# Patient Record
Sex: MALE | Race: WHITE | NOT HISPANIC OR LATINO | Employment: FULL TIME | URBAN - METROPOLITAN AREA
[De-identification: names, ages, dates, MRNs, and addresses within clinical notes are randomized per-mention and may not be internally consistent; named-entity substitution may affect disease eponyms.]

---

## 2017-06-03 ENCOUNTER — HOSPITAL ENCOUNTER (INPATIENT)
Facility: HOSPITAL | Age: 57
LOS: 1 days | Discharge: HOME | DRG: 552 | End: 2017-06-04
Attending: THORACIC SURGERY (CARDIOTHORACIC VASCULAR SURGERY) | Admitting: THORACIC SURGERY (CARDIOTHORACIC VASCULAR SURGERY)
Payer: COMMERCIAL

## 2017-06-03 ENCOUNTER — APPOINTMENT (EMERGENCY)
Dept: RADIOLOGY | Facility: HOSPITAL | Age: 57
DRG: 552 | End: 2017-06-03
Payer: COMMERCIAL

## 2017-06-03 LAB
ANION GAP SERPL CALCULATED.3IONS-SCNC: 5 MMOL/L (ref 4–13)
BASE EXCESS BLDA CALC-SCNC: 5 MMOL/L (ref -2–3)
BASOPHILS # BLD AUTO: 0.02 THOUSANDS/ΜL (ref 0–0.1)
BASOPHILS NFR BLD AUTO: 0 % (ref 0–1)
BUN SERPL-MCNC: 12 MG/DL (ref 5–25)
CA-I BLD-SCNC: 1.12 MMOL/L (ref 1.12–1.32)
CALCIUM SERPL-MCNC: 9 MG/DL (ref 8.3–10.1)
CHLORIDE SERPL-SCNC: 105 MMOL/L (ref 100–108)
CO2 SERPL-SCNC: 27 MMOL/L (ref 21–32)
CREAT SERPL-MCNC: 1.15 MG/DL (ref 0.6–1.3)
EOSINOPHIL # BLD AUTO: 0.25 THOUSAND/ΜL (ref 0–0.61)
EOSINOPHIL NFR BLD AUTO: 4 % (ref 0–6)
ERYTHROCYTE [DISTWIDTH] IN BLOOD BY AUTOMATED COUNT: 12.4 % (ref 11.6–15.1)
GFR SERPL CREATININE-BSD FRML MDRD: >60 ML/MIN/1.73SQ M
GLUCOSE SERPL-MCNC: 83 MG/DL (ref 65–140)
GLUCOSE SERPL-MCNC: 91 MG/DL (ref 65–140)
HCO3 BLDA-SCNC: 28.4 MMOL/L (ref 24–30)
HCT VFR BLD AUTO: 42.6 % (ref 36.5–49.3)
HCT VFR BLD CALC: 42 % (ref 36.5–49.3)
HGB BLD-MCNC: 15.6 G/DL (ref 12–17)
HGB BLDA-MCNC: 14.3 G/DL (ref 12–17)
HOLD SPECIMEN: NORMAL
HOLD SPECIMEN: YES
LYMPHOCYTES # BLD AUTO: 1.82 THOUSANDS/ΜL (ref 0.6–4.47)
LYMPHOCYTES NFR BLD AUTO: 31 % (ref 14–44)
MCH RBC QN AUTO: 31.5 PG (ref 26.8–34.3)
MCHC RBC AUTO-ENTMCNC: 36.6 G/DL (ref 31.4–37.4)
MCV RBC AUTO: 86 FL (ref 82–98)
MONOCYTES # BLD AUTO: 0.52 THOUSAND/ΜL (ref 0.17–1.22)
MONOCYTES NFR BLD AUTO: 9 % (ref 4–12)
NEUTROPHILS # BLD AUTO: 3.23 THOUSANDS/ΜL (ref 1.85–7.62)
NEUTS SEG NFR BLD AUTO: 56 % (ref 43–75)
NRBC BLD AUTO-RTO: 0 /100 WBCS
PCO2 BLD: 30 MMOL/L (ref 21–32)
PCO2 BLD: 36.2 MM HG (ref 42–50)
PH BLD: 7.5 [PH] (ref 7.3–7.4)
PLATELET # BLD AUTO: 171 THOUSANDS/UL (ref 149–390)
PMV BLD AUTO: 9.3 FL (ref 8.9–12.7)
PO2 BLD: 52 MM HG (ref 35–45)
POTASSIUM BLD-SCNC: 3.6 MMOL/L (ref 3.5–5.3)
POTASSIUM SERPL-SCNC: 4.6 MMOL/L (ref 3.5–5.3)
RBC # BLD AUTO: 4.95 MILLION/UL (ref 3.88–5.62)
SAO2 % BLD FROM PO2: 90 % (ref 95–98)
SODIUM BLD-SCNC: 141 MMOL/L (ref 136–145)
SODIUM SERPL-SCNC: 137 MMOL/L (ref 136–145)
SPECIMEN SOURCE: ABNORMAL
WBC # BLD AUTO: 5.86 THOUSAND/UL (ref 4.31–10.16)

## 2017-06-03 PROCEDURE — 80048 BASIC METABOLIC PNL TOTAL CA: CPT | Performed by: THORACIC SURGERY (CARDIOTHORACIC VASCULAR SURGERY)

## 2017-06-03 PROCEDURE — 84295 ASSAY OF SERUM SODIUM: CPT

## 2017-06-03 PROCEDURE — 84132 ASSAY OF SERUM POTASSIUM: CPT

## 2017-06-03 PROCEDURE — 85014 HEMATOCRIT: CPT

## 2017-06-03 PROCEDURE — 82330 ASSAY OF CALCIUM: CPT

## 2017-06-03 PROCEDURE — 85025 COMPLETE CBC W/AUTO DIFF WBC: CPT | Performed by: THORACIC SURGERY (CARDIOTHORACIC VASCULAR SURGERY)

## 2017-06-03 PROCEDURE — 73560 X-RAY EXAM OF KNEE 1 OR 2: CPT

## 2017-06-03 PROCEDURE — 71010 HB CHEST X-RAY 1 VIEW FRONTAL: CPT

## 2017-06-03 PROCEDURE — 70450 CT HEAD/BRAIN W/O DYE: CPT

## 2017-06-03 PROCEDURE — 36415 COLL VENOUS BLD VENIPUNCTURE: CPT

## 2017-06-03 PROCEDURE — 99285 EMERGENCY DEPT VISIT HI MDM: CPT

## 2017-06-03 PROCEDURE — 72125 CT NECK SPINE W/O DYE: CPT

## 2017-06-03 PROCEDURE — 82803 BLOOD GASES ANY COMBINATION: CPT

## 2017-06-03 PROCEDURE — 71260 CT THORAX DX C+: CPT

## 2017-06-03 PROCEDURE — 82947 ASSAY GLUCOSE BLOOD QUANT: CPT

## 2017-06-03 PROCEDURE — 73110 X-RAY EXAM OF WRIST: CPT

## 2017-06-03 PROCEDURE — 74177 CT ABD & PELVIS W/CONTRAST: CPT

## 2017-06-03 PROCEDURE — 96374 THER/PROPH/DIAG INJ IV PUSH: CPT

## 2017-06-03 RX ORDER — ONDANSETRON 2 MG/ML
4 INJECTION INTRAMUSCULAR; INTRAVENOUS EVERY 6 HOURS PRN
Status: DISCONTINUED | OUTPATIENT
Start: 2017-06-03 | End: 2017-06-04 | Stop reason: HOSPADM

## 2017-06-03 RX ORDER — ESOMEPRAZOLE MAGNESIUM 40 MG/1
40 CAPSULE, DELAYED RELEASE ORAL
COMMUNITY
End: 2017-12-20 | Stop reason: ALTCHOICE

## 2017-06-03 RX ORDER — FENTANYL CITRATE 50 UG/ML
INJECTION, SOLUTION INTRAMUSCULAR; INTRAVENOUS CODE/TRAUMA/SEDATION MEDICATION
Status: COMPLETED | OUTPATIENT
Start: 2017-06-03 | End: 2017-06-03

## 2017-06-03 RX ORDER — OXYCODONE HYDROCHLORIDE 5 MG/1
5 TABLET ORAL EVERY 4 HOURS PRN
Status: DISCONTINUED | OUTPATIENT
Start: 2017-06-03 | End: 2017-06-04 | Stop reason: HOSPADM

## 2017-06-03 RX ORDER — OXYCODONE HYDROCHLORIDE 10 MG/1
10 TABLET ORAL EVERY 4 HOURS PRN
Status: DISCONTINUED | OUTPATIENT
Start: 2017-06-03 | End: 2017-06-04 | Stop reason: HOSPADM

## 2017-06-03 RX ORDER — ESCITALOPRAM OXALATE 20 MG/1
20 TABLET ORAL DAILY
COMMUNITY
End: 2017-12-20 | Stop reason: ALTCHOICE

## 2017-06-03 RX ORDER — FENTANYL CITRATE 50 UG/ML
INJECTION, SOLUTION INTRAMUSCULAR; INTRAVENOUS
Status: COMPLETED
Start: 2017-06-03 | End: 2017-06-03

## 2017-06-03 RX ORDER — MORPHINE SULFATE 2 MG/ML
2 INJECTION, SOLUTION INTRAMUSCULAR; INTRAVENOUS EVERY 2 HOUR PRN
Status: DISCONTINUED | OUTPATIENT
Start: 2017-06-03 | End: 2017-06-04 | Stop reason: HOSPADM

## 2017-06-03 RX ORDER — ACETAMINOPHEN 325 MG/1
650 TABLET ORAL EVERY 6 HOURS SCHEDULED
Status: DISCONTINUED | OUTPATIENT
Start: 2017-06-03 | End: 2017-06-04 | Stop reason: HOSPADM

## 2017-06-03 RX ADMIN — ACETAMINOPHEN 650 MG: 325 TABLET, FILM COATED ORAL at 17:17

## 2017-06-03 RX ADMIN — OXYCODONE HYDROCHLORIDE 10 MG: 10 TABLET ORAL at 15:51

## 2017-06-03 RX ADMIN — FENTANYL CITRATE 50 MCG: 50 INJECTION INTRAMUSCULAR; INTRAVENOUS at 14:15

## 2017-06-03 RX ADMIN — FENTANYL CITRATE 50 MCG: 50 INJECTION, SOLUTION INTRAMUSCULAR; INTRAVENOUS at 14:15

## 2017-06-03 RX ADMIN — IOHEXOL 100 ML: 350 INJECTION, SOLUTION INTRAVENOUS at 14:09

## 2017-06-03 RX ADMIN — ACETAMINOPHEN 650 MG: 325 TABLET, FILM COATED ORAL at 23:35

## 2017-06-04 VITALS
BODY MASS INDEX: 23.29 KG/M2 | TEMPERATURE: 98.2 F | SYSTOLIC BLOOD PRESSURE: 127 MMHG | HEIGHT: 72 IN | RESPIRATION RATE: 16 BRPM | OXYGEN SATURATION: 95 % | WEIGHT: 171.96 LBS | HEART RATE: 68 BPM | DIASTOLIC BLOOD PRESSURE: 91 MMHG

## 2017-06-04 PROBLEM — S63.509A WRIST SPRAIN: Status: ACTIVE | Noted: 2017-06-04

## 2017-06-04 PROBLEM — S13.9XXA CERVICAL SPRAIN: Status: ACTIVE | Noted: 2017-06-04
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
## 2017-06-04 LAB
ANION GAP SERPL CALCULATED.3IONS-SCNC: 7 MMOL/L (ref 4–13)
BUN SERPL-MCNC: 12 MG/DL (ref 5–25)
CALCIUM SERPL-MCNC: 8.6 MG/DL (ref 8.3–10.1)
CHLORIDE SERPL-SCNC: 104 MMOL/L (ref 100–108)
CO2 SERPL-SCNC: 31 MMOL/L (ref 21–32)
CREAT SERPL-MCNC: 0.99 MG/DL (ref 0.6–1.3)
ERYTHROCYTE [DISTWIDTH] IN BLOOD BY AUTOMATED COUNT: 12.4 % (ref 11.6–15.1)
GFR SERPL CREATININE-BSD FRML MDRD: >60 ML/MIN/1.73SQ M
GLUCOSE SERPL-MCNC: 80 MG/DL (ref 65–140)
HCT VFR BLD AUTO: 41.5 % (ref 36.5–49.3)
HGB BLD-MCNC: 14.8 G/DL (ref 12–17)
MCH RBC QN AUTO: 31 PG (ref 26.8–34.3)
MCHC RBC AUTO-ENTMCNC: 35.7 G/DL (ref 31.4–37.4)
MCV RBC AUTO: 87 FL (ref 82–98)
PLATELET # BLD AUTO: 158 THOUSANDS/UL (ref 149–390)
PMV BLD AUTO: 9.6 FL (ref 8.9–12.7)
POTASSIUM SERPL-SCNC: 3.3 MMOL/L (ref 3.5–5.3)
RBC # BLD AUTO: 4.77 MILLION/UL (ref 3.88–5.62)
SODIUM SERPL-SCNC: 142 MMOL/L (ref 136–145)
WBC # BLD AUTO: 5 THOUSAND/UL (ref 4.31–10.16)

## 2017-06-04 PROCEDURE — G8988 SELF CARE GOAL STATUS: HCPCS

## 2017-06-04 PROCEDURE — G8978 MOBILITY CURRENT STATUS: HCPCS

## 2017-06-04 PROCEDURE — 80048 BASIC METABOLIC PNL TOTAL CA: CPT | Performed by: ORTHOPAEDIC SURGERY

## 2017-06-04 PROCEDURE — G8989 SELF CARE D/C STATUS: HCPCS

## 2017-06-04 PROCEDURE — G8987 SELF CARE CURRENT STATUS: HCPCS

## 2017-06-04 PROCEDURE — 97166 OT EVAL MOD COMPLEX 45 MIN: CPT

## 2017-06-04 PROCEDURE — 85027 COMPLETE CBC AUTOMATED: CPT | Performed by: ORTHOPAEDIC SURGERY

## 2017-06-04 PROCEDURE — G8980 MOBILITY D/C STATUS: HCPCS

## 2017-06-04 PROCEDURE — 97162 PT EVAL MOD COMPLEX 30 MIN: CPT

## 2017-06-04 PROCEDURE — G8979 MOBILITY GOAL STATUS: HCPCS

## 2017-06-04 RX ORDER — POTASSIUM CHLORIDE 20 MEQ/1
40 TABLET, EXTENDED RELEASE ORAL ONCE
Status: COMPLETED | OUTPATIENT
Start: 2017-06-04 | End: 2017-06-04

## 2017-06-04 RX ORDER — METHOCARBAMOL 500 MG/1
500 TABLET, FILM COATED ORAL EVERY 8 HOURS PRN
Qty: 15 TABLET | Refills: 0 | Status: SHIPPED | OUTPATIENT
Start: 2017-06-04 | End: 2017-12-20 | Stop reason: ALTCHOICE

## 2017-06-04 RX ADMIN — POTASSIUM CHLORIDE 40 MEQ: 1500 TABLET, EXTENDED RELEASE ORAL at 08:48

## 2017-06-04 RX ADMIN — ACETAMINOPHEN 650 MG: 325 TABLET, FILM COATED ORAL at 11:54

## 2017-06-04 RX ADMIN — OXYCODONE HYDROCHLORIDE 10 MG: 10 TABLET ORAL at 11:54

## 2017-06-04 RX ADMIN — ACETAMINOPHEN 650 MG: 325 TABLET, FILM COATED ORAL at 06:09

## 2017-06-04 RX ADMIN — OXYCODONE HYDROCHLORIDE 10 MG: 10 TABLET ORAL at 06:10

## 2017-06-05 ENCOUNTER — GENERIC CONVERSION - ENCOUNTER (OUTPATIENT)
Dept: OTHER | Facility: OTHER | Age: 57
End: 2017-06-05

## 2017-06-06 ENCOUNTER — HOSPITAL ENCOUNTER (OUTPATIENT)
Dept: RADIOLOGY | Facility: HOSPITAL | Age: 57
Discharge: HOME | End: 2017-06-06
Attending: SURGERY
Payer: COMMERCIAL

## 2017-06-06 ENCOUNTER — TRANSCRIBE ORDERS (OUTPATIENT)
Dept: RADIOLOGY | Facility: HOSPITAL | Age: 57
End: 2017-06-06

## 2017-06-06 DIAGNOSIS — M54.9 DORSALGIA: ICD-10-CM

## 2017-06-06 DIAGNOSIS — M25.532 LEFT WRIST PAIN: ICD-10-CM

## 2017-06-06 DIAGNOSIS — M54.9 DORSALGIA: Primary | ICD-10-CM

## 2017-06-06 PROCEDURE — 73110 X-RAY EXAM OF WRIST: CPT

## 2017-06-06 PROCEDURE — 72052 X-RAY EXAM NECK SPINE 6/>VWS: CPT

## 2017-06-08 ENCOUNTER — ALLSCRIPTS OFFICE VISIT (OUTPATIENT)
Dept: OTHER | Facility: OTHER | Age: 57
End: 2017-06-08

## 2017-06-08 ENCOUNTER — TRANSCRIBE ORDERS (OUTPATIENT)
Dept: ADMINISTRATIVE | Facility: HOSPITAL | Age: 57
End: 2017-06-08

## 2017-06-08 DIAGNOSIS — S62.112A: ICD-10-CM

## 2017-06-08 DIAGNOSIS — M77.8 OTHER ENTHESOPATHIES, NOT ELSEWHERE CLASSIFIED: ICD-10-CM

## 2017-06-08 DIAGNOSIS — M77.8 ENTHESOPATHY OF ELBOW, UNSPECIFIED: ICD-10-CM

## 2017-06-08 DIAGNOSIS — M25.532 PAIN IN LEFT WRIST: ICD-10-CM

## 2017-06-08 DIAGNOSIS — M25.532 LEFT WRIST PAIN: Primary | ICD-10-CM

## 2017-06-08 DIAGNOSIS — S60.011A CONTUSION OF RIGHT THUMB WITHOUT DAMAGE TO NAIL: ICD-10-CM

## 2017-06-09 ENCOUNTER — ALLSCRIPTS OFFICE VISIT (OUTPATIENT)
Dept: OTHER | Facility: OTHER | Age: 57
End: 2017-06-09

## 2017-06-12 ENCOUNTER — TRANSCRIBE ORDERS (OUTPATIENT)
Dept: LAB | Facility: CLINIC | Age: 57
End: 2017-06-12

## 2017-06-12 ENCOUNTER — APPOINTMENT (OUTPATIENT)
Dept: LAB | Facility: CLINIC | Age: 57
End: 2017-06-12
Payer: COMMERCIAL

## 2017-06-12 DIAGNOSIS — I10 UNSPECIFIED ESSENTIAL HYPERTENSION: ICD-10-CM

## 2017-06-12 DIAGNOSIS — E03.8 OTHER SPECIFIED ACQUIRED HYPOTHYROIDISM: ICD-10-CM

## 2017-06-12 DIAGNOSIS — E06.3 CHRONIC LYMPHOCYTIC THYROIDITIS: ICD-10-CM

## 2017-06-12 DIAGNOSIS — E03.8 OTHER SPECIFIED ACQUIRED HYPOTHYROIDISM: Primary | ICD-10-CM

## 2017-06-12 LAB
POTASSIUM SERPL-SCNC: 3.5 MMOL/L (ref 3.5–5.3)
TSH SERPL DL<=0.05 MIU/L-ACNC: 2.25 UIU/ML (ref 0.36–3.74)

## 2017-06-12 PROCEDURE — 84443 ASSAY THYROID STIM HORMONE: CPT

## 2017-06-12 PROCEDURE — 36415 COLL VENOUS BLD VENIPUNCTURE: CPT

## 2017-06-12 PROCEDURE — 84132 ASSAY OF SERUM POTASSIUM: CPT

## 2017-06-17 ENCOUNTER — HOSPITAL ENCOUNTER (OUTPATIENT)
Dept: MRI IMAGING | Facility: HOSPITAL | Age: 57
Discharge: HOME | End: 2017-06-17
Attending: ORTHOPAEDIC SURGERY
Payer: COMMERCIAL

## 2017-06-17 DIAGNOSIS — M25.532 PAIN IN LEFT WRIST: ICD-10-CM

## 2017-06-17 DIAGNOSIS — S62.112A: ICD-10-CM

## 2017-06-17 DIAGNOSIS — M25.532 LEFT WRIST PAIN: ICD-10-CM

## 2017-06-17 DIAGNOSIS — M77.8 OTHER ENTHESOPATHIES, NOT ELSEWHERE CLASSIFIED: ICD-10-CM

## 2017-06-17 DIAGNOSIS — M77.8 ENTHESOPATHY OF ELBOW, UNSPECIFIED: ICD-10-CM

## 2017-06-17 PROCEDURE — 73221 MRI JOINT UPR EXTREM W/O DYE: CPT

## 2017-07-05 ENCOUNTER — ALLSCRIPTS OFFICE VISIT (OUTPATIENT)
Dept: OTHER | Facility: OTHER | Age: 57
End: 2017-07-05

## 2017-07-05 ENCOUNTER — HOSPITAL ENCOUNTER (OUTPATIENT)
Dept: RADIOLOGY | Facility: HOSPITAL | Age: 57
Discharge: HOME | End: 2017-07-05
Attending: ORTHOPAEDIC SURGERY
Payer: COMMERCIAL

## 2017-07-05 DIAGNOSIS — S62.112A: ICD-10-CM

## 2017-07-05 PROCEDURE — 73110 X-RAY EXAM OF WRIST: CPT

## 2017-07-06 ENCOUNTER — ALLSCRIPTS OFFICE VISIT (OUTPATIENT)
Dept: OTHER | Facility: OTHER | Age: 57
End: 2017-07-06

## 2017-07-12 ENCOUNTER — GENERIC CONVERSION - ENCOUNTER (OUTPATIENT)
Dept: OTHER | Facility: OTHER | Age: 57
End: 2017-07-12

## 2017-07-12 ENCOUNTER — APPOINTMENT (OUTPATIENT)
Dept: OCCUPATIONAL THERAPY | Facility: CLINIC | Age: 57
End: 2017-07-12
Payer: COMMERCIAL

## 2017-07-12 DIAGNOSIS — S60.011A CONTUSION OF RIGHT THUMB WITHOUT DAMAGE TO NAIL: ICD-10-CM

## 2017-07-12 PROCEDURE — 97165 OT EVAL LOW COMPLEX 30 MIN: CPT

## 2017-07-18 ENCOUNTER — APPOINTMENT (OUTPATIENT)
Dept: OCCUPATIONAL THERAPY | Facility: CLINIC | Age: 57
End: 2017-07-18
Payer: COMMERCIAL

## 2017-07-18 PROCEDURE — 97110 THERAPEUTIC EXERCISES: CPT

## 2017-07-18 PROCEDURE — 97010 HOT OR COLD PACKS THERAPY: CPT

## 2017-07-18 PROCEDURE — 97140 MANUAL THERAPY 1/> REGIONS: CPT

## 2017-07-20 ENCOUNTER — APPOINTMENT (OUTPATIENT)
Dept: OCCUPATIONAL THERAPY | Facility: CLINIC | Age: 57
End: 2017-07-20
Payer: COMMERCIAL

## 2017-07-25 ENCOUNTER — APPOINTMENT (OUTPATIENT)
Dept: OCCUPATIONAL THERAPY | Facility: CLINIC | Age: 57
End: 2017-07-25
Payer: COMMERCIAL

## 2017-07-25 PROCEDURE — 97110 THERAPEUTIC EXERCISES: CPT

## 2017-07-25 PROCEDURE — 97140 MANUAL THERAPY 1/> REGIONS: CPT

## 2017-07-25 PROCEDURE — 97150 GROUP THERAPEUTIC PROCEDURES: CPT

## 2017-07-27 ENCOUNTER — ALLSCRIPTS OFFICE VISIT (OUTPATIENT)
Dept: OTHER | Facility: OTHER | Age: 57
End: 2017-07-27

## 2017-07-27 ENCOUNTER — APPOINTMENT (OUTPATIENT)
Dept: OCCUPATIONAL THERAPY | Facility: CLINIC | Age: 57
End: 2017-07-27
Payer: COMMERCIAL

## 2017-07-27 DIAGNOSIS — M25.532 PAIN IN LEFT WRIST: ICD-10-CM

## 2017-07-27 DIAGNOSIS — S62.112A: ICD-10-CM

## 2017-07-27 DIAGNOSIS — M77.8 OTHER ENTHESOPATHIES, NOT ELSEWHERE CLASSIFIED: ICD-10-CM

## 2017-07-27 PROCEDURE — 97110 THERAPEUTIC EXERCISES: CPT

## 2017-07-27 PROCEDURE — 97140 MANUAL THERAPY 1/> REGIONS: CPT

## 2017-07-31 ENCOUNTER — APPOINTMENT (OUTPATIENT)
Dept: OCCUPATIONAL THERAPY | Facility: CLINIC | Age: 57
End: 2017-07-31
Payer: COMMERCIAL

## 2017-07-31 PROCEDURE — 97110 THERAPEUTIC EXERCISES: CPT

## 2017-07-31 PROCEDURE — 97140 MANUAL THERAPY 1/> REGIONS: CPT

## 2017-08-02 ENCOUNTER — APPOINTMENT (OUTPATIENT)
Dept: OCCUPATIONAL THERAPY | Facility: CLINIC | Age: 57
End: 2017-08-02
Payer: COMMERCIAL

## 2017-08-02 PROCEDURE — 97110 THERAPEUTIC EXERCISES: CPT

## 2017-08-02 PROCEDURE — 97140 MANUAL THERAPY 1/> REGIONS: CPT

## 2017-08-07 ENCOUNTER — APPOINTMENT (OUTPATIENT)
Dept: OCCUPATIONAL THERAPY | Facility: CLINIC | Age: 57
End: 2017-08-07
Payer: COMMERCIAL

## 2017-08-07 PROCEDURE — 97140 MANUAL THERAPY 1/> REGIONS: CPT

## 2017-08-07 PROCEDURE — 97110 THERAPEUTIC EXERCISES: CPT

## 2017-08-09 ENCOUNTER — APPOINTMENT (OUTPATIENT)
Dept: OCCUPATIONAL THERAPY | Facility: CLINIC | Age: 57
End: 2017-08-09
Payer: COMMERCIAL

## 2017-08-09 PROCEDURE — 97110 THERAPEUTIC EXERCISES: CPT

## 2017-08-09 PROCEDURE — 97140 MANUAL THERAPY 1/> REGIONS: CPT

## 2017-08-14 ENCOUNTER — ALLSCRIPTS OFFICE VISIT (OUTPATIENT)
Dept: OTHER | Facility: OTHER | Age: 57
End: 2017-08-14

## 2017-08-14 ENCOUNTER — APPOINTMENT (OUTPATIENT)
Dept: OCCUPATIONAL THERAPY | Facility: CLINIC | Age: 57
End: 2017-08-14
Payer: COMMERCIAL

## 2017-08-14 ENCOUNTER — APPOINTMENT (OUTPATIENT)
Dept: RADIOLOGY | Facility: CLINIC | Age: 57
End: 2017-08-14
Payer: COMMERCIAL

## 2017-08-14 ENCOUNTER — TRANSCRIBE ORDERS (OUTPATIENT)
Dept: ADMINISTRATIVE | Facility: HOSPITAL | Age: 57
End: 2017-08-14

## 2017-08-14 DIAGNOSIS — M25.532 PAIN IN LEFT WRIST: ICD-10-CM

## 2017-08-14 DIAGNOSIS — S62.112A CLOSED DISPLACED FRACTURE OF TRIQUETRUM OF LEFT WRIST, INITIAL ENCOUNTER: Primary | ICD-10-CM

## 2017-08-14 PROCEDURE — 73110 X-RAY EXAM OF WRIST: CPT

## 2017-08-14 PROCEDURE — 97110 THERAPEUTIC EXERCISES: CPT

## 2017-08-14 PROCEDURE — 97010 HOT OR COLD PACKS THERAPY: CPT

## 2017-08-14 PROCEDURE — 97140 MANUAL THERAPY 1/> REGIONS: CPT

## 2017-08-17 ENCOUNTER — APPOINTMENT (OUTPATIENT)
Dept: OCCUPATIONAL THERAPY | Facility: CLINIC | Age: 57
End: 2017-08-17
Payer: COMMERCIAL

## 2017-08-17 PROCEDURE — 97010 HOT OR COLD PACKS THERAPY: CPT

## 2017-08-17 PROCEDURE — 97140 MANUAL THERAPY 1/> REGIONS: CPT

## 2017-08-17 PROCEDURE — 97110 THERAPEUTIC EXERCISES: CPT

## 2017-08-21 ENCOUNTER — APPOINTMENT (OUTPATIENT)
Dept: OCCUPATIONAL THERAPY | Facility: CLINIC | Age: 57
End: 2017-08-21
Payer: COMMERCIAL

## 2017-08-21 PROCEDURE — 97110 THERAPEUTIC EXERCISES: CPT

## 2017-08-21 PROCEDURE — 97140 MANUAL THERAPY 1/> REGIONS: CPT

## 2017-08-23 ENCOUNTER — APPOINTMENT (OUTPATIENT)
Dept: OCCUPATIONAL THERAPY | Facility: CLINIC | Age: 57
End: 2017-08-23
Payer: COMMERCIAL

## 2017-08-23 PROCEDURE — 97140 MANUAL THERAPY 1/> REGIONS: CPT

## 2017-08-23 PROCEDURE — 97110 THERAPEUTIC EXERCISES: CPT

## 2017-08-24 ENCOUNTER — HOSPITAL ENCOUNTER (OUTPATIENT)
Dept: MRI IMAGING | Facility: HOSPITAL | Age: 57
Discharge: HOME | End: 2017-08-24
Attending: ORTHOPAEDIC SURGERY
Payer: COMMERCIAL

## 2017-08-24 DIAGNOSIS — S62.112A CLOSED DISPLACED FRACTURE OF TRIQUETRUM OF LEFT WRIST, INITIAL ENCOUNTER: ICD-10-CM

## 2017-08-24 PROCEDURE — 73221 MRI JOINT UPR EXTREM W/O DYE: CPT

## 2017-08-25 ENCOUNTER — APPOINTMENT (OUTPATIENT)
Dept: OCCUPATIONAL THERAPY | Facility: CLINIC | Age: 57
End: 2017-08-25
Payer: COMMERCIAL

## 2017-08-25 PROCEDURE — 97110 THERAPEUTIC EXERCISES: CPT

## 2017-08-25 PROCEDURE — 97140 MANUAL THERAPY 1/> REGIONS: CPT

## 2017-08-28 ENCOUNTER — APPOINTMENT (OUTPATIENT)
Dept: OCCUPATIONAL THERAPY | Facility: CLINIC | Age: 57
End: 2017-08-28
Payer: COMMERCIAL

## 2017-08-28 PROCEDURE — 97110 THERAPEUTIC EXERCISES: CPT

## 2017-08-28 PROCEDURE — 97140 MANUAL THERAPY 1/> REGIONS: CPT

## 2017-08-30 ENCOUNTER — APPOINTMENT (OUTPATIENT)
Dept: OCCUPATIONAL THERAPY | Facility: CLINIC | Age: 57
End: 2017-08-30
Payer: COMMERCIAL

## 2017-08-30 PROCEDURE — 97110 THERAPEUTIC EXERCISES: CPT

## 2017-08-30 PROCEDURE — 97140 MANUAL THERAPY 1/> REGIONS: CPT

## 2017-09-01 ENCOUNTER — APPOINTMENT (OUTPATIENT)
Dept: OCCUPATIONAL THERAPY | Facility: CLINIC | Age: 57
End: 2017-09-01
Payer: COMMERCIAL

## 2017-09-05 ENCOUNTER — APPOINTMENT (OUTPATIENT)
Dept: OCCUPATIONAL THERAPY | Facility: CLINIC | Age: 57
End: 2017-09-05
Payer: COMMERCIAL

## 2017-09-05 PROCEDURE — 97140 MANUAL THERAPY 1/> REGIONS: CPT

## 2017-09-05 PROCEDURE — 97010 HOT OR COLD PACKS THERAPY: CPT

## 2017-09-05 PROCEDURE — 97110 THERAPEUTIC EXERCISES: CPT

## 2017-09-07 ENCOUNTER — APPOINTMENT (OUTPATIENT)
Dept: OCCUPATIONAL THERAPY | Facility: CLINIC | Age: 57
End: 2017-09-07
Payer: COMMERCIAL

## 2017-09-07 PROCEDURE — 97140 MANUAL THERAPY 1/> REGIONS: CPT

## 2017-09-07 PROCEDURE — 97110 THERAPEUTIC EXERCISES: CPT

## 2017-09-08 ENCOUNTER — APPOINTMENT (OUTPATIENT)
Dept: OCCUPATIONAL THERAPY | Facility: CLINIC | Age: 57
End: 2017-09-08
Payer: COMMERCIAL

## 2017-09-08 PROCEDURE — 97110 THERAPEUTIC EXERCISES: CPT

## 2017-09-08 PROCEDURE — 97140 MANUAL THERAPY 1/> REGIONS: CPT

## 2017-09-11 ENCOUNTER — APPOINTMENT (OUTPATIENT)
Dept: OCCUPATIONAL THERAPY | Facility: CLINIC | Age: 57
End: 2017-09-11
Payer: COMMERCIAL

## 2017-09-11 PROCEDURE — 97110 THERAPEUTIC EXERCISES: CPT

## 2017-09-13 ENCOUNTER — APPOINTMENT (OUTPATIENT)
Dept: OCCUPATIONAL THERAPY | Facility: CLINIC | Age: 57
End: 2017-09-13
Payer: COMMERCIAL

## 2017-09-15 ENCOUNTER — APPOINTMENT (OUTPATIENT)
Dept: OCCUPATIONAL THERAPY | Facility: CLINIC | Age: 57
End: 2017-09-15
Payer: COMMERCIAL

## 2017-09-18 ENCOUNTER — ALLSCRIPTS OFFICE VISIT (OUTPATIENT)
Dept: OTHER | Facility: OTHER | Age: 57
End: 2017-09-18

## 2017-09-21 ENCOUNTER — APPOINTMENT (OUTPATIENT)
Dept: OCCUPATIONAL THERAPY | Facility: CLINIC | Age: 57
End: 2017-09-21
Payer: COMMERCIAL

## 2017-09-21 PROCEDURE — 97140 MANUAL THERAPY 1/> REGIONS: CPT

## 2017-09-21 PROCEDURE — 97110 THERAPEUTIC EXERCISES: CPT

## 2017-09-26 ENCOUNTER — APPOINTMENT (OUTPATIENT)
Dept: OCCUPATIONAL THERAPY | Facility: CLINIC | Age: 57
End: 2017-09-26
Payer: COMMERCIAL

## 2017-09-26 PROCEDURE — 97110 THERAPEUTIC EXERCISES: CPT

## 2017-09-26 PROCEDURE — 97140 MANUAL THERAPY 1/> REGIONS: CPT

## 2017-09-28 ENCOUNTER — APPOINTMENT (OUTPATIENT)
Dept: OCCUPATIONAL THERAPY | Facility: CLINIC | Age: 57
End: 2017-09-28
Payer: COMMERCIAL

## 2017-09-28 PROCEDURE — 97140 MANUAL THERAPY 1/> REGIONS: CPT

## 2017-09-28 PROCEDURE — 97110 THERAPEUTIC EXERCISES: CPT

## 2017-10-03 ENCOUNTER — APPOINTMENT (OUTPATIENT)
Dept: OCCUPATIONAL THERAPY | Facility: CLINIC | Age: 57
End: 2017-10-03
Payer: COMMERCIAL

## 2017-10-03 PROCEDURE — 97110 THERAPEUTIC EXERCISES: CPT

## 2017-10-03 PROCEDURE — 97140 MANUAL THERAPY 1/> REGIONS: CPT

## 2017-10-05 ENCOUNTER — APPOINTMENT (OUTPATIENT)
Dept: OCCUPATIONAL THERAPY | Facility: CLINIC | Age: 57
End: 2017-10-05
Payer: COMMERCIAL

## 2017-10-05 PROCEDURE — 97140 MANUAL THERAPY 1/> REGIONS: CPT

## 2017-10-05 PROCEDURE — G8990 OTHER PT/OT CURRENT STATUS: HCPCS

## 2017-10-05 PROCEDURE — G8991 OTHER PT/OT GOAL STATUS: HCPCS

## 2017-12-20 ENCOUNTER — HOSPITAL ENCOUNTER (OUTPATIENT)
Facility: HOSPITAL | Age: 57
Setting detail: OBSERVATION
Discharge: HOME | End: 2017-12-22
Attending: EMERGENCY MEDICINE | Admitting: HOSPITALIST
Payer: COMMERCIAL

## 2017-12-20 ENCOUNTER — APPOINTMENT (EMERGENCY)
Dept: RADIOLOGY | Facility: HOSPITAL | Age: 57
End: 2017-12-20
Payer: COMMERCIAL

## 2017-12-20 DIAGNOSIS — R05.9 COUGH: ICD-10-CM

## 2017-12-20 DIAGNOSIS — R50.9 FEVER AND CHILLS: Primary | ICD-10-CM

## 2017-12-20 PROBLEM — E06.3 HASHIMOTO'S DISEASE: Status: ACTIVE | Noted: 2017-12-20

## 2017-12-20 PROBLEM — E86.0 DEHYDRATION: Status: ACTIVE | Noted: 2017-12-20

## 2017-12-20 PROBLEM — E87.6 HYPOKALEMIA: Status: ACTIVE | Noted: 2017-12-20

## 2017-12-20 LAB
ALBUMIN SERPL BCP-MCNC: 3.7 G/DL (ref 3.5–5)
ALP SERPL-CCNC: 103 U/L (ref 46–116)
ALT SERPL W P-5'-P-CCNC: 47 U/L (ref 12–78)
ANION GAP SERPL CALCULATED.3IONS-SCNC: 10 MMOL/L (ref 4–13)
AST SERPL W P-5'-P-CCNC: 28 U/L (ref 5–45)
BASOPHILS # BLD AUTO: 0.01 THOUSANDS/ΜL (ref 0–0.1)
BASOPHILS NFR BLD AUTO: 0 % (ref 0–1)
BILIRUB SERPL-MCNC: 0.9 MG/DL (ref 0.2–1)
BILIRUB UR QL STRIP: NEGATIVE
BUN SERPL-MCNC: 8 MG/DL (ref 5–25)
CALCIUM SERPL-MCNC: 8.6 MG/DL (ref 8.3–10.1)
CHLORIDE SERPL-SCNC: 101 MMOL/L (ref 100–108)
CK SERPL-CCNC: 70 U/L (ref 39–308)
CLARITY UR: CLEAR
CO2 SERPL-SCNC: 28 MMOL/L (ref 21–32)
COLOR UR: NORMAL
CREAT SERPL-MCNC: 1.16 MG/DL (ref 0.6–1.3)
CRP SERPL QL: 10.5 MG/L
EOSINOPHIL # BLD AUTO: 0.15 THOUSAND/ΜL (ref 0–0.61)
EOSINOPHIL NFR BLD AUTO: 3 % (ref 0–6)
ERYTHROCYTE [DISTWIDTH] IN BLOOD BY AUTOMATED COUNT: 12 % (ref 11.6–15.1)
FLUAV AG SPEC QL IA: NEGATIVE
FLUBV AG SPEC QL IA: NEGATIVE
GFR SERPL CREATININE-BSD FRML MDRD: 70 ML/MIN/1.73SQ M
GLUCOSE SERPL-MCNC: 116 MG/DL (ref 65–140)
GLUCOSE UR STRIP-MCNC: NEGATIVE MG/DL
HCT VFR BLD AUTO: 43.6 % (ref 36.5–49.3)
HGB BLD-MCNC: 15.2 G/DL (ref 12–17)
HGB UR QL STRIP.AUTO: NEGATIVE
KETONES UR STRIP-MCNC: NEGATIVE MG/DL
LACTATE SERPL-SCNC: 1.5 MMOL/L (ref 0.5–2)
LEUKOCYTE ESTERASE UR QL STRIP: NEGATIVE
LYMPHOCYTES # BLD AUTO: 0.71 THOUSANDS/ΜL (ref 0.6–4.47)
LYMPHOCYTES NFR BLD AUTO: 13 % (ref 14–44)
MCH RBC QN AUTO: 30.5 PG (ref 26.8–34.3)
MCHC RBC AUTO-ENTMCNC: 34.9 G/DL (ref 31.4–37.4)
MCV RBC AUTO: 87 FL (ref 82–98)
MONOCYTES # BLD AUTO: 0.58 THOUSAND/ΜL (ref 0.17–1.22)
MONOCYTES NFR BLD AUTO: 11 % (ref 4–12)
NEUTROPHILS # BLD AUTO: 4.02 THOUSANDS/ΜL (ref 1.85–7.62)
NEUTS SEG NFR BLD AUTO: 73 % (ref 43–75)
NITRITE UR QL STRIP: NEGATIVE
PH UR STRIP.AUTO: 7.5 [PH] (ref 4.5–8)
PLATELET # BLD AUTO: 176 THOUSANDS/UL (ref 149–390)
PMV BLD AUTO: 9.5 FL (ref 8.9–12.7)
POTASSIUM SERPL-SCNC: 3.2 MMOL/L (ref 3.5–5.3)
PROT SERPL-MCNC: 6.5 G/DL (ref 6.4–8.2)
PROT UR STRIP-MCNC: NEGATIVE MG/DL
RBC # BLD AUTO: 4.99 MILLION/UL (ref 3.88–5.62)
SODIUM SERPL-SCNC: 139 MMOL/L (ref 136–145)
SP GR UR STRIP.AUTO: 1.01 (ref 1–1.03)
TSH SERPL DL<=0.05 MIU/L-ACNC: 1.8 UIU/ML (ref 0.36–3.74)
UROBILINOGEN UR QL STRIP.AUTO: 0.2 E.U./DL
WBC # BLD AUTO: 5.47 THOUSAND/UL (ref 4.31–10.16)

## 2017-12-20 PROCEDURE — 86140 C-REACTIVE PROTEIN: CPT | Performed by: PHYSICIAN ASSISTANT

## 2017-12-20 PROCEDURE — 82550 ASSAY OF CK (CPK): CPT | Performed by: PHYSICIAN ASSISTANT

## 2017-12-20 PROCEDURE — 85025 COMPLETE CBC W/AUTO DIFF WBC: CPT | Performed by: PHYSICIAN ASSISTANT

## 2017-12-20 PROCEDURE — 96361 HYDRATE IV INFUSION ADD-ON: CPT

## 2017-12-20 PROCEDURE — 96375 TX/PRO/DX INJ NEW DRUG ADDON: CPT

## 2017-12-20 PROCEDURE — 87400 INFLUENZA A/B EACH AG IA: CPT | Performed by: PHYSICIAN ASSISTANT

## 2017-12-20 PROCEDURE — 84443 ASSAY THYROID STIM HORMONE: CPT | Performed by: PHYSICIAN ASSISTANT

## 2017-12-20 PROCEDURE — 36415 COLL VENOUS BLD VENIPUNCTURE: CPT | Performed by: PHYSICIAN ASSISTANT

## 2017-12-20 PROCEDURE — 81003 URINALYSIS AUTO W/O SCOPE: CPT | Performed by: PHYSICIAN ASSISTANT

## 2017-12-20 PROCEDURE — 71020 HB CHEST X-RAY 2VW FRONTAL&LATL: CPT

## 2017-12-20 PROCEDURE — 87798 DETECT AGENT NOS DNA AMP: CPT | Performed by: PHYSICIAN ASSISTANT

## 2017-12-20 PROCEDURE — 83605 ASSAY OF LACTIC ACID: CPT | Performed by: PHYSICIAN ASSISTANT

## 2017-12-20 PROCEDURE — 87040 BLOOD CULTURE FOR BACTERIA: CPT | Performed by: PHYSICIAN ASSISTANT

## 2017-12-20 PROCEDURE — 80053 COMPREHEN METABOLIC PANEL: CPT | Performed by: PHYSICIAN ASSISTANT

## 2017-12-20 PROCEDURE — 96374 THER/PROPH/DIAG INJ IV PUSH: CPT

## 2017-12-20 RX ORDER — VENLAFAXINE 75 MG/1
75 TABLET ORAL 2 TIMES DAILY
COMMUNITY
End: 2018-09-17 | Stop reason: ALTCHOICE

## 2017-12-20 RX ORDER — CLONAZEPAM 1 MG/1
0.5 TABLET ORAL DAILY
COMMUNITY
End: 2019-01-25 | Stop reason: SDUPTHER

## 2017-12-20 RX ORDER — KETOROLAC TROMETHAMINE 30 MG/ML
15 INJECTION, SOLUTION INTRAMUSCULAR; INTRAVENOUS ONCE
Status: COMPLETED | OUTPATIENT
Start: 2017-12-20 | End: 2017-12-20

## 2017-12-20 RX ORDER — POTASSIUM CHLORIDE 20 MEQ/1
40 TABLET, EXTENDED RELEASE ORAL ONCE
Status: COMPLETED | OUTPATIENT
Start: 2017-12-20 | End: 2017-12-20

## 2017-12-20 RX ORDER — POTASSIUM CHLORIDE 20MEQ/15ML
20 LIQUID (ML) ORAL ONCE
Status: COMPLETED | OUTPATIENT
Start: 2017-12-20 | End: 2017-12-20

## 2017-12-20 RX ORDER — BUPROPION HYDROCHLORIDE 300 MG/1
300 TABLET ORAL DAILY
Status: ON HOLD | COMMUNITY
End: 2019-07-27 | Stop reason: ALTCHOICE

## 2017-12-20 RX ORDER — ACETAMINOPHEN 325 MG/1
650 TABLET ORAL EVERY 6 HOURS PRN
Status: DISCONTINUED | OUTPATIENT
Start: 2017-12-20 | End: 2017-12-22 | Stop reason: HOSPADM

## 2017-12-20 RX ORDER — SODIUM CHLORIDE 9 MG/ML
125 INJECTION, SOLUTION INTRAVENOUS CONTINUOUS
Status: DISCONTINUED | OUTPATIENT
Start: 2017-12-21 | End: 2017-12-21 | Stop reason: SDUPTHER

## 2017-12-20 RX ORDER — BUPROPION HYDROCHLORIDE 150 MG/1
300 TABLET ORAL DAILY
Status: DISCONTINUED | OUTPATIENT
Start: 2017-12-21 | End: 2017-12-22 | Stop reason: HOSPADM

## 2017-12-20 RX ORDER — HEPARIN SODIUM 5000 [USP'U]/ML
5000 INJECTION, SOLUTION INTRAVENOUS; SUBCUTANEOUS EVERY 8 HOURS SCHEDULED
Status: DISCONTINUED | OUTPATIENT
Start: 2017-12-21 | End: 2017-12-22 | Stop reason: HOSPADM

## 2017-12-20 RX ORDER — SODIUM CHLORIDE 9 MG/ML
125 INJECTION, SOLUTION INTRAVENOUS CONTINUOUS
Status: DISCONTINUED | OUTPATIENT
Start: 2017-12-21 | End: 2017-12-22 | Stop reason: HOSPADM

## 2017-12-20 RX ORDER — VENLAFAXINE 37.5 MG/1
75 TABLET ORAL 2 TIMES DAILY
Status: DISCONTINUED | OUTPATIENT
Start: 2017-12-21 | End: 2017-12-22 | Stop reason: HOSPADM

## 2017-12-20 RX ORDER — ONDANSETRON 4 MG/1
4 TABLET, ORALLY DISINTEGRATING ORAL ONCE
Status: COMPLETED | OUTPATIENT
Start: 2017-12-20 | End: 2017-12-20

## 2017-12-20 RX ORDER — ACETAMINOPHEN 325 MG/1
650 TABLET ORAL ONCE
Status: COMPLETED | OUTPATIENT
Start: 2017-12-20 | End: 2017-12-20

## 2017-12-20 RX ORDER — LEVOTHYROXINE SODIUM 0.05 MG/1
50 TABLET ORAL
Status: DISCONTINUED | OUTPATIENT
Start: 2017-12-21 | End: 2017-12-22 | Stop reason: HOSPADM

## 2017-12-20 RX ORDER — LEVOTHYROXINE SODIUM 0.05 MG/1
50 TABLET ORAL DAILY
COMMUNITY
End: 2019-01-15 | Stop reason: ALTCHOICE

## 2017-12-20 RX ORDER — CLONAZEPAM 0.5 MG/1
0.5 TABLET ORAL DAILY
Status: DISCONTINUED | OUTPATIENT
Start: 2017-12-21 | End: 2017-12-22 | Stop reason: HOSPADM

## 2017-12-20 RX ADMIN — ONDANSETRON 4 MG: 4 TABLET, ORALLY DISINTEGRATING ORAL at 20:09

## 2017-12-20 RX ADMIN — POTASSIUM CHLORIDE 20 MEQ: 1500 TABLET, EXTENDED RELEASE ORAL at 22:24

## 2017-12-20 RX ADMIN — KETOROLAC TROMETHAMINE 15 MG: 30 INJECTION, SOLUTION INTRAMUSCULAR at 22:29

## 2017-12-20 RX ADMIN — POTASSIUM CHLORIDE 20 MEQ: 20 SOLUTION ORAL at 22:44

## 2017-12-20 RX ADMIN — SODIUM CHLORIDE 1000 ML: 0.9 INJECTION, SOLUTION INTRAVENOUS at 20:24

## 2017-12-20 RX ADMIN — CEFTRIAXONE 2000 MG: 2 INJECTION, SOLUTION INTRAVENOUS at 22:29

## 2017-12-20 RX ADMIN — ACETAMINOPHEN 650 MG: 325 TABLET, FILM COATED ORAL at 20:23

## 2017-12-21 ENCOUNTER — APPOINTMENT (OUTPATIENT)
Dept: CT IMAGING | Facility: HOSPITAL | Age: 57
End: 2017-12-21
Payer: COMMERCIAL

## 2017-12-21 PROBLEM — J10.1 INFLUENZA DUE TO INFLUENZA VIRUS, TYPE B: Status: ACTIVE | Noted: 2017-12-21

## 2017-12-21 PROBLEM — A41.9 SEPSIS (HCC): Status: ACTIVE | Noted: 2017-12-21

## 2017-12-21 PROBLEM — E86.0 DEHYDRATION: Status: RESOLVED | Noted: 2017-12-20 | Resolved: 2017-12-21

## 2017-12-21 PROBLEM — E87.6 HYPOKALEMIA: Status: RESOLVED | Noted: 2017-12-20 | Resolved: 2017-12-21

## 2017-12-21 LAB
ANION GAP SERPL CALCULATED.3IONS-SCNC: 9 MMOL/L (ref 4–13)
BUN SERPL-MCNC: 7 MG/DL (ref 5–25)
CALCIUM SERPL-MCNC: 8.1 MG/DL (ref 8.3–10.1)
CHLORIDE SERPL-SCNC: 107 MMOL/L (ref 100–108)
CK SERPL-CCNC: 70 U/L (ref 39–308)
CO2 SERPL-SCNC: 26 MMOL/L (ref 21–32)
CREAT SERPL-MCNC: 0.93 MG/DL (ref 0.6–1.3)
ERYTHROCYTE [DISTWIDTH] IN BLOOD BY AUTOMATED COUNT: 12.2 % (ref 11.6–15.1)
FLUAV AG SPEC QL: ABNORMAL
FLUBV AG SPEC QL: DETECTED
GFR SERPL CREATININE-BSD FRML MDRD: 91 ML/MIN/1.73SQ M
GLUCOSE SERPL-MCNC: 94 MG/DL (ref 65–140)
HCT VFR BLD AUTO: 40.2 % (ref 36.5–49.3)
HGB BLD-MCNC: 13.5 G/DL (ref 12–17)
MAGNESIUM SERPL-MCNC: 2 MG/DL (ref 1.6–2.6)
MCH RBC QN AUTO: 30.3 PG (ref 26.8–34.3)
MCHC RBC AUTO-ENTMCNC: 33.6 G/DL (ref 31.4–37.4)
MCV RBC AUTO: 90 FL (ref 82–98)
PLATELET # BLD AUTO: 147 THOUSANDS/UL (ref 149–390)
PMV BLD AUTO: 9.8 FL (ref 8.9–12.7)
POTASSIUM SERPL-SCNC: 3.7 MMOL/L (ref 3.5–5.3)
RBC # BLD AUTO: 4.46 MILLION/UL (ref 3.88–5.62)
RSV B RNA SPEC QL NAA+PROBE: ABNORMAL
SODIUM SERPL-SCNC: 142 MMOL/L (ref 136–145)
WBC # BLD AUTO: 4.11 THOUSAND/UL (ref 4.31–10.16)

## 2017-12-21 PROCEDURE — 85027 COMPLETE CBC AUTOMATED: CPT | Performed by: HOSPITALIST

## 2017-12-21 PROCEDURE — 80048 BASIC METABOLIC PNL TOTAL CA: CPT | Performed by: HOSPITALIST

## 2017-12-21 PROCEDURE — 83735 ASSAY OF MAGNESIUM: CPT | Performed by: HOSPITALIST

## 2017-12-21 PROCEDURE — 82550 ASSAY OF CK (CPK): CPT | Performed by: HOSPITALIST

## 2017-12-21 PROCEDURE — 99284 EMERGENCY DEPT VISIT MOD MDM: CPT

## 2017-12-21 PROCEDURE — 70486 CT MAXILLOFACIAL W/O DYE: CPT

## 2017-12-21 RX ORDER — KETOROLAC TROMETHAMINE 30 MG/ML
15 INJECTION, SOLUTION INTRAMUSCULAR; INTRAVENOUS EVERY 6 HOURS PRN
Status: DISCONTINUED | OUTPATIENT
Start: 2017-12-21 | End: 2017-12-22 | Stop reason: HOSPADM

## 2017-12-21 RX ORDER — OSELTAMIVIR PHOSPHATE 75 MG/1
75 CAPSULE ORAL EVERY 12 HOURS SCHEDULED
Status: DISCONTINUED | OUTPATIENT
Start: 2017-12-21 | End: 2017-12-22 | Stop reason: HOSPADM

## 2017-12-21 RX ADMIN — VENLAFAXINE 75 MG: 37.5 TABLET ORAL at 09:28

## 2017-12-21 RX ADMIN — VENLAFAXINE 75 MG: 37.5 TABLET ORAL at 17:46

## 2017-12-21 RX ADMIN — ACETAMINOPHEN 650 MG: 325 TABLET, FILM COATED ORAL at 17:44

## 2017-12-21 RX ADMIN — CLONAZEPAM 0.5 MG: 0.5 TABLET ORAL at 09:27

## 2017-12-21 RX ADMIN — KETOROLAC TROMETHAMINE 15 MG: 30 INJECTION, SOLUTION INTRAMUSCULAR at 20:54

## 2017-12-21 RX ADMIN — HEPARIN SODIUM 5000 UNITS: 5000 INJECTION, SOLUTION INTRAVENOUS; SUBCUTANEOUS at 13:30

## 2017-12-21 RX ADMIN — LEVOTHYROXINE SODIUM 50 MCG: 50 TABLET ORAL at 05:06

## 2017-12-21 RX ADMIN — ACETAMINOPHEN 650 MG: 325 TABLET, FILM COATED ORAL at 09:27

## 2017-12-21 RX ADMIN — BUPROPION HYDROCHLORIDE 300 MG: 150 TABLET, FILM COATED, EXTENDED RELEASE ORAL at 09:27

## 2017-12-21 RX ADMIN — HEPARIN SODIUM 5000 UNITS: 5000 INJECTION, SOLUTION INTRAVENOUS; SUBCUTANEOUS at 05:06

## 2017-12-21 RX ADMIN — HEPARIN SODIUM 5000 UNITS: 5000 INJECTION, SOLUTION INTRAVENOUS; SUBCUTANEOUS at 21:00

## 2017-12-21 RX ADMIN — OSELTAMIVIR PHOSPHATE 75 MG: 75 CAPSULE ORAL at 12:07

## 2017-12-21 RX ADMIN — SODIUM CHLORIDE 125 ML/HR: 0.9 INJECTION, SOLUTION INTRAVENOUS at 10:44

## 2017-12-21 RX ADMIN — SODIUM CHLORIDE 125 ML/HR: 0.9 INJECTION, SOLUTION INTRAVENOUS at 00:45

## 2017-12-21 RX ADMIN — SODIUM CHLORIDE 125 ML/HR: 0.9 INJECTION, SOLUTION INTRAVENOUS at 19:56

## 2017-12-21 RX ADMIN — OSELTAMIVIR PHOSPHATE 75 MG: 75 CAPSULE ORAL at 20:55

## 2017-12-21 RX ADMIN — KETOROLAC TROMETHAMINE 15 MG: 30 INJECTION, SOLUTION INTRAMUSCULAR at 10:40

## 2017-12-21 NOTE — PROGRESS NOTES
Progress Note - Sandra Churchill 1960, 62 y o  male MRN: [id number]    Unit/Bed#: -01 Encounter: [id number]    Primary Care Provider: Dorinda Barnard MD   Date and time admitted to hospital: 12/20/2017  7:26 PM        * Influenza due to influenza virus, type B   Assessment & Plan    · Fever has resolved since time of admission  Temp currently 98 5 down from 100 5  · Tylenol and Toradol prn for headache, sinus pain, and myalgias  · CT scan of sinuses ordered by admitting physician for ongoing sinus pain x 3 weeks  Hypokalemia-resolved as of 12/21/2017   Assessment & Plan    · Resolved with oral supplementation  Dehydration-resolved as of 12/21/2017   Assessment & Plan    · Resolved with IV fluids  VTE Pharmacologic Prophylaxis:   Pharmacologic: Heparin  Mechanical VTE Prophylaxis in Place: No    Patient Centered Rounds: I have performed bedside rounds with nursing staff today  Discussions with Specialists or Other Care Team Provider: None    Education and Discussions with Family / Patient: Patient made aware he has influenza and no longer requires antibiotics  All questions were addressed and answered  Time Spent for Care: 20 minutes  More than 50% of total time spent on counseling and coordination of care as described above  Current Length of Stay: 0 day(s)    Current Patient Status: Observation   Certification Statement: The patient will continue to require additional inpatient hospital stay due to influenza    Discharge Plan: Patient will require an additional night per ID recommendations  Code Status: Level 1 - Full Code      Subjective:   Patient continues with headache, sinus pain, and body aches which have worsened since last night  He did not sleep well due to pain  He has not received any pain mediation since given Toradol in the ED, which alleviated his pain significantly  Continues to sporadically cough up green mucus   He denies nausea and states his appetite has increased since yesterday  Objective:     Vitals:   Temp (24hrs), Av 8 °F (37 1 °C), Min:98 1 °F (36 7 °C), Max:100 5 °F (38 1 °C)    HR:  [] 86  Resp:  [18-20] 18  BP: (128-145)/(73-94) 137/83  SpO2:  [95 %-98 %] 97 %  Body mass index is 21 32 kg/m²  Input and Output Summary (last 24 hours):     No intake or output data in the 24 hours ending 17 1102    Physical Exam:       Physical Exam   Constitutional: He appears well-developed and well-nourished  No distress  Patient is awake, alert, and interactive sitting upright in bed  HENT:   No pain on palpation of sinuses  Minimally erythematous nasal mucosa and TM bilaterally  Cardiovascular: Normal rate and normal heart sounds  Pulmonary/Chest: Effort normal and breath sounds normal  No respiratory distress  He has no wheezes  He has no rales  Abdominal: Soft  Bowel sounds are normal    Lymphadenopathy:     He has cervical adenopathy  Skin: He is not diaphoretic  Additional Data:     Labs:      Results from last 7 days  Lab Units 17   WBC Thousand/uL 4 11* 5 47   HEMOGLOBIN g/dL 13 5 15 2   HEMATOCRIT % 40 2 43 6   PLATELETS Thousands/uL 147* 176   NEUTROS PCT %  --  73   LYMPHS PCT %  --  13*   MONOS PCT %  --  11   EOS PCT %  --  3       Results from last 7 days  Lab Units 17   SODIUM mmol/L 142 139   POTASSIUM mmol/L 3 7 3 2*   CHLORIDE mmol/L 107 101   CO2 mmol/L 26 28   BUN mg/dL 7 8   CREATININE mg/dL 0 93 1 16   CALCIUM mg/dL 8 1* 8 6   TOTAL PROTEIN g/dL  --  6 5   BILIRUBIN TOTAL mg/dL  --  0 90   ALK PHOS U/L  --  103   ALT U/L  --  47   AST U/L  --  28   GLUCOSE RANDOM mg/dL 94 116           * I Have Reviewed All Lab Data Listed Above  * Additional Pertinent Lab Tests Reviewed: All Labs For Current Hospital Admission Reviewed    Imaging:    Imaging Reports Reviewed Today Include: None    Imaging Personally Reviewed by Myself Includes: None     Recent Cultures (last 7 days):       Results from last 7 days  Lab Units 12/20/17 2030   INFLUENZA A PCR  None Detected   INFLUENZA B PCR  Detected*   RSV PCR  None Detected       Last 24 Hours Medication List:     buPROPion 300 mg Oral Daily   clonazePAM 0 5 mg Oral Daily   heparin (porcine) 5,000 Units Subcutaneous Q8H [address]   levothyroxine 50 mcg Oral Early Morning   oseltamivir 75 mg Oral Q12H MARYBETH   venlafaxine 75 mg Oral BID        Today, Patient Was Seen By: Wandy Beaulieu PA-C    ** Please Note: Dictation voice to text software may have been used in the creation of this document   **

## 2017-12-21 NOTE — CONSULTS
Consultation - Infectious Disease   Daniela Quach III 62 y o  male MRN: [id number]  Unit/Bed#: -01 Encounter: [id number]      IMPRESSION & RECOMMENDATIONS:   Impression/Recommendations:  1  Sepsis, POA  Fever, tachycardia  Secondary to #2  Fevers improving  Patient not tachycardic  O2 sat stable on room air  Hemodynamically stable and nontoxic appearing     -plan as below  -monitor temperatures and white blood cell count  -monitor hemodynamics  -supportive care per primary team  -follow-up blood cultures    2  Influenza B  PCR now positive for influenza B, which is consistent with patient's presenting symptoms  Chest x-ray not indicative of any signs of pneumonia  Stable O2 sats on room air  Patient is certainly at risk for a secondary bacterial infection in the setting of influenza, but I do not see any signs or symptoms to suggest this at this point  He has already been on 2 different courses of antibiotics as an outpatient with no improvement of his symptoms  Would monitor off anymore antibiotics  Although patient's symptoms have been ongoing for more than 48 hours and it is difficult to tell when he actually contracted influenza, I would still treat with oseltamivir as he was ill enough to require hospitalization     -start oseltamivir 75 mg p o  twice a day for 5 days  -monitor respiratory symptoms  -monitor fevers  -no indication for CT sinus at this point  -supportive care per primary team    3  Elevated CRP  Likely in the setting of acute illness due to influenza  4   Hypothyroidism  Appears to be stable  -Synthroid per primary team    5  Headache  Improving with fever control  Likely all related to influenza  No meningeal signs on exam to suggest meningitis  Patient refused LP when offered in the ER   -monitor symptoms  -supportive care per primary team        Antibiotics:  Ceftriaxone D2    Thank you for this consultation  We will follow along with you      HISTORY OF PRESENT ILLNESS:  Reason for Consult:  Fevers    HPI: Ariadna Gaines is a 62 y o  male with history of anxiety, depression, hypothyroidism admitted on  with complaints of feeling sick for the past 2-3 weeks  He has had nasal congestion, headaches, intermittent fevers  Also has had a productive cough  Complains of diffuse myalgias  No shortness of breath  Yesterday, patient had visible rigors prompting him to come to the ER  Patient was initially diagnosed with acute sinusitis and given a 10 day course of an antibiotic, which he states did not improve his symptoms  He was subsequently on a different antibiotic up until about 5 days ago, and also received no relief from that  On admission, patient had a fever of 100 5  He was started on empiric ceftriaxone for possible community-acquired pneumonia  Flu PCR is now positive for influenza B  today, he feels like his fevers and rigors or better  He continues to have nasal congestion and intermittent in productive cough  No nausea, vomiting, diarrhea, abdominal pain, chest pain, urinary symptoms  He tolerated his breakfast this morning  REVIEW OF SYSTEMS:  A complete system-based review of systems is otherwise negative  PAST MEDICAL HISTORY:  Past Medical History:   Diagnosis Date    Anxiety     Depression     Hashimoto's disease     Hashimoto's disease      History reviewed  No pertinent surgical history  FAMILY HISTORY:  Non-contributory    SOCIAL HISTORY:  History   Alcohol Use No     History   Drug Use No     History   Smoking Status    Never Smoker   Smokeless Tobacco    Not on file       ALLERGIES:  Allergies   Allergen Reactions    Compazine [Prochlorperazine] GI Intolerance    Sulfa Antibiotics        MEDICATIONS:  All current active medications have been reviewed      PHYSICAL EXAM:  Vitals:  HR:  [] 86  Resp:  [18-20] 18  BP: (128-145)/(73-94) 137/83  SpO2:  [95 %-98 %] 97 %  Temp (24hrs), Av 8 °F (37 1 °C), Min:98 1 °F (36 7 °C), Max:100 5 °F (38 1 °C)  Current: Temperature: 98 5 °F (36 9 °C)     Physical Exam:  General:  No acute distress  Eyes:  Conjunctive clear with no hemorrhages or effusions  Oropharynx:  Mild or pharyngeal erythema, no exudate  Neck:  Supple, no lymphadenopathy  Lungs:  Clear to auscultation bilaterally, no accessory muscle use  Cardiac:  Regular rate and rhythm, no murmurs  Abdomen:  Soft, non-tender, non-distended  Extremities:  No peripheral cyanosis, clubbing, or edema  Skin:  No rashes, no ulcers  Neurological:  Moves all four extremities spontaneously, sensation grossly intact      LABS, IMAGING, & OTHER STUDIES:  Lab Results:  I have personally reviewed pertinent labs  Results from last 7 days  Lab Units 12/21/17 0530 12/20/17 2020   SODIUM mmol/L 142 139   POTASSIUM mmol/L 3 7 3 2*   CHLORIDE mmol/L 107 101   CO2 mmol/L 26 28   ANION GAP mmol/L 9 10   BUN mg/dL 7 8   CREATININE mg/dL 0 93 1 16   EGFR ml/min/1 73sq m 91 70   GLUCOSE RANDOM mg/dL 94 116   CALCIUM mg/dL 8 1* 8 6   AST U/L  --  28   ALT U/L  --  47   ALK PHOS U/L  --  103   TOTAL PROTEIN g/dL  --  6 5   ALBUMIN g/dL  --  3 7   BILIRUBIN TOTAL mg/dL  --  0 90       Results from last 7 days  Lab Units 12/21/17 0530 12/20/17 2020   WBC Thousand/uL 4 11* 5 47   HEMOGLOBIN g/dL 13 5 15 2   PLATELETS Thousands/uL 147* 176       Results from last 7 days  Lab Units 12/20/17 2030   INFLUENZA A PCR  None Detected   INFLUENZA B PCR  Detected*   RSV PCR  None Detected       Imaging Studies:   I have personally reviewed pertinent imaging study reports and images in PACS  No active pulmonary disease    EKG, Pathology, and Other Studies:   I have personally reviewed pertinent reports

## 2017-12-21 NOTE — ED ATTENDING ATTESTATION
Amarjit Farooq MD, saw and evaluated the patient  I have discussed the patient with the resident/non-physician practitioner and agree with the resident's/non-physician practitioner's findings, Plan of Care, and MDM as documented in the resident's/non-physician practitioner's note, except where noted  All available labs and Radiology studies were reviewed  At this point I agree with the current assessment done in the Emergency Department  I have conducted an independent evaluation of this patient a history and physical is as follows:    51-year-old male presents with worsening symptoms over the last few days  He has been sick overall for few weeks  He initially had sinus symptoms and was started on antibiotics without any improvement  He had a 2nd course of antibiotics again with no improvement  Today he reports cough, fever, headache, ear pain, generalized muscle aches and malaise  He has got a history of Hashimoto's thyroiditis, anxiety, GERD  He has not had much relief from OTC meds at home  No recent travel or sick contacts  He is febrile on arrival here and appears uncomfortable  He is actively having chills  Heart lungs sounds unremarkable  Bilateral middle ear effusions oropharynx erythematous  Differential diagnosis included viral illness, pneumonia, influenza  Denies any urinary symptoms  No neck stiffness no focal neurologic deficits  His evaluation was relatively unremarkable with a normal chest x-ray and labs other than CRP  History of possible sarcoidosis  Patient remained uncomfortable after meds  Unclear source of infection  Given multiple antibiotic courses as an outpatient will plan obs admission, IV abx until blood cultures clear  Disscused LP with the patient to r/o meningitis which he refused       Critical Care Time  CritCare Time    Procedures

## 2017-12-21 NOTE — ASSESSMENT & PLAN NOTE
· Fever has resolved since time of admission  Temp is 98 5  · Headache and sinus pain persists and has been ongoing for 3 weeks  Tylenol and Toradol were ordered prn for pain  CT scan of sinuses has been ordered    ·

## 2017-12-21 NOTE — ED PROVIDER NOTES
History  Chief Complaint   Patient presents with    Cough     Pt reports cough "for a while" finished 10 days of antibiotics for a sinus infection  Reports today increased chills, cough, fever (102 at home)  Pt reports "not feeling well at all"      Mr Juliana Ladd is a 58yo M who presents to the ED for cough, fever, and overall malaise  He has a history of hasimoto's, anxiety, and GERD  Patient stated he started feeling ill 2 5-3 weeks ago with a sore throat and earaches  He was seen by PCP, who diagnosed a sinus infection and was started on 10 day course of antibiotics  After completion, he still had symptoms and was again prescribed another round of antibiotics, which were finished 5 days ago  He states the symptoms have worsened since then and is now experiencing headache, productive cough, joint aches, nausea, and fever of 102  Patient also states that starting today, he has noticed increased burning over his skin and his eyes, along with shooting pain in his arms  He works as a  at [employer], but denies any known, recent exposures  Prior to Admission Medications   Prescriptions Last Dose Informant Patient Reported? Taking? buPROPion (WELLBUTRIN XL) 300 mg 24 hr tablet 12/20/2017 at Unknown time  Yes Yes   Sig: Take 300 mg by mouth daily   clonazePAM (KlonoPIN) 1 mg tablet 12/20/2017 at Unknown time  Yes Yes   Sig: Take 0 5 mg by mouth daily   levothyroxine 50 mcg tablet 12/20/2017 at Unknown time  Yes Yes   Sig: Take 50 mcg by mouth daily   venlafaxine (EFFEXOR) 75 mg tablet   Yes Yes   Sig: Take 75 mg by mouth 2 (two) times a day      Facility-Administered Medications: None       Past Medical History:   Diagnosis Date    Anxiety     Depression     Hashimoto's disease     Hashimoto's disease        History reviewed  No pertinent surgical history  History reviewed  No pertinent family history  I have reviewed and agree with the history as documented      Social History Substance Use Topics    Smoking status: Never Smoker    Smokeless tobacco: Not on file    Alcohol use No        Review of Systems   Constitutional: Positive for chills, fatigue and fever  HENT: Positive for congestion, ear pain, sinus pain and sore throat  Eyes: Negative for discharge and redness  Eyes burning   Respiratory: Positive for cough  Negative for chest tightness, shortness of breath and wheezing  Cardiovascular: Negative for chest pain  Gastrointestinal: Positive for nausea  Negative for abdominal pain, diarrhea and vomiting  Genitourinary: Negative for dysuria, frequency and urgency  Musculoskeletal: Positive for arthralgias and myalgias  Skin: Negative  Neurological: Positive for weakness, light-headedness and headaches  Psychiatric/Behavioral: Negative  Physical Exam  ED Triage Vitals [12/20/17 1831]   Temperature Pulse Respirations Blood Pressure SpO2   100 5 °F (38 1 °C) (!) 114 20 142/85 95 %      Temp Source Heart Rate Source Patient Position - Orthostatic VS BP Location FiO2 (%)   Oral Monitor Sitting Left arm --      Pain Score       7           Orthostatic Vital Signs  Vitals:    12/20/17 1831 12/20/17 2031 12/20/17 2240   BP: 142/85 145/94 128/73   Pulse: (!) 114 101 93   Patient Position - Orthostatic VS: Sitting Sitting Sitting       Physical Exam   Constitutional: He is oriented to person, place, and time  He appears well-developed and well-nourished  He appears distressed  HENT:   Head: Normocephalic and atraumatic  Right Ear: Hearing normal  There is tenderness  Tympanic membrane is erythematous  A middle ear effusion is present  Left Ear: Hearing normal  Tympanic membrane is erythematous  A middle ear effusion is present  Eyes: Conjunctivae and EOM are normal  Pupils are equal, round, and reactive to light  Neck: Normal range of motion  Neck supple  Cardiovascular: Regular rhythm, S1 normal, S2 normal and normal heart sounds  Tachycardia present  Exam reveals no gallop  No murmur heard  Pulmonary/Chest: Effort normal and breath sounds normal  No respiratory distress  He has no wheezes  He has no rales  Abdominal: Soft  He exhibits no distension  There is no tenderness  There is no rebound and no guarding  Musculoskeletal: Normal range of motion  Neurological: He is alert and oriented to person, place, and time  Skin: Skin is warm  He is not diaphoretic  Psychiatric: He has a normal mood and affect   His behavior is normal  Judgment and thought content normal        ED Medications  Medications   sodium chloride 0 9 % bolus 1,000 mL (0 mL Intravenous Stopped 12/20/17 2120)   acetaminophen (TYLENOL) tablet 650 mg (650 mg Oral Given 12/20/17 2023)   ondansetron (ZOFRAN-ODT) dispersible tablet 4 mg (4 mg Oral Given 12/20/17 2009)   potassium chloride (K-DUR,KLOR-CON) CR tablet 40 mEq (20 mEq Oral Given 12/20/17 2224)   cefTRIAXone (ROCEPHIN) IVPB (premix) 2,000 mg (0 mg Intravenous Stopped 12/20/17 2305)   ketorolac (TORADOL) injection 15 mg (15 mg Intravenous Given 12/20/17 2229)   potassium chloride 10 % oral solution 20 mEq (20 mEq Oral Given 12/20/17 2244)       Diagnostic Studies  Results Reviewed     Procedure Component Value Units Date/Time    UA w Reflex to Microscopic w Reflex to Culture [[id number]]  (Normal) Collected:  12/20/17 2144    Lab Status:  Final result Specimen:  Urine from Urine, Clean Catch Updated:  12/20/17 2200     Color, UA Light Yellow     Clarity, UA Clear     Specific Gravity, UA 1 015     pH, UA 7 5     Leukocytes, UA Negative     Nitrite, UA Negative     Protein, UA Negative mg/dl      Glucose, UA Negative mg/dl      Ketones, UA Negative mg/dl      Urobilinogen, UA 0 2 E U /dl      Bilirubin, UA Negative     Blood, UA Negative    Rapid Influenza Screen with Reflex PCR (indicated for patients <2 mo of age) [[id number]]  (Normal) Collected:  12/20/17 2030    Lab Status:  Final result Specimen: Nasopharyngeal Updated:  12/20/17 2111     Rapid Influenza A Ag Negative     Rapid Influenza B Ag Negative    Influenza A/B and RSV by PCR (Indicated for patients > 2 mo of age) [[id number]] Collected:  12/20/17 2030    Lab Status: In process Specimen:  Nasopharyngeal Updated:  12/20/17 2111    Blood culture #1 [[id number]] Collected:  12/20/17 2104    Lab Status: In process Specimen:  Blood from Hand, Right Updated:  12/20/17 2108    TSH [[id number]]  (Normal) Collected:  12/20/17 2020    Lab Status:  Final result Specimen:  Blood from Arm, Left Updated:  12/20/17 2102     TSH 3RD GENERATON 1 797 uIU/mL     Narrative:         Patients undergoing fluorescein dye angiography may retain small amounts of fluorescein in the body for 48-72 hours post procedure  Samples containing fluorescein can produce falsely depressed TSH values  If the patient had this procedure,a specimen should be resubmitted post fluorescein clearance  CK (with reflex to MB) [[id number]]  (Normal) Collected:  12/20/17 2020    Lab Status:  Final result Specimen:  Blood from Arm, Left Updated:  12/20/17 2102     Total CK 70 U/L     C-reactive protein [[id number]]  (Abnormal) Collected:  12/20/17 2020    Lab Status:  Final result Specimen:  Blood from Arm, Left Updated:  12/20/17 2102     CRP 10 5 (H) mg/L     Lactic acid, plasma [[id number]]  (Normal) Collected:  12/20/17 2020    Lab Status:  Final result Specimen:  Blood from Arm, Left Updated:  12/20/17 2053     LACTIC ACID 1 5 mmol/L     Narrative:         Result may be elevated if tourniquet was used during collection      Comprehensive metabolic panel [[id number]]  (Abnormal) Collected:  12/20/17 2020    Lab Status:  Final result Specimen:  Blood from Arm, Left Updated:  12/20/17 2050     Sodium 139 mmol/L      Potassium 3 2 (L) mmol/L      Chloride 101 mmol/L      CO2 28 mmol/L      Anion Gap 10 mmol/L      BUN 8 mg/dL      Creatinine 1 16 mg/dL      Glucose 116 mg/dL      Calcium 8 6 mg/dL      AST 28 U/L      ALT 47 U/L      Alkaline Phosphatase 103 U/L      Total Protein 6 5 g/dL      Albumin 3 7 g/dL      Total Bilirubin 0 90 mg/dL      eGFR 70 ml/min/1 73sq m     Narrative:         National Kidney Disease Education Program recommendations are as follows:  GFR calculation is accurate only with a steady state creatinine  Chronic Kidney disease less than 60 ml/min/1 73 sq  meters  Kidney failure less than 15 ml/min/1 73 sq  meters  CBC and differential [[id number]]  (Abnormal) Collected:  12/20/17 2020    Lab Status:  Final result Specimen:  Blood from Arm, Left Updated:  12/20/17 2037     WBC 5 47 Thousand/uL      RBC 4 99 Million/uL      Hemoglobin 15 2 g/dL      Hematocrit 43 6 %      MCV 87 fL      MCH 30 5 pg      MCHC 34 9 g/dL      RDW 12 0 %      MPV 9 5 fL      Platelets 449 Thousands/uL      Neutrophils Relative 73 %      Lymphocytes Relative 13 (L) %      Monocytes Relative 11 %      Eosinophils Relative 3 %      Basophils Relative 0 %      Neutrophils Absolute 4 02 Thousands/µL      Lymphocytes Absolute 0 71 Thousands/µL      Monocytes Absolute 0 58 Thousand/µL      Eosinophils Absolute 0 15 Thousand/µL      Basophils Absolute 0 01 Thousands/µL     Blood culture #2 [[id number]] Collected:  12/20/17 2020    Lab Status:   In process Specimen:  Blood from Arm, Left Updated:  12/20/17 2031                 XR chest 2 views   ED Interpretation by Gio Bautista MD (12/20 2131)   Normal                 Procedures  Procedures       Phone Contacts  ED Phone Contact    ED Course  ED Course          MDM  Number of Diagnoses or Management Options  Cough: new and requires workup  Fever and chills: new and requires workup     Amount and/or Complexity of Data Reviewed  Clinical lab tests: ordered  Tests in the radiology section of CPT®: ordered  Tests in the medicine section of CPT®: ordered  Review and summarize past medical records: yes  Independent visualization of images, tracings, or specimens: yes    Risk of Complications, Morbidity, and/or Mortality  Presenting problems: moderate  Diagnostic procedures: moderate  Management options: moderate  General comments: Patient was ill appearing in ED  Labs demonstrated no leukocytosis  UA, rapid influenza, lactic acid, Ck, and TSH all normal  CRP was elevated at 10  Patient declined a LP and decision to admit to obs unit overnight was made, based on minimal improvement with fluids and tylenol  Headache was still present and toradol was given  Blood cultures were drawn and rocephin was ordered pending results  Admitted to med/surg under Dr Karina Villatoro  [facility] Emergency Department Time    Disposition  Final diagnoses:   Fever and chills   Cough     Time reflects when diagnosis was documented in both MDM as applicable and the Disposition within this note     Time User Action Codes Description Comment    12/20/2017 10:34 PM Ni Inman Add [R50 9] Fever and chills     12/20/2017 10:34 PM Cassy Inman Add [R05] Cough       ED Disposition     ED Disposition Condition Comment    Admit  Case was discussed with Dr Karina Villatoro and the patient's admission status was agreed to be Admission Status: observation status to the service of Dr Karina Villatoro   Follow-up Information    None       Patient's Medications   Discharge Prescriptions    No medications on file     No discharge procedures on file      ED Provider  Electronically Signed by           Adina Healy PA-C  12/20/17 9949
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
## 2017-12-21 NOTE — H&P
History and Physical - [facility] Internal Medicine    Patient Information: Vianney Calles III 62 y o  male MRN: [id number]  Unit/Bed#: -01 Encounter: [id number]  Admitting Physician: Gail Sauer MD  PCP: Milvia Goldstein MD  Date of Admission:  12/21/17    Assessment/Plan:    Hospital Problem List:     Principal Problem:    Fever  Active Problems:    Dehydration    Hashimoto's disease    Hypokalemia    Present on Admission:   Fever   Dehydration   Hashimoto's disease   Hypokalemia      Plan for the Primary Problem(s):  · Fever  · Recurrence of her past 3 weeks, failed 2 courses of antibiotics for treatment of sinusitis and bronchitis  Started on Rocephin in the emergency department will continue  Will obtain CT of the sinuses to further evaluate  Will ask Infectious Disease to weigh in  Monitor temps and WBC count    Plan for Additional Problems:   ·    Dehydration:  Will start on normal saline solution at 100 cc/hour, monitor volume status  · Hypokalemia:  Replete orally  · History of Hashimoto's disease:  Resume outpatient levothyroxine, TSH level normal  · History of anxiety and depression:  Continue Wellbutrin and Klonopin as taken as an outpatient    VTE Prophylaxis: Heparin  / sequential compression device   Code Status: full  POLST: There is no POLST form on file for this patient (pre-hospital)    Anticipated Length of Stay:  Patient will be admitted on an Observation basis with an anticipated length of stay of  less than 2 midnights  Justification for Hospital Stay:  Fever workup    Total Time for Visit, including Counseling / Coordination of Care: 45 minutes  Greater than 50% of this total time spent on direct patient counseling and coordination of care      Chief Complaint:   Fever, myalgia, headache, cough    History of Present Illness:    Vianney Calles III is a 62 y o  male with past medical history of anxiety, depression, Hashimoto's disease presents to the emergency department with complaint of 3 week history of intermittent fevers  Patient states that he was treated for acute sinusitis with 10 day course of antibiotic with initially partial response  He was then switched over to a different antibiotic and completed a 10 day course without improvement of symptoms  He reports recurrence of fevers with temps as high as 102, chills, continued diffuse headache and cough productive of green sputum  He denies shortness of breath, chest pain, diarrhea or urinary complaints  He was offered lumbar puncture in the emergency department but refused  Review of Systems:  No chest pain, lightheadedness or palpitations  Positive fever, chills, generalized fatigue  Positive myalgias  Positive headache    All systems are reviewed  Positive as per history of presenting illness  Patient answered no to all other questions  Past Medical and Surgical History:     Past Medical History:   Diagnosis Date    Anxiety     Depression     Hashimoto's disease     Hashimoto's disease        History reviewed  No pertinent surgical history  Meds/Allergies:    Prior to Admission medications    Medication Sig Start Date End Date Taking? Authorizing Provider   buPROPion (WELLBUTRIN XL) 300 mg 24 hr tablet Take 300 mg by mouth daily   Yes Historical Provider, MD   clonazePAM (KlonoPIN) 1 mg tablet Take 0 5 mg by mouth daily   Yes Historical Provider, MD   levothyroxine 50 mcg tablet Take 50 mcg by mouth daily   Yes Historical Provider, MD   venlafaxine (EFFEXOR) 75 mg tablet Take 75 mg by mouth 2 (two) times a day   Yes Historical Provider, MD     I have reviewed home medications using allscripts  Allergies:    Allergies   Allergen Reactions    Compazine [Prochlorperazine] GI Intolerance    Sulfa Antibiotics        Social History:     Marital Status: /Civil Union   Patient Pre-hospital Living Situation:  Wife  Patient Pre-hospital Level of Mobility:  Independent  Patient Pre-hospital Diet Restrictions:  None  Substance Use History:   History   Alcohol Use No     History   Smoking Status    Never Smoker   Smokeless Tobacco    Not on file     History   Drug Use No       Family History:    non-contributory      Physical Exam:  Vitals:   Blood Pressure: 137/81 (12/21/17 0023)  Pulse: 83 (12/21/17 0023)  Temperature: 98 5 °F (36 9 °C) (12/21/17 0023)  Temp Source: Oral (12/21/17 0023)  Respirations: 18 (12/21/17 0023)  Height: 6' (182 9 cm) (12/21/17 0023)  Weight - Scale: 71 3 kg (157 lb 3 oz) (12/21/17 0023)  SpO2: 96 % (12/21/17 0023)    Vital signs are reviewed as above  No distress, awake and alert, wife at bedside  Sclera anicteric  Dry mucous membranes  Regular rate rhythm  Clear bilaterally  Soft, positive bowel sounds, nontender  No peripheral edema or deformity  No rash  No joint swelling  Move all extremities, grossly nonfocal, oriented x3        Additional Data:     Lab Results: I have personally reviewed pertinent reports  Results from last 7 days  Lab Units 12/20/17 2020   WBC Thousand/uL 5 47   HEMOGLOBIN g/dL 15 2   HEMATOCRIT % 43 6   PLATELETS Thousands/uL 176   NEUTROS PCT % 73   LYMPHS PCT % 13*   MONOS PCT % 11   EOS PCT % 3       Results from last 7 days  Lab Units 12/20/17 2020   SODIUM mmol/L 139   POTASSIUM mmol/L 3 2*   CHLORIDE mmol/L 101   CO2 mmol/L 28   BUN mg/dL 8   CREATININE mg/dL 1 16   CALCIUM mg/dL 8 6   TOTAL PROTEIN g/dL 6 5   BILIRUBIN TOTAL mg/dL 0 90   ALK PHOS U/L 103   ALT U/L 47   AST U/L 28   GLUCOSE RANDOM mg/dL 116           · Imaging: CXR: NAD    Allscripts Records Reviewed: Yes     ** Please Note: Dragon 360 Dictation voice to text software may have been used in the creation of this document   **

## 2017-12-21 NOTE — PLAN OF CARE
Nutrition/Hydration-ADULT     Nutrient/Hydration intake appropriate for improving, restoring or maintaining nutritional needs Progressing        Potential for Falls     Patient will remain free of falls Progressing        RESPIRATORY - ADULT     Achieves optimal ventilation and oxygenation Progressing

## 2017-12-21 NOTE — ASSESSMENT & PLAN NOTE
· Fever has resolved since time of admission  Temp currently 98 5 down from 100 5  · Tylenol and Toradol prn for headache, sinus pain, and myalgias  · CT scan of sinuses ordered by admitting physician for ongoing sinus pain x 3 weeks

## 2017-12-22 VITALS
RESPIRATION RATE: 16 BRPM | OXYGEN SATURATION: 100 % | HEART RATE: 81 BPM | WEIGHT: 157.19 LBS | HEIGHT: 72 IN | TEMPERATURE: 98.5 F | BODY MASS INDEX: 21.29 KG/M2 | DIASTOLIC BLOOD PRESSURE: 88 MMHG | SYSTOLIC BLOOD PRESSURE: 162 MMHG

## 2017-12-22 PROBLEM — A41.9 SEPSIS (HCC): Status: RESOLVED | Noted: 2017-12-21 | Resolved: 2017-12-22

## 2017-12-22 PROBLEM — R51.9 HEADACHE: Status: ACTIVE | Noted: 2017-12-22

## 2017-12-22 PROBLEM — J32.9 SINUSITIS: Status: ACTIVE | Noted: 2017-12-22

## 2017-12-22 RX ORDER — ACETAMINOPHEN 325 MG/1
650 TABLET ORAL EVERY 6 HOURS PRN
Qty: 30 TABLET | Refills: 0
Start: 2017-12-22 | End: 2018-09-17 | Stop reason: ALTCHOICE

## 2017-12-22 RX ORDER — POTASSIUM CHLORIDE 20 MEQ/1
20 TABLET, EXTENDED RELEASE ORAL ONCE
Status: DISCONTINUED | OUTPATIENT
Start: 2017-12-22 | End: 2017-12-22 | Stop reason: HOSPADM

## 2017-12-22 RX ORDER — OSELTAMIVIR PHOSPHATE 75 MG/1
75 CAPSULE ORAL EVERY 12 HOURS SCHEDULED
Qty: 7 CAPSULE | Refills: 0 | Status: SHIPPED | OUTPATIENT
Start: 2017-12-26 | End: 2018-01-05

## 2017-12-22 RX ADMIN — BUPROPION HYDROCHLORIDE 300 MG: 150 TABLET, FILM COATED, EXTENDED RELEASE ORAL at 09:53

## 2017-12-22 RX ADMIN — LEVOTHYROXINE SODIUM 50 MCG: 50 TABLET ORAL at 05:55

## 2017-12-22 RX ADMIN — SODIUM CHLORIDE 125 ML/HR: 0.9 INJECTION, SOLUTION INTRAVENOUS at 02:25

## 2017-12-22 RX ADMIN — KETOROLAC TROMETHAMINE 15 MG: 30 INJECTION, SOLUTION INTRAMUSCULAR at 03:16

## 2017-12-22 RX ADMIN — ACETAMINOPHEN 650 MG: 325 TABLET, FILM COATED ORAL at 02:25

## 2017-12-22 RX ADMIN — CLONAZEPAM 0.5 MG: 0.5 TABLET ORAL at 09:53

## 2017-12-22 RX ADMIN — OSELTAMIVIR PHOSPHATE 75 MG: 75 CAPSULE ORAL at 09:53

## 2017-12-22 NOTE — ASSESSMENT & PLAN NOTE
As evidence by fever, tachycardia likely due to influenza B  Fever has improved  No longer tachycardic  Maintaining oxygen saturations on room air  Continue Tamiflu and supportive care

## 2017-12-22 NOTE — ASSESSMENT & PLAN NOTE
Improved  Likely related to influenza and questionable acute on chronic sinusitis  Per ID, no signs of meningitis on exam   Patient refused LP when offered in the ED  Presently, no indication for LP

## 2017-12-22 NOTE — PROGRESS NOTES
Progress Note - Víctor Al 1960, 62 y o  male MRN: [id number]    Unit/Bed#: -01 Encounter: [id number]    Primary Care Provider: Mc Trinh MD   Date and time admitted to hospital: 12/20/2017  7:26 PM        Sinusitis   Assessment & Plan    ID following, appreciate input  Per ID, he has had worsening sinus congestion for the past 2 weeks which were not relieved with 2 different courses of oral antibiotics  Suspect a component of chronic sinusitis on acute sinusitis due to influenza virus  Recommend patient follows up with ENT as an outpatient after improvement in symptoms related to influenza  Information will be given to him on discharge  Sepsis ([address] )   Assessment & Plan    As evidence by fever, tachycardia likely due to influenza B  Fever has improved  No longer tachycardic  Maintaining oxygen saturations on room air  Continue Tamiflu and supportive care  * Influenza due to influenza virus, type B   Assessment & Plan    Influenza A&B and RSV by PCR positive for Influenza B  Chest x-ray without signs of pneumonia  Oxygen level stable on room air  Continue Tamiflu 75 mg p o  twice daily for total of 5 days  Patient anxious for discharge and will monitor his respiratory symptoms and fever at home  He is to continue supportive care with hydration, rest, NSAIDs as needed  Headache   Assessment & Plan    Improved  Likely related to influenza and questionable acute on chronic sinusitis  Per ID, no signs of meningitis on exam   Patient refused LP when offered in the ED  Presently, no indication for LP  Hashimoto's disease   Assessment & Plan    Stable  Continue Synthroid               Consultations During Hospital Stay:  · Infectious Disease    Procedures Performed:     · CT sinuses:  Diffuse paranasal sinus mucosal thickening  Bilateral air-fluid levels within the maxillary sinuses suggesting acute component of sinus disease    Deviation of the nasal septum  Significant Findings / Test Results:     · Influenza B positive by PCR  · Blood cultures reveal no growth at 24 hours    Incidental Findings:   · None    Test Results Pending at Discharge (will require follow up): · None     Outpatient Tests Requested:  · None    Complications:  None    Reason for Admission:  Fevers    Hospital Course:     Leonardo Aguillon III is a 62 y o  male patient with a PMH including anxiety, depression, hypothyroidism who originally presented to the hospital on 12/20/2017 due to complaints of nasal congestion, headaches, intermittent fevers, and productive cough for the past 3 weeks  Patient was treated with 2 different antibiotics for 10 day courses of therapy for suspected sinusitis, however, he did not receive relief from treatment  He was initially started on empiric ceftriaxone for possible community-acquired pneumonia  Influenza a and B and RSV by PCR returned positive for influenza B virus  Infectious Disease was consulted and discontinued ceftriaxone and initiated Tamiflu 75 mg twice daily for a 5 day course  He was treated with symptomatic care, IV fluids and Toradol/Tylenol as needed for pain  CT of the sinuses was obtained for suspected sinusitis which revealed diffuse paranasal sinus mucosal thickening and bilateral air-fluid levels within the maxillary sinuses suggesting acute component of sinus disease  I and D Infectious Disease suspects a component of acute sinusitis related to the influenza virus as well as a possible chronic sinusitis  Patient is recommended to follow up with ENT as an outpatient after improvement from influenza virus  Please see above list of diagnoses and related plan for additional information  Condition at Discharge: stable     Discharge Day Visit / Exam:     Subjective:  Overall, patient feels much improved  He is out of bed in chair with ease  Tolerating a diet    Denies shortness of breath, chest pain, abdominal pain, nausea, vomiting, diarrhea  Continues with intermittent headache which is relieved with Tylenol and Toradol  Anxious for discharge home  Vitals: Blood Pressure: 162/88 (12/22/17 0707)  Pulse: 81 (12/22/17 0707)  Temperature: 98 5 °F (36 9 °C) (12/22/17 0707)  Temp Source: Oral (12/22/17 0707)  Respirations: 16 (12/22/17 0707)  Height: 6' (182 9 cm) (12/21/17 0023)  Weight - Scale: 71 3 kg (157 lb 3 oz) (12/21/17 0023)  SpO2: 100 % (12/22/17 0707)  Exam:   Physical Exam   Constitutional: He is oriented to person, place, and time  He appears well-developed  No distress  HENT:   Head: Normocephalic  Neck: Normal range of motion  Cardiovascular: Normal rate and regular rhythm  Pulmonary/Chest: Effort normal  No respiratory distress  He has decreased breath sounds in the right lower field and the left lower field  He has no wheezes  He has no rhonchi  He has no rales  Abdominal: Soft  Bowel sounds are normal  He exhibits no distension  There is no tenderness  Musculoskeletal: Normal range of motion  He exhibits no edema or tenderness  Neurological: He is alert and oriented to person, place, and time  Skin: Skin is warm and dry  No rash noted  He is not diaphoretic  Psychiatric: He has a normal mood and affect  Judgment normal    Nursing note and vitals reviewed  Discussion with Family:  Wife at bedside    Discharge instructions/Information to patient and family:   See after visit summary for information provided to patient and family  Provisions for Follow-Up Care:  See after visit summary for information related to follow-up care and any pertinent home health orders  Disposition:     Home    For Discharges to [facility] SNF:   · Not Applicable to this Patient - Not Applicable to this Patient    Planned Readmission:  None     Discharge Statement:  I spent 35 minutes discharging the patient  This time was spent on the day of discharge   I had direct contact with the patient on the day of discharge  Greater than 50% of the total time was spent examining patient, answering all patient questions, arranging and discussing plan of care with patient as well as directly providing post-discharge instructions  Additional time then spent on discharge activities  Coordinated with ID  Discharge Medications:  See after visit summary for reconciled discharge medications provided to patient and family        ** Please Note: This note has been constructed using a voice recognition system **

## 2017-12-22 NOTE — PROGRESS NOTES
Progress Note - Infectious Disease   Kerry Big III 62 y o  male MRN: [id number]  Unit/Bed#: -01 Encounter: [id number]      Impression/Recommendations:  1  Sepsis, POA  Fever, tachycardia  Secondary to #2  Fevers have improved  Patient not tachycardic  O2 sat stable on room air  Hemodynamically stable and nontoxic appearing      -plan as below  -monitor temperatures and white blood cell count  -monitor hemodynamics  -supportive care per primary team  -follow-up blood cultures-no growth to date     2  Influenza B  PCR now positive for influenza B, which is consistent with patient's presenting symptoms  Chest x-ray not indicative of any signs of pneumonia  Stable O2 sats on room air  Patient is certainly at risk for a secondary bacterial infection in the setting of influenza, but I do not see any signs or symptoms to suggest this at this point  He has already been on 2 different courses of antibiotics as an outpatient with no improvement of his symptoms  Would continue to monitor off anymore antibiotics  Although patient's symptoms have been ongoing for more than 48 hours and it is difficult to tell when he actually contracted influenza, I would still treat with oseltamivir as he was ill enough to require hospitalization      -continue with oseltamivir 75 mg p o  twice a day for total of 5 days  -monitor respiratory symptoms  -monitor fevers  -supportive care per primary team     3   Elevated CRP  Likely in the setting of acute illness due to influenza      4  Hypothyroidism  Appears to be stable  -Synthroid per primary team     5   Headache  Improved  Likely all related to influenza  No meningeal signs on exam to suggest meningitis  Patient refused LP when offered in the ER  No indication for LP   -monitor symptoms  -supportive care per primary team     6  Sinusitis  Based on history, I suspect that patient has a component of chronic sinusitis    He has had worsening sinus congestion for the past 3 weeks, which were not relieved with to different courses of oral antibiotics  There is likely a component of acute sinusitis as well and I suspect this is related to viral infection  After improvement in symptoms related to influenza, I would suggest outpatient follow-up with ENT  7   Abdominal pain  I suspect this is superficial muscular pain secondary to heparin injections  No other associated GI symptoms  -monitor pain        Antibiotics:  None     Spoke with patient and his wife at bedside in detail regarding plan of care  Subjective:  Fevers have improved  No chills  No nausea, vomiting, diarrhea  Tolerating oral diet  He complains of some mild abdominal tenderness at the sites of injection  Objective:  Vitals:  HR:  [70-81] 81  Resp:  [16-18] 16  BP: (143-162)/(84-88) 162/88  SpO2:  [97 %-100 %] 100 %  Temp (24hrs), Av 2 °F (36 8 °C), Min:97 9 °F (36 6 °C), Max:98 5 °F (36 9 °C)  Current: Temperature: 98 5 °F (36 9 °C)    Physical Exam:   General:  No acute distress  Psychiatric:  Awake and alert  Pulmonary:  Normal respiratory excursion without accessory muscle use  Abdomen:  Soft, mild tenderness at sites of id the coagulation ejection  Extremities:  No edema  Skin:  No rashes  Mild oropharyngeal erythema, no exudate  Mild tenderness at maxillary sinuses    Lab Results:  I have personally reviewed pertinent labs      Results from last 7 days  Lab Units 17   SODIUM mmol/L 142 139   POTASSIUM mmol/L 3 7 3 2*   CHLORIDE mmol/L 107 101   CO2 mmol/L 26 28   ANION GAP mmol/L 9 10   BUN mg/dL 7 8   CREATININE mg/dL 0 93 1 16   EGFR ml/min/1 73sq m 91 70   GLUCOSE RANDOM mg/dL 94 116   CALCIUM mg/dL 8 1* 8 6   AST U/L  --  28   ALT U/L  --  47   ALK PHOS U/L  --  103   TOTAL PROTEIN g/dL  --  6 5   ALBUMIN g/dL  --  3 7   BILIRUBIN TOTAL mg/dL  --  0 90       Results from last 7 days  Lab Units 17   WBC Thousand/uL 4 11* 5 47 HEMOGLOBIN g/dL 13 5 15 2   PLATELETS Thousands/uL 147* 176       Results from last 7 days  Lab Units 12/20/17  2104 12/20/17 2030 12/20/17 2020   BLOOD CULTURE  No Growth at 24 hrs   --  No Growth at 24 hrs  INFLUENZA A PCR   --  None Detected  --    INFLUENZA B PCR   --  Detected*  --    RSV PCR   --  None Detected  --        Imaging Studies:   I have personally reviewed pertinent imaging study reports and images in PACS  CT sinus shows diffuse paranasal sinus mucosal thickening with bilateral air fluid levels suggesting acute component of sinus disease  Deviation of nasal septum  EKG, Pathology, and Other Studies:   I have personally reviewed pertinent reports

## 2017-12-22 NOTE — DISCHARGE INSTRUCTIONS
Influenza B:   Continue Tamiflu twice daily for a total of 5 days  Remain hydrated  Obtain plenty of rest    Monitor for fevers/chills  Take Aleve, Advil, Motrin, or Ibuprofen for fever or pain  Sinusitis: Suspect a component of chronic sinusitis with acute sinusitis due to influenza virus   After improvement in symptoms related to influenza, suggest outpatient follow-up with ENT (information was provided)      Influenza   WHAT YOU NEED TO KNOW:   Influenza (the flu) is an infection caused by the influenza virus  The flu is easily spread when an infected person coughs, sneezes, or has close contact with others  You may be able to spread the flu to others for 1 week or longer after signs or symptoms appear  DISCHARGE INSTRUCTIONS:   Call 911 for any of the following:   · You have trouble breathing, and your lips look purple or blue  · You have a seizure  Seek care immediately if:   · You are dizzy, or you are urinating less or not at all  · You have a headache with a stiff neck, and you feel tired or confused  · You have new pain or pressure in your chest     · Your symptoms, such as shortness of breath, vomiting, or diarrhea, get worse  · Your symptoms, such as fever and coughing, seem to get better, but then get worse  Contact your healthcare provider if:   · You have new muscle pain or weakness  · You have questions or concerns about your condition or care  Medicines: You may need any of the following:  · Acetaminophen  decreases pain and fever  It is available without a doctor's order  Ask how much to take and how often to take it  Follow directions  Acetaminophen can cause liver damage if not taken correctly  · NSAIDs , such as ibuprofen, help decrease swelling, pain, and fever  This medicine is available with or without a doctor's order  NSAIDs can cause stomach bleeding or kidney problems in certain people   If you take blood thinner medicine, always ask your healthcare provider if NSAIDs are safe for you  Always read the medicine label and follow directions  · Antivirals  help fight a viral infection  · Take your medicine as directed  Contact your healthcare provider if you think your medicine is not helping or if you have side effects  Tell him or her if you are allergic to any medicine  Keep a list of the medicines, vitamins, and herbs you take  Include the amounts, and when and why you take them  Bring the list or the pill bottles to follow-up visits  Carry your medicine list with you in case of an emergency  Rest  as much as you can to help you recover  Drink liquids as directed  to help prevent dehydration  Ask how much liquid to drink each day and which liquids are best for you  Prevent the spread of influenza:   · Wash your hands often  Use soap and water  Wash your hands after you use the bathroom, change a child's diapers, or sneeze  Wash your hands before you prepare or eat food  Use gel hand cleanser when soap and water are not available  Do not touch your eyes, nose, or mouth unless you have washed your hands first            · Cover your mouth when you sneeze or cough  Cough into a tissue or the bend of your arm  · Clean shared items with a germ-killing   Clean table surfaces, doorknobs, and light switches  Do not share towels, silverware, and dishes with people who are sick  Wash bed sheets, towels, silverware, and dishes with soap and water  · Wear a mask  over your mouth and nose if you are sick or are near anyone who is sick  · Stay away from others  if you are sick  · Influenza vaccine  helps prevent influenza (flu)  Everyone older than 6 months should get a yearly influenza vaccine  Get the vaccine as soon as it is available, usually in September or October each year  Follow up with your healthcare provider as directed:  Write down your questions so you remember to ask them during your visits     © 2017 [employer] Information is for End User's use only and may not be sold, redistributed or otherwise used for commercial purposes  All illustrations and images included in CareNotes® are the copyrighted property of [employer]  or [employer]  The above information is an  only  It is not intended as medical advice for individual conditions or treatments  Talk to your doctor, nurse or pharmacist before following any medical regimen to see if it is safe and effective for you  Oseltamivir (By mouth)   Oseltamivir (iw-cij-KNX-i-vir)  Treats and helps prevent influenza  Brand Name(s): Tamiflu   There may be other brand names for this medicine  When This Medicine Should Not Be Used: This medicine is not right for everyone  Do not use it if you had an allergic reaction to oseltamivir  How to Use This Medicine:   Capsule, Liquid  · Your doctor will tell you how much medicine to use  Do not use more than directed  Do not take this medicine for any illness other than the flu  · Start taking this medicine as soon as possible after flu symptoms begin or after you are exposed to the flu (within the first 2 days)  · Shake the oral liquid before each use  Measure the liquid medicine with the oral dispenser that came with the medicine  Ask your pharmacist for an oral measuring spoon or syringe if you do not have one  · You may open the capsule and mix the contents with a sweet liquid (such as chocolate syrup, corn syrup, or sugar dissolved in water)  Ask your doctor or pharmacist if you have any questions  · Read and follow the patient instructions that come with this medicine  Talk to your doctor or pharmacist if you have any questions  · Missed dose: If you miss a dose and your next dose is due within 2 hours, skip the missed dose and take your medicine at the normal time  Do not use extra medicine to make up for a missed dose   If you miss a dose and your next dose is due in more than 2 hours, take the missed dose as soon as you can  Then take your next dose at the normal time, and go back to your regular schedule  · Capsules: Store at room temperature, away from heat, moisture, and direct light  · Oral liquid: Store in the refrigerator and use within 17 days  Do not freeze  You may also store the medicine at room temperature, but use it within 10 days  Throw away any medicine that has not been used within this time  Drugs and Foods to Avoid:   Ask your doctor or pharmacist before using any other medicine, including over-the-counter medicines, vitamins, and herbal products  · Do not use this medicine if you have received the live influenza vaccine (nasal mist) in the past 2 weeks or if you will receive the vaccine within 48 hours, unless your doctor says it is okay  Warnings While Using This Medicine:   · Tell your doctor if you are pregnant or breastfeeding, or if you have kidney disease, liver disease, heart disease, lung disease, or a weakened immune system  · This medicine may cause the following problems:   ¨ Serious skin reactions  ¨ Unusual thoughts or behaviors  · Call your doctor if your symptoms do not improve or if they get worse  · The liquid form of this medicine contains sorbitol  Tell your doctor if you have hereditary fructose intolerance  · This medicine is not a substitute for a yearly flu shot  It also will not prevent a bacterial infection  · Keep all medicine out of the reach of children  Never share your medicine with anyone    Possible Side Effects While Using This Medicine:   Call your doctor right away if you notice any of these side effects:  · Allergic reaction: Itching or hives, swelling in your face or hands, swelling or tingling in your mouth or throat, chest tightness, trouble breathing  · Blistering, peeling, red skin rash  · Confusion, agitation, seeing or hearing things that are not there, change in mood or behavior, seizures  · Fever, chills, cough, sore throat, body aches  If you notice these less serious side effects, talk with your doctor:   · Nausea, vomiting, diarrhea, stomach pain, or upset stomach  If you notice other side effects that you think are caused by this medicine, tell your doctor  Call your doctor for medical advice about side effects  You may report side effects to FDA at 7-289-FDA-8819  © 2017 [address] [employer] Information is for End User's use only and may not be sold, redistributed or otherwise used for commercial purposes  The above information is an  only  It is not intended as medical advice for individual conditions or treatments  Talk to your doctor, nurse or pharmacist before following any medical regimen to see if it is safe and effective for you  Sinusitis   WHAT YOU NEED TO KNOW:   What is sinusitis? Sinusitis is inflammation or infection of your sinuses  It is most often caused by a virus  Acute sinusitis may last up to 12 weeks  Chronic sinusitis lasts longer than 12 weeks  Recurrent sinusitis means you have 4 or more times in 1 year  What increases my risk for sinusitis? · Medical conditions, such as an upper respiratory infection, allergies, asthma, or cystic fibrosis    · Dental infections or procedures, such as gum infections, tooth decay, or a root canal    · Smoking    · Abnormal sinus structure, such as nasal growths, swollen tonsils, or a deviated septum    · A weak immune system, from diseases such as diabetes or HIV  What are the signs and symptoms of sinusitis? · Fever    · Pain, pressure, redness, or swelling around the forehead, cheeks, or eyes    · Thick yellow or green discharge from your nose    · Tenderness when you touch your face over your sinuses    · Dry cough that happens mostly at night or when you lie down    · Headache and face pain that is worse when you lean forward    · Tooth pain, or pain when you chew  How is sinusitis diagnosed?   Your healthcare provider will examine you and ask about your symptoms  He or she will check inside your nose using a nasal speculum  This is a small tool used to open your nostrils  A sample of the mucus from your nose may show what germ is causing your infection  How is sinusitis treated? Your symptoms may go away on their own  Your healthcare provider may recommend watchful waiting for up to 10 days before starting antibiotics  You may  need any of the following:  · Acetaminophen  decreases pain and fever  It is available without a doctor's order  Ask how much to take and how often to take it  Follow directions  Read the labels of all other medicines you are using to see if they also contain acetaminophen, or ask your doctor or pharmacist  Acetaminophen can cause liver damage if not taken correctly  Do not use more than 4 grams (4,000 milligrams) total of acetaminophen in one day  · NSAIDs , such as ibuprofen, help decrease swelling, pain, and fever  This medicine is available with or without a doctor's order  NSAIDs can cause stomach bleeding or kidney problems in certain people  If you take blood thinner medicine, always ask your healthcare provider if NSAIDs are safe for you  Always read the medicine label and follow directions  · Nasal steroid sprays  may help decrease inflammation in your nose and sinuses  · Decongestants  help reduce swelling and drain mucus in the nose and sinuses  They may help you breathe easier  · Antihistamines  help dry mucus in the nose and relieve sneezing  · Antibiotics  help treat or prevent a bacterial infection  How can I manage my symptoms? · Rinse your sinuses  Use a sinus rinse device to rinse your nasal passages with a saline (salt water) solution or distilled water  Do not use tap water  This will help thin the mucus in your nose and rinse away pollen and dirt  It will also help reduce swelling so you can breathe normally  Ask your healthcare provider how often to do this       · Breathe in steam   Heat a bowl of water until you see steam  Lean over the bowl and make a tent over your head with a large towel  Breathe deeply for about 20 minutes  Be careful not to get too close to the steam or burn yourself  Do this 3 times a day  You can also breathe deeply when you take a hot shower  · Sleep with your head elevated  Place an extra pillow under your head before you go to sleep to help your sinuses drain  · Drink liquids as directed  Ask your healthcare provider how much liquid to drink each day and which liquids are best for you  Liquids will thin the mucus in your nose and help it drain  Avoid drinks that contain alcohol or caffeine  · Do not smoke, and avoid secondhand smoke  Nicotine and other chemicals in cigarettes and cigars can make your symptoms worse  Ask your healthcare provider for information if you currently smoke and need help to quit  E-cigarettes or smokeless tobacco still contain nicotine  Talk to your healthcare provider before you use these products  How can I help prevent the spread of germs that cause sinusitis? Wash your hands often with soap and water  Wash your hands after you use the bathroom, change a child's diaper, or sneeze  Wash your hands before you prepare or eat food  When should I seek immediate care? · Your eye and eyelid are red, swollen, and painful  · You cannot open your eye  · You have vision changes, such as double vision  · Your eyeball bulges out or you cannot move your eye  · You are more sleepy than normal, or you notice changes in your ability to think, move, or talk  · You have a stiff neck, a fever, or a bad headache  · You have swelling of your forehead or scalp  When should I contact my healthcare provider? · Your symptoms do not improve after 3 days  · Your symptoms do not go away after 10 days  · You have nausea and are vomiting  · Your nose is bleeding      · You have questions or concerns about your condition or care  CARE AGREEMENT:   You have the right to help plan your care  Learn about your health condition and how it may be treated  Discuss treatment options with your caregivers to decide what care you want to receive  You always have the right to refuse treatment  The above information is an  only  It is not intended as medical advice for individual conditions or treatments  Talk to your doctor, nurse or pharmacist before following any medical regimen to see if it is safe and effective for you  © 2017 [employer]  Information is for End User's use only and may not be sold, redistributed or otherwise used for commercial purposes  All illustrations and images included in CareNotes® are the copyrighted property of [employer]  or [employer]

## 2017-12-22 NOTE — ASSESSMENT & PLAN NOTE
Influenza A&B and RSV by PCR positive for Influenza B  Chest x-ray without signs of pneumonia  Oxygen level stable on room air  Continue Tamiflu 75 mg p o  twice daily for total of 5 days  Patient anxious for discharge and will monitor his respiratory symptoms and fever at home  He is to continue supportive care with hydration, rest, NSAIDs as needed
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
## 2017-12-22 NOTE — ASSESSMENT & PLAN NOTE
ID following, appreciate input  Per ID, he has had worsening sinus congestion for the past 2 weeks which were not relieved with 2 different courses of oral antibiotics  Suspect a component of chronic sinusitis on acute sinusitis due to influenza virus  Recommend patient follows up with ENT as an outpatient after improvement in symptoms related to influenza  Information will be given to him on discharge

## 2017-12-25 LAB
BACTERIA BLD CULT: NORMAL
BACTERIA BLD CULT: NORMAL

## 2018-01-13 VITALS
BODY MASS INDEX: 22.95 KG/M2 | HEART RATE: 76 BPM | HEIGHT: 72 IN | DIASTOLIC BLOOD PRESSURE: 78 MMHG | SYSTOLIC BLOOD PRESSURE: 116 MMHG | TEMPERATURE: 97.3 F | RESPIRATION RATE: 12 BRPM | WEIGHT: 169.44 LBS

## 2018-01-13 VITALS
HEART RATE: 67 BPM | DIASTOLIC BLOOD PRESSURE: 84 MMHG | HEIGHT: 72 IN | SYSTOLIC BLOOD PRESSURE: 156 MMHG | BODY MASS INDEX: 23.03 KG/M2 | WEIGHT: 170 LBS

## 2018-01-13 VITALS
WEIGHT: 167.5 LBS | DIASTOLIC BLOOD PRESSURE: 89 MMHG | HEIGHT: 72 IN | SYSTOLIC BLOOD PRESSURE: 144 MMHG | HEART RATE: 99 BPM | BODY MASS INDEX: 22.69 KG/M2

## 2018-01-13 VITALS
HEART RATE: 75 BPM | WEIGHT: 168.5 LBS | DIASTOLIC BLOOD PRESSURE: 98 MMHG | HEIGHT: 72 IN | BODY MASS INDEX: 22.82 KG/M2 | SYSTOLIC BLOOD PRESSURE: 145 MMHG

## 2018-01-13 VITALS
DIASTOLIC BLOOD PRESSURE: 88 MMHG | BODY MASS INDEX: 22.62 KG/M2 | SYSTOLIC BLOOD PRESSURE: 132 MMHG | HEIGHT: 72 IN | HEART RATE: 87 BPM | WEIGHT: 167 LBS

## 2018-01-13 NOTE — MISCELLANEOUS
Message  Faxed order for left wrist xray and c-spine xray to [facility] outpatient radiology registration @ [phone]  Active Problems    1  Back pain (724 5) (M54 9)   2  Left wrist pain (719 43) (M25 532)    Current Meds   1  ALPRAZolam 0 5 MG Oral Tablet; Therapy: (Recorded:2014) to Recorded   2  BuPROPion HCl ER (XL) 150 MG Oral Tablet Extended Release 24 Hour; Therapy: (Recorded:2014) to Recorded   3  ClonazePAM 0 5 MG Oral Tablet; Therapy: (Recorded:2014) to Recorded   4  Escitalopram Oxalate 20 MG Oral Tablet; Therapy: (Recorded:2014) to Recorded   5  LamoTRIgine 150 MG Oral Tablet; Therapy: (Recorded:2014) to Recorded   6  Micardis 80 MG Oral Tablet (Telmisartan); Therapy: (Recorded:2014) to Recorded   7  Pantoprazole Sodium 40 MG Oral Tablet Delayed Release; Therapy: (Recorded:2014) to Recorded   8  QUEtiapine Fumarate 25 MG Oral Tablet; Therapy: (Recorded:2014) to Recorded    Plan  Back pain    · XR SPINE CERVICAL COMPLETE 6+ VW FLEX/EXT/OBL; Status:Active - Retrospective  By Protocol Authorization; Requested EAM:[id number];   Left wrist pain    · * XR WRIST 3+ VIEW LEFT; Status:Active - Retrospective By Protocol Authorization;   Requested [id number];     Signatures   Electronically signed by : Agatha Johnston, ; 2017 10:07AM EST                       (Author)

## 2018-01-14 VITALS
DIASTOLIC BLOOD PRESSURE: 89 MMHG | BODY MASS INDEX: 22.95 KG/M2 | SYSTOLIC BLOOD PRESSURE: 142 MMHG | HEIGHT: 72 IN | HEART RATE: 77 BPM | WEIGHT: 169.44 LBS

## 2018-09-17 ENCOUNTER — APPOINTMENT (OUTPATIENT)
Dept: LAB | Facility: CLINIC | Age: 58
End: 2018-09-17
Payer: COMMERCIAL

## 2018-09-17 ENCOUNTER — OFFICE VISIT (OUTPATIENT)
Dept: FAMILY MEDICINE CLINIC | Facility: CLINIC | Age: 58
End: 2018-09-17
Payer: COMMERCIAL

## 2018-09-17 VITALS
WEIGHT: 171.3 LBS | DIASTOLIC BLOOD PRESSURE: 84 MMHG | SYSTOLIC BLOOD PRESSURE: 142 MMHG | RESPIRATION RATE: 16 BRPM | TEMPERATURE: 97.5 F | HEART RATE: 82 BPM | HEIGHT: 72 IN | BODY MASS INDEX: 23.2 KG/M2

## 2018-09-17 DIAGNOSIS — R53.83 OTHER FATIGUE: ICD-10-CM

## 2018-09-17 DIAGNOSIS — N40.0 BENIGN PROSTATIC HYPERPLASIA WITHOUT LOWER URINARY TRACT SYMPTOMS: ICD-10-CM

## 2018-09-17 DIAGNOSIS — E03.9 HYPOTHYROIDISM, UNSPECIFIED TYPE: ICD-10-CM

## 2018-09-17 DIAGNOSIS — E78.00 HYPERCHOLESTEROLEMIA: ICD-10-CM

## 2018-09-17 DIAGNOSIS — F32.A DEPRESSION, UNSPECIFIED DEPRESSION TYPE: Primary | ICD-10-CM

## 2018-09-17 LAB
ALBUMIN SERPL BCP-MCNC: 4.1 G/DL (ref 3.5–5)
ALP SERPL-CCNC: 95 U/L (ref 46–116)
ALT SERPL W P-5'-P-CCNC: 38 U/L (ref 12–78)
ANION GAP SERPL CALCULATED.3IONS-SCNC: 5 MMOL/L (ref 4–13)
AST SERPL W P-5'-P-CCNC: 26 U/L (ref 5–45)
BASOPHILS # BLD AUTO: 0.05 THOUSANDS/ΜL (ref 0–0.1)
BASOPHILS NFR BLD AUTO: 1 % (ref 0–1)
BILIRUB SERPL-MCNC: 1 MG/DL (ref 0.2–1)
BUN SERPL-MCNC: 9 MG/DL (ref 5–25)
CALCIUM SERPL-MCNC: 9 MG/DL (ref 8.3–10.1)
CHLORIDE SERPL-SCNC: 104 MMOL/L (ref 100–108)
CHOLEST SERPL-MCNC: 215 MG/DL (ref 50–200)
CO2 SERPL-SCNC: 33 MMOL/L (ref 21–32)
CREAT SERPL-MCNC: 1.15 MG/DL (ref 0.6–1.3)
EOSINOPHIL # BLD AUTO: 0.24 THOUSAND/ΜL (ref 0–0.61)
EOSINOPHIL NFR BLD AUTO: 4 % (ref 0–6)
ERYTHROCYTE [DISTWIDTH] IN BLOOD BY AUTOMATED COUNT: 12.1 % (ref 11.6–15.1)
GFR SERPL CREATININE-BSD FRML MDRD: 70 ML/MIN/1.73SQ M
GLUCOSE P FAST SERPL-MCNC: 90 MG/DL (ref 65–99)
HCT VFR BLD AUTO: 48 % (ref 36.5–49.3)
HDLC SERPL-MCNC: 59 MG/DL (ref 40–60)
HGB BLD-MCNC: 16.5 G/DL (ref 12–17)
IMM GRANULOCYTES # BLD AUTO: 0.02 THOUSAND/UL (ref 0–0.2)
IMM GRANULOCYTES NFR BLD AUTO: 0 % (ref 0–2)
LDLC SERPL CALC-MCNC: 142 MG/DL (ref 0–100)
LYMPHOCYTES # BLD AUTO: 1.8 THOUSANDS/ΜL (ref 0.6–4.47)
LYMPHOCYTES NFR BLD AUTO: 30 % (ref 14–44)
MCH RBC QN AUTO: 30.3 PG (ref 26.8–34.3)
MCHC RBC AUTO-ENTMCNC: 34.4 G/DL (ref 31.4–37.4)
MCV RBC AUTO: 88 FL (ref 82–98)
MONOCYTES # BLD AUTO: 0.6 THOUSAND/ΜL (ref 0.17–1.22)
MONOCYTES NFR BLD AUTO: 10 % (ref 4–12)
NEUTROPHILS # BLD AUTO: 3.37 THOUSANDS/ΜL (ref 1.85–7.62)
NEUTS SEG NFR BLD AUTO: 55 % (ref 43–75)
NRBC BLD AUTO-RTO: 0 /100 WBCS
PLATELET # BLD AUTO: 220 THOUSANDS/UL (ref 149–390)
PMV BLD AUTO: 8.7 FL (ref 8.9–12.7)
POTASSIUM SERPL-SCNC: 4.2 MMOL/L (ref 3.5–5.3)
PROT SERPL-MCNC: 7.1 G/DL (ref 6.4–8.2)
PSA SERPL-MCNC: 1.4 NG/ML (ref 0–4)
RBC # BLD AUTO: 5.44 MILLION/UL (ref 3.88–5.62)
SODIUM SERPL-SCNC: 142 MMOL/L (ref 136–145)
TRIGL SERPL-MCNC: 71 MG/DL
TSH SERPL DL<=0.05 MIU/L-ACNC: 4.58 UIU/ML (ref 0.36–3.74)
WBC # BLD AUTO: 6.08 THOUSAND/UL (ref 4.31–10.16)

## 2018-09-17 PROCEDURE — 99204 OFFICE O/P NEW MOD 45 MIN: CPT | Performed by: FAMILY MEDICINE

## 2018-09-17 PROCEDURE — 36415 COLL VENOUS BLD VENIPUNCTURE: CPT

## 2018-09-17 PROCEDURE — 3008F BODY MASS INDEX DOCD: CPT | Performed by: FAMILY MEDICINE

## 2018-09-17 PROCEDURE — 80053 COMPREHEN METABOLIC PANEL: CPT

## 2018-09-17 PROCEDURE — 85025 COMPLETE CBC W/AUTO DIFF WBC: CPT

## 2018-09-17 PROCEDURE — 84443 ASSAY THYROID STIM HORMONE: CPT

## 2018-09-17 PROCEDURE — 80061 LIPID PANEL: CPT

## 2018-09-17 PROCEDURE — 84153 ASSAY OF PSA TOTAL: CPT

## 2018-09-17 PROCEDURE — 1036F TOBACCO NON-USER: CPT | Performed by: FAMILY MEDICINE

## 2018-09-17 RX ORDER — DULOXETIN HYDROCHLORIDE 60 MG/1
60 CAPSULE, DELAYED RELEASE ORAL DAILY
Qty: 30 CAPSULE | Refills: 3 | Status: SHIPPED | OUTPATIENT
Start: 2018-09-17 | End: 2019-01-14 | Stop reason: SDUPTHER

## 2018-09-18 NOTE — PROGRESS NOTES
Patient ID: Reina Uribe is a 62 y o  male  HPI: 62 y o male presents to get established into the practice  He has hypercholeesrla and bph  He complains of fatigue and feels depressed without sucdality  SUBJECTIVE    Family History   Problem Relation Age of Onset    Cancer Mother     Stroke Mother     Colon cancer Father     Colon polyps Father     Valvular heart disease Father     Cancer Sister     Suicidality Brother      Social History     Social History    Marital status: /Civil Union     Spouse name: N/A    Number of children: N/A    Years of education: N/A     Occupational History    Not on file       Social History Main Topics    Smoking status: Never Smoker    Smokeless tobacco: Not on file    Alcohol use No      Comment: social    Drug use: No    Sexual activity: Not on file     Other Topics Concern    Not on file     Social History Narrative    No narrative on file     Past Medical History:   Diagnosis Date    Anxiety     Chronic urticaria     Depression     Hashimoto's disease     Hashimoto's disease     Hashimoto's thyroiditis      Past Surgical History:   Procedure Laterality Date    NASAL SEPTUM SURGERY      TONSILLECTOMY  1979     Allergies   Allergen Reactions    Compazine [Prochlorperazine] GI Intolerance    Sulfa Antibiotics        Current Outpatient Prescriptions:     buPROPion (WELLBUTRIN XL) 300 mg 24 hr tablet, Take 300 mg by mouth daily, Disp: , Rfl:     clonazePAM (KlonoPIN) 1 mg tablet, Take 0 5 mg by mouth daily, Disp: , Rfl:     levothyroxine 50 mcg tablet, Take 50 mcg by mouth daily, Disp: , Rfl:     DULoxetine (CYMBALTA) 60 mg delayed release capsule, Take 1 capsule (60 mg total) by mouth daily 1 in am, Disp: 30 capsule, Rfl: 3    Review of Systems  Constitutional:     Denies fever, chills , ,weakness ,weight loss, weight gain   + fatigue  ENT: Denies earache ,loss of hearing ,nosebleed, nasal discharge,nasal congestion ,sore throat ,hoarseness  Pulmonary: Denies shortness of breath ,cough  ,dyspnea on exertion, orthopnea  ,PND   Cardiovascular:  Denies bradycardia , tachycardia  ,palpations, lower extremity edema leg, claudication  Breast:  Denies new or changing breast lumps ,nipple discharge ,nipple changes  Abdomen:  Denies abdominal pain , anorexia , indigestion, nausea, vomiting, constipation, diarrhea  Musculoskeletal: Denies myalgias, arthralgias, joint swelling, joint stiffness , limb pain, limb swelling  Gu: denies dysuria, polyuria  Skin: Denies skin rash, skin lesion, skin wound, itching, dry skin  Neuro: Denies headache, numbness, tingling, confusion, loss of consciousness, dizziness, vertigo  Psychiatric: +Denies feelings of depression, suicidal ideation, denies any anxiety, sleep disturbances    OBJECTIVE  /84   Pulse 82   Temp 97 5 °F (36 4 °C)   Resp 16   Ht 6' (1 829 m)   Wt 77 7 kg (171 lb 4 8 oz)   BMI 23 23 kg/m²   Constitutional:   NAD, well appearing and well nourished      ENT:   Conjunctiva and lids: no injection, edema, or discharge     Pupils and iris: SULMA bilaterally    External inspection of ears and nose: normal without deformities or discharge  Otoscopic exam: Canals patent without erythema  Nasal mucosa, septum and turbinates: Normal or edema or discharge         Oropharynx:  Moist mucosa, normal tongue and tonsils without lesions  No erythema        Pulmonary:Respiratory effort normal rate and rhythm, no increased work of breathing   Auscultation of lungs:  Clear bilaterally with no adventitious breath sounds       Cardiovascular: regular rate and rhythm, S1 and S2, no murmur, no edema and/or varicosities of LE      Abdomen: Soft and non-distended     Positive bowel sounds      No heptomegaly or splenomegaly    Gu: no suprapubic tenderness or CVA tenderness, no urethral discharge  Lymphatic:  No anterior or posterior cervical lymphadenopathy         Musculoskeletal:  Gait and station: Normal gait      Digits and nails normal without clubbing or cyanosis       Inspection/palpation of joints, bones, and muscles:  No joint tenderness, swelling, full active and passive range of motion       Skin: Normal skin turgor and no rashes      Neuro:    Normal reflexes     Psych:   alert and oriented to person, place and time     normal mood and affect ; no suicidality      Assessment/Plan:Diagnoses and all orders for this visit:    Depression, unspecified depression type  -     DULoxetine (CYMBALTA) 60 mg delayed release capsule; Take 1 capsule (60 mg total) by mouth daily 1 in am    Hypothyroidism, unspecified type  -     TSH, 3rd generation; Future    Hypercholesterolemia  -     Lipid Panel with Direct LDL reflex; Future    Benign prostatic hyperplasia without lower urinary tract symptoms  -     PSA Total, Diagnostic; Future    Other fatigue  -     Comprehensive metabolic panel; Future  -     CBC and differential; Future    Other orders  -     Discontinue: loratadine-pseudoephedrine (CLARITIN-D 12-HOUR) 5-120 mg per tablet; Take 1 tablet by mouth 2 (two) times a day        Reviewed with patient plan to treat with the above      Patient instructed to call in $ weeks if not feeling better or if symptoms worsen

## 2018-10-01 ENCOUNTER — OFFICE VISIT (OUTPATIENT)
Dept: OBGYN CLINIC | Facility: CLINIC | Age: 58
End: 2018-10-01
Payer: COMMERCIAL

## 2018-10-01 VITALS
WEIGHT: 175.4 LBS | DIASTOLIC BLOOD PRESSURE: 96 MMHG | SYSTOLIC BLOOD PRESSURE: 164 MMHG | BODY MASS INDEX: 23.76 KG/M2 | HEIGHT: 72 IN | HEART RATE: 71 BPM

## 2018-10-01 DIAGNOSIS — M19.031 CMC DJD(CARPOMETACARPAL DEGENERATIVE JOINT DISEASE), LOCALIZED PRIMARY, RIGHT: Primary | ICD-10-CM

## 2018-10-01 PROCEDURE — 20600 DRAIN/INJ JOINT/BURSA W/O US: CPT | Performed by: ORTHOPAEDIC SURGERY

## 2018-10-01 PROCEDURE — 99213 OFFICE O/P EST LOW 20 MIN: CPT | Performed by: ORTHOPAEDIC SURGERY

## 2018-10-01 RX ORDER — BUPIVACAINE HYDROCHLORIDE 2.5 MG/ML
1 INJECTION, SOLUTION EPIDURAL; INFILTRATION; INTRACAUDAL
Status: COMPLETED | OUTPATIENT
Start: 2018-10-01 | End: 2018-10-01

## 2018-10-01 RX ORDER — BETAMETHASONE SODIUM PHOSPHATE AND BETAMETHASONE ACETATE 3; 3 MG/ML; MG/ML
3 INJECTION, SUSPENSION INTRA-ARTICULAR; INTRALESIONAL; INTRAMUSCULAR; SOFT TISSUE
Status: COMPLETED | OUTPATIENT
Start: 2018-10-01 | End: 2018-10-01

## 2018-10-01 RX ADMIN — BETAMETHASONE SODIUM PHOSPHATE AND BETAMETHASONE ACETATE 3 MG: 3; 3 INJECTION, SUSPENSION INTRA-ARTICULAR; INTRALESIONAL; INTRAMUSCULAR; SOFT TISSUE at 16:12

## 2018-10-01 RX ADMIN — BUPIVACAINE HYDROCHLORIDE 1 ML: 2.5 INJECTION, SOLUTION EPIDURAL; INFILTRATION; INTRACAUDAL at 16:12

## 2018-10-01 NOTE — PROGRESS NOTES
ASSESSMENT/PLAN:    Assessment:   Right thumb CMC DJD    Plan:   Steroid injection provided today  NSAIDs prn  Can use comfort cool brace prn    Follow Up:  3 months    General Discussions:  CMC Arthritis: The anatomy and physiology of carpometacarpal joint arthritis was discussed with the patient today in the office  Deterioration of the articular cartilage eventually leads to hypermobility at the thumb [facility] joint, resulting in joint subluxation, osteophyte formation, cystic changes within the trapezium and base of the first metacarpal, as well as subchondral sclerosis  Eventually, pain, limited mobility, and compensatory hyperextension at the metacarpophalangeal joint may develop  While normal activity and usage of the thumb joint may provide a painful experience to the patient, this typically does not result in damage to the thumb or hand  Treatment options include resting thumb spica splints to decreased joint edema, pain, and inflammation  Therapy exercises to strengthen the thenar musculature may relieve pain, but do not alter the overall continued development of osteoarthritis  Oral medications, topical medications, corticosteroid injections may decrease pain and increase overall function  Eventually, approximately 5% of patients may require surgical intervention  _____________________________________________________  CHIEF COMPLAINT:  Chief Complaint   Patient presents with    Right Wrist - Pain         SUBJECTIVE:  Leonidas Still III is a 62y o  year old male who presents for follow up regarding right wrist pain  States it is radial wrist pain, towards the base of the thumb  Worse with gripping activities  Previous steroid injection provided a couple of months relief  Uses NSAIDs prn      PAST MEDICAL HISTORY:  Past Medical History:   Diagnosis Date    Anxiety     Chronic urticaria     Depression     Hashimoto's disease     Hashimoto's disease     Hashimoto's thyroiditis        PAST SURGICAL HISTORY:  Past Surgical History:   Procedure Laterality Date    NASAL SEPTUM SURGERY      TONSILLECTOMY  1979       FAMILY HISTORY:  Family History   Problem Relation Age of Onset    Cancer Mother     Stroke Mother     Colon cancer Father     Colon polyps Father     Valvular heart disease Father     Cancer Sister     Suicidality Brother        SOCIAL HISTORY:  Social History   Substance Use Topics    Smoking status: Never Smoker    Smokeless tobacco: Not on file    Alcohol use No      Comment: social       MEDICATIONS:    Current Outpatient Prescriptions:     buPROPion (WELLBUTRIN XL) 300 mg 24 hr tablet, Take 300 mg by mouth daily, Disp: , Rfl:     clonazePAM (KlonoPIN) 1 mg tablet, Take 0 5 mg by mouth daily, Disp: , Rfl:     DULoxetine (CYMBALTA) 60 mg delayed release capsule, Take 1 capsule (60 mg total) by mouth daily 1 in am, Disp: 30 capsule, Rfl: 3    levothyroxine 50 mcg tablet, Take 50 mcg by mouth daily, Disp: , Rfl:     ALLERGIES:  Allergies   Allergen Reactions    Compazine [Prochlorperazine] GI Intolerance    Sulfa Antibiotics        REVIEW OF SYSTEMS:  Pertinent items are noted in HPI  A comprehensive review of systems was negative      LABS:  HgA1c: No results found for: HGBA1C  BMP:   Lab Results   Component Value Date    GLUCOSE 91 06/03/2017    CALCIUM 9 0 09/17/2018     09/17/2018    K 4 2 09/17/2018    CO2 33 (H) 09/17/2018     09/17/2018    BUN 9 09/17/2018    CREATININE 1 15 09/17/2018           _____________________________________________________  PHYSICAL EXAMINATION:  General: well developed and well nourished, alert, oriented times 3 and appears comfortable  Psychiatric: Normal  HEENT: Trachea Midline, No torticollis  Cardiovascular: No discernable arrhythmia  Pulmonary: No wheezing or stridor  Skin: No masses, erthema, lacerations, fluctation, ulcerations  Neurovascular: Sensation Intact to the Median, Ulnar, Radial Nerve, Motor Intact to the Median, Ulnar, Radial Nerve and Pulses Intact    MUSCULOSKELETAL EXAMINATION:  RIGHT SIDE:  CMC: No Instability, Positive grind, Positive tendnerness CMC and Positive Shoulder Sign  No ulnar wrist ttp  No ttp over radial styloid   Negative Finkelstein's    _____________________________________________________  STUDIES REVIEWED:  No Studies to review      PROCEDURES PERFORMED:  Small joint arthrocentesis  Date/Time: 10/1/2018 4:12 PM  Consent given by: patient  Supporting Documentation  Indications: pain   Procedure Details  Location: thumb - R thumb CMC  Needle size: 25 G  Ultrasound guidance: no  Approach: volar  Medications administered: 3 mg betamethasone acetate-betamethasone sodium phosphate 6 (3-3) mg/mL; 1 mL bupivacaine (PF) 0 25 %    Patient tolerance: patient tolerated the procedure well with no immediate complications  Dressing:  Sterile dressing applied          Scribe Attestation    I,:   Екатерина Mayer PA-C am acting as a scribe while in the presence of the attending physician :        I,:   Chris Higginbotham MD personally performed the services described in this documentation    as scribed in my presence :

## 2018-11-12 DIAGNOSIS — E03.9 HYPOTHYROIDISM, UNSPECIFIED TYPE: Primary | ICD-10-CM

## 2018-11-12 RX ORDER — LEVOTHYROXINE SODIUM 0.07 MG/1
75 TABLET ORAL DAILY
Qty: 30 TABLET | Refills: 3 | Status: SHIPPED | OUTPATIENT
Start: 2018-11-12 | End: 2019-01-25 | Stop reason: SDUPTHER

## 2019-01-14 DIAGNOSIS — F32.A DEPRESSION, UNSPECIFIED DEPRESSION TYPE: ICD-10-CM

## 2019-01-14 RX ORDER — DULOXETIN HYDROCHLORIDE 60 MG/1
CAPSULE, DELAYED RELEASE ORAL
Qty: 30 CAPSULE | Refills: 3 | Status: SHIPPED | OUTPATIENT
Start: 2019-01-14 | End: 2019-05-20 | Stop reason: SDUPTHER

## 2019-01-15 ENCOUNTER — TRANSCRIBE ORDERS (OUTPATIENT)
Dept: RADIOLOGY | Facility: CLINIC | Age: 59
End: 2019-01-15

## 2019-01-15 ENCOUNTER — APPOINTMENT (OUTPATIENT)
Dept: RADIOLOGY | Facility: CLINIC | Age: 59
End: 2019-01-15
Payer: COMMERCIAL

## 2019-01-15 ENCOUNTER — OFFICE VISIT (OUTPATIENT)
Dept: FAMILY MEDICINE CLINIC | Facility: CLINIC | Age: 59
End: 2019-01-15
Payer: COMMERCIAL

## 2019-01-15 VITALS
WEIGHT: 173 LBS | HEIGHT: 72 IN | HEART RATE: 70 BPM | TEMPERATURE: 98.5 F | SYSTOLIC BLOOD PRESSURE: 124 MMHG | BODY MASS INDEX: 23.43 KG/M2 | DIASTOLIC BLOOD PRESSURE: 90 MMHG

## 2019-01-15 DIAGNOSIS — R10.32 LEFT LOWER QUADRANT PAIN: ICD-10-CM

## 2019-01-15 DIAGNOSIS — J01.40 ACUTE NON-RECURRENT PANSINUSITIS: Primary | ICD-10-CM

## 2019-01-15 PROBLEM — M18.11 PRIMARY OSTEOARTHRITIS OF FIRST CARPOMETACARPAL JOINT OF RIGHT HAND: Status: ACTIVE | Noted: 2017-09-18

## 2019-01-15 PROBLEM — R91.8 PULMONARY NODULES: Status: ACTIVE | Noted: 2017-07-12

## 2019-01-15 PROBLEM — J30.1 NON-SEASONAL ALLERGIC RHINITIS DUE TO POLLEN: Status: ACTIVE | Noted: 2017-07-12

## 2019-01-15 PROCEDURE — 99213 OFFICE O/P EST LOW 20 MIN: CPT | Performed by: NURSE PRACTITIONER

## 2019-01-15 PROCEDURE — 74018 RADEX ABDOMEN 1 VIEW: CPT

## 2019-01-15 RX ORDER — AMPICILLIN TRIHYDRATE 250 MG
CAPSULE ORAL
COMMUNITY

## 2019-01-15 RX ORDER — AMOXICILLIN AND CLAVULANATE POTASSIUM 875; 125 MG/1; MG/1
1 TABLET, FILM COATED ORAL EVERY 12 HOURS SCHEDULED
Qty: 20 TABLET | Refills: 0 | Status: SHIPPED | OUTPATIENT
Start: 2019-01-15 | End: 2019-01-25

## 2019-01-15 NOTE — PROGRESS NOTES
Assessment/Plan:     Diagnoses and all orders for this visit:    Acute non-recurrent pansinusitis  -     amoxicillin-clavulanate (AUGMENTIN) 875-125 mg per tablet; Take 1 tablet by mouth every 12 (twelve) hours for 10 days    Left lower quadrant pain  -     XR abdomen 1 view kub; Future    Other orders  -     Coenzyme Q10 (COQ10) 200 MG CAPS; Take by mouth    #1 Acute pansinusitis  Discussed with patient and his plan to treat with a 10 day course of Augmentin  #2 Left lower quadrant pain  Discussed with patient and his wife plan to obtain x-ray of his abdominal to look for potential constipation or obstruction  Discussed with patient and his wife that he may require a GI referral for the pains/discomfort  Patient instructed to call if no improvement in 72 hours or symptoms worsen    Subjective:      Patient ID: Gaylen Gosselin III is a 62 y o  male  58 y o male presenting with nasal congestion, sinus pressure, headache, and neck pain for the past three days  He also reports having abdominal pains for a few months  When the cold symptoms started he thought he might have the flu but he called this office and was told that it did not sound like the flu  The upper respiratory symptoms have started improving but he woke up with a headache and sinus pressure this morning  The abdominal pains have been on going and he reports that he as been taking a stool softener for at least one month  He report that it feels like he needs to have a bowel movement but he cannot completely empty his colon  He denies nausea, vomiting or diarrhea occurring with the rectal pressure symptoms          Family History   Problem Relation Age of Onset   Kirsty Mare Cancer Mother     Stroke Mother     Colon cancer Father     Colon polyps Father     Valvular heart disease Father     Cancer Sister     Suicidality Brother      Social History     Social History    Marital status: /Civil Union     Spouse name: N/A    Number of children: N/A    Years of education: N/A     Occupational History    Not on file  Social History Main Topics    Smoking status: Never Smoker    Smokeless tobacco: Never Used    Alcohol use Yes      Comment: social    Drug use: No    Sexual activity: Not on file     Other Topics Concern    Not on file     Social History Narrative    No narrative on file     Past Medical History:   Diagnosis Date    Anxiety     Chronic urticaria     Depression     Hashimoto's disease     Hashimoto's disease     Hashimoto's thyroiditis      Past Surgical History:   Procedure Laterality Date    NASAL SEPTUM SURGERY      TONSILLECTOMY  1979     Allergies   Allergen Reactions    Compazine [Prochlorperazine] GI Intolerance    Sulfa Antibiotics        Current Outpatient Prescriptions:     buPROPion (WELLBUTRIN XL) 300 mg 24 hr tablet, Take 300 mg by mouth daily, Disp: , Rfl:     clonazePAM (KlonoPIN) 1 mg tablet, Take 0 5 mg by mouth daily, Disp: , Rfl:     Coenzyme Q10 (COQ10) 200 MG CAPS, Take by mouth, Disp: , Rfl:     DULoxetine (CYMBALTA) 60 mg delayed release capsule, TAKE ONE CAPSULE BY MOUTH EVERY MORNING, Disp: 30 capsule, Rfl: 3    amoxicillin-clavulanate (AUGMENTIN) 875-125 mg per tablet, Take 1 tablet by mouth every 12 (twelve) hours for 10 days, Disp: 20 tablet, Rfl: 0    levothyroxine 75 mcg tablet, Take 1 tablet (75 mcg total) by mouth daily for 30 days, Disp: 30 tablet, Rfl: 3      Review of Systems   Constitutional: Negative  HENT: Positive for congestion and sinus pressure  Eyes: Negative  Gastrointestinal: Positive for abdominal pain  Endocrine: Negative  Genitourinary: Negative  Musculoskeletal: Positive for neck pain  Skin: Negative  Allergic/Immunologic: Negative  Neurological: Positive for headaches  Hematological: Negative  Psychiatric/Behavioral: Negative          Objective:    /90   Pulse 70   Temp 98 5 °F (36 9 °C)   Ht 6' (1 829 m)   Wt 78 5 kg (173 lb)   BMI 23 46 kg/m² (Reviewed)     Physical Exam   Constitutional: He is oriented to person, place, and time  Vital signs are normal  He appears well-developed and well-nourished  HENT:   Head: Normocephalic and atraumatic  Right Ear: Tympanic membrane, external ear and ear canal normal    Left Ear: Tympanic membrane, external ear and ear canal normal    Nose: Mucosal edema and rhinorrhea present  Right sinus exhibits maxillary sinus tenderness and frontal sinus tenderness  Left sinus exhibits maxillary sinus tenderness and frontal sinus tenderness  Mouth/Throat: Uvula is midline and mucous membranes are normal  Oropharyngeal exudate and posterior oropharyngeal erythema present  Eyes: Pupils are equal, round, and reactive to light  Conjunctivae, EOM and lids are normal    Neck: Trachea normal and normal range of motion  Neck supple  Muscular tenderness present  Cardiovascular: Normal rate, regular rhythm and normal heart sounds  Pulmonary/Chest: Effort normal and breath sounds normal    Abdominal: Soft  Normal appearance and bowel sounds are normal  There is no hepatosplenomegaly  There is tenderness in the left lower quadrant  Neurological: He is alert and oriented to person, place, and time  Skin: Skin is warm and dry  Psychiatric: He has a normal mood and affect   His behavior is normal

## 2019-01-21 DIAGNOSIS — N20.0 RENAL STONES: Primary | ICD-10-CM

## 2019-01-23 ENCOUNTER — OFFICE VISIT (OUTPATIENT)
Dept: UROLOGY | Facility: CLINIC | Age: 59
End: 2019-01-23
Payer: COMMERCIAL

## 2019-01-23 VITALS
HEART RATE: 94 BPM | DIASTOLIC BLOOD PRESSURE: 84 MMHG | WEIGHT: 175.4 LBS | SYSTOLIC BLOOD PRESSURE: 118 MMHG | HEIGHT: 72 IN | BODY MASS INDEX: 23.76 KG/M2

## 2019-01-23 DIAGNOSIS — R39.15 URGENCY OF URINATION: Primary | ICD-10-CM

## 2019-01-23 DIAGNOSIS — N20.0 RENAL STONES: ICD-10-CM

## 2019-01-23 PROCEDURE — 99204 OFFICE O/P NEW MOD 45 MIN: CPT | Performed by: UROLOGY

## 2019-01-23 NOTE — ASSESSMENT & PLAN NOTE
Patient has noticed some increasing urinary urgency over time, he has no decrease in his force of stream and no difficulties emptying his bladder  He seldom has nocturia  AUA symptom score is 11 with a bother score of 5 mostly due to storage symptoms  We reviewed his daily intake of Gatorade, tea, and water, and discussed behavioral and fluid changes as well as avoidance of bladder irritants to decrease his symptoms  He states that he would not want to start a medication at this time for his symptoms, and he refuses a digital rectal examination today  PSA is 1 4, this would be in the low range risk for prostate cancer  Plan:  Behavioral and fluid changes for storage symptoms, if these do not improve then we will consider therapies with anticholinergic medication versus beta 3 agonist medication  No indication for this at this time

## 2019-01-23 NOTE — ASSESSMENT & PLAN NOTE
The patient is referred in consultation by Dr Moore for kidney stones  A copy of today's consultation note is being sent to the referring provider in the name of continuity of care  The patient has a longstanding history (at least 1 5 years) of nonobstructing right-sided kidney stones, he has no flank pain characteristic of stone obstruction or nonobstructing stones  He does have some lower abdominal and back pain which is positional but this appears to be musculoskeletal     Plan:  Reviewed films with him, discussed with him indications for operating to remove kidney stones in terms of recurrent urinary tract infections, intractable symptoms or pain, or evidence of renal dysfunction  The patient does not meet criteria for operative intervention at this time    Should he developed the above complaints, we will be happy to reassess him in the future with a consideration for removal of his stable, nonobstructing kidney stones
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
## 2019-01-23 NOTE — PATIENT INSTRUCTIONS
Kidney Stones   WHAT YOU NEED TO KNOW:   What is a kidney stone? Kidney stones form in the urinary system when the water and waste in your urine are out of balance  When this happens, certain types of waste crystals separate from the urine  The crystals build up and form kidney stones  Kidney stones can be made of uric acid, calcium, phosphate, or oxalate crystals  You may have 1 or more kidney stones  What increases my risk for kidney stones? · You do not drink enough liquids (especially water) each day  · You have urinary tract infections often  · You follow a certain type of diet  For example, people who eat a diet high in meat or salt may be at higher risk for kidney stones  People who eat foods high in oxalate may also be at higher risk  Foods that are high in oxalate include nuts, chocolate, coffee, and green leafy vegetables  · You take certain medicines such as diuretics, steroids, and antacids  · A family member has had kidney stones  · You were born with a kidney or bowel disorder, or you have other medical problems such as gout  What are the signs and symptoms of kidney stones? · Pain in the middle of your back that moves across to your side or that may spread to your groin    · Nausea and vomiting    · Urge to urinate often, burning feeling when you urinate, or pink or red urine    · Tenderness in your lower back, side, or stomach  How are kidney stones diagnosed? Your healthcare provider will ask about your health, diet, and lifestyle  He may refer you to a urologist  Leticia Keane may need tests to find out what type of kidney stones you have  Tests can show the size of your kidney stones and where they are in your urinary system  You may have one or more of the following:  · Urine tests  may show if you have blood in your urine  They may also show high amounts of the substances that form kidney stones, such as uric acid  · Blood tests  show how well your kidneys are working   They may also be used to check the levels of calcium or uric acid in your blood  · A noncontrast helical CT scan  is a type of x-ray that uses a computer to take pictures of your kidneys  Healthcare providers use the pictures to check for kidney stones or other problems  · X-rays  of your kidneys, bladder, and ureters may be done  You may be given a dye before the pictures are taken to help healthcare providers see the pictures better  You may need to have more than one x-ray  Tell the healthcare provider if you have ever had an allergic reaction to contrast dye  · An abdominal ultrasound  uses sound waves to show pictures of your abdomen on a monitor  How are kidney stones treated? · NSAIDs , such as ibuprofen, help decrease swelling, pain, and fever  This medicine is available with or without a doctor's order  NSAIDs can cause stomach bleeding or kidney problems in certain people  If you take blood thinner medicine, always ask your healthcare provider if NSAIDs are safe for you  Always read the medicine label and follow directions  · Prescription medicine  may be given  Ask how to take this medicine safely  · Medicines  to balance your electrolytes may be needed  · A procedure or surgery  to remove the kidney stones may be needed if they do not pass on their own  Your treatment will depend on the size and location of your kidney stones  How can I manage my symptoms? · Drink plenty of liquids  Your healthcare provider may tell you to drink at least 8 to 12 (eight-ounce) cups of liquids each day  This helps flush out the kidney stones when you urinate  Water is the best liquid to drink  · Strain your urine every time you go to the bathroom  Urinate through a strainer or a piece of thin cloth to catch the stones  Take the stones to your healthcare provider so they can be sent to the lab for tests  This will help your healthcare providers plan the best treatment for you             · Eat a variety of healthy foods  Healthy foods include fruits, vegetables, whole-grain breads, low-fat dairy products, beans, and fish  You may need to limit how much sodium (salt) or protein you eat  Ask for information about the best foods for you  · Exercise regularly  Your stones may pass more easily if you stay active  Ask about the best activities for you  When should I seek immediate care? · You have vomiting that is not relieved by medicine  When should I contact my healthcare provider? · You have a fever  · You have trouble passing urine  · You see blood in your urine  · You have severe pain  · You have any questions or concerns about your condition or care  CARE AGREEMENT:   You have the right to help plan your care  Learn about your health condition and how it may be treated  Discuss treatment options with your caregivers to decide what care you want to receive  You always have the right to refuse treatment  The above information is an  only  It is not intended as medical advice for individual conditions or treatments  Talk to your doctor, nurse or pharmacist before following any medical regimen to see if it is safe and effective for you  © 2017 [employer] Information is for End User's use only and may not be sold, redistributed or otherwise used for commercial purposes  All illustrations and images included in CareNotes® are the copyrighted property of [employer]  or [employer]
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
## 2019-01-24 ENCOUNTER — TRANSCRIBE ORDERS (OUTPATIENT)
Dept: LAB | Facility: CLINIC | Age: 59
End: 2019-01-24

## 2019-01-24 ENCOUNTER — OFFICE VISIT (OUTPATIENT)
Dept: FAMILY MEDICINE CLINIC | Facility: CLINIC | Age: 59
End: 2019-01-24
Payer: COMMERCIAL

## 2019-01-24 ENCOUNTER — APPOINTMENT (OUTPATIENT)
Dept: LAB | Facility: CLINIC | Age: 59
End: 2019-01-24
Payer: COMMERCIAL

## 2019-01-24 VITALS
TEMPERATURE: 97.4 F | DIASTOLIC BLOOD PRESSURE: 92 MMHG | SYSTOLIC BLOOD PRESSURE: 150 MMHG | HEART RATE: 76 BPM | HEIGHT: 72 IN | WEIGHT: 177.2 LBS | BODY MASS INDEX: 24 KG/M2

## 2019-01-24 DIAGNOSIS — L73.9 FOLLICULITIS: Primary | ICD-10-CM

## 2019-01-24 DIAGNOSIS — K59.00 CONSTIPATION, UNSPECIFIED CONSTIPATION TYPE: ICD-10-CM

## 2019-01-24 DIAGNOSIS — E03.9 HYPOTHYROIDISM, UNSPECIFIED TYPE: ICD-10-CM

## 2019-01-24 DIAGNOSIS — H69.83 EUSTACHIAN TUBE DYSFUNCTION, BILATERAL: ICD-10-CM

## 2019-01-24 DIAGNOSIS — R61 HYPERHIDROSIS: ICD-10-CM

## 2019-01-24 LAB — TSH SERPL DL<=0.05 MIU/L-ACNC: 1.86 UIU/ML (ref 0.36–3.74)

## 2019-01-24 PROCEDURE — 36415 COLL VENOUS BLD VENIPUNCTURE: CPT

## 2019-01-24 PROCEDURE — 3008F BODY MASS INDEX DOCD: CPT | Performed by: FAMILY MEDICINE

## 2019-01-24 PROCEDURE — 99215 OFFICE O/P EST HI 40 MIN: CPT | Performed by: FAMILY MEDICINE

## 2019-01-24 PROCEDURE — 84443 ASSAY THYROID STIM HORMONE: CPT

## 2019-01-24 RX ORDER — PREDNISONE 10 MG/1
TABLET ORAL
Qty: 26 TABLET | Refills: 0 | Status: ON HOLD | OUTPATIENT
Start: 2019-01-24 | End: 2019-07-27 | Stop reason: ALTCHOICE

## 2019-01-25 DIAGNOSIS — G47.00 INSOMNIA, UNSPECIFIED TYPE: Primary | ICD-10-CM

## 2019-01-25 DIAGNOSIS — E03.9 HYPOTHYROIDISM, UNSPECIFIED TYPE: ICD-10-CM

## 2019-01-25 RX ORDER — CLONAZEPAM 1 MG/1
0.5 TABLET ORAL DAILY
Qty: 30 TABLET | Refills: 3 | Status: SHIPPED | OUTPATIENT
Start: 2019-01-25 | End: 2019-08-10 | Stop reason: SDUPTHER

## 2019-01-25 RX ORDER — LEVOTHYROXINE SODIUM 0.05 MG/1
75 TABLET ORAL DAILY
Qty: 30 TABLET | Refills: 3 | Status: SHIPPED | OUTPATIENT
Start: 2019-01-25 | End: 2019-06-09 | Stop reason: SDUPTHER

## 2019-01-25 NOTE — PROGRESS NOTES
Patient ID: Antonia Najera is a 62 y o  male  HPI: 62 y o male presenting with continued macular rash on forearms following treatment for folliculitis with Augmentin  Pt had been using a nylon puff to bathe with   He also complains of hyperhidrosis, constipation over the past 2 weeks  He also complains of clogged ears, pressure and difficulty hearing for past 2-3 weeks without nasal congestion , fever or ear pain  SUBJECTIVE    Family History   Problem Relation Age of Onset    Cancer Mother     Stroke Mother     Colon cancer Father     Colon polyps Father     Valvular heart disease Father     Cancer Sister     Suicidality Brother      Social History     Social History    Marital status: /Civil Union     Spouse name: N/A    Number of children: N/A    Years of education: N/A     Occupational History    Not on file       Social History Main Topics    Smoking status: Never Smoker    Smokeless tobacco: Never Used    Alcohol use Yes      Comment: social    Drug use: No    Sexual activity: Not on file     Other Topics Concern    Not on file     Social History Narrative    No narrative on file     Past Medical History:   Diagnosis Date    Anxiety     Chronic urticaria     Depression     Hashimoto's disease     Hashimoto's disease     Hashimoto's thyroiditis      Past Surgical History:   Procedure Laterality Date    NASAL SEPTUM SURGERY      TONSILLECTOMY  1979     Allergies   Allergen Reactions    Compazine [Prochlorperazine] GI Intolerance    Sulfa Antibiotics        Current Outpatient Prescriptions:     amoxicillin-clavulanate (AUGMENTIN) 875-125 mg per tablet, Take 1 tablet by mouth every 12 (twelve) hours for 10 days, Disp: 20 tablet, Rfl: 0    clonazePAM (KlonoPIN) 1 mg tablet, Take 0 5 mg by mouth daily, Disp: , Rfl:     Coenzyme Q10 (COQ10) 200 MG CAPS, Take by mouth, Disp: , Rfl:     DULoxetine (CYMBALTA) 60 mg delayed release capsule, TAKE ONE CAPSULE BY MOUTH EVERY MORNING, Disp: 30 capsule, Rfl: 3    buPROPion (WELLBUTRIN XL) 300 mg 24 hr tablet, Take 300 mg by mouth daily, Disp: , Rfl:     levothyroxine 75 mcg tablet, Take 1 tablet (75 mcg total) by mouth daily for 30 days, Disp: 30 tablet, Rfl: 3    predniSONE 10 mg tablet, 3 tabs po bid x2 days, then 2 tabs po bid x2 days, then 1 tab bid x2 days, then 1 daily until done , Disp: 26 tablet, Rfl: 0    Review of Systems  Constitutional:     Denies fever, chills ,fatigue ,weakness ,weight loss, weight gain + hyperhidrosis     ENT: Denies earache ,nosebleed, nasal discharge,nasal congestion ,sore throat ,hoarseness; + muffled sound and loss of hearing bilaterally with ear pressure and crackling  Pulmonary: Denies shortness of breath ,cough  ,dyspnea on exertion, orthopnea  ,PND   Cardiovascular:  Denies bradycardia , tachycardia  ,palpations, lower extremity edema leg, claudication  Breast:  Denies new or changing breast lumps ,nipple discharge ,nipple changes  Abdomen:  Denies abdominal pain , anorexia , indigestion, nausea, vomiting,+ constipation  Musculoskeletal: Denies myalgias, arthralgias, joint swelling, joint stiffness , limb pain, limb swelling  Gu: denies dysuria, polyuria  Skin: Denies skin lesion, skin wound, itching, dry skin + residual erythematous flat red spots on forearms bilaterally  Neuro: Denies headache, numbness, tingling, confusion, loss of consciousness, dizziness, vertigo  Psychiatric: Denies feelings of depression, suicidal ideation, anxiety, sleep disturbances    OBJECTIVE  /92   Pulse 76   Temp (!) 97 4 °F (36 3 °C)   Ht 6' (1 829 m)   Wt 80 4 kg (177 lb 3 2 oz)   BMI 24 03 kg/m²   Constitutional:   NAD, well appearing and well nourished, excessively sweating  ENT:   Conjunctiva and lids: no injection, edema, or discharge     Pupils and iris: SULMA bilaterally    External inspection of ears and nose: normal without deformities or discharge        Otoscopic exam: Canals patent ; tm dull with effusions bilaterally, but no erythema  Nasal mucosa, septum and turbinates: Normal or edema or discharge         Oropharynx:  Moist mucosa, normal tongue and tonsils without lesions  No erythema        Pulmonary:Respiratory effort normal rate and rhythm, no increased work of breathing  Auscultation of lungs:  Clear bilaterally with no adventitious breath sounds       Cardiovascular: regular rate and rhythm, S1 and S2, no murmur, no edema and/or varicosities of LE      Abdomen: Soft and non-distended , no tenderness on palpation    Positive bowel sounds      No heptomegaly or splenomegaly      Gu: no suprapubic tenderness or CVA tenderness, no urethral discharge  Lymphatic:  No anterior or posterior cervical lymphadenopathy         Musculoskeletal:  Gait and station: Normal gait      Digits and nails normal without clubbing or cyanosis       Inspection/palpation of joints, bones, and muscles:  No joint tenderness, swelling, full active and passive range of motion       Skin: Normal skin turgor and erythematous macular rash on forearms bilaterally      Neuro:     Normal reflexes      Psych:   alert and oriented to person, place and time     normal mood and affect       Assessment/Plan:Diagnoses and all orders for this visit:    Folliculitis  Comments:  Pt to use a washcloth to bathe with and discontinue the nylon puff  Advised to use Dove, Tone or Basis soaps  Orders:  -     predniSONE 10 mg tablet; 3 tabs po bid x2 days, then 2 tabs po bid x2 days, then 1 tab bid x2 days, then 1 daily until done  Hypothyroidism, unspecified type  Comments: Will await TSH and titrate levothyroxine accordingly  This may be source of constipation and hyperhidrosis  Orders:  -     TSH, 3rd generation; Future    Constipation, unspecified constipation type  Comments:  Pt to  magnesium citrate; drink 1/2 bottle &if no bowel movement, drink other 1/2    Starting tomorrow, pt to use miralax daily &increase fluids    Hyperhidrosis  Comments:  Await TSH results  Eustachian tube dysfunction, bilateral  Comments:  Pt to take loratidine 10 mg daily in conjunction with prednisone taper  Reviewed with patient plan to treat with above plan       Patient instructed to call in 72 hours if not feeling better or if symptoms worsen

## 2019-01-30 ENCOUNTER — OFFICE VISIT (OUTPATIENT)
Dept: OBGYN CLINIC | Facility: HOSPITAL | Age: 59
End: 2019-01-30
Payer: COMMERCIAL

## 2019-01-30 VITALS
HEIGHT: 72 IN | WEIGHT: 178 LBS | DIASTOLIC BLOOD PRESSURE: 74 MMHG | SYSTOLIC BLOOD PRESSURE: 129 MMHG | HEART RATE: 76 BPM | BODY MASS INDEX: 24.11 KG/M2

## 2019-01-30 DIAGNOSIS — M19.031 CMC DJD(CARPOMETACARPAL DEGENERATIVE JOINT DISEASE), LOCALIZED PRIMARY, RIGHT: Primary | ICD-10-CM

## 2019-01-30 DIAGNOSIS — M65.4 DE QUERVAIN'S TENOSYNOVITIS, RIGHT: ICD-10-CM

## 2019-01-30 PROCEDURE — 99213 OFFICE O/P EST LOW 20 MIN: CPT | Performed by: ORTHOPAEDIC SURGERY

## 2019-01-30 PROCEDURE — 20550 NJX 1 TENDON SHEATH/LIGAMENT: CPT | Performed by: ORTHOPAEDIC SURGERY

## 2019-01-30 PROCEDURE — 20600 DRAIN/INJ JOINT/BURSA W/O US: CPT | Performed by: ORTHOPAEDIC SURGERY

## 2019-01-30 RX ORDER — BETAMETHASONE SODIUM PHOSPHATE AND BETAMETHASONE ACETATE 3; 3 MG/ML; MG/ML
6 INJECTION, SUSPENSION INTRA-ARTICULAR; INTRALESIONAL; INTRAMUSCULAR; SOFT TISSUE
Status: COMPLETED | OUTPATIENT
Start: 2019-01-30 | End: 2019-01-30

## 2019-01-30 RX ORDER — BETAMETHASONE SODIUM PHOSPHATE AND BETAMETHASONE ACETATE 3; 3 MG/ML; MG/ML
3 INJECTION, SUSPENSION INTRA-ARTICULAR; INTRALESIONAL; INTRAMUSCULAR; SOFT TISSUE
Status: COMPLETED | OUTPATIENT
Start: 2019-01-30 | End: 2019-01-30

## 2019-01-30 RX ADMIN — BETAMETHASONE SODIUM PHOSPHATE AND BETAMETHASONE ACETATE 6 MG: 3; 3 INJECTION, SUSPENSION INTRA-ARTICULAR; INTRALESIONAL; INTRAMUSCULAR; SOFT TISSUE at 15:10

## 2019-01-30 RX ADMIN — BETAMETHASONE SODIUM PHOSPHATE AND BETAMETHASONE ACETATE 3 MG: 3; 3 INJECTION, SUSPENSION INTRA-ARTICULAR; INTRALESIONAL; INTRAMUSCULAR; SOFT TISSUE at 15:10

## 2019-01-30 NOTE — PROGRESS NOTES
ASSESSMENT/PLAN:    Assessment:   Thumb CMC Arthritis  Right   De quervain's tenosynovitis right     Plan:   Patient was given his first steroid injection into his right dorsal compartment today and a repeat injection into his right thumb cmc joint today  He was instructed to wear his comfort cool brace as needed  Patient was given a handout today  Follow Up:  3  month(s)    To Do Next Visit:       General Discussions:  CMC Arthritis: The anatomy and physiology of carpometacarpal joint arthritis was discussed with the patient today in the office  Deterioration of the articular cartilage eventually leads to hypermobility at the thumb [facility] joint, resulting in joint subluxation, osteophyte formation, cystic changes within the trapezium and base of the first metacarpal, as well as subchondral sclerosis  Eventually, pain, limited mobility, and compensatory hyperextension at the metacarpophalangeal joint may develop  While normal activity and usage of the thumb joint may provide a painful experience to the patient, this typically does not result in damage to the thumb or hand  Treatment options include resting thumb spica splints to decreased joint edema, pain, and inflammation  Therapy exercises to strengthen the thenar musculature may relieve pain, but do not alter the overall continued development of osteoarthritis  Oral medications, topical medications, corticosteroid injections may decrease pain and increase overall function  Eventually, approximately 5% of patients may require surgical intervention  Dalton Reid Tenosynovitis: The anatomy and physiology of de Quervain's tenosynovitis was discussed with the patient today in the office  Edema and increased contact pressure within the first dorsal extensor compartment at the radial styloid can cause pain, crepitation, and limitation of function    Treatment options include resting thumb spica splints to decrease edema, oral anti-inflammatory medications, home or formal therapy exercises, up to 2 steroid injections within the first dorsal extensor compartment, or surgical release  While majority of patients do respond to conservative treatment, up to 20% may require surgical release  Operative Discussions:         _____________________________________________________  CHIEF COMPLAINT:  Chief Complaint   Patient presents with    Right Wrist - Follow-up         SUBJECTIVE:  David Dubose is a 62y o  year old male who presents for follow up regarding Thumb CMC Arthritis  left  Since last visit, David Dubose has tried steroid injections, comfort cool and NSAIDs with only partial relief  Today there is Pain  Moderate  Intermittant  Sharp and Aching to the left thumb  Radiation: Yes to the  forearm  Associated symptoms: radiating pain to his forearm that is made worse with activities such as twisting wrenches  This started about 2 months ago with an acute onset       PAST MEDICAL HISTORY:  Past Medical History:   Diagnosis Date    Anxiety     Chronic urticaria     Depression     Hashimoto's disease     Hashimoto's disease     Hashimoto's thyroiditis        PAST SURGICAL HISTORY:  Past Surgical History:   Procedure Laterality Date    NASAL SEPTUM SURGERY      TONSILLECTOMY  1979       FAMILY HISTORY:  Family History   Problem Relation Age of Onset    Cancer Mother     Stroke Mother     Colon cancer Father     Colon polyps Father     Valvular heart disease Father     Cancer Sister     Suicidality Brother        SOCIAL HISTORY:  Social History   Substance Use Topics    Smoking status: Never Smoker    Smokeless tobacco: Never Used    Alcohol use Yes      Comment: social       MEDICATIONS:    Current Outpatient Prescriptions:     buPROPion (WELLBUTRIN XL) 300 mg 24 hr tablet, Take 300 mg by mouth daily, Disp: , Rfl:     clonazePAM (KlonoPIN) 1 mg tablet, Take 0 5 tablets (0 5 mg total) by mouth daily, Disp: 30 tablet, Rfl: 3    Coenzyme Q10 (COQ10) 200 MG CAPS, Take by mouth, Disp: , Rfl:     DULoxetine (CYMBALTA) 60 mg delayed release capsule, TAKE ONE CAPSULE BY MOUTH EVERY MORNING, Disp: 30 capsule, Rfl: 3    levothyroxine 50 mcg tablet, Take 1 5 tablets (75 mcg total) by mouth daily for 30 days, Disp: 30 tablet, Rfl: 3    predniSONE 10 mg tablet, 3 tabs po bid x2 days, then 2 tabs po bid x2 days, then 1 tab bid x2 days, then 1 daily until done , Disp: 26 tablet, Rfl: 0    ALLERGIES:  Allergies   Allergen Reactions    Compazine [Prochlorperazine] GI Intolerance    Sulfa Antibiotics        REVIEW OF SYSTEMS:  Pertinent items are noted in HPI      LABS:  HgA1c: No results found for: HGBA1C  BMP:   Lab Results   Component Value Date    GLUCOSE 91 06/03/2017    CALCIUM 9 0 09/17/2018     09/18/2015    K 4 2 09/17/2018    CO2 33 (H) 09/17/2018     09/17/2018    BUN 9 09/17/2018    CREATININE 1 15 09/17/2018           _____________________________________________________  PHYSICAL EXAMINATION:  General: well developed and well nourished, alert, oriented times 3 and appears comfortable  Psychiatric: Normal  HEENT: Trachea Midline, No torticollis  Cardiovascular: No discernable arrhythmia  Pulmonary: No wheezing or stridor  Skin: No masses, erthema, lacerations, fluctation, ulcerations  Neurovascular: Sensation Intact to the Median, Ulnar, Radial Nerve, Motor Intact to the Median, Ulnar, Radial Nerve and Pulses Intact    MUSCULOSKELETAL EXAMINATION:  RIGHT SIDE:  CMC: Positive grind and Positive tendnerness CMC and De Quervain:  Tenderness Radial Styloid and Positive Finkelstein's Test    _____________________________________________________  STUDIES REVIEWED:  No Studies to review      PROCEDURES PERFORMED:  Small joint arthrocentesis  Date/Time: 1/30/2019 3:10 PM  Consent given by: patient  Site marked: site marked  Supporting Documentation  Indications: pain   Procedure Details  Location: thumb - R thumb CMC  Medications administered: 3 mg betamethasone acetate-betamethasone sodium phosphate 6 (3-3) mg/mL (1ml 2% lidocaine)    Patient tolerance: patient tolerated the procedure well with no immediate complications  Dressing:  Sterile dressing applied  Hand/upper extremity injection  Date/Time: 1/30/2019 3:10 PM  Consent given by: patient  Site marked: site marked  Supporting Documentation  Indications: tendon swelling   Procedure Details  Condition:de Quervain's tenosynovitis Site: R extensor compartment 1   Medications administered: 6 mg betamethasone acetate-betamethasone sodium phosphate 6 (3-3) mg/mL (1ml 2% lidocaine)  Patient tolerance: patient tolerated the procedure well with no immediate complications  Dressing:  Sterile dressing applied           Scribe Attestation    I,:   Amy Morales am acting as a scribe while in the presence of the attending physician :        I,:   Jeanine Shearer MD personally performed the services described in this documentation    as scribed in my presence :

## 2019-05-20 DIAGNOSIS — F32.A DEPRESSION, UNSPECIFIED DEPRESSION TYPE: ICD-10-CM

## 2019-05-20 RX ORDER — DULOXETIN HYDROCHLORIDE 60 MG/1
CAPSULE, DELAYED RELEASE ORAL
Qty: 30 CAPSULE | Refills: 3 | Status: SHIPPED | OUTPATIENT
Start: 2019-05-20 | End: 2019-09-30 | Stop reason: SDUPTHER

## 2019-06-09 DIAGNOSIS — E03.9 HYPOTHYROIDISM, UNSPECIFIED TYPE: ICD-10-CM

## 2019-06-10 RX ORDER — LEVOTHYROXINE SODIUM 0.07 MG/1
TABLET ORAL
Qty: 30 TABLET | Refills: 3 | Status: SHIPPED | OUTPATIENT
Start: 2019-06-10 | End: 2019-10-17 | Stop reason: SDUPTHER

## 2019-07-27 ENCOUNTER — APPOINTMENT (EMERGENCY)
Dept: CT IMAGING | Facility: HOSPITAL | Age: 59
End: 2019-07-27
Payer: COMMERCIAL

## 2019-07-27 ENCOUNTER — ANESTHESIA EVENT (OUTPATIENT)
Dept: PERIOP | Facility: HOSPITAL | Age: 59
End: 2019-07-27
Payer: COMMERCIAL

## 2019-07-27 ENCOUNTER — HOSPITAL ENCOUNTER (OUTPATIENT)
Facility: HOSPITAL | Age: 59
Setting detail: OBSERVATION
Discharge: HOME | End: 2019-07-28
Attending: EMERGENCY MEDICINE | Admitting: INTERNAL MEDICINE
Payer: COMMERCIAL

## 2019-07-27 DIAGNOSIS — N20.0 NEPHROLITHIASIS: ICD-10-CM

## 2019-07-27 DIAGNOSIS — N20.1 URETEROLITHIASIS: ICD-10-CM

## 2019-07-27 DIAGNOSIS — R79.89 ELEVATED SERUM CREATININE: ICD-10-CM

## 2019-07-27 DIAGNOSIS — R10.9 RIGHT FLANK PAIN: Primary | ICD-10-CM

## 2019-07-27 PROBLEM — N17.9 ACUTE KIDNEY INJURY (HCC): Status: ACTIVE | Noted: 2019-07-27

## 2019-07-27 LAB
ALBUMIN SERPL BCP-MCNC: 4.1 G/DL (ref 3.5–5)
ALP SERPL-CCNC: 96 U/L (ref 46–116)
ALT SERPL W P-5'-P-CCNC: 31 U/L (ref 12–78)
ANION GAP SERPL CALCULATED.3IONS-SCNC: 15 MMOL/L (ref 4–13)
AST SERPL W P-5'-P-CCNC: 28 U/L (ref 5–45)
BACTERIA UR QL AUTO: ABNORMAL /HPF
BASOPHILS # BLD AUTO: 0.05 THOUSANDS/ΜL (ref 0–0.1)
BASOPHILS NFR BLD AUTO: 1 % (ref 0–1)
BILIRUB SERPL-MCNC: 0.8 MG/DL (ref 0.2–1)
BILIRUB UR QL STRIP: NEGATIVE
BUN SERPL-MCNC: 14 MG/DL (ref 5–25)
CALCIUM SERPL-MCNC: 9.1 MG/DL (ref 8.3–10.1)
CHLORIDE SERPL-SCNC: 102 MMOL/L (ref 100–108)
CLARITY UR: CLEAR
CO2 SERPL-SCNC: 25 MMOL/L (ref 21–32)
COLOR UR: YELLOW
CREAT SERPL-MCNC: 1.41 MG/DL (ref 0.6–1.3)
EOSINOPHIL # BLD AUTO: 0.33 THOUSAND/ΜL (ref 0–0.61)
EOSINOPHIL NFR BLD AUTO: 4 % (ref 0–6)
ERYTHROCYTE [DISTWIDTH] IN BLOOD BY AUTOMATED COUNT: 12 % (ref 11.6–15.1)
GFR SERPL CREATININE-BSD FRML MDRD: 55 ML/MIN/1.73SQ M
GLUCOSE SERPL-MCNC: 94 MG/DL (ref 65–140)
GLUCOSE UR STRIP-MCNC: NEGATIVE MG/DL
HCT VFR BLD AUTO: 44.3 % (ref 36.5–49.3)
HGB BLD-MCNC: 15.9 G/DL (ref 12–17)
HGB UR QL STRIP.AUTO: ABNORMAL
IMM GRANULOCYTES # BLD AUTO: 0.03 THOUSAND/UL (ref 0–0.2)
IMM GRANULOCYTES NFR BLD AUTO: 0 % (ref 0–2)
KETONES UR STRIP-MCNC: NEGATIVE MG/DL
LEUKOCYTE ESTERASE UR QL STRIP: NEGATIVE
LIPASE SERPL-CCNC: 532 U/L (ref 73–393)
LYMPHOCYTES # BLD AUTO: 2.45 THOUSANDS/ΜL (ref 0.6–4.47)
LYMPHOCYTES NFR BLD AUTO: 27 % (ref 14–44)
MCH RBC QN AUTO: 30.9 PG (ref 26.8–34.3)
MCHC RBC AUTO-ENTMCNC: 35.9 G/DL (ref 31.4–37.4)
MCV RBC AUTO: 86 FL (ref 82–98)
MONOCYTES # BLD AUTO: 1.09 THOUSAND/ΜL (ref 0.17–1.22)
MONOCYTES NFR BLD AUTO: 12 % (ref 4–12)
NEUTROPHILS # BLD AUTO: 5.27 THOUSANDS/ΜL (ref 1.85–7.62)
NEUTS SEG NFR BLD AUTO: 56 % (ref 43–75)
NITRITE UR QL STRIP: NEGATIVE
NON-SQ EPI CELLS URNS QL MICRO: ABNORMAL /HPF
NRBC BLD AUTO-RTO: 0 /100 WBCS
PH UR STRIP.AUTO: 7 [PH] (ref 4.5–8)
PLATELET # BLD AUTO: 235 THOUSANDS/UL (ref 149–390)
PMV BLD AUTO: 9.2 FL (ref 8.9–12.7)
POTASSIUM SERPL-SCNC: 2.8 MMOL/L (ref 3.5–5.3)
PROT SERPL-MCNC: 7.2 G/DL (ref 6.4–8.2)
PROT UR STRIP-MCNC: NEGATIVE MG/DL
RBC # BLD AUTO: 5.14 MILLION/UL (ref 3.88–5.62)
RBC #/AREA URNS AUTO: ABNORMAL /HPF
SODIUM SERPL-SCNC: 142 MMOL/L (ref 136–145)
SP GR UR STRIP.AUTO: 1.01 (ref 1–1.03)
UROBILINOGEN UR QL STRIP.AUTO: 0.2 E.U./DL
WBC # BLD AUTO: 9.22 THOUSAND/UL (ref 4.31–10.16)
WBC #/AREA URNS AUTO: ABNORMAL /HPF

## 2019-07-27 PROCEDURE — 80053 COMPREHEN METABOLIC PANEL: CPT | Performed by: PHYSICIAN ASSISTANT

## 2019-07-27 PROCEDURE — 74176 CT ABD & PELVIS W/O CONTRAST: CPT

## 2019-07-27 PROCEDURE — 99285 EMERGENCY DEPT VISIT HI MDM: CPT | Performed by: PHYSICIAN ASSISTANT

## 2019-07-27 PROCEDURE — 99285 EMERGENCY DEPT VISIT HI MDM: CPT

## 2019-07-27 PROCEDURE — 93005 ELECTROCARDIOGRAM TRACING: CPT

## 2019-07-27 PROCEDURE — 96374 THER/PROPH/DIAG INJ IV PUSH: CPT

## 2019-07-27 PROCEDURE — 99220 PR INITIAL OBSERVATION CARE/DAY 70 MINUTES: CPT | Performed by: INTERNAL MEDICINE

## 2019-07-27 PROCEDURE — 85025 COMPLETE CBC W/AUTO DIFF WBC: CPT | Performed by: PHYSICIAN ASSISTANT

## 2019-07-27 PROCEDURE — 81001 URINALYSIS AUTO W/SCOPE: CPT

## 2019-07-27 PROCEDURE — 83690 ASSAY OF LIPASE: CPT | Performed by: PHYSICIAN ASSISTANT

## 2019-07-27 PROCEDURE — 96361 HYDRATE IV INFUSION ADD-ON: CPT

## 2019-07-27 PROCEDURE — 36415 COLL VENOUS BLD VENIPUNCTURE: CPT | Performed by: PHYSICIAN ASSISTANT

## 2019-07-27 PROCEDURE — 96375 TX/PRO/DX INJ NEW DRUG ADDON: CPT

## 2019-07-27 RX ORDER — CLONAZEPAM 0.5 MG/1
0.5 TABLET ORAL DAILY
Status: DISCONTINUED | OUTPATIENT
Start: 2019-07-28 | End: 2019-07-28

## 2019-07-27 RX ORDER — ONDANSETRON 2 MG/ML
4 INJECTION INTRAMUSCULAR; INTRAVENOUS EVERY 6 HOURS PRN
Status: DISCONTINUED | OUTPATIENT
Start: 2019-07-27 | End: 2019-07-28 | Stop reason: HOSPADM

## 2019-07-27 RX ORDER — LORATADINE 10 MG/1
10 TABLET ORAL DAILY
Status: DISCONTINUED | OUTPATIENT
Start: 2019-07-27 | End: 2019-07-28 | Stop reason: HOSPADM

## 2019-07-27 RX ORDER — CETIRIZINE HYDROCHLORIDE 10 MG/1
10 TABLET, CHEWABLE ORAL DAILY
COMMUNITY
End: 2021-09-01

## 2019-07-27 RX ORDER — KETOROLAC TROMETHAMINE 30 MG/ML
15 INJECTION, SOLUTION INTRAMUSCULAR; INTRAVENOUS ONCE
Status: COMPLETED | OUTPATIENT
Start: 2019-07-27 | End: 2019-07-27

## 2019-07-27 RX ORDER — ONDANSETRON 2 MG/ML
4 INJECTION INTRAMUSCULAR; INTRAVENOUS ONCE
Status: COMPLETED | OUTPATIENT
Start: 2019-07-27 | End: 2019-07-27

## 2019-07-27 RX ORDER — TAMSULOSIN HYDROCHLORIDE 0.4 MG/1
0.4 CAPSULE ORAL
Status: DISCONTINUED | OUTPATIENT
Start: 2019-07-27 | End: 2019-07-28 | Stop reason: HOSPADM

## 2019-07-27 RX ORDER — ACETAMINOPHEN 325 MG/1
650 TABLET ORAL EVERY 6 HOURS PRN
Status: DISCONTINUED | OUTPATIENT
Start: 2019-07-27 | End: 2019-07-28 | Stop reason: HOSPADM

## 2019-07-27 RX ORDER — LEVOTHYROXINE SODIUM 0.07 MG/1
75 TABLET ORAL DAILY
Status: DISCONTINUED | OUTPATIENT
Start: 2019-07-28 | End: 2019-07-28 | Stop reason: HOSPADM

## 2019-07-27 RX ORDER — DULOXETIN HYDROCHLORIDE 60 MG/1
60 CAPSULE, DELAYED RELEASE ORAL EVERY MORNING
Status: DISCONTINUED | OUTPATIENT
Start: 2019-07-28 | End: 2019-07-28

## 2019-07-27 RX ORDER — DEXTROSE, SODIUM CHLORIDE, AND POTASSIUM CHLORIDE 5; .9; .15 G/100ML; G/100ML; G/100ML
125 INJECTION INTRAVENOUS CONTINUOUS
Status: DISCONTINUED | OUTPATIENT
Start: 2019-07-27 | End: 2019-07-28 | Stop reason: HOSPADM

## 2019-07-27 RX ORDER — POTASSIUM CHLORIDE 20 MEQ/1
40 TABLET, EXTENDED RELEASE ORAL
Status: COMPLETED | OUTPATIENT
Start: 2019-07-27 | End: 2019-07-27

## 2019-07-27 RX ORDER — OXYCODONE HYDROCHLORIDE 10 MG/1
10 TABLET ORAL EVERY 4 HOURS PRN
Status: DISCONTINUED | OUTPATIENT
Start: 2019-07-27 | End: 2019-07-28 | Stop reason: HOSPADM

## 2019-07-27 RX ORDER — ECHINACEA PURPUREA EXTRACT 125 MG
1 TABLET ORAL
Status: DISCONTINUED | OUTPATIENT
Start: 2019-07-27 | End: 2019-07-28 | Stop reason: HOSPADM

## 2019-07-27 RX ADMIN — LORATADINE 10 MG: 10 TABLET ORAL at 18:26

## 2019-07-27 RX ADMIN — POTASSIUM CHLORIDE, DEXTROSE MONOHYDRATE AND SODIUM CHLORIDE 75 ML/HR: 150; 5; 900 INJECTION, SOLUTION INTRAVENOUS at 17:26

## 2019-07-27 RX ADMIN — KETOROLAC TROMETHAMINE 15 MG: 30 INJECTION, SOLUTION INTRAMUSCULAR at 14:30

## 2019-07-27 RX ADMIN — POTASSIUM CHLORIDE 40 MEQ: 1500 TABLET, EXTENDED RELEASE ORAL at 18:20

## 2019-07-27 RX ADMIN — SODIUM CHLORIDE 1000 ML: 0.9 INJECTION, SOLUTION INTRAVENOUS at 14:32

## 2019-07-27 RX ADMIN — SALINE NASAL SPRAY 1 SPRAY: 1.5 SOLUTION NASAL at 22:50

## 2019-07-27 RX ADMIN — TAMSULOSIN HYDROCHLORIDE 0.4 MG: 0.4 CAPSULE ORAL at 18:21

## 2019-07-27 RX ADMIN — POTASSIUM CHLORIDE 40 MEQ: 1500 TABLET, EXTENDED RELEASE ORAL at 19:50

## 2019-07-27 RX ADMIN — ONDANSETRON 4 MG: 2 INJECTION INTRAMUSCULAR; INTRAVENOUS at 14:29

## 2019-07-27 NOTE — ASSESSMENT & PLAN NOTE
· Potassium level of 2 8 upon arrival to ED  · KCL infusion given a paul at 20 mEq with normal saline  · 40 mg of K-Dur was given every 2 hours for 2 doses  · Re-evaluate BMP tomorrow morning

## 2019-07-27 NOTE — ASSESSMENT & PLAN NOTE
· Baseline creatinine unknown  · Creatinine at 1 41 today  · Continue IV fluid resuscitation at 125 mL/hour  · Re-evaluate BMP tomorrow morning

## 2019-07-27 NOTE — H&P
H&P- Kitty Hoffman III 1960, 62 y o  male MRN: [id number]    Unit/Bed#: -01 Encounter: [id number]    Primary Care Provider: Lolis Wu DO   Date and time admitted to hospital: 7/27/2019  1:56 PM        * Nephrolithiasis  Assessment & Plan  · History of renal stones, but has never had to pass them  · Previous studies have noted that he has stones collecting in his kidneys  · Urinalysis in the ED was positive for blood in the urine  · CT of the abdomen showed mild right hydronephrosis and proximal hydroureter  Obstructing 6 mm calculus in the right ureter at the L3-4 level   CT also found numerous nonobstructing 2 to 4 mm calculi in right renal calyces  · The patient was given Flomax, fluids at a rate of 125 mL/hour, morphine for pain  · Urology was consulted should the patient not pass the stone by tomorrow morning  · Zofran p r n   For nausea  · Strain all urine    Acute kidney injury ([address] )  Assessment & Plan  · Baseline creatinine unknown  · Creatinine at 1 41 today  · Continue IV fluid resuscitation at 125 mL/hour  · Re-evaluate BMP tomorrow morning      Hypokalemia  Assessment & Plan  · Potassium level of 2 8 upon arrival to ED  · KCL infusion given a paul at 20 mEq with normal saline  · 40 mg of K-Dur was given every 2 hours for 2 doses  · Re-evaluate BMP tomorrow morning    Hypothyroidism due to Hashimoto's thyroiditis  Assessment & Plan  · Continue home medications  · Follow-up outpatient endocrinology or PCP    Depression  Assessment & Plan  · Continue home medication  · Follow-up outpatient psychiatry or PCP    Anxiety  Assessment & Plan  · Continue home medication  · Follow-up outpatient psychiatry or PCP      VTE Prophylaxis: Patient has a VTE score of 1 and thus does not need chemical prophylaxis  / reason for no mechanical VTE prophylaxis Low risk for VTE- encourage early ambulation   Code Status:  FULL code  POLST: POLST form is not discussed and not completed at this time   Discussion with family:  Discussed with patient's wife and sister    Anticipated Length of Stay:  Patient will be admitted on an Observation basis with an anticipated length of stay of  less than 2 midnights  Justification for Hospital Stay:  Nephrolithiasis that requires IV hydration so the stones may past     Total Time for Visit, including Counseling / Coordination of Care: 30 minutes  Greater than 50% of this total time spent on direct patient counseling and coordination of care  Chief Complaint:  Sudden onset back pain    History of Present Illness:    Yoselin Nava III is a 62 y o  male with a known history of renal calculi accumulation in the renal calyces is a 62 y o  male who presents to the [facility] ER complaining of right-sided flank pain that had a sudden onset at about 11:00 a m  this morning  The patient's wife, Sindy Loza, called [phone] and was with the patient until EMS arrival   The pain was described as a sharp pain that was radiating anteriorly to the right groin  The pain was alleviated minimally with ibuprofen and exacerbated by movement  The pain was associated with nausea with no vomiting  Upon EMS arrival to their house, the patient stated that the pain decreased  The patient describes the nausea and pain as oscillating in terms of intensity throughout this episode  · Upon arrival to the ER urinalysis was obtained and was positive for blood in the urine  · CT scan of the abdomen showed mild hydronephrosis on the right side at the proximal hydroureter  There is also an obstructing 6 mm calculus in the right ureter at the L3-L4 level  CT was also found numerous nonobstructing 2-4 mm calculi in the right renal calices  Patient's wife, Sindy Loza, was present in the room during the examination  Review of Systems:    Review of Systems   Constitutional: Negative for activity change, appetite change, diaphoresis and fever  Respiratory: Negative for shortness of breath      Gastrointestinal: Positive for abdominal pain and nausea  Negative for diarrhea and vomiting  Genitourinary: Positive for difficulty urinating  Musculoskeletal: Positive for back pain  All other systems reviewed and are negative  Past Medical and Surgical History:     Past Medical History:   Diagnosis Date    Anxiety     Chronic urticaria     Depression     Hashimoto's disease     Hashimoto's thyroiditis     Renal stones        Past Surgical History:   Procedure Laterality Date    NASAL SEPTUM SURGERY      TONSILLECTOMY  1979       Meds/Allergies:    Prior to Admission medications    Medication Sig Start Date End Date Taking? Authorizing Provider   cetirizine (ZyrTEC) 10 MG chewable tablet Chew 10 mg daily   Yes Historical Provider, MD   clonazePAM (KlonoPIN) 1 mg tablet Take 0 5 tablets (0 5 mg total) by mouth daily 1/25/19  Yes Nathaly Alvarado DO   Coenzyme Q10 (COQ10) 200 MG CAPS Take by mouth   Yes Historical Provider, MD   DULoxetine (CYMBALTA) 60 mg delayed release capsule TAKE ONE CAPSULE BY MOUTH EVERY MORNING 5/20/19  Yes Nathaly Alvarado DO   levothyroxine 75 mcg tablet TAKE ONE TABLET BY MOUTH EVERY DAY 6/10/19  Yes Tiffany Odom MD   buPROPion (WELLBUTRIN XL) 300 mg 24 hr tablet Take 300 mg by mouth daily  7/27/19  Historical Provider, MD   predniSONE 10 mg tablet 3 tabs po bid x2 days, then 2 tabs po bid x2 days, then 1 tab bid x2 days, then 1 daily until done  Patient not taking: Reported on 7/27/2019 1/24/19 7/27/19  Flaco Alvarado DO     I have reviewed home medications with patient personally  Allergies:    Allergies   Allergen Reactions    Compazine [Prochlorperazine] GI Intolerance    Sulfa Antibiotics        Social History:     Marital Status: /Civil Union   Occupation:  Desk job at a [employer]  Patient Pre-hospital Living Situation:  Lives at home with wife  Patient Pre-hospital Level of Mobility:  Ambulates well without any assistance  Patient Pre-hospital Diet Restrictions:  No dietary restrictions  Substance Use History:  Patient denies using substances in the past  Social History     Substance and Sexual Activity   Alcohol Use Yes    Comment: social     Social History     Tobacco Use   Smoking Status Never Smoker   Smokeless Tobacco Never Used     Social History     Substance and Sexual Activity   Drug Use No       Family History:    Family History   Problem Relation Age of Onset    Cancer Mother     Stroke Mother     Colon cancer Father     Colon polyps Father     Valvular heart disease Father     Cancer Sister     Suicidality Brother        Physical Exam:     Vitals:   Blood Pressure: 162/98 (07/27/19 1654)  Pulse: 69 (07/27/19 1654)  Temperature: 98 9 °F (37 2 °C) (07/27/19 1654)  Temp Source: Oral (07/27/19 1654)  Respirations: 18 (07/27/19 1654)  Weight - Scale: 80 8 kg (178 lb 2 1 oz) (07/27/19 1356)  SpO2: 97 % (07/27/19 1654)    Physical Exam   Constitutional: He is oriented to person, place, and time  He appears well-developed and well-nourished  Non-toxic appearance  He does not have a sickly appearance  He does not appear ill  No distress  He is not intubated  HENT:   Head: Normocephalic and atraumatic  Eyes: Pupils are equal, round, and reactive to light  Conjunctivae, EOM and lids are normal    Neck: Normal range of motion  Neck supple  Cardiovascular: Normal rate, regular rhythm, S1 normal, S2 normal, normal heart sounds, intact distal pulses and normal pulses  Exam reveals no gallop and no friction rub  No murmur heard  Pulmonary/Chest: Effort normal and breath sounds normal  No accessory muscle usage or stridor  He is not intubated  No respiratory distress  He has no decreased breath sounds  He has no wheezes  He has no rhonchi  He has no rales  Abdominal: Soft  Normal appearance and bowel sounds are normal  He exhibits no distension and no mass  There is tenderness  There is CVA tenderness  Musculoskeletal: Normal range of motion  He exhibits no edema  Lymphadenopathy:     He has no cervical adenopathy  Neurological: He is alert and oriented to person, place, and time  No sensory deficit  Skin: Skin is warm, dry and intact  He is not diaphoretic  Psychiatric: He has a normal mood and affect  His speech is normal and behavior is normal  Judgment and thought content normal  Cognition and memory are normal    Nursing note and vitals reviewed  Additional Data:     Lab Results: I have personally reviewed pertinent reports  and I have personally reviewed pertinent films in PACS    Results from last 7 days   Lab Units 07/27/19  1431   WBC Thousand/uL 9 22   HEMOGLOBIN g/dL 15 9   HEMATOCRIT % 44 3   PLATELETS Thousands/uL 235   NEUTROS PCT % 56   LYMPHS PCT % 27   MONOS PCT % 12   EOS PCT % 4     Results from last 7 days   Lab Units 07/27/19  1431   SODIUM mmol/L 142   POTASSIUM mmol/L 2 8*   CHLORIDE mmol/L 102   CO2 mmol/L 25   BUN mg/dL 14   CREATININE mg/dL 1 41*   ANION GAP mmol/L 15*   CALCIUM mg/dL 9 1   ALBUMIN g/dL 4 1   TOTAL BILIRUBIN mg/dL 0 80   ALK PHOS U/L 96   ALT U/L 31   AST U/L 28   GLUCOSE RANDOM mg/dL 94                       Imaging: I have personally reviewed pertinent reports  and I have personally reviewed pertinent films in PACS    CT renal stone study abdomen pelvis without contrast   Final Result by Jolynn Gowers, MD (07/27 5797)         1  Mild right hydronephrosis and proximal hydroureter  Obstructing 6 mm calculus in the right ureter at the L3-4 level  2   Numerous nonobstructing 2 to 4 mm calculi in right renal calyces  Workstation performed: [id number]             EKG, Pathology, and Other Studies Reviewed on Admission:   · EKG:  EKG was normal sinus rhythm with no ST changes on any leads    Allscripts / Epic Records Reviewed: Yes     ** Please Note: This note has been constructed using a voice recognition system  **

## 2019-07-27 NOTE — ASSESSMENT & PLAN NOTE
· History of renal stones, but has never had to pass them  · Previous studies have noted that he has stones collecting in his kidneys  · Urinalysis in the ED was positive for blood in the urine  · CT of the abdomen showed mild right hydronephrosis and proximal hydroureter  Obstructing 6 mm calculus in the right ureter at the L3-4 level   CT also found numerous nonobstructing 2 to 4 mm calculi in right renal calyces  · The patient was given Flomax, fluids at a rate of 125 mL/hour, morphine for pain  · Urology was consulted should the patient not pass the stone by tomorrow morning  · Zofran p r n   For nausea  · Strain all urine

## 2019-07-27 NOTE — ED PROVIDER NOTES
History  Chief Complaint   Patient presents with    Flank Pain     Pt presents to ED with right sided flank pain that radiates to groin  Pain comes and goes  51-year-old male with past medical history of Hashimoto's and depression presenting for evaluation of right flank pain  Patient states he has a sudden onset of right flank pain starting at 11:00 a m  Today that is radiating anteriorly  He reports taking ibuprofen with only minimal relief  He states he had nausea when the pain was at its worse but the nausea has now resolved  No episodes of vomiting diarrhea dysuria hematuria fevers chills or sweats  No chest pain or shortness of breath  Patient does have a known history of kidney stones but was told by his physician not to worry is they had not passed into the ureter  Prior to Admission Medications   Prescriptions Last Dose Informant Patient Reported? Taking? Coenzyme Q10 (COQ10) 200 MG CAPS 7/27/2019 at Unknown time Self Yes Yes   Sig: Take by mouth   DULoxetine (CYMBALTA) 60 mg delayed release capsule 7/26/2019 at Unknown time  No Yes   Sig: TAKE ONE CAPSULE BY MOUTH EVERY MORNING   buPROPion (WELLBUTRIN XL) 300 mg 24 hr tablet Not Taking at Unknown time Self Yes No   Sig: Take 300 mg by mouth daily   cetirizine (ZyrTEC) 10 MG chewable tablet   Yes Yes   Sig: Chew 10 mg daily   clonazePAM (KlonoPIN) 1 mg tablet 7/26/2019 at Unknown time  No Yes   Sig: Take 0 5 tablets (0 5 mg total) by mouth daily   levothyroxine 75 mcg tablet 7/27/2019 at Unknown time  No Yes   Sig: TAKE ONE TABLET BY MOUTH EVERY DAY   predniSONE 10 mg tablet Not Taking at Unknown time  No No   Sig: 3 tabs po bid x2 days, then 2 tabs po bid x2 days, then 1 tab bid x2 days, then 1 daily until done     Patient not taking: Reported on 7/27/2019      Facility-Administered Medications: None       Past Medical History:   Diagnosis Date    Anxiety     Chronic urticaria     Depression     Hashimoto's disease     Hashimoto's thyroiditis     Renal stones        Past Surgical History:   Procedure Laterality Date    NASAL SEPTUM SURGERY      TONSILLECTOMY  1979       Family History   Problem Relation Age of Onset   Mollmauro Sizer Cancer Mother     Stroke Mother     Colon cancer Father     Colon polyps Father     Valvular heart disease Father     Cancer Sister     Suicidality Brother      I have reviewed and agree with the history as documented  Social History     Tobacco Use    Smoking status: Never Smoker    Smokeless tobacco: Never Used   Substance Use Topics    Alcohol use: Yes     Comment: social    Drug use: No        Review of Systems   All other systems reviewed and are negative  Physical Exam  Physical Exam   Constitutional: He is oriented to person, place, and time  He appears well-developed and well-nourished  No distress  HENT:   Head: Normocephalic and atraumatic  Eyes: Conjunctivae are normal    EOM grossly intact   Neck: Normal range of motion  Neck supple  No JVD present  Cardiovascular: Normal rate  Pulmonary/Chest: Effort normal    Abdominal: Soft  R CVA tenderness to palpation, no right lower quadrant tenderness to palpation   Musculoskeletal:   FROM, steady gait, cap refill brisk, strength and sensation grossly intact throughout   Neurological: He is alert and oriented to person, place, and time  Skin: Skin is warm and dry  Capillary refill takes less than 2 seconds  Psychiatric: He has a normal mood and affect  His behavior is normal    Nursing note and vitals reviewed        Vital Signs  ED Triage Vitals   Temperature Pulse Respirations Blood Pressure SpO2   07/27/19 1356 07/27/19 1356 07/27/19 1356 07/27/19 1356 07/27/19 1356   97 9 °F (36 6 °C) 71 19 (!) 177/87 99 %      Temp Source Heart Rate Source Patient Position - Orthostatic VS BP Location FiO2 (%)   07/27/19 1356 07/27/19 1356 07/27/19 1515 07/27/19 1356 --   Oral Monitor Sitting Right arm       Pain Score       07/27/19 1356 6           Vitals:    07/27/19 1356 07/27/19 1515   BP: (!) 177/87 (!) 164/101   Pulse: 71 68   Patient Position - Orthostatic VS:  Sitting         Visual Acuity      ED Medications  Medications   sodium chloride 0 9 % bolus 1,000 mL (0 mL Intravenous Stopped 7/27/19 1528)   ketorolac (TORADOL) injection 15 mg (15 mg Intravenous Given 7/27/19 1430)   ondansetron (ZOFRAN) injection 4 mg (4 mg Intravenous Given 7/27/19 1429)       Diagnostic Studies  Results Reviewed     Procedure Component Value Units Date/Time    Urine Microscopic [[id number]]  (Abnormal) Collected:  07/27/19 1516    Lab Status:  Final result Specimen:  Urine, Clean Catch Updated:  07/27/19 1534     RBC, UA 1-2 /hpf      WBC, UA None Seen /hpf      Epithelial Cells None Seen /hpf      Bacteria, UA Occasional /hpf     ED Urine Macroscopic [[id number]]  (Abnormal) Collected:  07/27/19 1516    Lab Status:  Final result Specimen:  Urine Updated:  07/27/19 1506     Color, UA Yellow     Clarity, UA Clear     pH, UA 7 0     Leukocytes, UA Negative     Nitrite, UA Negative     Protein, UA Negative mg/dl      Glucose, UA Negative mg/dl      Ketones, UA Negative mg/dl      Urobilinogen, UA 0 2 E U /dl      Bilirubin, UA Negative     Blood, UA Small     Specific [place], UA 1 010    Narrative:       CLINITEK RESULT    Comprehensive metabolic panel [[id number]]  (Abnormal) Collected:  07/27/19 1431    Lab Status:  Final result Specimen:  Blood from Arm, Left Updated:  07/27/19 1455     Sodium 142 mmol/L      Potassium 2 8 mmol/L      Chloride 102 mmol/L      CO2 25 mmol/L      ANION GAP 15 mmol/L      BUN 14 mg/dL      Creatinine 1 41 mg/dL      Glucose 94 mg/dL      Calcium 9 1 mg/dL      AST 28 U/L      ALT 31 U/L      Alkaline Phosphatase 96 U/L      Total Protein 7 2 g/dL      Albumin 4 1 g/dL      Total Bilirubin 0 80 mg/dL      eGFR 55 ml/min/1 73sq m     Narrative:       [place] guidelines for Chronic Kidney Disease (CKD):   Stage 1 with normal or high GFR (GFR > 90 mL/min/1 73 square meters)    Stage 2 Mild CKD (GFR = 60-89 mL/min/1 73 square meters)    Stage 3A Moderate CKD (GFR = 45-59 mL/min/1 73 square meters)    Stage 3B Moderate CKD (GFR = 30-44 mL/min/1 73 square meters)    Stage 4 Severe CKD (GFR = 15-29 mL/min/1 73 square meters)    Stage 5 End Stage CKD (GFR <15 mL/min/1 73 square meters)  Note: GFR calculation is accurate only with a steady state creatinine    Lipase [[id number]]  (Abnormal) Collected:  07/27/19 1431    Lab Status:  Final result Specimen:  Blood from Arm, Left Updated:  07/27/19 1455     Lipase 532 u/L     CBC and differential [[id number]] Collected:  07/27/19 1431    Lab Status:  Final result Specimen:  Blood from Arm, Left Updated:  07/27/19 1440     WBC 9 22 Thousand/uL      RBC 5 14 Million/uL      Hemoglobin 15 9 g/dL      Hematocrit 44 3 %      MCV 86 fL      MCH 30 9 pg      MCHC 35 9 g/dL      RDW 12 0 %      MPV 9 2 fL      Platelets 686 Thousands/uL      nRBC 0 /100 WBCs      Neutrophils Relative 56 %      Immat GRANS % 0 %      Lymphocytes Relative 27 %      Monocytes Relative 12 %      Eosinophils Relative 4 %      Basophils Relative 1 %      Neutrophils Absolute 5 27 Thousands/µL      Immature Grans Absolute 0 03 Thousand/uL      Lymphocytes Absolute 2 45 Thousands/µL      Monocytes Absolute 1 09 Thousand/µL      Eosinophils Absolute 0 33 Thousand/µL      Basophils Absolute 0 05 Thousands/µL                  CT renal stone study abdomen pelvis without contrast   Final Result by Sarah Fitch MD (07/27 1502)         1  Mild right hydronephrosis and proximal hydroureter  Obstructing 6 mm calculus in the right ureter at the L3-4 level  2   Numerous nonobstructing 2 to 4 mm calculi in right renal calyces               Workstation performed: [id number]                    Procedures  Procedures       ED Course                               MDM  Number of Diagnoses or Management Options  Elevated serum creatinine:   Nephrolithiasis:   Right flank pain:   Ureterolithiasis:   Diagnosis management comments: 59-year-old male presenting for evaluation of right flank pain, patient did have small amount of blood on the urinalysis along with an elevated creatinine of 1 41, patient found to have a 6 mm obstructing stone in the right ureter with mild hydronephrosis and proximal hydroureter, will admit for observation, spoke to Dr Yohan Stanton who accepted admission    Portions of the record may have been created with voice recognition software  Occasional wrong word or "sound a like" substitutions may have occurred due to the inherent limitations of voice recognition software  Read the chart carefully and recognize, using context, where substitutions have occurred  Disposition  Final diagnoses:   Right flank pain   Nephrolithiasis   Ureterolithiasis   Elevated serum creatinine     Time reflects when diagnosis was documented in both MDM as applicable and the Disposition within this note     Time User Action Codes Description Comment    7/27/2019  3:48 PM Car Angeles Add [R10 9] Right flank pain     7/27/2019  3:48 PM Car Angeles Add [N20 0] Nephrolithiasis     7/27/2019  3:48 PM Car Angeles Add [N20 1] Ureterolithiasis     7/27/2019  3:49 PM Car Angeles Add [R79 89] Elevated serum creatinine       ED Disposition     ED Disposition Condition Date/Time Comment    Admit Stable Sat Jul 27, 2019  3:48 PM Case was discussed with Munira and the patient's admission status was agreed to be Admission Status: observation status to the service of Dr Yohan Stanton   Follow-up Information    None         Patient's Medications   Discharge Prescriptions    No medications on file     No discharge procedures on file      ED Provider  Electronically Signed by           Kali Katz PA-C  07/27/19 6357

## 2019-07-28 ENCOUNTER — APPOINTMENT (OUTPATIENT)
Dept: RADIOLOGY | Facility: HOSPITAL | Age: 59
End: 2019-07-28
Payer: COMMERCIAL

## 2019-07-28 ENCOUNTER — TELEPHONE (OUTPATIENT)
Dept: UROLOGY | Facility: CLINIC | Age: 59
End: 2019-07-28

## 2019-07-28 ENCOUNTER — ANESTHESIA (OUTPATIENT)
Dept: PERIOP | Facility: HOSPITAL | Age: 59
End: 2019-07-28
Payer: COMMERCIAL

## 2019-07-28 VITALS
OXYGEN SATURATION: 96 % | SYSTOLIC BLOOD PRESSURE: 137 MMHG | RESPIRATION RATE: 18 BRPM | HEART RATE: 86 BPM | DIASTOLIC BLOOD PRESSURE: 75 MMHG | BODY MASS INDEX: 24.16 KG/M2 | WEIGHT: 178.13 LBS | TEMPERATURE: 98.3 F

## 2019-07-28 DIAGNOSIS — N20.0 RENAL STONES: Primary | ICD-10-CM

## 2019-07-28 LAB
ANION GAP SERPL CALCULATED.3IONS-SCNC: 8 MMOL/L (ref 4–13)
ATRIAL RATE: 66 BPM
BUN SERPL-MCNC: 10 MG/DL (ref 5–25)
CALCIUM SERPL-MCNC: 8 MG/DL (ref 8.3–10.1)
CHLORIDE SERPL-SCNC: 108 MMOL/L (ref 100–108)
CO2 SERPL-SCNC: 27 MMOL/L (ref 21–32)
CREAT SERPL-MCNC: 0.97 MG/DL (ref 0.6–1.3)
ERYTHROCYTE [DISTWIDTH] IN BLOOD BY AUTOMATED COUNT: 12.3 % (ref 11.6–15.1)
GFR SERPL CREATININE-BSD FRML MDRD: 86 ML/MIN/1.73SQ M
GLUCOSE SERPL-MCNC: 108 MG/DL (ref 65–140)
HCT VFR BLD AUTO: 40.2 % (ref 36.5–49.3)
HGB BLD-MCNC: 13.9 G/DL (ref 12–17)
MCH RBC QN AUTO: 30.2 PG (ref 26.8–34.3)
MCHC RBC AUTO-ENTMCNC: 34.6 G/DL (ref 31.4–37.4)
MCV RBC AUTO: 87 FL (ref 82–98)
P AXIS: 80 DEGREES
PLATELET # BLD AUTO: 188 THOUSANDS/UL (ref 149–390)
PMV BLD AUTO: 8.6 FL (ref 8.9–12.7)
POTASSIUM SERPL-SCNC: 3.6 MMOL/L (ref 3.5–5.3)
PR INTERVAL: 164 MS
QRS AXIS: 42 DEGREES
QRSD INTERVAL: 88 MS
QT INTERVAL: 416 MS
QTC INTERVAL: 436 MS
RBC # BLD AUTO: 4.6 MILLION/UL (ref 3.88–5.62)
SODIUM SERPL-SCNC: 143 MMOL/L (ref 136–145)
T WAVE AXIS: 66 DEGREES
VENTRICULAR RATE: 66 BPM
WBC # BLD AUTO: 6.35 THOUSAND/UL (ref 4.31–10.16)

## 2019-07-28 PROCEDURE — 99217 PR OBSERVATION CARE DISCHARGE MANAGEMENT: CPT | Performed by: INTERNAL MEDICINE

## 2019-07-28 PROCEDURE — 88300 SURGICAL PATH GROSS: CPT | Performed by: PATHOLOGY

## 2019-07-28 PROCEDURE — 52356 CYSTO/URETERO W/LITHOTRIPSY: CPT | Performed by: UROLOGY

## 2019-07-28 PROCEDURE — C1769 GUIDE WIRE: HCPCS | Performed by: UROLOGY

## 2019-07-28 PROCEDURE — 82360 CALCULUS ASSAY QUANT: CPT | Performed by: UROLOGY

## 2019-07-28 PROCEDURE — 85027 COMPLETE CBC AUTOMATED: CPT | Performed by: INTERNAL MEDICINE

## 2019-07-28 PROCEDURE — C1758 CATHETER, URETERAL: HCPCS | Performed by: UROLOGY

## 2019-07-28 PROCEDURE — C1894 INTRO/SHEATH, NON-LASER: HCPCS | Performed by: UROLOGY

## 2019-07-28 PROCEDURE — 74420 UROGRAPHY RTRGR +-KUB: CPT

## 2019-07-28 PROCEDURE — 80048 BASIC METABOLIC PNL TOTAL CA: CPT | Performed by: INTERNAL MEDICINE

## 2019-07-28 PROCEDURE — 99244 OFF/OP CNSLTJ NEW/EST MOD 40: CPT | Performed by: UROLOGY

## 2019-07-28 PROCEDURE — C2617 STENT, NON-COR, TEM W/O DEL: HCPCS | Performed by: UROLOGY

## 2019-07-28 PROCEDURE — 93010 ELECTROCARDIOGRAM REPORT: CPT | Performed by: INTERNAL MEDICINE

## 2019-07-28 DEVICE — STENT URETERAL 6 FR 26CM INLAY OPTIMA: Type: IMPLANTABLE DEVICE | Site: URETER | Status: FUNCTIONAL

## 2019-07-28 RX ORDER — PROPOFOL 10 MG/ML
INJECTION, EMULSION INTRAVENOUS AS NEEDED
Status: DISCONTINUED | OUTPATIENT
Start: 2019-07-28 | End: 2019-07-28 | Stop reason: SURG

## 2019-07-28 RX ORDER — SODIUM CHLORIDE, SODIUM LACTATE, POTASSIUM CHLORIDE, CALCIUM CHLORIDE 600; 310; 30; 20 MG/100ML; MG/100ML; MG/100ML; MG/100ML
125 INJECTION, SOLUTION INTRAVENOUS CONTINUOUS
Status: DISCONTINUED | OUTPATIENT
Start: 2019-07-28 | End: 2019-07-28 | Stop reason: HOSPADM

## 2019-07-28 RX ORDER — DIAZEPAM 5 MG/ML
5 INJECTION, SOLUTION INTRAMUSCULAR; INTRAVENOUS ONCE
Status: COMPLETED | OUTPATIENT
Start: 2019-07-28 | End: 2019-07-28

## 2019-07-28 RX ORDER — MEPERIDINE HYDROCHLORIDE 25 MG/ML
12.5 INJECTION INTRAMUSCULAR; INTRAVENOUS; SUBCUTANEOUS AS NEEDED
Status: DISCONTINUED | OUTPATIENT
Start: 2019-07-28 | End: 2019-07-28

## 2019-07-28 RX ORDER — ALBUTEROL SULFATE 2.5 MG/3ML
2.5 SOLUTION RESPIRATORY (INHALATION) ONCE AS NEEDED
Status: DISCONTINUED | OUTPATIENT
Start: 2019-07-28 | End: 2019-07-28

## 2019-07-28 RX ORDER — MAGNESIUM HYDROXIDE 1200 MG/15ML
LIQUID ORAL AS NEEDED
Status: DISCONTINUED | OUTPATIENT
Start: 2019-07-28 | End: 2019-07-28 | Stop reason: HOSPADM

## 2019-07-28 RX ORDER — GUAIFENESIN 600 MG
600 TABLET, EXTENDED RELEASE 12 HR ORAL ONCE
Status: COMPLETED | OUTPATIENT
Start: 2019-07-28 | End: 2019-07-28

## 2019-07-28 RX ORDER — ONDANSETRON 2 MG/ML
INJECTION INTRAMUSCULAR; INTRAVENOUS AS NEEDED
Status: DISCONTINUED | OUTPATIENT
Start: 2019-07-28 | End: 2019-07-28 | Stop reason: SURG

## 2019-07-28 RX ORDER — PHENAZOPYRIDINE HYDROCHLORIDE 100 MG/1
100 TABLET, FILM COATED ORAL
Status: DISCONTINUED | OUTPATIENT
Start: 2019-07-28 | End: 2019-07-28 | Stop reason: HOSPADM

## 2019-07-28 RX ORDER — LABETALOL 20 MG/4 ML (5 MG/ML) INTRAVENOUS SYRINGE
5
Status: DISCONTINUED | OUTPATIENT
Start: 2019-07-28 | End: 2019-07-28

## 2019-07-28 RX ORDER — CEFAZOLIN SODIUM 2 G/50ML
2000 SOLUTION INTRAVENOUS ONCE
Status: DISCONTINUED | OUTPATIENT
Start: 2019-07-28 | End: 2019-07-28

## 2019-07-28 RX ORDER — PROMETHAZINE HYDROCHLORIDE 25 MG/ML
12.5 INJECTION, SOLUTION INTRAMUSCULAR; INTRAVENOUS ONCE AS NEEDED
Status: DISCONTINUED | OUTPATIENT
Start: 2019-07-28 | End: 2019-07-28

## 2019-07-28 RX ORDER — PSEUDOEPHEDRINE HCL 120 MG/1
120 TABLET, FILM COATED, EXTENDED RELEASE ORAL ONCE
Status: COMPLETED | OUTPATIENT
Start: 2019-07-28 | End: 2019-07-28

## 2019-07-28 RX ORDER — CLONAZEPAM 0.5 MG/1
0.5 TABLET ORAL
Status: DISCONTINUED | OUTPATIENT
Start: 2019-07-28 | End: 2019-07-28 | Stop reason: HOSPADM

## 2019-07-28 RX ORDER — TAMSULOSIN HYDROCHLORIDE 0.4 MG/1
0.4 CAPSULE ORAL
Qty: 30 CAPSULE | Refills: 0 | Status: SHIPPED | OUTPATIENT
Start: 2019-07-28 | End: 2019-08-21 | Stop reason: ALTCHOICE

## 2019-07-28 RX ORDER — HYDROMORPHONE HCL/PF 1 MG/ML
0.5 SYRINGE (ML) INJECTION
Status: DISCONTINUED | OUTPATIENT
Start: 2019-07-28 | End: 2019-07-28

## 2019-07-28 RX ORDER — LIDOCAINE HYDROCHLORIDE 20 MG/ML
JELLY TOPICAL AS NEEDED
Status: DISCONTINUED | OUTPATIENT
Start: 2019-07-28 | End: 2019-07-28 | Stop reason: HOSPADM

## 2019-07-28 RX ORDER — GUAIFENESIN 600 MG
600 TABLET, EXTENDED RELEASE 12 HR ORAL 2 TIMES DAILY
Status: DISCONTINUED | OUTPATIENT
Start: 2019-07-28 | End: 2019-07-28 | Stop reason: HOSPADM

## 2019-07-28 RX ORDER — ONDANSETRON 2 MG/ML
4 INJECTION INTRAMUSCULAR; INTRAVENOUS ONCE AS NEEDED
Status: DISCONTINUED | OUTPATIENT
Start: 2019-07-28 | End: 2019-07-28

## 2019-07-28 RX ORDER — FENTANYL CITRATE 50 UG/ML
INJECTION, SOLUTION INTRAMUSCULAR; INTRAVENOUS AS NEEDED
Status: DISCONTINUED | OUTPATIENT
Start: 2019-07-28 | End: 2019-07-28 | Stop reason: SURG

## 2019-07-28 RX ORDER — PHENAZOPYRIDINE HYDROCHLORIDE 100 MG/1
100 TABLET, FILM COATED ORAL
Qty: 10 TABLET | Refills: 0 | Status: SHIPPED | OUTPATIENT
Start: 2019-07-28 | End: 2019-07-31

## 2019-07-28 RX ORDER — SODIUM CHLORIDE, SODIUM LACTATE, POTASSIUM CHLORIDE, CALCIUM CHLORIDE 600; 310; 30; 20 MG/100ML; MG/100ML; MG/100ML; MG/100ML
INJECTION, SOLUTION INTRAVENOUS CONTINUOUS PRN
Status: DISCONTINUED | OUTPATIENT
Start: 2019-07-28 | End: 2019-07-28 | Stop reason: SURG

## 2019-07-28 RX ORDER — FENTANYL CITRATE/PF 50 MCG/ML
25 SYRINGE (ML) INJECTION
Status: DISCONTINUED | OUTPATIENT
Start: 2019-07-28 | End: 2019-07-28

## 2019-07-28 RX ORDER — OXYCODONE HYDROCHLORIDE 5 MG/1
5 TABLET ORAL EVERY 4 HOURS PRN
Qty: 15 TABLET | Refills: 0 | Status: SHIPPED | OUTPATIENT
Start: 2019-07-28 | End: 2019-08-02

## 2019-07-28 RX ORDER — CEFAZOLIN SODIUM 1 G/3ML
INJECTION, POWDER, FOR SOLUTION INTRAMUSCULAR; INTRAVENOUS AS NEEDED
Status: DISCONTINUED | OUTPATIENT
Start: 2019-07-28 | End: 2019-07-28 | Stop reason: SURG

## 2019-07-28 RX ORDER — EPHEDRINE SULFATE 50 MG/ML
INJECTION INTRAVENOUS AS NEEDED
Status: DISCONTINUED | OUTPATIENT
Start: 2019-07-28 | End: 2019-07-28 | Stop reason: SURG

## 2019-07-28 RX ORDER — PSEUDOEPHEDRINE HCL 120 MG/1
120 TABLET, FILM COATED, EXTENDED RELEASE ORAL 2 TIMES DAILY
Status: DISCONTINUED | OUTPATIENT
Start: 2019-07-28 | End: 2019-07-28 | Stop reason: HOSPADM

## 2019-07-28 RX ORDER — DULOXETIN HYDROCHLORIDE 60 MG/1
60 CAPSULE, DELAYED RELEASE ORAL
Status: DISCONTINUED | OUTPATIENT
Start: 2019-07-28 | End: 2019-07-28 | Stop reason: HOSPADM

## 2019-07-28 RX ADMIN — POTASSIUM CHLORIDE, DEXTROSE MONOHYDRATE AND SODIUM CHLORIDE 125 ML/HR: 150; 5; 900 INJECTION, SOLUTION INTRAVENOUS at 01:18

## 2019-07-28 RX ADMIN — LIDOCAINE HYDROCHLORIDE 100 MG: 20 INJECTION, SOLUTION INTRAVENOUS at 08:05

## 2019-07-28 RX ADMIN — MORPHINE SULFATE 2 MG: 2 INJECTION, SOLUTION INTRAMUSCULAR; INTRAVENOUS at 10:07

## 2019-07-28 RX ADMIN — CEFAZOLIN SODIUM 2000 MG: 1 INJECTION, POWDER, FOR SOLUTION INTRAMUSCULAR; INTRAVENOUS at 08:15

## 2019-07-28 RX ADMIN — PHENAZOPYRIDINE 100 MG: 100 TABLET ORAL at 16:42

## 2019-07-28 RX ADMIN — DIAZEPAM 5 MG: 5 INJECTION, SOLUTION INTRAMUSCULAR; INTRAVENOUS at 11:05

## 2019-07-28 RX ADMIN — PSEUDOEPHEDRINE HYDROCHLORIDE 120 MG: 120 TABLET, FILM COATED, EXTENDED RELEASE ORAL at 04:31

## 2019-07-28 RX ADMIN — OXYCODONE HYDROCHLORIDE 10 MG: 10 TABLET ORAL at 11:18

## 2019-07-28 RX ADMIN — EPHEDRINE SULFATE 10 MG: 50 INJECTION, SOLUTION INTRAVENOUS at 08:31

## 2019-07-28 RX ADMIN — ACETAMINOPHEN 650 MG: 325 TABLET, FILM COATED ORAL at 04:31

## 2019-07-28 RX ADMIN — SODIUM CHLORIDE, SODIUM LACTATE, POTASSIUM CHLORIDE, AND CALCIUM CHLORIDE: .6; .31; .03; .02 INJECTION, SOLUTION INTRAVENOUS at 07:33

## 2019-07-28 RX ADMIN — ONDANSETRON 4 MG: 2 INJECTION INTRAMUSCULAR; INTRAVENOUS at 10:11

## 2019-07-28 RX ADMIN — PHENAZOPYRIDINE 100 MG: 100 TABLET ORAL at 11:12

## 2019-07-28 RX ADMIN — MORPHINE SULFATE 2 MG: 2 INJECTION, SOLUTION INTRAMUSCULAR; INTRAVENOUS at 14:02

## 2019-07-28 RX ADMIN — EPHEDRINE SULFATE 10 MG: 50 INJECTION, SOLUTION INTRAVENOUS at 08:29

## 2019-07-28 RX ADMIN — FENTANYL CITRATE 50 MCG: 50 INJECTION INTRAMUSCULAR; INTRAVENOUS at 08:13

## 2019-07-28 RX ADMIN — PROPOFOL 200 MG: 10 INJECTION, EMULSION INTRAVENOUS at 08:05

## 2019-07-28 RX ADMIN — TAMSULOSIN HYDROCHLORIDE 0.4 MG: 0.4 CAPSULE ORAL at 16:42

## 2019-07-28 RX ADMIN — SODIUM CHLORIDE, SODIUM LACTATE, POTASSIUM CHLORIDE, AND CALCIUM CHLORIDE 125 ML/HR: .6; .31; .03; .02 INJECTION, SOLUTION INTRAVENOUS at 11:11

## 2019-07-28 RX ADMIN — OXYCODONE HYDROCHLORIDE 10 MG: 10 TABLET ORAL at 15:39

## 2019-07-28 RX ADMIN — SODIUM CHLORIDE, SODIUM LACTATE, POTASSIUM CHLORIDE, AND CALCIUM CHLORIDE 125 ML/HR: .6; .31; .03; .02 INJECTION, SOLUTION INTRAVENOUS at 10:08

## 2019-07-28 RX ADMIN — PROPOFOL 100 MG: 10 INJECTION, EMULSION INTRAVENOUS at 08:07

## 2019-07-28 RX ADMIN — ONDANSETRON 4 MG: 2 INJECTION INTRAMUSCULAR; INTRAVENOUS at 08:13

## 2019-07-28 RX ADMIN — GUAIFENESIN 600 MG: 600 TABLET, EXTENDED RELEASE ORAL at 04:31

## 2019-07-28 NOTE — UTILIZATION REVIEW
Initial Clinical Review    Admission: Date/Time/Statement: 07/27/2019 @ 1549  Orders Placed This Encounter   Procedures    Place in Observation     Standing Status:   Standing     Number of Occurrences:   1     Order Specific Question:   Admitting Physician     Answer:   Karyle Sessions     Order Specific Question:   Level of Care     Answer:   Med Surg [16]     ED Arrival Information     Expected Arrival Acuity Means of Arrival Escorted By Service Admission Type    - 7/27/2019 13:56 Urgent Ambulance [facility] General Medicine Urgent    Arrival Complaint    flank pain        Chief Complaint   Patient presents with    Flank Pain     Pt presents to ED with right sided flank pain that radiates to groin  Pain comes and goes  Assessment/Plan: 62year old male, presented to the ED from home via EMS  Admitted as Observation due to nephrolithiasis  Sudden onset at about 11:00 a m  this morning  Nephrolithiasis:  History of renal stones, but has never had to pass them  Previous studies have noted that he has stones collecting in his kidneys  Urinalysis in the ED was positive for blood in the urine  CT of the abdomen showed mild right hydronephrosis and proximal hydroureter  Obstructing 6 mm calculus in the right ureter at the L3-4 level   CT also found numerous nonobstructing 2 to 4 mm calculi in right renal calyces  Was given Flomax, fluids at a rate of 125 mL/hour, morphine for pain  Uro Consulted  Zofran prn  Strain all urine  07/28/2019  Consult Uro: with right proximal 6 mm obstructing ureteral stone, to OR   SURGERY DATE: 7/28/2019  Procedure(s) (LRB):  CYSTOSCOPY URETEROSCOPY WITH LITHOTRIPSY HOLMIUM LASER, basket stone extraction, RETROGRADE PYELOGRAM AND INSERTION STENT URETERAL (Right)  Anesthesia Type: smooth induction of LMA anesthesia,   Operative Findings:  1   Shawnale Najjar was lodged in the interpolar region of the kidney, required laser lithotripsy and basket extraction  2   Stent placed in good position        ED Triage Vitals   Temperature Pulse Respirations Blood Pressure SpO2   07/27/19 1356 07/27/19 1356 07/27/19 1356 07/27/19 1356 07/27/19 1356   97 9 °F (36 6 °C) 71 19 (!) 177/87 99 %      Temp Source Heart Rate Source Patient Position - Orthostatic VS BP Location FiO2 (%)   07/27/19 1356 07/27/19 1356 07/27/19 1515 07/27/19 1356 --   Oral Monitor Sitting Right arm       Pain Score       07/27/19 1356       6        Wt Readings from Last 1 Encounters:   07/27/19 80 8 kg (178 lb 2 1 oz)     Additional Vital Signs:   Date/Time  Temp  Pulse  Resp  BP  MAP (mmHg)  SpO2  O2 Device  Cardiac (WDL)  Patient Position - Orthostatic VS   07/28/19 1030  98 1 °F (36 7 °C)  67  18  142/82  106  98 %  None (Room air)  --  Lying   07/28/19 0937  97 7 °F (36 5 °C)  71  18  137/78  --  98 %  None (Room air)  --  --   07/28/19 0920  97 7 °F (36 5 °C)  84  18  131/73  --  97 %  None (Room air)  --  --   07/28/19 0915  --  83  16  135/79  --  97 %  None (Room air)  --  --   07/28/19 0905  97 4 °F (36 3 °C)Abnormal   74  14  109/61  --  98 %  Simple mask  WDL  --   07/27/19 2256  98 3 °F (36 8 °C)  74  18  160/85  --  95 %  None (Room air)  --  Lying   07/27/19 1930  --  --  --  --  --  --  None (Room air)  --  --   07/27/19 1654  98 9 °F (37 2 °C)  69  18  162/98  --  97 %  None (Room air)  --  Sitting   07/27/19 1515  --  68  18  164/101Abnormal   --  97 %  None (Room air)  --  Sitting       Pertinent Labs/Diagnostic Test Results:   Results from last 7 days   Lab Units 07/28/19  0434 07/27/19  1431   WBC Thousand/uL 6 35 9 22   HEMOGLOBIN g/dL 13 9 15 9   HEMATOCRIT % 40 2 44 3   PLATELETS Thousands/uL 188 235   NEUTROS ABS Thousands/µL  --  5 27     Results from last 7 days   Lab Units 07/28/19  0434 07/27/19  1431   SODIUM mmol/L 143 142   POTASSIUM mmol/L 3 6 2 8*   CHLORIDE mmol/L 108 102   CO2 mmol/L 27 25   ANION GAP mmol/L 8 15*   BUN mg/dL 10 14   CREATININE mg/dL 0 97 1 41* EGFR ml/min/1 73sq m 86 55   CALCIUM mg/dL 8 0* 9 1     Results from last 7 days   Lab Units 19  1431   AST U/L 28   ALT U/L 31   ALK PHOS U/L 96   TOTAL PROTEIN g/dL 7 2   ALBUMIN g/dL 4 1   TOTAL BILIRUBIN mg/dL 0 80     Results from last 7 days   Lab Units 19  0434 19  1431   GLUCOSE RANDOM mg/dL 108 94     Results from last 7 days   Lab Units 19  1431   LIPASE u/L 532*     Results from last 7 days   Lab Units 19  1516   CLARITY UA  Clear   COLOR UA  Yellow   SPEC GRAV UA  1 010   PH UA  7 0   GLUCOSE UA mg/dl Negative   KETONES UA mg/dl Negative   BLOOD UA  Small*   PROTEIN UA mg/dl Negative   NITRITE UA  Negative   BILIRUBIN UA  Negative   UROBILINOGEN UA E U /dl 0 2   LEUKOCYTES UA  Negative   WBC UA /hpf None Seen   RBC UA /hpf 1-2*   BACTERIA UA /hpf Occasional   EPITHELIAL CELLS WET PREP /hpf None Seen     2019 @ 1457  CT renal stone:   1   Mild right hydronephrosis and proximal hydroureter   Obstructing 6 mm calculus in the right ureter at the L3-4 level  2   Numerous nonobstructing 2 to 4 mm calculi in right renal calyces       2019  @    Fl retrograde pyelogram:  PENDING    2019 @ 1658  EC, NSR    ED Treatment:   Medication Administration from 2019 1355 to 2019 1613       Date/Time Order Dose Route Action     2019 1432 sodium chloride 0 9 % bolus 1,000 mL 1,000 mL Intravenous New Bag     2019 1430 ketorolac (TORADOL) injection 15 mg 15 mg Intravenous Given     2019 1429 ondansetron (ZOFRAN) injection 4 mg 4 mg Intravenous Given        Past Medical History:   Diagnosis Date    Anxiety     Chronic urticaria     Depression     Hashimoto's disease     Hashimoto's thyroiditis     Renal stones      Present on Admission:   Nephrolithiasis   Hypothyroidism due to Hashimoto's thyroiditis   Anxiety   Depression   Acute kidney [place] ([facility] [address] )      Admitting Diagnosis: Ureterolithiasis [N20 1]  Nephrolithiasis [N20 0]  Elevated serum creatinine [R79 89]  Flank pain [R10 9]  Right flank pain [R10 9]  Age/Sex: 62 y o  male  Admission Orders:  Current Facility-Administered Medications:  [MAR Hold] acetaminophen 650 mg Oral Q6H PRN   albuterol 2 5 mg Nebulization Once PRN   cefazolin 2,000 mg Intravenous Once   [MAR Hold] clonazePAM 0 5 mg Oral HS   dextrose 5 % and sodium chloride 0 9 % with KCl 20 mEq/L 125 mL/hr Intravenous Continuous   [MAR Hold] DULoxetine 60 mg Oral HS   fentaNYL 25 mcg Intravenous Q3 min PRN   [MAR Hold] guaiFENesin 600 mg Oral BID   HYDROmorphone 0 5 mg Intravenous Q5 Min PRN   Labetalol HCl 5 mg Intravenous Q10 Min PRN   lactated ringers 125 mL/hr Intravenous Continuous   [MAR Hold] levothyroxine 75 mcg Oral Daily   [MAR Hold] loratadine 10 mg Oral Daily   meperidine 12 5 mg Intravenous PRN   [MAR Hold] morphine injection 2 mg Intravenous Q4H PRN   ondansetron 4 mg Intravenous Q6H PRN   ondansetron 4 mg Intravenous Once PRN   [MAR Hold] oxyCODONE 10 mg Oral Q4H PRN   promethazine 12 5 mg Intravenous Once PRN   [MAR Hold] pseudoephedrine 120 mg Oral BID   [MAR Hold] sodium chloride 1 spray Each Nare Q1H PRN   [MAR Hold] tamsulosin 0 4 mg Oral Daily With Dinner   NPO  Retrograde pyelogram:  pending  IP CONSULT TO UROLOGY  Per [facility]CDs  Strain all urine    07/28/2019  DC order entered - Home    Network Utilization Review Department  Phone: [phone]; Fax [phone]  [email or web address]  ATTENTION: Please call with any questions or concerns to [phone]  and carefully listen to the prompts so that you are directed to the right person  Send all requests for admission clinical reviews, approved or denied determinations and any other requests to fax [phone]   All voicemails are confidential

## 2019-07-28 NOTE — ANESTHESIA POSTPROCEDURE EVALUATION
Post-Op Assessment Note    CV Status:  Stable  Pain Score: 0    Pain management: adequate     Mental Status:  Alert and awake   Hydration Status:  Euvolemic   PONV Controlled:  Controlled   Airway Patency:  Patent   Post Op Vitals Reviewed: Yes      Staff: CRNA           /61 (07/28/19 0905)    Temp (!) 97 4 °F (36 3 °C) (07/28/19 0905)    Pulse 74 (07/28/19 0905)   Resp 14 (07/28/19 0905)    SpO2 98 % (07/28/19 0905)

## 2019-07-28 NOTE — PLAN OF CARE
Problem: Potential for Falls  Goal: Patient will remain free of falls  Description  INTERVENTIONS:  - Assess patient frequently for physical needs  -  Identify cognitive and physical deficits and behaviors that affect risk of falls    -  [place] fall precautions as indicated by assessment   - Educate patient/family on patient safety including physical limitations  - Instruct patient to call for assistance with activity based on assessment  - Modify environment to reduce risk of injury  - Consider OT/PT consult to assist with strengthening/mobility  Outcome: Progressing

## 2019-07-28 NOTE — QUICK NOTE
Patient's significant other asking to speak to provider because she does not feel as thought the patient is receiving good care  She reports that they came in with 3 issues that have not been addressed  The first issue was that they both have "severe anxiety about being at the hospital for good reason  It should all be in the chart " She is upset because he did not receive his 8PM Cymbalta or Klonopin  "Now it's too late to get them now " Will adjust doses to be scheduled at bedtime  The second issue is that he has "severe nasal congestion from his allergies " He took DayQuil at home yesterday morning that started to wear off around 6PM  He was given Claritin for his allergies, "we knew an hour later that it wasn't going to do anything " He was then ordered a saline nasal spray, which "we knew was not doing anything after 2-3 times that he used it because he is too congested to even breathe it in " Now since it has been so long that it has not been treated, he has developed a frontal HA  "With each hour that passes he is getting worse " She would prefer a menthol nasal spray that he uses at home and DayQuil  It was explained that we do not carry these  I will add on Mucinex, Sudafed, Tylenol  The third issue is his kidney stone that "you are doing nothing for besides giving him some fluids with potassium  He still has yet to pass it " Explained that urology is consulted to evaluate patient this AM  He is not likely to pass a 6mm obstructing stone  They are also upset because he cannot drink anything  I explained that he is receiving IVF for hydration and can take sips of water with his medications  Lastly, they are upset because his urinal was not emptied by staff

## 2019-07-28 NOTE — CONSULTS
UROLOGY CONSULTATION NOTE     Patient Identifiers: Sushant Sosa (MRN [id number])  Service Requesting Consultation:  Internal Medicine  Service Providing Consultation:  Urology, Anay Melissa MD    Date of Service: 7/28/2019    Reason for Consultation:  Ureteral stone      ASSESSMENT:     Patient with right proximal 6 mm obstructing ureteral stone  PLAN:     With his significant other at the bedside, options discussed including trial of passage versus ureteroscopic intervention  Surgery detail including risks and benefits  Patient understands there is approximately a 20% risk stone will not be able to be retrieved today and he will require secondary procedure  Patient has agreed and consented to cystoscopy, [RIGHT] retrograde pyelogram, ureteroscopy with possible laser lithotripsy and basket extraction of stone, stent placement  History of Present Illness:     Sushant Sosa is a 62 y o  old with a history of known nonobstructing stone disease  Patient has never had an episode of stone colic or required surgery or intervention with a urologist   Several weeks ago, the patient began to have some right-sided flank pain that was minimal  Yesterday the pain increased such that the patient was immobilized  He and his significant other called 911 and he was brought to the emergency room  CT scan demonstrates multiple small nonobstructing stones in the right kidney and a 6 mm proximal obstructing stone with hydronephrosis  Patient had a mild elevation in his creatinine  He was admitted to the medical service  He has not had any pain or issues since admission      Past Medical, Past Surgical History:     Past Medical History:   Diagnosis Date    Anxiety     Chronic urticaria     Depression     Hashimoto's disease     Hashimoto's thyroiditis     Renal stones    :    Past Surgical History:   Procedure Laterality Date    NASAL SEPTUM SURGERY      TONSILLECTOMY  1979   :    Medications, Allergies: Current Facility-Administered Medications:     acetaminophen (TYLENOL) tablet 650 mg, 650 mg, Oral, Q6H PRN, Johnnie Christensen, DO, 650 mg at 07/28/19 0431    clonazePAM (KlonoPIN) tablet 0 5 mg, 0 5 mg, Oral, HS, Caroline Sandhu PA-C    dextrose 5 % and sodium chloride 0 9 % with KCl 20 mEq/L infusion (premix), 125 mL/hr, Intravenous, Continuous, Johnnie Christensen DO, Last Rate: 125 mL/hr at 07/28/19 0118, 125 mL/hr at 07/28/19 0118    DULoxetine (CYMBALTA) delayed release capsule 60 mg, 60 mg, Oral, HS, Caroline Sandhu PA-C    guaiFENesin (MUCINEX) 12 hr tablet 600 mg, 600 mg, Oral, BID, Caroline Sandhu PA-C    levothyroxine tablet 75 mcg, 75 mcg, Oral, Daily, Johnnie Christensen DO    loratadine (CLARITIN) tablet 10 mg, 10 mg, Oral, Daily, Johnnie Christensen, DO, 10 mg at 07/27/19 1826    morphine injection 2 mg, 2 mg, Intravenous, Q4H PRN, Bjorn Lombardo MD    ondansetron (ZOFRAN) injection 4 mg, 4 mg, Intravenous, Q6H PRN, Johnnie Christensen DO    oxyCODONE (ROXICODONE) immediate release tablet 10 mg, 10 mg, Oral, Q4H PRN, Bjorn Lombardo MD    pseudoephedrine (SUDAFED) 12 hr tablet 120 mg, 120 mg, Oral, BID, Caroline Sandhu PA-C    sodium chloride (OCEAN) 0 65 % nasal spray 1 spray, 1 spray, Each Nare, Q1H PRN, Sagar Arcos PA-C, 1 spray at 07/27/19 2250    tamsulosin (FLOMAX) capsule 0 4 mg, 0 4 mg, Oral, Daily With Dinner, Johnnie Christensen DO, 0 4 mg at 07/27/19 1821    Allergies:   Allergies   Allergen Reactions    Compazine [Prochlorperazine] GI Intolerance    Sulfa Antibiotics    :    Social and Family History:   Social History:   Social History     Tobacco Use    Smoking status: Never Smoker    Smokeless tobacco: Never Used   Substance Use Topics    Alcohol use: Yes     Comment: social    Drug use: No        Social History     Tobacco Use   Smoking Status Never Smoker   Smokeless Tobacco Never Used       Family History:  Family History Problem Relation Age of Onset    Cancer Mother     Stroke Mother     Colon cancer Father     Colon polyps Father     Valvular heart disease Father     Cancer Sister     Suicidality Brother    :     Review of Systems:     General: Fever, chills, or night sweats: negative  Cardiac: Negative for chest pain  Pulmonary: Negative for shortness of breath  Gastrointestinal: Abdominal pain positive  Nausea, vomiting, or diarrhea negative,  Genitourinary: See HPI above  Patient does not have hematuria  All other systems queried were negative  Physical Exam:   General: Patient is pleasant and in NAD  Awake and alert  /85 (BP Location: Right arm)   Pulse 74   Temp 98 3 °F (36 8 °C) (Oral)   Resp 18   Wt 80 8 kg (178 lb 2 1 oz)   SpO2 95%   BMI 24 16 kg/m²   Cardiac: Peripheral edema: negative  Pulmonary: Non-labored breathing  Abdomen: Soft, non-tender, non-distended  No surgical scars  No masses, tenderness, hernias noted  Genitourinary: Negative CVA tenderness, negative suprapubic tenderness  (Male): Phallus normal, meatus patent  Testicles descended into scrotum bilaterally without masses nor tenderness  No inguinal hernias bilaterally        Labs:     Lab Results   Component Value Date    HGB 13 9 07/28/2019    HCT 40 2 07/28/2019    WBC 6 35 07/28/2019     07/28/2019   ]    Lab Results   Component Value Date     09/18/2015    K 3 6 07/28/2019     07/28/2019    CO2 27 07/28/2019    BUN 10 07/28/2019    CREATININE 0 97 07/28/2019    CALCIUM 8 0 (L) 07/28/2019    GLUCOSE 91 06/03/2017 7/27/19 1516    RBC, UA None Seen, 0-5 /hpf 1-2Abnormal     WBC, UA None Seen, 0-5, 5-55, 5-65 /hpf None Seen    Epithelial Cells None Seen, Occasional /hpf None Seen    Bacteria, UA None Seen, Occasional /hpf Occasional      Imaging:   I personally reviewed the images and report of the following studies, and reviewed them with the patient:    7/27/19  CT ABDOMEN AND PELVIS WITHOUT IV CONTRAST - LOW DOSE RENAL STONE      INDICATION:   Right flank pain      COMPARISON:  None      TECHNIQUE:  Low dose thin section CT examination of the abdomen and pelvis was performed without intravenous or oral contrast according to a protocol specifically designed to evaluate for urinary tract calculus  Axial, sagittal, and coronal 2D   reformatted images were created from the source data and submitted for interpretation  Evaluation for pathology in the abdomen and pelvis that is unrelated to urinary tract calculi is limited       Radiation dose length product (DLP) for this visit:  163 mGy-cm   This examination, like all CT scans performed in the [facility], was performed utilizing techniques to minimize radiation dose exposure, including the use of iterative   reconstruction and automated exposure control       FINDINGS:     RIGHT KIDNEY AND URETER:  Numerous nonobstructing 2 to 4 mm calculi in renal calyces  Obstructing 4 x 5 x 6 mm calculus in the proximal ureter at the L3-4 level  Mild hydronephrosis and proximal hydroureter      LEFT KIDNEY AND URETER:  No urinary tract calculi  No hydronephrosis or hydroureter      URINARY BLADDER:   No prostate enlargement  No bladder wall thickening or calculi within the bladder      No significant abnormality in the visualized lung bases      Limited low radiation dose noncontrast CT evaluation demonstrates no clinically significant abnormality of liver, spleen, pancreas, or adrenal glands  No calcified gallstones or gallbladder wall thickening noted  No ascites or bulky lymphadenopathy on this limited noncontrast study  No bowel obstruction, colitis or diverticulitis  Normal appendix  No acute fracture or destructive osseous lesion is identified         IMPRESSION:        1  Mild right hydronephrosis and proximal hydroureter  Obstructing 6 mm calculus in the right ureter at the L3-4 level    2   Numerous nonobstructing 2 to 4 mm calculi in right renal calyces

## 2019-07-28 NOTE — DISCHARGE INSTRUCTIONS
You have stent on a string, which will be removed in 3-5 days  Please take care not to remove with dressing or undressing  Our office staff will contact you to arrange an appointment for removal     Ureteroscopy   WHAT YOU NEED TO KNOW:   A ureteroscopy is a procedure to examine in the inside of your urinary tract, which includes your urethra, bladder, ureters, and kidneys  A ureteroscope is a small, thin tube with a light and camera on the end  Ureteroscopy can help your healthcare provider diagnose and treat problems in your urinary tract, such as kidney stones  DISCHARGE INSTRUCTIONS:   Medicine:   · Antibiotics  may be given to treat or prevent an infection  · Take your medicine as directed  Contact your healthcare provider if you think your medicine is not helping or if you have side effects  Tell him or her if you are allergic to any medicine  Keep a list of the medicines, vitamins, and herbs you take  Include the amounts, and when and why you take them  Bring the list or the pill bottles to follow-up visits  Carry your medicine list with you in case of an emergency  Follow up with your healthcare provider as directed:  Write down your questions so you remember to ask them during your visits  Drink liquids as directed  Liquids can help prevent kidney stones and urinary tract infections  Drink water and limit the amount of caffeine you drink  Caffeine may be found in coffee, tea, soda, sports drinks, and foods  Ask your healthcare provider how much liquid to drink each day  Contact your healthcare provider if:   · You have a fever  · You cannot urinate  · You have blood in your urine  · You are vomiting  · You have pain in your abdomen or side  · You have questions or concerns about your condition or care  © 2017 [address] [employer] Information is for End User's use only and may not be sold, redistributed or otherwise used for commercial purposes   All illustrations and images included in CareNotes® are the copyrighted property of A D A M [employer] Inc  or [employer]  The above information is an  only  It is not intended as medical advice for individual conditions or treatments  Talk to your doctor, nurse or pharmacist before following any medical regimen to see if it is safe and effective for you

## 2019-07-28 NOTE — DISCHARGE SUMMARY
Discharge Summary - [facility] Internal Medicine    Patient Information: Sang Soto III 62 y o  male MRN: [id number]  Unit/Bed#: -01 Encounter: [id number]    Discharging Physician / Practitioner: Deidre Antonio MD  PCP: Octaviano Thurman DO  Admission Date: 7/27/2019  Discharge Date: 07/28/19    Disposition:     Home    Reason for Admission: flank pain    Discharge Diagnoses:     Principal Problem:    Nephrolithiasis  Active Problems:    Hypokalemia    Hypothyroidism due to Hashimoto's thyroiditis    Anxiety    Depression    Acute kidney injury ([address] )    Right flank pain    Ureterolithiasis  Resolved Problems:    * No resolved hospital problems  *      Consultations During Hospital Stay:  · Urology    Procedures Performed:     · Cystoscopy with stent placement  · CT abdomen and pelvis:  Mild right-sided hydronephrosis with hydroureter 6 mm calculus noted in the right ureter    Significant Findings / Test Results:     · As above    Hospital Course:     Sang Soto III is a 62 y o  male patient with history of nephrolithiasis, hypothyroidism depression and anxiety who originally presented to the hospital on 7/27/2019 due to right flank pain which radiated to growing  Patient had severe pain with nausea  Patient was subsequently admitted given right-sided hydronephrosis on CT scan of abdomen and pelvis  Was seen by Urology and cystoscopy was performed with stent placement  Postprocedure patient remained stable and continues to improve  He is being discharged home in stable condition  He will follow with Urology in outpatient setting for stent removal     Condition at Discharge: good     Discharge Day Visit / Exam:     Subjective:  Developed spasm after the procedure    Vitals: Blood Pressure: 142/82 (07/28/19 1030)  Pulse: 67 (07/28/19 1030)  Temperature: 98 1 °F (36 7 °C) (07/28/19 1030)  Temp Source: Oral (07/28/19 1030)  Respirations: 18 (07/28/19 1030)  Weight - Scale: 80 8 kg (178 lb 2 1 oz) (07/27/19 1356)  SpO2: 98 % (07/28/19 1030)  Exam:   Physical Exam     Gen -Patient comfortable at rest   Neck- Supple  No thyromegaly or lymphadenopathy  Lungs-Clear bilaterally without any wheeze or rales   Heart S1-S2, regular rate and rhythm, no murmurs  Abdomen-soft nontender, no organomegaly  Bowel sounds present  Extremities-no cyanosi,  clubbing or edema  Skin- no rash  Neuro-nonfocal       Discussion with Family:  Wife at bedside    Discharge instructions/Information to patient and family:   See after visit summary for information provided to patient and family  Provisions for Follow-Up Care:  See after visit summary for information related to follow-up care and any pertinent home health orders  Planned Readmission:  No     Discharge Statement:  I spent 35 minutes discharging the patient  This time was spent on the day of discharge  I had direct contact with the patient on the day of discharge  Greater than 50% of the total time was spent examining patient, answering all patient questions, arranging and discussing plan of care with patient as well as directly providing post-discharge instructions  Additional time then spent on discharge activities  Discharge Medications:  See after visit summary for reconciled discharge medications provided to patient and family        ** Please Note: This note has been constructed using a voice recognition system **

## 2019-07-28 NOTE — OP NOTE
OPERATIVE REPORT  PATIENT NAME: Scott Dc III    :  1960  MRN: [id number]  Pt Location: AN OR ROOM 01    SURGERY DATE: 2019    Surgeon(s) and Role:     * Joaquina Jerry MD - Primary    Preop Diagnosis:  Right flank pain [R10 9]  Ureterolithiasis [N20 1]    Post-Op Diagnosis Codes:     * Right flank pain [R10 9]     * Ureterolithiasis [N20 1]    Procedure(s) (LRB):  CYSTOSCOPY URETEROSCOPY WITH LITHOTRIPSY HOLMIUM LASER, basket stone extraction, RETROGRADE PYELOGRAM AND INSERTION STENT URETERAL (Right)    Specimen(s):  ID Type Source Tests Collected by Time Destination   1 : right ureteral stone  Calculus Kidney, Right STONE ANALYSIS, TISSUE EXAM Joaquina Jerry MD 2019 [id number]        Estimated Blood Loss:   Minimal    Drains:  6 x 26 right-sided stent attached to dorsal penis with string    Anesthesia Type:   Choice    Operative Indications:  Right flank pain [R10 9]  Ureterolithiasis [N20 1]      Operative Findings:  1  Panchito Green was lodged in the interpolar region of the kidney, required laser lithotripsy and basket extraction  2  Stent placed in good position    Complications:   None    Procedure and Technique:  Scott Dc III is a 62y o -year-old male  with a history of 6 mm right proximal stone  Risk and benefits of ureteroscopy were discussed and reviewed  Informed consent was obtained  The patient was brought to the operating room on 2019  After the smooth induction of LMA anesthesia, the patient was placed in the dorsal lithotomy position  His genitalia was prepped and draped in a sterile fashion  Intravenous antibiotics were administered in the form of Ancef  A timeout was performed with all members of the operative team confirmed the patient's identity, procedure to be performed, and laterality of the case  A 22 Syriac rigid cystoscope with 30° lens was inserted  The bladder was thoroughly inspected  It was no evidence of mucosal abnormalities or lesions   There were no calculi identified  Attention was focused on the right ureteral orifice   flouroscopy demonstrated a non radioopaque stone  A Bard Solo Plus wire was passed proximal to the stone and secured as a safety  A dual lumen catheter was then advanced over the wire and used to dilate the very distal ureter under direct vision  A retrograde pyelogram was performed that demonstrated mild hydroureteronephrosis proximal     The dual-lumen was removed leaving the safety wire in place  A long semirigid ureteroscope was then inserted adjacent to the wire  We were able to pass the scope all the way up to the ureteropelvic junction  No stone was encounter in the ureter  Second wire was placed through the scope and the scope was removed in a push-pull fashion  A 10/1245 cm ureteral access sheath was placed in the proximal ureter  5 3 Kinyarwanda flexible ureteral scope was then passed  Careful inspection of the upper interpolar and lower pole regions was performed  There appeared to be some Horacio's plaques in the upper pole region  The small stone was located in the interpolar position  Two hundred seventy-two nanometer laser fiber was used to fracture the stone into 2 pieces  [employer] Andriy light basket was used to remove the stone fragments  These were sent for analysis  The ureteroscope was then repassed to ensure that the collecting system was free and clear of any residual stones  Additional contrast was injected as a roadmap for stent insertion  The ureteroscope and access sheath or then removed  The wire was backloaded through the cystoscope  The cystoscope was repassed into the bladder  A 6 Kinyarwanda 26 double-J ureteral stent was then placed over the previously banked safety wire without difficulty  The proximal coil was appreciated in the right renal pelvis and the distal coil was visualized within the bladder  The bladder was emptied and the cystoscope was removed   A string was left in place and secured to the dorsal penis with Tegaderm  2% viscous lidocaine was placed per urethra  Overall the patient tolerated the procedure well and were no complications  The patient was extubated in the operating room and transferred to the PACU in stable condition at the conclusion of the case  Plan-patient will have his stent on string removed this upcoming week  Will follow up in our office for his stone disease       I was present for the entire procedure    Patient Disposition:  PACU     SIGNATURE: Alexa Morales MD  DATE: July 28, 2019  TIME: 9:17 AM

## 2019-07-28 NOTE — TELEPHONE ENCOUNTER
Patient is status post ureteroscopy  Stent is on string  Can be removed mid week  Should follow-up in 3 months with advanced practitioner with x-ray and ultrasound

## 2019-07-28 NOTE — ANESTHESIA PREPROCEDURE EVALUATION
Review of Systems/Medical History  Patient summary reviewed        Cardiovascular  Negative cardio ROS Exercise tolerance (METS): >4,     Pulmonary  Negative pulmonary ROS        GI/Hepatic  Negative GI/hepatic ROS   GERD ,        Negative  ROS Kidney stones,        Endo/Other  Negative endo/other ROS History of thyroid disease ,      GYN  Negative gynecology ROS          Hematology  Negative hematology ROS      Musculoskeletal  Negative musculoskeletal ROS   Arthritis     Neurology  Negative neurology ROS   Headaches,    Psychology   Negative psychology ROS              Physical Exam    Airway    Mallampati score: II  TM Distance: >3 FB  Neck ROM: full     Dental   No notable dental hx     Cardiovascular  Comment: Negative ROS,     Pulmonary      Other Findings        Anesthesia Plan  ASA Score- 2     Anesthesia Type- general with ASA Monitors  Additional Monitors:   Airway Plan: LMA  Comment: Patient seen and examined, history reviewed  Patient to be done under general anesthesia with LMA and routine monitors  Risks discussed with the patient, consent obtained        Plan Factors-    Induction- intravenous  Postoperative Plan-     Informed Consent- Anesthetic plan and risks discussed with patient  I personally reviewed this patient with the CRNA  Discussed and agreed on the Anesthesia Plan with the CRNA  Negrita Starr

## 2019-07-28 NOTE — PLAN OF CARE
Problem: Potential for Falls  Goal: Patient will remain free of falls  Description  INTERVENTIONS:  - Assess patient frequently for physical needs  -  Identify cognitive and physical deficits and behaviors that affect risk of falls    -  [place] fall precautions as indicated by assessment   - Educate patient/family on patient safety including physical limitations  - Instruct patient to call for assistance with activity based on assessment  - Modify environment to reduce risk of injury  - Consider OT/PT consult to assist with strengthening/mobility  7/28/2019 1705 by Kamaljit Hawley RN  Outcome: Adequate for Discharge  7/28/2019 1619 by Kamaljit Hawley RN  Outcome: Progressing

## 2019-07-29 ENCOUNTER — TRANSITIONAL CARE MANAGEMENT (OUTPATIENT)
Dept: FAMILY MEDICINE CLINIC | Facility: CLINIC | Age: 59
End: 2019-07-29

## 2019-07-29 ENCOUNTER — TELEPHONE (OUTPATIENT)
Dept: UROLOGY | Facility: MEDICAL CENTER | Age: 59
End: 2019-07-29

## 2019-07-29 ENCOUNTER — TELEPHONE (OUTPATIENT)
Dept: FAMILY MEDICINE CLINIC | Facility: CLINIC | Age: 59
End: 2019-07-29

## 2019-07-29 DIAGNOSIS — M62.838 MUSCLE SPASMS OF LOWER EXTREMITY, UNSPECIFIED LATERALITY: Primary | ICD-10-CM

## 2019-07-29 DIAGNOSIS — N20.0 RENAL STONES: ICD-10-CM

## 2019-07-29 DIAGNOSIS — R39.15 URGENCY OF URINATION: Primary | ICD-10-CM

## 2019-07-29 RX ORDER — OXYBUTYNIN CHLORIDE 5 MG/1
5 TABLET ORAL 3 TIMES DAILY
Qty: 12 TABLET | Refills: 0 | Status: SHIPPED | OUTPATIENT
Start: 2019-07-29 | End: 2019-08-01 | Stop reason: SDUPTHER

## 2019-07-29 RX ORDER — DIAZEPAM 5 MG/1
5 TABLET ORAL EVERY 8 HOURS PRN
Qty: 30 TABLET | Refills: 0 | Status: SHIPPED | OUTPATIENT
Start: 2019-07-29 | End: 2019-08-06 | Stop reason: ALTCHOICE

## 2019-07-29 NOTE — TELEPHONE ENCOUNTER
Patient of Dr Hali Beltrán seen at [facility]  Wife calling to set up OR follow up appointment  She also state patient is still in a considerable amount of pain      Asking for a call back at [phone]

## 2019-07-29 NOTE — TELEPHONE ENCOUNTER
Pt was in the Hospital with a kidney stone called to scheduleTCM appoint and his wife wanted to know if he could have a few Valium for the spasms he is having he was on it the the hospital they did not give him any to come home with if it is ok Pt's wife will check with [facility] later today

## 2019-07-29 NOTE — TELEPHONE ENCOUNTER
Called and spoke to spouse, advised prescription for Oxybuytnin 5 mg , one tablet 3 times per day sent to [facility]  Patient may also use heating pad  He should stop medication if he notices decrease in urination or trouble urinating

## 2019-07-29 NOTE — TELEPHONE ENCOUNTER
I sent a prescription for 5mg IR oxybutynin to the patient's preferred pharmacy  I would also recommend a heating pack  If patient notices a decrease in urination or difficulty in urination, I would recommend discontinuing oxybutynin as it can predispose patients to urinary retention

## 2019-07-29 NOTE — TELEPHONE ENCOUNTER
Stent with string  Patient reporting lower back pain, groin pain, and spasms  He is alternating Oxycodone and Advil every 2 hours, he is on Flomax and Pyridium  Any additional recommendation/ prescription for stent colic?

## 2019-07-29 NOTE — TELEPHONE ENCOUNTER
Returned call from spouse  She called office to report patient is having pain  Post Op Note    Mayra Dumont III is a 62 y o  male s/p CYSTOSCOPY URETEROSCOPY WITH LITHOTRIPSY HOLMIUM LASER, basket stone extraction, RETROGRADE PYELOGRAM AND INSERTION STENT URETERAL (Right Bladder) performed 7/27/19 with DR Razo on call Marylene Alstrom  How would you rate your pain on a scale from 1 to 10, 10 being the worst pain ever? Pain level 9 with movement  Reporting lower back pain  Patient is alternating Oxycodone with Advil every 2 hours  He is hydrating  Have you had a fever? No fever  Do you have any difficulty urinating? Patient is reporting pain with urination, lower back pain and groin pain  Patient is taking Pyridium and flomax  Do you have any other questions or concerns that I can address at this time? Advised wife that message will be sent to provider to review to see if additional recommendation for recommendation for stent colic/management  Scheduled for stent with string removal Thursday 8/1/19 at 10 am Navi  Pharmacy is [facility] in [place]

## 2019-08-01 ENCOUNTER — PROCEDURE VISIT (OUTPATIENT)
Dept: UROLOGY | Facility: CLINIC | Age: 59
End: 2019-08-01
Payer: COMMERCIAL

## 2019-08-01 VITALS
BODY MASS INDEX: 23.81 KG/M2 | HEIGHT: 72 IN | DIASTOLIC BLOOD PRESSURE: 78 MMHG | HEART RATE: 88 BPM | WEIGHT: 175.8 LBS | SYSTOLIC BLOOD PRESSURE: 138 MMHG

## 2019-08-01 DIAGNOSIS — N20.0 NEPHROLITHIASIS: Primary | ICD-10-CM

## 2019-08-01 DIAGNOSIS — N20.0 RENAL STONES: ICD-10-CM

## 2019-08-01 DIAGNOSIS — N20.0 NEPHROLITHIASIS: ICD-10-CM

## 2019-08-01 PROCEDURE — 99211 OFF/OP EST MAY X REQ PHY/QHP: CPT

## 2019-08-01 PROCEDURE — 1111F DSCHRG MED/CURRENT MED MERGE: CPT

## 2019-08-01 RX ORDER — OXYBUTYNIN CHLORIDE 5 MG/1
5 TABLET ORAL 3 TIMES DAILY
Qty: 12 TABLET | Refills: 0 | Status: SHIPPED | OUTPATIENT
Start: 2019-08-01 | End: 2019-08-21 | Stop reason: ALTCHOICE

## 2019-08-01 RX ORDER — ONDANSETRON 4 MG/1
4 TABLET, ORALLY DISINTEGRATING ORAL EVERY 6 HOURS PRN
Qty: 20 TABLET | Refills: 0 | Status: SHIPPED | OUTPATIENT
Start: 2019-08-01 | End: 2019-12-30 | Stop reason: SDUPTHER

## 2019-08-01 NOTE — TELEPHONE ENCOUNTER
Called and spoke to wife, advised that prescription for Zofran for nausea and refill of Oxybutynin was sent to [facility]  Recommend aggressive hydration and heating pack, may use Oxycodone or Ibuprofen for pain, advised symptoms should improve in the next 24 to 48 hours and to call office back if no improvement  Verbal understanding given

## 2019-08-01 NOTE — TELEPHONE ENCOUNTER
Patients wife calling to advise he is in an extreme amount of pain ( level 9 ) after stent removal today at [facility]  He is also experiencing nausea and cramping      Asking for a prompt return call at [phone]

## 2019-08-01 NOTE — PATIENT INSTRUCTIONS
Increase fluids , water, medicate for pain with Ibuprofen as needed, heating pad as needed  Call office if develop fever, chills, nausea or vomiting  Follow up in 3 months with xray and ultrasound prior to visit

## 2019-08-01 NOTE — TELEPHONE ENCOUNTER
Called and spoke to wife,  Patient was seen at 10 am today for stent with string removal   Stent removed without difficulty , intact  About one hour ago, patient developed severe cramping/ wrenching feeling flank pain bilateral     he has taken Oxycodone and Oxybutynin    He is also experiencing nausea, no vomiting  Advised spouse to have patient hydrate, alternate Oxycodone with Ibuprofen and that message will be sent to provider to review  Wife states that when patient was in hospital and having stent pain he was given Oxycodone and Valium which helped his cramping/wrenching feeling  She is inquiring if this could be prescribed again  Patient does not have medication for nausea, he has 6 tablets of Oxybutynin remaining and he also has flomax  Advised her we do not routinely give Valium but will send message to provider to review to give further recommendation for nausea, and pain

## 2019-08-01 NOTE — TELEPHONE ENCOUNTER
I have sent a refill of his oxybutynin as well as a prescription for Zofran to his preferred pharmacy  These symptoms should improve over the next 24-48 hours  I recommend continuing with aggressive hydration and heating pack as needed

## 2019-08-01 NOTE — PROGRESS NOTES
8/1/2019  John Rushing III is a 62 y o  male  [id number]        Diagnosis  Chief Complaint     Nephrolithiasis          Patient is s/p right ureteroscopy with stone extraction on 7/28/19 with Dr Lorie Cespedes  Plan  Follow up in 3 months with xray and ultrasound prior to visit  Procedure Stent with String Removal    Vitals:    08/01/19 0958   BP: 138/78   Pulse: 88   Weight: 79 7 kg (175 lb 12 8 oz)   Height: 6' (1 829 m)       Stent with string removed intact without difficulty  Reviewed post stent removal symptoms including flank pain, dysuria, and hematuria  Instructed patient to increase oral fluid intake  Encouraged the use of NSAIDS and other prescribed pain medication as needed for discomfort  Patient instructed to call the office or report to the ER for uncontrolled pain, fever, chills, nausea or vomiting         Reina Alicia RN

## 2019-08-06 ENCOUNTER — TELEPHONE (OUTPATIENT)
Dept: FAMILY MEDICINE CLINIC | Facility: CLINIC | Age: 59
End: 2019-08-06

## 2019-08-06 ENCOUNTER — OFFICE VISIT (OUTPATIENT)
Dept: FAMILY MEDICINE CLINIC | Facility: CLINIC | Age: 59
End: 2019-08-06
Payer: COMMERCIAL

## 2019-08-06 VITALS
BODY MASS INDEX: 23.54 KG/M2 | WEIGHT: 173.8 LBS | HEART RATE: 78 BPM | DIASTOLIC BLOOD PRESSURE: 90 MMHG | SYSTOLIC BLOOD PRESSURE: 140 MMHG | TEMPERATURE: 98.8 F | HEIGHT: 72 IN

## 2019-08-06 DIAGNOSIS — I10 ESSENTIAL HYPERTENSION: ICD-10-CM

## 2019-08-06 DIAGNOSIS — N20.0 RENAL LITHIASIS: ICD-10-CM

## 2019-08-06 DIAGNOSIS — R14.0 BLOATING: ICD-10-CM

## 2019-08-06 DIAGNOSIS — R10.13 EPIGASTRIC PAIN: ICD-10-CM

## 2019-08-06 DIAGNOSIS — I51.7 LVH (LEFT VENTRICULAR HYPERTROPHY): ICD-10-CM

## 2019-08-06 DIAGNOSIS — E87.6 HYPOKALEMIA: Primary | ICD-10-CM

## 2019-08-06 DIAGNOSIS — E04.2 MULTIPLE THYROID NODULES: ICD-10-CM

## 2019-08-06 DIAGNOSIS — E03.9 HYPOTHYROIDISM, UNSPECIFIED TYPE: ICD-10-CM

## 2019-08-06 LAB
CA PHOS MFR STONE: 5 %
CALCIUM OXALATE DIHYDRATE MFR STONE IR: 5 %
COLOR STONE: NORMAL
COM MFR STONE: 90 %
COMMENT-STONE3: NORMAL
COMPOSITION: NORMAL
LABORATORY COMMENT REPORT: NORMAL
NIDUS STONE QL: NORMAL
PHOTO: NORMAL
SIZE STONE: NORMAL MM
STONE ANALYSIS-IMP: NORMAL
SURFACE CRYSTALS: NORMAL
WT STONE: 45.4 MG

## 2019-08-06 PROCEDURE — 99496 TRANSJ CARE MGMT HIGH F2F 7D: CPT | Performed by: FAMILY MEDICINE

## 2019-08-06 PROCEDURE — 1111F DSCHRG MED/CURRENT MED MERGE: CPT | Performed by: FAMILY MEDICINE

## 2019-08-06 RX ORDER — PANTOPRAZOLE SODIUM 40 MG/1
40 TABLET, DELAYED RELEASE ORAL
Qty: 30 TABLET | Refills: 5 | Status: SHIPPED | OUTPATIENT
Start: 2019-08-06 | End: 2020-01-13

## 2019-08-06 RX ORDER — LISINOPRIL 5 MG/1
5 TABLET ORAL DAILY
Qty: 30 TABLET | Refills: 3 | Status: SHIPPED | OUTPATIENT
Start: 2019-08-06 | End: 2019-11-24 | Stop reason: SDUPTHER

## 2019-08-06 NOTE — TELEPHONE ENCOUNTER
He is in for appt Friday for SALINA, kidney stone surgery  The center of his stomach hurts now, he would like to be seen sooner, please advise

## 2019-08-06 NOTE — PROGRESS NOTES
Patient ID: Manus Peabody is a 62 y o  male  HPI: 62 y o male presenting for urbano visit s/p hospitalization for right kiney stone in ureter  Pt had a stent put in and stone was removed  He now has ruq pain , epigastric pain and bloating that has been going on for mos  CT of abdomen did not show gallstones, but pt states that about 1/2 hr after eating he has bloating, increased belching and discomfort  He also had LVH on ekg done at both work and at hospital   He has had elevated bp (diastolic 90 for past few mos)   His K+ was low in hospital       SUBJECTIVE    Family History   Problem Relation Age of Onset    Cancer Mother     Stroke Mother     Colon cancer Father     Colon polyps Father     Valvular heart disease Father     Cancer Sister     Suicidality Brother      Social History     Socioeconomic History    Marital status: /Civil Union     Spouse name: Not on file    Number of children: Not on file    Years of education: Not on file    Highest education level: Not on file   Occupational History    Not on file   Social Needs    Financial resource strain: Not on file    Food insecurity:     Worry: Not on file     Inability: Not on file    Transportation needs:     Medical: Not on file     Non-medical: Not on file   Tobacco Use    Smoking status: Never Smoker    Smokeless tobacco: Never Used   Substance and Sexual Activity    Alcohol use: Yes     Comment: social    Drug use: No    Sexual activity: Not on file   Lifestyle    Physical activity:     Days per week: Not on file     Minutes per session: Not on file    Stress: Not on file   Relationships    Social connections:     Talks on phone: Not on file     Gets together: Not on file     Attends Confucianism service: Not on file     Active member of club or organization: Not on file     Attends meetings of clubs or organizations: Not on file     Relationship status: Not on file    Intimate partner violence:     Fear of current or ex partner: Not on file     Emotionally abused: Not on file     Physically abused: Not on file     Forced sexual activity: Not on file   Other Topics Concern    Not on file   Social History Narrative    Not on file     Past Medical History:   Diagnosis Date    Anxiety     Chronic urticaria     Depression     Hashimoto's disease     Hashimoto's thyroiditis     Renal stones      Past Surgical History:   Procedure Laterality Date    FL RETROGRADE PYELOGRAM  7/28/2019    NASAL SEPTUM SURGERY      [place] CYSTO/URETERO W/LITHOTRIPSY &INDWELL STENT INSRT Right 7/28/2019    Procedure: CYSTOSCOPY URETEROSCOPY WITH LITHOTRIPSY HOLMIUM LASER, basket stone extraction, RETROGRADE PYELOGRAM AND INSERTION STENT URETERAL;  Surgeon: Ki Campuzano MD;  [facility];  Service: Urology    TONSILLECTOMY  1979     Allergies   Allergen Reactions    Compazine [Prochlorperazine] GI Intolerance    Sulfa Antibiotics        Current Outpatient Medications:     cetirizine (ZyrTEC) 10 MG chewable tablet, Chew 10 mg daily, Disp: , Rfl:     clonazePAM (KlonoPIN) 1 mg tablet, Take 0 5 tablets (0 5 mg total) by mouth daily (Patient taking differently: Take 0 5 mg by mouth daily at bedtime ), Disp: 30 tablet, Rfl: 3    Coenzyme Q10 (COQ10) 200 MG CAPS, Take by mouth, Disp: , Rfl:     DULoxetine (CYMBALTA) 60 mg delayed release capsule, TAKE ONE CAPSULE BY MOUTH EVERY MORNING (Patient taking differently: 60 mg daily at bedtime ), Disp: 30 capsule, Rfl: 3    levothyroxine 75 mcg tablet, TAKE ONE TABLET BY MOUTH EVERY DAY (Patient taking differently: 75 mcg daily in the early morning ), Disp: 30 tablet, Rfl: 3    ondansetron (ZOFRAN-ODT) 4 mg disintegrating tablet, Take 1 tablet (4 mg total) by mouth every 6 (six) hours as needed for nausea or vomiting, Disp: 20 tablet, Rfl: 0    oxybutynin (DITROPAN) 5 mg tablet, Take 1 tablet (5 mg total) by mouth 3 (three) times a day, Disp: 12 tablet, Rfl: 0    tamsulosin (FLOMAX) 0 4 mg, Take 1 capsule (0 4 mg total) by mouth daily with dinner, Disp: 30 capsule, Rfl: 0    Review of Systems  Constitutional:     Denies fever, chills ,fatigue ,weakness ,weight loss, weight gain     ENT: Denies earache ,loss of hearing ,nosebleed, nasal discharge,nasal congestion ,sore throat ,hoarseness  Pulmonary: Denies shortness of breath ,cough  ,dyspnea on exertion, orthopnea  ,PND   Cardiovascular:  Denies bradycardia , tachycardia  ,palpations, lower extremity edema leg, claudication  Breast:  Denies new or changing breast lumps ,nipple discharge ,nipple changes  Abdomen:  Right upper quadrant abdominal pain , anorexia , indigestion, nausea, vomiting, constipation, diarrhea + belching frequently  Musculoskeletal: Denies myalgias, arthralgias, joint swelling, joint stiffness , limb pain, limb swelling;  Gu: denies dysuria, polyuria  Skin: Denies skin rash, skin lesion, skin wound, itching, dry skin  Neuro: Denies headache, numbness, tingling, confusion, loss of consciousness, dizziness, vertigo  Psychiatric: Denies feelings of depression, suicidal ideation, anxiety, sleep disturbances    OBJECTIVE  /90   Pulse 78   Temp 98 8 °F (37 1 °C)   Ht 6' (1 829 m)   Wt 78 8 kg (173 lb 12 8 oz)   BMI 23 57 kg/m²   Constitutional:   NAD, well appearing and well nourished      ENT:   Conjunctiva and lids: no injection, edema, or discharge     Pupils and iris: SULMA bilaterally    External inspection of ears and nose: normal without deformities or discharge  Otoscopic exam: Canals patent without erythema  Nasal mucosa, septum and turbinates: Normal or edema or discharge         Oropharynx:  Moist mucosa, normal tongue and tonsils without lesions  No erythema        Pulmonary:Respiratory effort normal rate and rhythm, no increased work of breathing   Auscultation of lungs:  Clear bilaterally with no adventitious breath sounds       Cardiovascular: regular rate and rhythm, S1 and S2, no murmur, no edema and/or varicosities of LE     Abdomen: Soft and non-distended  + epigastric tenderness on palpation   Positive bowel sounds      No heptomegaly or splenomegaly      Gu: no suprapubic tenderness or CVA tenderness, no urethral discharge  Lymphatic:  No anterior or posterior cervical lymphadenopathy         Musculoskeletal:  Gait and station: Normal gait      Digits and nails normal without clubbing or cyanosis       Inspection/palpation of joints, bones, and muscles:  No joint tenderness, swelling, full active and passive range of motion       Skin: Normal skin turgor and no rashes      Neuro:    Normal reflexes     Psych:   alert and oriented to person, place and time     normal mood and affect       Assessment/Plan:Diagnoses and all orders for this visit:    Hypokalemia  -     Magnesium; Future  -     Potassium; Future    Hypothyroidism, unspecified type  -     TSH, 3rd generation; Future    Essential hypertension  -     lisinopril (ZESTRIL) 5 mg tablet; Take 1 tablet (5 mg total) by mouth daily for 30 days  -     Echo stress test w contrast if indicated; Future    Multiple thyroid nodules  -     US thyroid; Future    Bloating  -     US gallbladder; Future    Epigastric pain  -     pantoprazole (PROTONIX) 40 mg tablet; Take 1 tablet (40 mg total) by mouth daily before breakfast    LVH (left ventricular hypertrophy)  -     Echo stress test w contrast if indicated; Future        Reviewed with patient plan to treat with above plan      Patient instructed to call in 72 hours if not feeling better or if symptoms worsen

## 2019-08-07 ENCOUNTER — APPOINTMENT (OUTPATIENT)
Dept: LAB | Facility: CLINIC | Age: 59
End: 2019-08-07
Payer: COMMERCIAL

## 2019-08-07 DIAGNOSIS — E03.9 HYPOTHYROIDISM, UNSPECIFIED TYPE: ICD-10-CM

## 2019-08-07 DIAGNOSIS — E87.6 HYPOKALEMIA: ICD-10-CM

## 2019-08-07 LAB
MAGNESIUM SERPL-MCNC: 2.1 MG/DL (ref 1.6–2.6)
POTASSIUM SERPL-SCNC: 4 MMOL/L (ref 3.5–5.3)
TSH SERPL DL<=0.05 MIU/L-ACNC: 2.32 UIU/ML (ref 0.36–3.74)

## 2019-08-07 PROCEDURE — 83735 ASSAY OF MAGNESIUM: CPT

## 2019-08-07 PROCEDURE — 36415 COLL VENOUS BLD VENIPUNCTURE: CPT

## 2019-08-07 PROCEDURE — 84132 ASSAY OF SERUM POTASSIUM: CPT

## 2019-08-07 PROCEDURE — 84443 ASSAY THYROID STIM HORMONE: CPT

## 2019-08-10 DIAGNOSIS — G47.00 INSOMNIA, UNSPECIFIED TYPE: ICD-10-CM

## 2019-08-12 ENCOUNTER — TELEPHONE (OUTPATIENT)
Dept: FAMILY MEDICINE CLINIC | Facility: CLINIC | Age: 59
End: 2019-08-12

## 2019-08-12 RX ORDER — CLONAZEPAM 1 MG/1
0.5 TABLET ORAL DAILY
Qty: 30 TABLET | Refills: 0 | Status: SHIPPED | OUTPATIENT
Start: 2019-08-12 | End: 2019-10-06 | Stop reason: SDUPTHER

## 2019-08-14 ENCOUNTER — HOSPITAL ENCOUNTER (OUTPATIENT)
Dept: ULTRASOUND IMAGING | Facility: HOSPITAL | Age: 59
Discharge: HOME | End: 2019-08-14
Payer: COMMERCIAL

## 2019-08-14 DIAGNOSIS — E04.2 MULTIPLE THYROID NODULES: ICD-10-CM

## 2019-08-14 DIAGNOSIS — R14.0 BLOATING: ICD-10-CM

## 2019-08-14 PROCEDURE — 76705 ECHO EXAM OF ABDOMEN: CPT

## 2019-08-14 PROCEDURE — 76536 US EXAM OF HEAD AND NECK: CPT

## 2019-08-16 ENCOUNTER — TELEPHONE (OUTPATIENT)
Dept: FAMILY MEDICINE CLINIC | Facility: CLINIC | Age: 59
End: 2019-08-16

## 2019-08-16 ENCOUNTER — DOCUMENTATION (OUTPATIENT)
Dept: FAMILY MEDICINE CLINIC | Facility: CLINIC | Age: 59
End: 2019-08-16

## 2019-08-16 NOTE — TELEPHONE ENCOUNTER
Please call patient back  Results are back:    US Gallbladder:  - Right-side kidney stones without obstruction, otherwise normal ultrasound  US Thyroid:  - Heterogeneous hypervascular thyroid gland in keeping with history of thyroiditis  No discrete nodules are detected  If there is any paperwork or follow up that needs to be addressed, Dr Jaswant Paulson will be back on Monday

## 2019-08-16 NOTE — PROGRESS NOTES
Pt called back for results  They were given to him  He will f/u on Monday for further instructions &/ or paperwork

## 2019-08-16 NOTE — TELEPHONE ENCOUNTER
Call patient - let him know we called and they will read results today  We'll let him know when we have results

## 2019-08-16 NOTE — TELEPHONE ENCOUNTER
Patient called asking for his results of ultrasounds that were done on Weds   He is worried because today is his last day of short term disability and they were waiting on these results    can you call the hosp and get them read faster? ?      Please call the patient either way

## 2019-08-19 NOTE — TELEPHONE ENCOUNTER
Please call pt with his results  This is my first day back from vacation  When does he want to go back to work and is there a form up front?

## 2019-08-19 NOTE — TELEPHONE ENCOUNTER
Pt aware of results and wanted to schedule an appoint with Dr Anushka Taylor ,Pt was transferred to [facility] to schedule appoint

## 2019-08-21 ENCOUNTER — TELEPHONE (OUTPATIENT)
Dept: FAMILY MEDICINE CLINIC | Facility: CLINIC | Age: 59
End: 2019-08-21

## 2019-08-21 ENCOUNTER — OFFICE VISIT (OUTPATIENT)
Dept: FAMILY MEDICINE CLINIC | Facility: CLINIC | Age: 59
End: 2019-08-21
Payer: COMMERCIAL

## 2019-08-21 VITALS
DIASTOLIC BLOOD PRESSURE: 96 MMHG | SYSTOLIC BLOOD PRESSURE: 140 MMHG | BODY MASS INDEX: 23.43 KG/M2 | HEIGHT: 72 IN | WEIGHT: 173 LBS | HEART RATE: 80 BPM | TEMPERATURE: 98.1 F

## 2019-08-21 DIAGNOSIS — K31.84 GASTROPARESIS: ICD-10-CM

## 2019-08-21 DIAGNOSIS — R14.0 BLOATING: Primary | ICD-10-CM

## 2019-08-21 PROCEDURE — 3008F BODY MASS INDEX DOCD: CPT | Performed by: FAMILY MEDICINE

## 2019-08-21 PROCEDURE — 99213 OFFICE O/P EST LOW 20 MIN: CPT | Performed by: FAMILY MEDICINE

## 2019-08-21 PROCEDURE — 1036F TOBACCO NON-USER: CPT | Performed by: FAMILY MEDICINE

## 2019-08-21 RX ORDER — METOCLOPRAMIDE 5 MG/1
TABLET ORAL
Qty: 120 TABLET | Refills: 3 | Status: SHIPPED | OUTPATIENT
Start: 2019-08-21 | End: 2019-08-21

## 2019-08-21 RX ORDER — METOCLOPRAMIDE 5 MG/1
TABLET ORAL
Qty: 120 TABLET | Refills: 3
Start: 2019-08-21 | End: 2019-12-23 | Stop reason: SDUPTHER

## 2019-08-21 NOTE — PROGRESS NOTES
Patient ID: Fiona Camargo is a 61 y o  male  HPI: 61 y o male presenting with ongoing epigastric pain, bloating  His ultrasound of the gallbladder was negative for gallstones, but symptoms persist   He was also diagnosed with gastroparesis via a gastric transit study at [facility]  He has not been on meds in years  He is scheduled for a stress echo in 1 week        SUBJECTIVE    Family History   Problem Relation Age of Onset    Cancer Mother     Stroke Mother     Colon cancer Father     Colon polyps Father     Valvular heart disease Father     Cancer Sister     Suicidality Brother      Social History     Socioeconomic History    Marital status: /Civil Union     Spouse name: Not on file    Number of children: Not on file    Years of education: Not on file    Highest education level: Not on file   Occupational History    Not on file   Social Needs    Financial resource strain: Not on file    Food insecurity:     Worry: Not on file     Inability: Not on file    Transportation needs:     Medical: Not on file     Non-medical: Not on file   Tobacco Use    Smoking status: Never Smoker    Smokeless tobacco: Never Used   Substance and Sexual Activity    Alcohol use: Yes     Comment: social    Drug use: No    Sexual activity: Not on file   Lifestyle    Physical activity:     Days per week: Not on file     Minutes per session: Not on file    Stress: Not on file   Relationships    Social connections:     Talks on phone: Not on file     Gets together: Not on file     Attends Latter day service: Not on file     Active member of club or organization: Not on file     Attends meetings of clubs or organizations: Not on file     Relationship status: Not on file    Intimate partner violence:     Fear of current or ex partner: Not on file     Emotionally abused: Not on file     Physically abused: Not on file     Forced sexual activity: Not on file   Other Topics Concern    Not on file   Social History Narrative    Not on file     Past Medical History:   Diagnosis Date    Anxiety     Chronic urticaria     Depression     Hashimoto's disease     Hashimoto's thyroiditis     Renal stones      Past Surgical History:   Procedure Laterality Date    FL RETROGRADE PYELOGRAM  7/28/2019    NASAL SEPTUM SURGERY      [place] CYSTO/URETERO W/LITHOTRIPSY &INDWELL STENT INSRT Right 7/28/2019    Procedure: CYSTOSCOPY URETEROSCOPY WITH LITHOTRIPSY HOLMIUM LASER, basket stone extraction, RETROGRADE PYELOGRAM AND INSERTION STENT URETERAL;  Surgeon: Ryne Huynh MD;  [facility];  Service: Urology    TONSILLECTOMY  1979     Allergies   Allergen Reactions    Compazine [Prochlorperazine] GI Intolerance    Sulfa Antibiotics        Current Outpatient Medications:     cetirizine (ZyrTEC) 10 MG chewable tablet, Chew 10 mg daily, Disp: , Rfl:     clonazePAM (KlonoPIN) 1 mg tablet, Take 0 5 tablets (0 5 mg total) by mouth daily, Disp: 30 tablet, Rfl: 0    Coenzyme Q10 (COQ10) 200 MG CAPS, Take by mouth, Disp: , Rfl:     DULoxetine (CYMBALTA) 60 mg delayed release capsule, TAKE ONE CAPSULE BY MOUTH EVERY MORNING (Patient taking differently: 60 mg daily at bedtime ), Disp: 30 capsule, Rfl: 3    levothyroxine 75 mcg tablet, TAKE ONE TABLET BY MOUTH EVERY DAY (Patient taking differently: 75 mcg daily in the early morning ), Disp: 30 tablet, Rfl: 3    lisinopril (ZESTRIL) 5 mg tablet, Take 1 tablet (5 mg total) by mouth daily for 30 days, Disp: 30 tablet, Rfl: 3    ondansetron (ZOFRAN-ODT) 4 mg disintegrating tablet, Take 1 tablet (4 mg total) by mouth every 6 (six) hours as needed for nausea or vomiting, Disp: 20 tablet, Rfl: 0    pantoprazole (PROTONIX) 40 mg tablet, Take 1 tablet (40 mg total) by mouth daily before breakfast, Disp: 30 tablet, Rfl: 5    metoclopramide (REGLAN) 5 mg tablet, 1 tab prior to each meal and at bedtime, Disp: 120 tablet, Rfl: 3    Review of Systems  Constitutional:     Denies fever, chills ,fatigue ,weakness ,weight loss, weight gain     ENT: Denies earache ,loss of hearing ,nosebleed, nasal discharge,nasal congestion ,sore throat ,hoarseness  Pulmonary: Denies shortness of breath ,cough  ,dyspnea on exertion, orthopnea  ,PND   Cardiovascular:  Denies bradycardia , tachycardia  ,palpations, lower extremity edema leg, claudication  Breast:  Denies new or changing breast lumps ,nipple discharge ,nipple changes  Abdomen:  Denies anorexia , indigestion, nausea, vomiting, constipation, diarrhea + bloating and epigastric pain  Musculoskeletal: Denies myalgias, arthralgias, joint swelling, joint stiffness , limb pain, limb swelling  Gu: denies dysuria, polyuria  Skin: Denies skin rash, skin lesion, skin wound, itching, dry skin  Neuro: Denies headache, numbness, tingling, confusion, loss of consciousness, dizziness, vertigo  Psychiatric: Denies feelings of depression, suicidal ideation, anxiety, sleep disturbances    OBJECTIVE  /96   Pulse 80   Temp 98 1 °F (36 7 °C)   Ht 6' (1 829 m)   Wt 78 5 kg (173 lb)   BMI 23 46 kg/m²   Constitutional:   NAD, well appearing and well nourished      ENT:   Conjunctiva and lids: no injection, edema, or discharge     Pupils and iris: SULMA bilaterally    External inspection of ears and nose: normal without deformities or discharge  Otoscopic exam: Canals patent without erythema  Nasal mucosa, septum and turbinates: Normal or edema or discharge         Oropharynx:  Moist mucosa, normal tongue and tonsils without lesions  No erythema        Pulmonary:Respiratory effort normal rate and rhythm, no increased work of breathing   Auscultation of lungs:  Clear bilaterally with no adventitious breath sounds       Cardiovascular: regular rate and rhythm, S1 and S2, no murmur, no edema and/or varicosities of LE      Abdomen: Soft and non-distended; non palpatble epigastric tenderness on exam      Positive bowel sounds      No heptomegaly or splenomegaly      Gu: no suprapubic tenderness or CVA tenderness, no urethral discharge  Lymphatic:  No anterior or posterior cervical lymphadenopathy         Musculoskeletal:  Gait and station: Normal gait      Digits and nails normal without clubbing or cyanosis       Inspection/palpation of joints, bones, and muscles:  No joint tenderness, swelling, full active and passive range of motion       Skin: Normal skin turgor and no rashes      Neuro:       Normal reflexes     Psych:   alert and oriented to person, place and time     normal mood and affect       Assessment/Plan:Diagnoses and all orders for this visit:    Bloating  -     NM hepatobiliary w rx; Future  -     Discontinue: metoclopramide (REGLAN) 5 mg tablet; 1 tab prior to each meal and at bedtime    Gastroparesis  -     metoclopramide (REGLAN) 5 mg tablet; 1 tab prior to each meal and at bedtime        Reviewed with patient plan to treat with above plan     Patient instructed to call in 72 hours if not feeling better or if symptoms worsen

## 2019-08-27 ENCOUNTER — HOSPITAL ENCOUNTER (OUTPATIENT)
Dept: NUCLEAR MEDICINE | Facility: HOSPITAL | Age: 59
Discharge: HOME | End: 2019-08-27
Payer: COMMERCIAL

## 2019-08-27 DIAGNOSIS — R14.0 BLOATING: ICD-10-CM

## 2019-08-27 PROCEDURE — 78227 HEPATOBIL SYST IMAGE W/DRUG: CPT

## 2019-08-27 PROCEDURE — A9537 TC99M MEBROFENIN: HCPCS

## 2019-08-27 RX ADMIN — SINCALIDE 1.6 MCG: 5 INJECTION, POWDER, LYOPHILIZED, FOR SOLUTION INTRAVENOUS at 08:54

## 2019-09-06 ENCOUNTER — HOSPITAL ENCOUNTER (OUTPATIENT)
Dept: NON INVASIVE DIAGNOSTICS | Facility: CLINIC | Age: 59
Discharge: HOME | End: 2019-09-06
Payer: COMMERCIAL

## 2019-09-06 DIAGNOSIS — I10 ESSENTIAL HYPERTENSION: ICD-10-CM

## 2019-09-06 DIAGNOSIS — I51.7 LVH (LEFT VENTRICULAR HYPERTROPHY): ICD-10-CM

## 2019-09-06 PROCEDURE — 93350 STRESS TTE ONLY: CPT

## 2019-09-06 PROCEDURE — 93351 STRESS TTE COMPLETE: CPT | Performed by: INTERNAL MEDICINE

## 2019-09-09 ENCOUNTER — TELEPHONE (OUTPATIENT)
Dept: FAMILY MEDICINE CLINIC | Facility: CLINIC | Age: 59
End: 2019-09-09

## 2019-09-09 LAB
ARRHY DURING EX: NORMAL
CHEST PAIN STATEMENT: NORMAL
MAX DIASTOLIC BP: 100 MMHG
MAX HEART RATE: 173 BPM
MAX PREDICTED HEART RATE: 161 BPM
MAX. SYSTOLIC BP: 212 MMHG
PROTOCOL NAME: NORMAL
REASON FOR TERMINATION: NORMAL
TARGET HR FORMULA: NORMAL
TEST INDICATION: NORMAL
TIME IN EXERCISE PHASE: NORMAL

## 2019-09-09 NOTE — TELEPHONE ENCOUNTER
Patient called stating he would like to see Dr Alvarado THIS WEEK to review his imaging results  What day and time would work for you?

## 2019-09-17 ENCOUNTER — OFFICE VISIT (OUTPATIENT)
Dept: FAMILY MEDICINE CLINIC | Facility: CLINIC | Age: 59
End: 2019-09-17
Payer: COMMERCIAL

## 2019-09-17 VITALS
HEART RATE: 76 BPM | SYSTOLIC BLOOD PRESSURE: 130 MMHG | TEMPERATURE: 97.8 F | DIASTOLIC BLOOD PRESSURE: 82 MMHG | BODY MASS INDEX: 23.4 KG/M2 | HEIGHT: 72 IN | WEIGHT: 172.8 LBS

## 2019-09-17 DIAGNOSIS — R10.9 ABDOMINAL PAIN, UNSPECIFIED ABDOMINAL LOCATION: Primary | ICD-10-CM

## 2019-09-17 PROCEDURE — 3008F BODY MASS INDEX DOCD: CPT | Performed by: FAMILY MEDICINE

## 2019-09-17 PROCEDURE — 99212 OFFICE O/P EST SF 10 MIN: CPT | Performed by: FAMILY MEDICINE

## 2019-09-17 NOTE — LETTER
September 17, 2019     Patient: Salvatore Holt III   YOB: 1960   Date of Visit: 9/17/2019       To Whom it May Concern:    Esau Bradley is under my professional care  He was seen in my office on 9/17/2019  He may return to work on 9/23/19 with no restrictions  If you have any questions or concerns, please don't hesitate to call           Sincerely,          Darrell Hodge DO        CC: No Recipients

## 2019-09-17 NOTE — PROGRESS NOTES
Patient ID: Nan Akbar is a 61 y o  male  HPI: 61 y o male presents to review results of his stress echo and discida scan  Both were normal   Pt states he occasionally feels "twinges" of abdominal pain when eating high-fat content foods, but other than that is feeling well  He is ready to return to work         SUBJECTIVE    Family History   Problem Relation Age of Onset    Cancer Mother     Stroke Mother     Colon cancer Father     Colon polyps Father     Valvular heart disease Father     Cancer Sister     Suicidality Brother      Social History     Socioeconomic History    Marital status: /Civil Union     Spouse name: Not on file    Number of children: Not on file    Years of education: Not on file    Highest education level: Not on file   Occupational History    Not on file   Social Needs    Financial resource strain: Not on file    Food insecurity:     Worry: Not on file     Inability: Not on file    Transportation needs:     Medical: Not on file     Non-medical: Not on file   Tobacco Use    Smoking status: Never Smoker    Smokeless tobacco: Never Used   Substance and Sexual Activity    Alcohol use: Yes     Comment: social    Drug use: No    Sexual activity: Not on file   Lifestyle    Physical activity:     Days per week: Not on file     Minutes per session: Not on file    Stress: Not on file   Relationships    Social connections:     Talks on phone: Not on file     Gets together: Not on file     Attends Spiritism service: Not on file     Active member of club or organization: Not on file     Attends meetings of clubs or organizations: Not on file     Relationship status: Not on file    Intimate partner violence:     Fear of current or ex partner: Not on file     Emotionally abused: Not on file     Physically abused: Not on file     Forced sexual activity: Not on file   Other Topics Concern    Not on file   Social History Narrative    Not on file     Past Medical History:   Diagnosis Date    Anxiety     Chronic urticaria     Depression     Hashimoto's disease     Hashimoto's thyroiditis     Renal stones      Past Surgical History:   Procedure Laterality Date    FL RETROGRADE PYELOGRAM  7/28/2019    NASAL SEPTUM SURGERY      SC CYSTO/URETERO W/LITHOTRIPSY &INDWELL STENT INSRT Right 7/28/2019    Procedure: CYSTOSCOPY URETEROSCOPY WITH LITHOTRIPSY HOLMIUM LASER, basket stone extraction, RETROGRADE PYELOGRAM AND INSERTION STENT URETERAL;  Surgeon: Jovana Urbina MD;  [facility];  Service: Urology    TONSILLECTOMY  1979     Allergies   Allergen Reactions    Compazine [Prochlorperazine] GI Intolerance    Sulfa Antibiotics        Current Outpatient Medications:     cetirizine (ZyrTEC) 10 MG chewable tablet, Chew 10 mg daily, Disp: , Rfl:     clonazePAM (KlonoPIN) 1 mg tablet, Take 0 5 tablets (0 5 mg total) by mouth daily, Disp: 30 tablet, Rfl: 0    Coenzyme Q10 (COQ10) 200 MG CAPS, Take by mouth, Disp: , Rfl:     DULoxetine (CYMBALTA) 60 mg delayed release capsule, TAKE ONE CAPSULE BY MOUTH EVERY MORNING (Patient taking differently: 60 mg daily at bedtime ), Disp: 30 capsule, Rfl: 3    levothyroxine 75 mcg tablet, TAKE ONE TABLET BY MOUTH EVERY DAY (Patient taking differently: 75 mcg daily in the early morning ), Disp: 30 tablet, Rfl: 3    lisinopril (ZESTRIL) 5 mg tablet, Take 1 tablet (5 mg total) by mouth daily for 30 days, Disp: 30 tablet, Rfl: 3    metoclopramide (REGLAN) 5 mg tablet, 1 tab prior to each meal and at bedtime, Disp: 120 tablet, Rfl: 3    ondansetron (ZOFRAN-ODT) 4 mg disintegrating tablet, Take 1 tablet (4 mg total) by mouth every 6 (six) hours as needed for nausea or vomiting, Disp: 20 tablet, Rfl: 0    pantoprazole (PROTONIX) 40 mg tablet, Take 1 tablet (40 mg total) by mouth daily before breakfast, Disp: 30 tablet, Rfl: 5    Review of Systems  Constitutional:     Denies fever, chills ,fatigue ,weakness ,weight loss, weight gain     ENT: Denies earache ,loss of hearing ,nosebleed, nasal discharge,nasal congestion ,sore throat ,hoarseness  Pulmonary: Denies shortness of breath ,cough  ,dyspnea on exertion, orthopnea  ,PND   Cardiovascular:  Denies bradycardia , tachycardia  ,palpations, lower extremity edema leg, claudication  Breast:  Denies new or changing breast lumps ,nipple discharge ,nipple changes  Abdomen:  Denies abdominal pain , anorexia , indigestion, nausea, vomiting, constipation, diarrhea  Musculoskeletal: Denies myalgias, arthralgias, joint swelling, joint stiffness , limb pain, limb swelling  Gu: denies dysuria, polyuria  Skin: Denies skin rash, skin lesion, skin wound, itching, dry skin  Neuro: Denies headache, numbness, tingling, confusion, loss of consciousness, dizziness, vertigo  Psychiatric: Denies feelings of depression, suicidal ideation, anxiety, sleep disturbances    OBJECTIVE  /82 (BP Location: Right arm, Patient Position: Sitting, Cuff Size: Standard)   Pulse 76   Temp 97 8 °F (36 6 °C)   Ht 6' (1 829 m)   Wt 78 4 kg (172 lb 12 8 oz)   BMI 23 44 kg/m²   Constitutional:   NAD, well appearing and well nourished      ENT:   Conjunctiva and lids: no injection, edema, or discharge     Pupils and iris: SULMA bilaterally    External inspection of ears and nose: normal without deformities or discharge  Otoscopic exam: Canals patent without erythema  Nasal mucosa, septum and turbinates: Normal or edema or discharge         Oropharynx:  Moist mucosa, normal tongue and tonsils without lesions  No erythema        Pulmonary:Respiratory effort normal rate and rhythm, no increased work of breathing   Auscultation of lungs:  Clear bilaterally with no adventitious breath sounds       Cardiovascular: regular rate and rhythm, S1 and S2, no murmur, no edema and/or varicosities of LE      Abdomen: Soft and non-distended     Positive bowel sounds      No heptomegaly or splenomegaly      Gu: no suprapubic tenderness or CVA tenderness, no urethral discharge  Lymphatic:  No anterior or posterior cervical lymphadenopathy         Musculoskeletal:  Gait and station: Normal gait      Digits and nails normal without clubbing or cyanosis       Inspection/palpation of joints, bones, and muscles:  No joint tenderness, swelling, full active and passive range of motion       Skin: Normal skin turgor and no rashes      Neuro:     Normal reflexes     Psych:   alert and oriented to person, place and time     normal mood and affect       Assessment/Plan:Diagnoses and all orders for this visit:    Abdominal pain, unspecified abdominal location  Comments:  Pt told he may have some irritable bowel, so encouraged to continue with metamucil, increase fluids and exercise  He is to call with any further problems

## 2019-09-30 ENCOUNTER — TELEPHONE (OUTPATIENT)
Dept: FAMILY MEDICINE CLINIC | Facility: CLINIC | Age: 59
End: 2019-09-30

## 2019-09-30 DIAGNOSIS — F32.A DEPRESSION, UNSPECIFIED DEPRESSION TYPE: ICD-10-CM

## 2019-09-30 RX ORDER — DULOXETIN HYDROCHLORIDE 60 MG/1
CAPSULE, DELAYED RELEASE ORAL
Qty: 30 CAPSULE | Refills: 3 | Status: SHIPPED | OUTPATIENT
Start: 2019-09-30 | End: 2020-01-20

## 2019-09-30 NOTE — TELEPHONE ENCOUNTER
Needs something with our ltr head , listing all his diagnoses  Anxiety, clinical sev depression, abdominal pain, he fill out the form for a medical [facility] card ID # [id number], the ctr is requesting this  You suggested this for him  Fax to : [facility]   fx [phone]  Phone [phone]    Aware of the 2 wk wait time

## 2019-10-06 DIAGNOSIS — G47.00 INSOMNIA, UNSPECIFIED TYPE: ICD-10-CM

## 2019-10-07 RX ORDER — CLONAZEPAM 1 MG/1
TABLET ORAL
Qty: 30 TABLET | Refills: 0 | Status: SHIPPED | OUTPATIENT
Start: 2019-10-07 | End: 2019-12-02 | Stop reason: SDUPTHER

## 2019-10-17 DIAGNOSIS — E03.9 HYPOTHYROIDISM, UNSPECIFIED TYPE: ICD-10-CM

## 2019-10-17 RX ORDER — LEVOTHYROXINE SODIUM 0.07 MG/1
TABLET ORAL
Qty: 30 TABLET | Refills: 3 | Status: SHIPPED | OUTPATIENT
Start: 2019-10-17 | End: 2020-02-17

## 2019-10-18 DIAGNOSIS — E03.9 HYPOTHYROIDISM, UNSPECIFIED TYPE: ICD-10-CM

## 2019-10-18 RX ORDER — LEVOTHYROXINE SODIUM 0.07 MG/1
TABLET ORAL
Qty: 30 TABLET | Refills: 3 | OUTPATIENT
Start: 2019-10-18

## 2019-10-28 ENCOUNTER — HOSPITAL ENCOUNTER (OUTPATIENT)
Dept: ULTRASOUND IMAGING | Facility: HOSPITAL | Age: 59
Discharge: HOME | End: 2019-10-28
Attending: UROLOGY
Payer: COMMERCIAL

## 2019-10-28 DIAGNOSIS — N20.0 RENAL STONES: ICD-10-CM

## 2019-10-28 PROCEDURE — 76770 US EXAM ABDO BACK WALL COMP: CPT

## 2019-11-04 ENCOUNTER — TELEPHONE (OUTPATIENT)
Dept: FAMILY MEDICINE CLINIC | Facility: CLINIC | Age: 59
End: 2019-11-04

## 2019-11-04 DIAGNOSIS — K82.8 DYSFUNCTIONAL GALLBLADDER: Primary | ICD-10-CM
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
## 2019-11-04 NOTE — TELEPHONE ENCOUNTER
Patients wife would like you to call her back regarding the side effects of his overactive gallbladder  Please call her back when you can

## 2019-11-06 ENCOUNTER — OFFICE VISIT (OUTPATIENT)
Dept: UROLOGY | Facility: CLINIC | Age: 59
End: 2019-11-06
Payer: COMMERCIAL

## 2019-11-06 VITALS
HEIGHT: 72 IN | SYSTOLIC BLOOD PRESSURE: 145 MMHG | WEIGHT: 183 LBS | HEART RATE: 71 BPM | DIASTOLIC BLOOD PRESSURE: 90 MMHG | BODY MASS INDEX: 24.79 KG/M2

## 2019-11-06 DIAGNOSIS — N20.0 NEPHROLITHIASIS: Primary | ICD-10-CM

## 2019-11-06 PROCEDURE — 99213 OFFICE O/P EST LOW 20 MIN: CPT | Performed by: PHYSICIAN ASSISTANT

## 2019-11-18 ENCOUNTER — TELEPHONE (OUTPATIENT)
Dept: FAMILY MEDICINE CLINIC | Facility: CLINIC | Age: 59
End: 2019-11-18

## 2019-11-18 PROBLEM — K31.84 GASTROPARESIS: Status: ACTIVE | Noted: 2019-11-18

## 2019-11-18 NOTE — TELEPHONE ENCOUNTER
His wife is asking that only Dr Vinita Essex call her   She is upset because they went to see Dr Jd Dumont 15 mins ago and he said he's doesn't know why Dr Vinita Essex sent Oseas Malin there and that he needs to see GI   Dr    She explained that he has had all the GI testing already         They are confused and upset because this appoint was a waste of time       Please call her

## 2019-11-19 DIAGNOSIS — R10.84 ABDOMINAL CRAMPING, GENERALIZED: Primary | ICD-10-CM

## 2019-11-24 DIAGNOSIS — I10 ESSENTIAL HYPERTENSION: ICD-10-CM

## 2019-11-25 RX ORDER — LISINOPRIL 5 MG/1
TABLET ORAL
Qty: 30 TABLET | Refills: 3 | Status: SHIPPED | OUTPATIENT
Start: 2019-11-25 | End: 2020-03-25

## 2019-12-02 DIAGNOSIS — G47.00 INSOMNIA, UNSPECIFIED TYPE: ICD-10-CM

## 2019-12-02 RX ORDER — CLONAZEPAM 1 MG/1
TABLET ORAL
Qty: 30 TABLET | Refills: 0 | Status: SHIPPED | OUTPATIENT
Start: 2019-12-02 | End: 2020-04-02 | Stop reason: ALTCHOICE

## 2019-12-04 ENCOUNTER — TELEPHONE (OUTPATIENT)
Dept: FAMILY MEDICINE CLINIC | Facility: CLINIC | Age: 59
End: 2019-12-04

## 2019-12-09 NOTE — TELEPHONE ENCOUNTER
After discussing with Dr Yonathan Euceda, I spoke with Mr Shahab Torres and offered two options regarding his somach pain and normal test results  1   We can do an on-line referral to the [address] [place] [address]   2  He can contact his insurance for guidance on other options    Mr Shahab Torres will contact his insurance

## 2019-12-21 DIAGNOSIS — R14.0 BLOATING: ICD-10-CM

## 2019-12-23 RX ORDER — METOCLOPRAMIDE 5 MG/1
TABLET ORAL
Qty: 120 TABLET | Refills: 3 | Status: SHIPPED | OUTPATIENT
Start: 2019-12-23 | End: 2020-03-04 | Stop reason: ALTCHOICE

## 2019-12-26 ENCOUNTER — OFFICE VISIT (OUTPATIENT)
Dept: FAMILY MEDICINE CLINIC | Facility: CLINIC | Age: 59
End: 2019-12-26
Payer: COMMERCIAL

## 2019-12-26 VITALS
DIASTOLIC BLOOD PRESSURE: 100 MMHG | SYSTOLIC BLOOD PRESSURE: 138 MMHG | WEIGHT: 180 LBS | HEART RATE: 74 BPM | TEMPERATURE: 98.5 F | BODY MASS INDEX: 24.38 KG/M2 | HEIGHT: 72 IN

## 2019-12-26 DIAGNOSIS — J01.10 ACUTE NON-RECURRENT FRONTAL SINUSITIS: Primary | ICD-10-CM

## 2019-12-26 PROCEDURE — 3008F BODY MASS INDEX DOCD: CPT | Performed by: FAMILY MEDICINE

## 2019-12-26 PROCEDURE — 99213 OFFICE O/P EST LOW 20 MIN: CPT | Performed by: FAMILY MEDICINE

## 2019-12-26 PROCEDURE — 1036F TOBACCO NON-USER: CPT | Performed by: FAMILY MEDICINE

## 2019-12-26 RX ORDER — AMOXICILLIN AND CLAVULANATE POTASSIUM 875; 125 MG/1; MG/1
1 TABLET, FILM COATED ORAL EVERY 12 HOURS SCHEDULED
Qty: 14 TABLET | Refills: 0 | Status: SHIPPED | OUTPATIENT
Start: 2019-12-26 | End: 2020-01-02

## 2019-12-26 NOTE — PROGRESS NOTES
Smiley Agee III 1960 male MRN: [id number]    Acute Visit    Assessment/Plan   Melchor Smith was seen today for cold like symptoms, sore throat, earache and cough  Diagnoses and all orders for this visit:    Acute non-recurrent frontal sinusitis  -     amoxicillin-clavulanate (AUGMENTIN) 875-125 mg per tablet; Take 1 tablet by mouth every 12 (twelve) hours for 7 days      Brianna Guthrie MD  [address]  12/26/2019      Please be aware that this note contains text that was dictated and there may be errors pertaining to "sound-alike "words during the dictation process  SUBJECTIVE    CC: Cold Like Symptoms; Sore Throat; Earache; and Cough    HPI:  Smiley Agee III is a 61 y o  male who presented for an acute visit complaining of 5 days of symptoms  Reports nasal congestion, sinus pressure, fever of 102F on jarvis michelle  Chills  He has been nauseated and had 1 episode of diarrhea today  Denies rash, sore throat, vomiting  Taking OTC medications without much improvement  He had flu shot and reports he had the flu in October  Review of Systems   Constitutional: Positive for chills and fever  Negative for activity change  HENT: Positive for congestion, rhinorrhea and sinus pressure  Negative for ear pain and sore throat  Eyes: Negative for visual disturbance  Respiratory: Negative for cough, shortness of breath and wheezing  Cardiovascular: Negative for chest pain and palpitations  Gastrointestinal: Positive for diarrhea and nausea  Negative for abdominal pain, blood in stool, constipation and vomiting  Genitourinary: Negative for dysuria  Musculoskeletal: Positive for myalgias  Negative for arthralgias  Skin: Negative for rash  Neurological: Negative for dizziness, weakness and headaches  All other systems reviewed and are negative        Medications:   Meds/Allergies   Current Outpatient Medications   Medication Sig Dispense Refill    cetirizine (ZyrTEC) 10 MG chewable tablet Chew 10 mg daily      clonazePAM (KlonoPIN) 1 mg tablet TAKE ONE-HALF TABLET BY MOUTH EVERY DAY 30 tablet 0    Coenzyme Q10 (COQ10) 200 MG CAPS Take by mouth      DULoxetine (CYMBALTA) 60 mg delayed release capsule TAKE ONE CAPSULE BY MOUTH EVERY MORNING 30 capsule 3    hyoscyamine (LEVSIN/SL) 0 125 mg SL tablet Take 1 tablet (0 125 mg total) by mouth every 4 (four) hours as needed for cramping 30 tablet 1    levothyroxine 75 mcg tablet TAKE ONE TABLET BY MOUTH EVERY DAY 30 tablet 3    lisinopril (ZESTRIL) 5 mg tablet TAKE ONE TABLET BY MOUTH EVERY DAY 30 tablet 3    metoclopramide (REGLAN) 5 mg tablet TAKE ONE TABLET BY MOUTH PRIOR TO EACH MEAL AND AT BEDTIME 120 tablet 3    ondansetron (ZOFRAN-ODT) 4 mg disintegrating tablet Take 1 tablet (4 mg total) by mouth every 6 (six) hours as needed for nausea or vomiting 20 tablet 0    pantoprazole (PROTONIX) 40 mg tablet Take 1 tablet (40 mg total) by mouth daily before breakfast 30 tablet 5    amoxicillin-clavulanate (AUGMENTIN) 875-125 mg per tablet Take 1 tablet by mouth every 12 (twelve) hours for 7 days 14 tablet 0     No current facility-administered medications for this visit  Allergies   Allergen Reactions    Compazine [Prochlorperazine] GI Intolerance    Sulfa Antibiotics Rash       OBJECTIVE    Vitals:   Vitals:    12/26/19 0918   BP: 138/100   Pulse: 74   Temp: 98 5 °F (36 9 °C)   Weight: 81 6 kg (180 lb)   Height: 6' (1 829 m)       Physical Exam   Constitutional: He appears well-developed and well-nourished  Non-toxic appearance  He appears ill  No distress  HENT:   Head: Normocephalic and atraumatic  Right Ear: Tympanic membrane and external ear normal  No tenderness  No middle ear effusion  Left Ear: Tympanic membrane and external ear normal  No tenderness  No middle ear effusion  Nose: Mucosal edema and rhinorrhea present  Right sinus exhibits frontal sinus tenderness   Left sinus exhibits frontal sinus tenderness  Mouth/Throat: Uvula is midline, oropharynx is clear and moist and mucous membranes are normal  No oropharyngeal exudate or posterior oropharyngeal erythema  No tonsillar exudate  Eyes: Conjunctivae and EOM are normal    Cardiovascular: Normal rate, regular rhythm, normal heart sounds and intact distal pulses  No murmur heard  Pulmonary/Chest: Effort normal and breath sounds normal  No respiratory distress  He has no rhonchi  He has no rales  Abdominal: Soft  Bowel sounds are normal    Lymphadenopathy:     He has cervical adenopathy  Neurological: He is alert  No cranial nerve deficit  Skin: Capillary refill takes less than 2 seconds  No rash noted  He is not diaphoretic  Psychiatric: He has a normal mood and affect  Nursing note and vitals reviewed

## 2019-12-30 ENCOUNTER — TELEPHONE (OUTPATIENT)
Dept: FAMILY MEDICINE CLINIC | Facility: CLINIC | Age: 59
End: 2019-12-30

## 2019-12-30 DIAGNOSIS — N20.0 NEPHROLITHIASIS: ICD-10-CM

## 2019-12-30 DIAGNOSIS — J06.9 UPPER RESPIRATORY TRACT INFECTION, UNSPECIFIED TYPE: Primary | ICD-10-CM

## 2019-12-30 RX ORDER — ONDANSETRON 4 MG/1
4 TABLET, ORALLY DISINTEGRATING ORAL EVERY 6 HOURS PRN
Qty: 30 TABLET | Refills: 2 | Status: SHIPPED | OUTPATIENT
Start: 2019-12-30 | End: 2020-03-04 | Stop reason: ALTCHOICE

## 2019-12-30 RX ORDER — BROMPHENIRAMINE MALEATE, PSEUDOEPHEDRINE HYDROCHLORIDE, AND DEXTROMETHORPHAN HYDROBROMIDE 2; 30; 10 MG/5ML; MG/5ML; MG/5ML
10 SYRUP ORAL 4 TIMES DAILY PRN
Qty: 180 ML | Refills: 1 | Status: SHIPPED | OUTPATIENT
Start: 2019-12-30 | End: 2020-03-04 | Stop reason: ALTCHOICE

## 2019-12-30 NOTE — TELEPHONE ENCOUNTER
Mrs Luis Fernando Koo contacted [facility] because patient and spouse were not happy with outcome of OV dated 12/26/19  I reviewed chart and called Mrs Dominguez/patient:    Patient was treated with antibiotic and is not better, he is flat on his back        Patient was not offered a cough suppressant, anything to relieve his continued head/chest congestion, nothing for nausea and post nasal drip  He has no fever  He is not sleeping  Please advise

## 2019-12-30 NOTE — TELEPHONE ENCOUNTER
Was seen on 12/26,  He was only given an antibiotic,  He wants meds for goopy cough, he is only taking otc cough med,   wants med for nose congestion and chest congestion,  He is taking Musinex but he needs something stronger,  He wasn't given anything for upset stomach,  He wants the little white tab that goes under his tongue for nausea      [facility]  Will ck with pharm if no cb

## 2019-12-30 NOTE — TELEPHONE ENCOUNTER
Mrs Keshav Elder aware that Dr Constance Bravo called in cough medicine and nausea med  She is also aware if patient is not better within a couple of days, he should call to be re evaluated by Dr Constance Bravo

## 2020-01-12 DIAGNOSIS — R10.13 EPIGASTRIC PAIN: ICD-10-CM

## 2020-01-13 RX ORDER — PANTOPRAZOLE SODIUM 40 MG/1
TABLET, DELAYED RELEASE ORAL
Qty: 30 TABLET | Refills: 0 | Status: SHIPPED | OUTPATIENT
Start: 2020-01-13 | End: 2020-03-17

## 2020-01-19 DIAGNOSIS — F32.A DEPRESSION, UNSPECIFIED DEPRESSION TYPE: ICD-10-CM

## 2020-01-20 RX ORDER — DULOXETIN HYDROCHLORIDE 60 MG/1
CAPSULE, DELAYED RELEASE ORAL
Qty: 30 CAPSULE | Refills: 0 | Status: SHIPPED | OUTPATIENT
Start: 2020-01-20 | End: 2021-04-13 | Stop reason: ALTCHOICE

## 2020-02-15 DIAGNOSIS — E03.9 HYPOTHYROIDISM, UNSPECIFIED TYPE: ICD-10-CM

## 2020-02-17 RX ORDER — LEVOTHYROXINE SODIUM 0.07 MG/1
TABLET ORAL
Qty: 30 TABLET | Refills: 3 | Status: SHIPPED | OUTPATIENT
Start: 2020-02-17 | End: 2020-06-22

## 2020-03-04 ENCOUNTER — OFFICE VISIT (OUTPATIENT)
Dept: FAMILY MEDICINE CLINIC | Facility: CLINIC | Age: 60
End: 2020-03-04
Payer: COMMERCIAL

## 2020-03-04 VITALS
TEMPERATURE: 98.5 F | SYSTOLIC BLOOD PRESSURE: 150 MMHG | DIASTOLIC BLOOD PRESSURE: 94 MMHG | BODY MASS INDEX: 23.43 KG/M2 | HEIGHT: 72 IN | WEIGHT: 173 LBS | HEART RATE: 74 BPM

## 2020-03-04 DIAGNOSIS — R19.4 CHANGE IN BOWEL HABITS: Primary | ICD-10-CM

## 2020-03-04 DIAGNOSIS — R14.0 BLOATING: ICD-10-CM

## 2020-03-04 DIAGNOSIS — M62.838 CERVICAL PARASPINAL MUSCLE SPASM: ICD-10-CM

## 2020-03-04 DIAGNOSIS — E87.6 HYPOKALEMIA: ICD-10-CM

## 2020-03-04 PROCEDURE — 3077F SYST BP >= 140 MM HG: CPT | Performed by: FAMILY MEDICINE

## 2020-03-04 PROCEDURE — 99214 OFFICE O/P EST MOD 30 MIN: CPT | Performed by: FAMILY MEDICINE

## 2020-03-04 PROCEDURE — 1036F TOBACCO NON-USER: CPT | Performed by: FAMILY MEDICINE

## 2020-03-04 PROCEDURE — 3080F DIAST BP >= 90 MM HG: CPT | Performed by: FAMILY MEDICINE

## 2020-03-04 RX ORDER — METHOCARBAMOL 500 MG/1
500 TABLET, FILM COATED ORAL
Qty: 10 TABLET | Refills: 0 | Status: SHIPPED | OUTPATIENT
Start: 2020-03-04 | End: 2020-05-15 | Stop reason: ALTCHOICE

## 2020-03-06 ENCOUNTER — APPOINTMENT (OUTPATIENT)
Dept: LAB | Facility: CLINIC | Age: 60
End: 2020-03-06
Payer: COMMERCIAL

## 2020-03-06 DIAGNOSIS — R19.4 CHANGE IN BOWEL HABITS: ICD-10-CM

## 2020-03-06 DIAGNOSIS — E87.6 HYPOKALEMIA: ICD-10-CM

## 2020-03-06 DIAGNOSIS — R14.0 BLOATING: ICD-10-CM

## 2020-03-06 LAB — POTASSIUM SERPL-SCNC: 3.5 MMOL/L (ref 3.5–5.3)

## 2020-03-06 PROCEDURE — 86001 ALLERGEN SPECIFIC IGG: CPT

## 2020-03-06 PROCEDURE — 36415 COLL VENOUS BLD VENIPUNCTURE: CPT

## 2020-03-06 PROCEDURE — 84132 ASSAY OF SERUM POTASSIUM: CPT

## 2020-03-06 PROCEDURE — 86003 ALLG SPEC IGE CRUDE XTRC EA: CPT

## 2020-03-06 PROCEDURE — 86008 ALLG SPEC IGE RECOMB EA: CPT

## 2020-03-06 PROCEDURE — 83516 IMMUNOASSAY NONANTIBODY: CPT

## 2020-03-06 NOTE — PROGRESS NOTES
Patient ID: Cecilia Mathias is a 61 y o  male  HPI: 61 y o male presenting with a chief complaint of change in his bowel habits, bloating, and cervical muscle spasms  He also needs needs a repeat potassium due to hypokalemia        SUBJECTIVE    Family History   Problem Relation Age of Onset    Cancer Mother     Stroke Mother     Colon cancer Father     Colon polyps Father     Valvular heart disease Father     Cancer Sister     Suicidality Brother      Social History     Socioeconomic History    Marital status: /Civil Union     Spouse name: Not on file    Number of children: Not on file    Years of education: Not on file    Highest education level: Not on file   Occupational History    Not on file   Social Needs    Financial resource strain: Not on file    Food insecurity:     Worry: Not on file     Inability: Not on file    Transportation needs:     Medical: Not on file     Non-medical: Not on file   Tobacco Use    Smoking status: Never Smoker    Smokeless tobacco: Never Used   Substance and Sexual Activity    Alcohol use: Yes     Comment: social    Drug use: No    Sexual activity: Not on file   Lifestyle    Physical activity:     Days per week: Not on file     Minutes per session: Not on file    Stress: Not on file   Relationships    Social connections:     Talks on phone: Not on file     Gets together: Not on file     Attends Buddhism service: Not on file     Active member of club or organization: Not on file     Attends meetings of clubs or organizations: Not on file     Relationship status: Not on file    Intimate partner violence:     Fear of current or ex partner: Not on file     Emotionally abused: Not on file     Physically abused: Not on file     Forced sexual activity: Not on file   Other Topics Concern    Not on file   Social History Narrative    Not on file     Past Medical History:   Diagnosis Date    Anxiety     Chronic urticaria     Depression     Hashimoto's disease     Hashimoto's thyroiditis     Renal stones      Past Surgical History:   Procedure Laterality Date    FL RETROGRADE PYELOGRAM  7/28/2019    NASAL SEPTUM SURGERY      [place] CYSTO/URETERO W/LITHOTRIPSY &INDWELL STENT INSRT Right 7/28/2019    Procedure: CYSTOSCOPY URETEROSCOPY WITH LITHOTRIPSY HOLMIUM LASER, basket stone extraction, RETROGRADE PYELOGRAM AND INSERTION STENT URETERAL;  Surgeon: Kenia Medel MD;  [facility];  Service: Urology    TONSILLECTOMY  1979     Allergies   Allergen Reactions    Compazine [Prochlorperazine] GI Intolerance    Sulfa Antibiotics Rash       Current Outpatient Medications:     cetirizine (ZyrTEC) 10 MG chewable tablet, Chew 10 mg daily, Disp: , Rfl:     clonazePAM (KlonoPIN) 1 mg tablet, TAKE ONE-HALF TABLET BY MOUTH EVERY DAY, Disp: 30 tablet, Rfl: 0    Coenzyme Q10 (COQ10) 200 MG CAPS, Take by mouth, Disp: , Rfl:     DULoxetine (CYMBALTA) 60 mg delayed release capsule, TAKE ONE CAPSULE BY MOUTH EVERY MORNING, Disp: 30 capsule, Rfl: 0    hyoscyamine (LEVSIN/SL) 0 125 mg SL tablet, Take 1 tablet (0 125 mg total) by mouth every 4 (four) hours as needed for cramping, Disp: 30 tablet, Rfl: 1    levothyroxine 75 mcg tablet, TAKE ONE TABLET BY MOUTH EVERY DAY, Disp: 30 tablet, Rfl: 3    lisinopril (ZESTRIL) 5 mg tablet, TAKE ONE TABLET BY MOUTH EVERY DAY, Disp: 30 tablet, Rfl: 3    pantoprazole (PROTONIX) 40 mg tablet, TAKE ONE TABLET BY MOUTH EVERY DAY BEFORE BREAKFAST, Disp: 30 tablet, Rfl: 0    methocarbamol (ROBAXIN) 500 mg tablet, Take 1 tablet (500 mg total) by mouth daily at bedtime, Disp: 10 tablet, Rfl: 0    Review of Systems  Constitutional:     Denies fever, chills ,fatigue ,weakness ,weight loss, weight gain     ENT: Denies earache ,loss of hearing ,nosebleed, nasal discharge,nasal congestion ,sore throat ,hoarseness  Pulmonary: Denies shortness of breath ,cough  ,dyspnea on exertion, orthopnea  ,PND   Cardiovascular:  Denies bradycardia , tachycardia  ,palpations, lower extremity edema leg, claudication  Breast:  Denies new or changing breast lumps ,nipple discharge ,nipple changes  Abdomen:  Denies abdominal pain , anorexia , indigestion, nausea, vomiting, constipation, diarrhea + abdominal bloating  Musculoskeletal: Denies myalgias, arthralgias, joint swelling, joint stiffness , limb pain, limb swelling+ cervical muscle spasms  Gu: denies dysuria, polyuria  Skin: Denies skin rash, skin lesion, skin wound, itching, dry skin  Neuro: Denies headache, numbness, tingling, confusion, loss of consciousness, dizziness, vertigo  Psychiatric: Denies feelings of depression, suicidal ideation, anxiety, sleep disturbances    OBJECTIVE  /94   Pulse 74   Temp 98 5 °F (36 9 °C)   Ht 6' (1 829 m)   Wt 78 5 kg (173 lb)   BMI 23 46 kg/m²   Constitutional:   NAD, well appearing and well nourished      ENT:   Conjunctiva and lids: no injection, edema, or discharge     Pupils and iris: SULMA bilaterally    External inspection of ears and nose: normal without deformities or discharge  Otoscopic exam: Canals patent without erythema  Nasal mucosa, septum and turbinates: Normal or edema or discharge         Oropharynx:  Moist mucosa, normal tongue and tonsils without lesions  No erythema        Pulmonary:Respiratory effort normal rate and rhythm, no increased work of breathing   Auscultation of lungs:  Clear bilaterally with no adventitious breath sounds       Cardiovascular: regular rate and rhythm, S1 and S2, no murmur, no edema and/or varicosities of LE      Abdomen: Soft and non-distended     Positive bowel sounds      No heptomegaly or splenomegaly      Gu: no suprapubic tenderness or CVA tenderness, no urethral discharge  Lymphatic:  No anterior or posterior cervical lymphadenopathy         Musculoskeletal:  Gait and station: Normal gait      Digits and nails normal without clubbing or cyanosis       Inspection/palpation of joints, bones, and muscles: +tenderness of cervical paraspinal musculature, full active and passive range of motion       Skin: Normal skin turgor and no rashes      Neuro:   Normal reflexes     Psych:   alert and oriented to person, place and time     normal mood and affect       Assessment/Plan:Diagnoses and all orders for this visit:    Change in bowel habits  -     Ambulatory referral to Colorectal Surgery; Future  -     Food Specific IgG Allergy (Adult) Panel; Future    Bloating  -     Antigliadin Abs, IgG; Future  -     Allergy, Milk Components Panel; Future  -     Almonds IgE; Future  -     Wheat IgE; Future  -     Egg Mix (Yolk & White) (F245) IgE; Future    Cervical paraspinal muscle spasm  -     methocarbamol (ROBAXIN) 500 mg tablet; Take 1 tablet (500 mg total) by mouth daily at bedtime    Hypokalemia  -     Potassium; Future        Reviewed with patient plan to treat with above plan      Patient instructed to call in 72 hours if not feeling better or if symptoms worsen

## 2020-03-07 LAB
GLIADIN PEPTIDE IGA SER-ACNC: 5 UNITS (ref 0–19)
GLIADIN PEPTIDE IGG SER-ACNC: 2 UNITS (ref 0–19)

## 2020-03-10 LAB
A-LACTALB IGE QN: <0.1 KAU/I
ALLERGEN COMMENT: NORMAL
ALLERGEN COMMENT: NORMAL
ALMOND IGE QN: <0.1 KUA/I
B-LACTOGLOB IGE QN: <0.1 KAU/I
CASEIN IGE QN: <0.1 KAU/I
WHEAT IGE QN: <0.1 KUA/I

## 2020-03-16 DIAGNOSIS — R10.13 EPIGASTRIC PAIN: ICD-10-CM

## 2020-03-17 LAB
MISCELLANEOUS LAB TEST RESULT: NORMAL
MISCELLANEOUS LAB TEST RESULT: NORMAL

## 2020-03-17 RX ORDER — PANTOPRAZOLE SODIUM 40 MG/1
TABLET, DELAYED RELEASE ORAL
Qty: 30 TABLET | Refills: 0 | Status: SHIPPED | OUTPATIENT
Start: 2020-03-17 | End: 2020-04-16

## 2020-03-18 DIAGNOSIS — Z91.018 MULTIPLE FOOD ALLERGIES: Primary | ICD-10-CM

## 2020-03-25 DIAGNOSIS — I10 ESSENTIAL HYPERTENSION: ICD-10-CM

## 2020-03-25 RX ORDER — LISINOPRIL 5 MG/1
TABLET ORAL
Qty: 30 TABLET | Refills: 3 | Status: SHIPPED | OUTPATIENT
Start: 2020-03-25 | End: 2020-03-26

## 2020-03-26 DIAGNOSIS — I10 ESSENTIAL HYPERTENSION: ICD-10-CM

## 2020-03-26 RX ORDER — LISINOPRIL 5 MG/1
TABLET ORAL
Qty: 30 TABLET | Refills: 3 | Status: SHIPPED | OUTPATIENT
Start: 2020-03-26 | End: 2020-04-02 | Stop reason: DRUGHIGH

## 2020-04-01 ENCOUNTER — TELEPHONE (OUTPATIENT)
Dept: FAMILY MEDICINE CLINIC | Facility: CLINIC | Age: 60
End: 2020-04-01

## 2020-04-02 ENCOUNTER — OFFICE VISIT (OUTPATIENT)
Dept: FAMILY MEDICINE CLINIC | Facility: CLINIC | Age: 60
End: 2020-04-02
Payer: COMMERCIAL

## 2020-04-02 VITALS
DIASTOLIC BLOOD PRESSURE: 100 MMHG | TEMPERATURE: 97.8 F | BODY MASS INDEX: 23.43 KG/M2 | RESPIRATION RATE: 16 BRPM | WEIGHT: 173 LBS | HEART RATE: 86 BPM | HEIGHT: 72 IN | SYSTOLIC BLOOD PRESSURE: 152 MMHG

## 2020-04-02 DIAGNOSIS — I10 ESSENTIAL HYPERTENSION: Primary | ICD-10-CM

## 2020-04-02 PROCEDURE — 3008F BODY MASS INDEX DOCD: CPT | Performed by: FAMILY MEDICINE

## 2020-04-02 PROCEDURE — 3077F SYST BP >= 140 MM HG: CPT | Performed by: FAMILY MEDICINE

## 2020-04-02 PROCEDURE — 1036F TOBACCO NON-USER: CPT | Performed by: FAMILY MEDICINE

## 2020-04-02 PROCEDURE — 3080F DIAST BP >= 90 MM HG: CPT | Performed by: FAMILY MEDICINE

## 2020-04-02 PROCEDURE — 99213 OFFICE O/P EST LOW 20 MIN: CPT | Performed by: FAMILY MEDICINE

## 2020-04-02 RX ORDER — LISINOPRIL 20 MG/1
20 TABLET ORAL DAILY
Qty: 30 TABLET | Refills: 3 | Status: SHIPPED | OUTPATIENT
Start: 2020-04-02 | End: 2020-05-29 | Stop reason: SDUPTHER

## 2020-04-11 DIAGNOSIS — R14.0 BLOATING: ICD-10-CM

## 2020-04-13 RX ORDER — METOCLOPRAMIDE 5 MG/1
TABLET ORAL
Qty: 120 TABLET | Refills: 3 | OUTPATIENT
Start: 2020-04-13

## 2020-04-16 DIAGNOSIS — R10.13 EPIGASTRIC PAIN: ICD-10-CM

## 2020-04-16 RX ORDER — PANTOPRAZOLE SODIUM 40 MG/1
TABLET, DELAYED RELEASE ORAL
Qty: 30 TABLET | Refills: 0 | Status: SHIPPED | OUTPATIENT
Start: 2020-04-16 | End: 2020-05-19

## 2020-05-15 ENCOUNTER — OFFICE VISIT (OUTPATIENT)
Dept: FAMILY MEDICINE CLINIC | Facility: CLINIC | Age: 60
End: 2020-05-15
Payer: COMMERCIAL

## 2020-05-15 VITALS
BODY MASS INDEX: 23.16 KG/M2 | SYSTOLIC BLOOD PRESSURE: 152 MMHG | HEIGHT: 72 IN | HEART RATE: 74 BPM | DIASTOLIC BLOOD PRESSURE: 96 MMHG | WEIGHT: 171 LBS | TEMPERATURE: 98.4 F

## 2020-05-15 DIAGNOSIS — Z23 NEED FOR VACCINATION: ICD-10-CM

## 2020-05-15 DIAGNOSIS — E03.9 HYPOTHYROIDISM, UNSPECIFIED TYPE: ICD-10-CM

## 2020-05-15 DIAGNOSIS — Z00.00 ANNUAL PHYSICAL EXAM: Primary | ICD-10-CM

## 2020-05-15 DIAGNOSIS — M54.50 LUMBAR BACK PAIN: ICD-10-CM

## 2020-05-15 PROCEDURE — 1036F TOBACCO NON-USER: CPT | Performed by: FAMILY MEDICINE

## 2020-05-15 PROCEDURE — 3077F SYST BP >= 140 MM HG: CPT | Performed by: FAMILY MEDICINE

## 2020-05-15 PROCEDURE — 3008F BODY MASS INDEX DOCD: CPT | Performed by: FAMILY MEDICINE

## 2020-05-15 PROCEDURE — 90715 TDAP VACCINE 7 YRS/> IM: CPT | Performed by: FAMILY MEDICINE

## 2020-05-15 PROCEDURE — 99214 OFFICE O/P EST MOD 30 MIN: CPT | Performed by: FAMILY MEDICINE

## 2020-05-15 PROCEDURE — 99396 PREV VISIT EST AGE 40-64: CPT | Performed by: FAMILY MEDICINE

## 2020-05-15 PROCEDURE — 3080F DIAST BP >= 90 MM HG: CPT | Performed by: FAMILY MEDICINE

## 2020-05-15 PROCEDURE — 90471 IMMUNIZATION ADMIN: CPT | Performed by: FAMILY MEDICINE

## 2020-05-15 RX ORDER — METHYLPREDNISOLONE 4 MG/1
TABLET ORAL
Qty: 21 EACH | Refills: 0 | Status: SHIPPED | OUTPATIENT
Start: 2020-05-15 | End: 2021-04-13 | Stop reason: ALTCHOICE

## 2020-05-15 RX ORDER — METAXALONE 800 MG/1
800 TABLET ORAL
Qty: 30 TABLET | Refills: 0 | Status: SHIPPED | OUTPATIENT
Start: 2020-05-15 | End: 2021-04-13 | Stop reason: ALTCHOICE

## 2020-05-18 DIAGNOSIS — R10.13 EPIGASTRIC PAIN: ICD-10-CM

## 2020-05-19 RX ORDER — PANTOPRAZOLE SODIUM 40 MG/1
TABLET, DELAYED RELEASE ORAL
Qty: 30 TABLET | Refills: 0 | Status: SHIPPED | OUTPATIENT
Start: 2020-05-19 | End: 2020-06-29

## 2020-05-21 DIAGNOSIS — Z80.0 FAMILY HISTORY OF MALIGNANT NEOPLASM OF GASTROINTESTINAL TRACT: ICD-10-CM

## 2020-05-21 DIAGNOSIS — R19.4 FREQUENT BOWEL MOVEMENTS: ICD-10-CM

## 2020-05-21 PROCEDURE — U0003 INFECTIOUS AGENT DETECTION BY NUCLEIC ACID (DNA OR RNA); SEVERE ACUTE RESPIRATORY SYNDROME CORONAVIRUS 2 (SARS-COV-2) (CORONAVIRUS DISEASE [COVID-19]), AMPLIFIED PROBE TECHNIQUE, MAKING USE OF HIGH THROUGHPUT TECHNOLOGIES AS DESCRIBED BY CMS-2020-01-R: HCPCS

## 2020-05-23 LAB — SARS-COV-2 RNA SPEC QL NAA+PROBE: NOT DETECTED

## 2020-05-26 ENCOUNTER — ANESTHESIA EVENT (OUTPATIENT)
Dept: GASTROENTEROLOGY | Facility: HOSPITAL | Age: 60
End: 2020-05-26

## 2020-05-27 ENCOUNTER — HOSPITAL ENCOUNTER (OUTPATIENT)
Dept: GASTROENTEROLOGY | Facility: HOSPITAL | Age: 60
Setting detail: OUTPATIENT SURGERY
Discharge: HOME | End: 2020-05-27
Attending: COLON & RECTAL SURGERY | Admitting: COLON & RECTAL SURGERY
Payer: COMMERCIAL

## 2020-05-27 ENCOUNTER — ANESTHESIA (OUTPATIENT)
Dept: GASTROENTEROLOGY | Facility: HOSPITAL | Age: 60
End: 2020-05-27

## 2020-05-27 VITALS
RESPIRATION RATE: 20 BRPM | SYSTOLIC BLOOD PRESSURE: 103 MMHG | TEMPERATURE: 96 F | HEIGHT: 72 IN | DIASTOLIC BLOOD PRESSURE: 68 MMHG | BODY MASS INDEX: 23.16 KG/M2 | HEART RATE: 102 BPM | WEIGHT: 171 LBS | OXYGEN SATURATION: 95 %

## 2020-05-27 DIAGNOSIS — R19.4 CHANGE IN BOWEL HABITS: ICD-10-CM

## 2020-05-27 DIAGNOSIS — Z80.0 FAMILY HISTORY OF COLON CANCER IN FATHER: ICD-10-CM

## 2020-05-27 DIAGNOSIS — K63.5 POLYP OF COLON, UNSPECIFIED PART OF COLON, UNSPECIFIED TYPE: ICD-10-CM

## 2020-05-27 PROCEDURE — 88305 TISSUE EXAM BY PATHOLOGIST: CPT | Performed by: PATHOLOGY

## 2020-05-27 PROCEDURE — 45380 COLONOSCOPY AND BIOPSY: CPT | Performed by: COLON & RECTAL SURGERY

## 2020-05-27 RX ORDER — SODIUM CHLORIDE 9 MG/ML
INJECTION, SOLUTION INTRAVENOUS CONTINUOUS PRN
Status: DISCONTINUED | OUTPATIENT
Start: 2020-05-27 | End: 2020-05-27 | Stop reason: SURG

## 2020-05-27 RX ORDER — LIDOCAINE HYDROCHLORIDE 10 MG/ML
INJECTION, SOLUTION EPIDURAL; INFILTRATION; INTRACAUDAL; PERINEURAL AS NEEDED
Status: DISCONTINUED | OUTPATIENT
Start: 2020-05-27 | End: 2020-05-27 | Stop reason: SURG

## 2020-05-27 RX ORDER — GLYCOPYRROLATE 0.2 MG/ML
INJECTION INTRAMUSCULAR; INTRAVENOUS AS NEEDED
Status: DISCONTINUED | OUTPATIENT
Start: 2020-05-27 | End: 2020-05-27 | Stop reason: SURG

## 2020-05-27 RX ORDER — PROPOFOL 10 MG/ML
INJECTION, EMULSION INTRAVENOUS CONTINUOUS PRN
Status: DISCONTINUED | OUTPATIENT
Start: 2020-05-27 | End: 2020-05-27 | Stop reason: SURG

## 2020-05-27 RX ORDER — PROPOFOL 10 MG/ML
INJECTION, EMULSION INTRAVENOUS AS NEEDED
Status: DISCONTINUED | OUTPATIENT
Start: 2020-05-27 | End: 2020-05-27 | Stop reason: SURG

## 2020-05-27 RX ADMIN — PHENYLEPHRINE HYDROCHLORIDE 100 MCG: 10 INJECTION INTRAVENOUS at 09:39

## 2020-05-27 RX ADMIN — SODIUM CHLORIDE: 9 INJECTION, SOLUTION INTRAVENOUS at 09:15

## 2020-05-27 RX ADMIN — PROPOFOL 150 MCG/KG/MIN: 10 INJECTION, EMULSION INTRAVENOUS at 09:27

## 2020-05-27 RX ADMIN — PHENYLEPHRINE HYDROCHLORIDE 200 MCG: 10 INJECTION INTRAVENOUS at 09:47

## 2020-05-27 RX ADMIN — LIDOCAINE HYDROCHLORIDE 50 MG: 10 INJECTION, SOLUTION EPIDURAL; INFILTRATION; INTRACAUDAL; PERINEURAL at 09:27

## 2020-05-27 RX ADMIN — PHENYLEPHRINE HYDROCHLORIDE 200 MCG: 10 INJECTION INTRAVENOUS at 09:49

## 2020-05-27 RX ADMIN — PROPOFOL 20 MG: 10 INJECTION, EMULSION INTRAVENOUS at 09:29

## 2020-05-27 RX ADMIN — PROPOFOL 80 MG: 10 INJECTION, EMULSION INTRAVENOUS at 09:27

## 2020-05-27 RX ADMIN — GLYCOPYRROLATE 0.2 MG: 0.2 INJECTION, SOLUTION INTRAMUSCULAR; INTRAVENOUS at 09:27

## 2020-05-29 DIAGNOSIS — I10 ESSENTIAL HYPERTENSION: ICD-10-CM

## 2020-05-29 RX ORDER — LISINOPRIL 40 MG/1
40 TABLET ORAL DAILY
Qty: 90 TABLET | Refills: 1 | Status: SHIPPED | OUTPATIENT
Start: 2020-05-29 | End: 2020-11-18

## 2020-06-16 ENCOUNTER — TELEPHONE (OUTPATIENT)
Dept: FAMILY MEDICINE CLINIC | Facility: CLINIC | Age: 60
End: 2020-06-16

## 2020-06-16 ENCOUNTER — TELEMEDICINE (OUTPATIENT)
Dept: FAMILY MEDICINE CLINIC | Facility: CLINIC | Age: 60
End: 2020-06-16
Payer: COMMERCIAL

## 2020-06-16 DIAGNOSIS — J01.90 ACUTE SINUSITIS, RECURRENCE NOT SPECIFIED, UNSPECIFIED LOCATION: Primary | ICD-10-CM

## 2020-06-16 DIAGNOSIS — Z91.018 MULTIPLE FOOD ALLERGIES: Primary | ICD-10-CM

## 2020-06-16 DIAGNOSIS — K04.7 DENTAL ABSCESS: ICD-10-CM

## 2020-06-16 PROCEDURE — 99213 OFFICE O/P EST LOW 20 MIN: CPT | Performed by: FAMILY MEDICINE

## 2020-06-16 RX ORDER — PREDNISONE 10 MG/1
TABLET ORAL
Qty: 26 TABLET | Refills: 0 | Status: SHIPPED | OUTPATIENT
Start: 2020-06-16 | End: 2021-04-13 | Stop reason: ALTCHOICE

## 2020-06-16 RX ORDER — MECLIZINE HYDROCHLORIDE 25 MG/1
25 TABLET ORAL EVERY 8 HOURS PRN
Qty: 60 TABLET | Refills: 1 | Status: SHIPPED | OUTPATIENT
Start: 2020-06-16 | End: 2021-04-13 | Stop reason: ALTCHOICE

## 2020-06-16 RX ORDER — CLINDAMYCIN HYDROCHLORIDE 300 MG/1
300 CAPSULE ORAL 2 TIMES DAILY
Qty: 20 CAPSULE | Refills: 0 | Status: SHIPPED | OUTPATIENT
Start: 2020-06-16 | End: 2020-06-26

## 2020-06-21 DIAGNOSIS — E03.9 HYPOTHYROIDISM, UNSPECIFIED TYPE: ICD-10-CM

## 2020-06-22 RX ORDER — LEVOTHYROXINE SODIUM 0.07 MG/1
TABLET ORAL
Qty: 30 TABLET | Refills: 3 | Status: SHIPPED | OUTPATIENT
Start: 2020-06-22 | End: 2020-10-19

## 2020-06-28 DIAGNOSIS — R10.13 EPIGASTRIC PAIN: ICD-10-CM

## 2020-06-29 RX ORDER — PANTOPRAZOLE SODIUM 40 MG/1
TABLET, DELAYED RELEASE ORAL
Qty: 30 TABLET | Refills: 0 | Status: SHIPPED | OUTPATIENT
Start: 2020-06-29 | End: 2020-08-06

## 2020-08-06 DIAGNOSIS — R10.13 EPIGASTRIC PAIN: ICD-10-CM

## 2020-08-06 RX ORDER — PANTOPRAZOLE SODIUM 40 MG/1
TABLET, DELAYED RELEASE ORAL
Qty: 30 TABLET | Refills: 0 | Status: SHIPPED | OUTPATIENT
Start: 2020-08-06 | End: 2020-09-09

## 2020-09-08 DIAGNOSIS — R10.13 EPIGASTRIC PAIN: ICD-10-CM

## 2020-09-09 RX ORDER — PANTOPRAZOLE SODIUM 40 MG/1
TABLET, DELAYED RELEASE ORAL
Qty: 30 TABLET | Refills: 0 | Status: SHIPPED | OUTPATIENT
Start: 2020-09-09 | End: 2020-10-09

## 2020-09-18 ENCOUNTER — TELEMEDICINE (OUTPATIENT)
Dept: FAMILY MEDICINE CLINIC | Facility: CLINIC | Age: 60
End: 2020-09-18
Payer: COMMERCIAL

## 2020-09-18 DIAGNOSIS — J02.9 ACUTE PHARYNGITIS, UNSPECIFIED ETIOLOGY: Primary | ICD-10-CM

## 2020-09-18 PROCEDURE — 1036F TOBACCO NON-USER: CPT | Performed by: FAMILY MEDICINE

## 2020-09-18 PROCEDURE — 99213 OFFICE O/P EST LOW 20 MIN: CPT | Performed by: FAMILY MEDICINE

## 2020-09-18 RX ORDER — AZITHROMYCIN 250 MG/1
TABLET, FILM COATED ORAL
Qty: 6 TABLET | Refills: 0 | Status: SHIPPED | OUTPATIENT
Start: 2020-09-18 | End: 2020-09-22

## 2020-09-18 RX ORDER — METHYLPREDNISOLONE 4 MG/1
TABLET ORAL
Qty: 21 EACH | Refills: 0 | Status: SHIPPED | OUTPATIENT
Start: 2020-09-18 | End: 2021-04-13 | Stop reason: ALTCHOICE

## 2020-09-18 NOTE — PROGRESS NOTES
Virtual Regular Visit      Assessment/Plan:    Problem List Items Addressed This Visit     None      Visit Diagnoses     Acute pharyngitis, unspecified etiology    -  Primary    Relevant Medications    azithromycin (ZITHROMAX) 250 mg tablet    methylPREDNISolone 4 MG tablet therapy pack               Reason for visit is sore throat, no fever, no nasal discharge, no headache, no cough or wheezing  He also denies any myalgia, vomiting or diarrhea          Encounter provider Cory Palumbo DO    Provider located at [place] [address] [address] [facility]      Recent Visits  Date Type Provider Dept   09/18/20 [facility], [facility] recent visits within past 7 days and meeting all other requirements     Future Appointments  No visits were found meeting these conditions  Showing future appointments within next 150 days and meeting all other requirements        The patient was identified by name and date of birth  Vianney Calles III was informed that this is a telemedicine visit and that the visit is being conducted through [employer] and patient was informed that this is not a secure, HIPAA-complaint platform  He agrees to proceed     My office door was closed  No one else was in the room  He acknowledged consent and understanding of privacy and security of the video platform  The patient has agreed to participate and understands they can discontinue the visit at any time  Patient is aware this is a billable service  Subjective  Vianney Calles III is a 61 y o  male as above          HPI     Past Medical History:   Diagnosis Date    Anxiety     Change in bowel habits     Chronic urticaria     Depression     GERD (gastroesophageal reflux disease)     Hashimoto's disease     Hashimoto's thyroiditis     Hypertension     Renal stones        Past Surgical History:   Procedure Laterality Date    FL RETROGRADE PYELOGRAM  7/28/2019    NASAL SEPTUM SURGERY      [place] CYSTO/URETERO W/LITHOTRIPSY &INDWELL STENT INSRT Right 7/28/2019    Procedure: CYSTOSCOPY URETEROSCOPY WITH LITHOTRIPSY HOLMIUM LASER, basket stone extraction, RETROGRADE PYELOGRAM AND INSERTION STENT URETERAL;  Surgeon: Tiny Field MD;  [facility];  Service: Urology    TONSILLECTOMY  1979       Current Outpatient Medications   Medication Sig Dispense Refill    azithromycin (ZITHROMAX) 250 mg tablet Take 2 tablets today then 1 tablet daily x 4 days 6 tablet 0    cetirizine (ZyrTEC) 10 MG chewable tablet Chew 10 mg daily      Coenzyme Q10 (COQ10) 200 MG CAPS Take by mouth      DULoxetine (CYMBALTA) 60 mg delayed release capsule TAKE ONE CAPSULE BY MOUTH EVERY MORNING 30 capsule 0    levothyroxine 75 mcg tablet TAKE ONE TABLET BY MOUTH EVERY DAY 30 tablet 3    lisinopril (ZESTRIL) 40 mg tablet Take 1 tablet (40 mg total) by mouth daily 90 tablet 1    meclizine (ANTIVERT) 25 mg tablet Take 1 tablet (25 mg total) by mouth every 8 (eight) hours as needed for dizziness 60 tablet 1    metaxalone (SKELAXIN) 800 mg tablet Take 1 tablet (800 mg total) by mouth daily at bedtime as needed for muscle spasms 30 tablet 0    methylPREDNISolone 4 MG tablet therapy pack Use as directed on package 21 each 0    methylPREDNISolone 4 MG tablet therapy pack Use as directed on package 21 each 0    pantoprazole (PROTONIX) 40 mg tablet TAKE ONE TABLET BY MOUTH EVERY DAY BEFORE BREAKFAST 30 tablet 0    predniSONE 10 mg tablet 3 tabs po bid x2 days, then 2 tabs po bid x2 days, then 1 tab bid x2 days, then 1 daily until done  26 tablet 0     No current facility-administered medications for this visit  Allergies   Allergen Reactions    Compazine [Prochlorperazine] GI Intolerance    Sulfa Antibiotics Rash       Review of Systems   HENT: Positive for postnasal drip and sore throat  Negative for sinus pressure and sinus pain      Respiratory: Negative for cough, shortness of breath and wheezing  Musculoskeletal: Negative for arthralgias and myalgias  Neurological: Negative for dizziness  All other systems reviewed and are negative  Video Exam    There were no vitals filed for this visit  Physical Exam  Vitals signs reviewed  Constitutional:       Appearance: Normal appearance  He is normal weight  He is not ill-appearing  Eyes:      General:         Right eye: No discharge  Left eye: No discharge  Skin:     Findings: No rash  Neurological:      Mental Status: He is oriented to person, place, and time  Mental status is at baseline  Psychiatric:         Mood and Affect: Mood normal          Behavior: Behavior normal          Thought Content: Thought content normal          Judgment: Judgment normal           I spent 15 minutes directly with the patient during this visit      VIRTUAL VISIT Clarence Murillo III acknowledges that he has consented to an online visit or consultation  He understands that the online visit is based solely on information provided by him, and that, in the absence of a face-to-face physical evaluation by the physician, the diagnosis he receives is both limited and provisional in terms of accuracy and completeness  This is not intended to replace a full medical face-to-face evaluation by the physician  David Dubose understands and accepts these terms

## 2020-10-08 DIAGNOSIS — R10.13 EPIGASTRIC PAIN: ICD-10-CM

## 2020-10-09 RX ORDER — PANTOPRAZOLE SODIUM 40 MG/1
TABLET, DELAYED RELEASE ORAL
Qty: 30 TABLET | Refills: 3 | Status: SHIPPED | OUTPATIENT
Start: 2020-10-09 | End: 2021-08-17

## 2020-10-18 DIAGNOSIS — E03.9 HYPOTHYROIDISM, UNSPECIFIED TYPE: ICD-10-CM

## 2020-10-19 RX ORDER — LEVOTHYROXINE SODIUM 0.07 MG/1
TABLET ORAL
Qty: 30 TABLET | Refills: 3 | Status: SHIPPED | OUTPATIENT
Start: 2020-10-19 | End: 2021-02-11

## 2020-10-29 DIAGNOSIS — Z20.822 SUSPECTED COVID-19 VIRUS INFECTION: ICD-10-CM

## 2020-10-29 DIAGNOSIS — Z20.822 SUSPECTED COVID-19 VIRUS INFECTION: Primary | ICD-10-CM

## 2020-10-29 PROCEDURE — U0003 INFECTIOUS AGENT DETECTION BY NUCLEIC ACID (DNA OR RNA); SEVERE ACUTE RESPIRATORY SYNDROME CORONAVIRUS 2 (SARS-COV-2) (CORONAVIRUS DISEASE [COVID-19]), AMPLIFIED PROBE TECHNIQUE, MAKING USE OF HIGH THROUGHPUT TECHNOLOGIES AS DESCRIBED BY CMS-2020-01-R: HCPCS | Performed by: FAMILY MEDICINE

## 2020-10-30 LAB — SARS-COV-2 RNA SPEC QL NAA+PROBE: NOT DETECTED

## 2020-11-02 ENCOUNTER — TELEPHONE (OUTPATIENT)
Dept: FAMILY MEDICINE CLINIC | Facility: CLINIC | Age: 60
End: 2020-11-02

## 2020-11-09 ENCOUNTER — TELEPHONE (OUTPATIENT)
Dept: UROLOGY | Facility: MEDICAL CENTER | Age: 60
End: 2020-11-09

## 2020-11-09 ENCOUNTER — HOSPITAL ENCOUNTER (OUTPATIENT)
Dept: RADIOLOGY | Facility: HOSPITAL | Age: 60
Discharge: HOME | End: 2020-11-09
Payer: COMMERCIAL

## 2020-11-09 DIAGNOSIS — N20.0 NEPHROLITHIASIS: Primary | ICD-10-CM

## 2020-11-09 DIAGNOSIS — N20.0 NEPHROLITHIASIS: ICD-10-CM

## 2020-11-09 PROCEDURE — 74018 RADEX ABDOMEN 1 VIEW: CPT

## 2020-11-11 ENCOUNTER — OFFICE VISIT (OUTPATIENT)
Dept: UROLOGY | Facility: CLINIC | Age: 60
End: 2020-11-11
Payer: COMMERCIAL

## 2020-11-11 VITALS
WEIGHT: 173 LBS | BODY MASS INDEX: 23.43 KG/M2 | SYSTOLIC BLOOD PRESSURE: 110 MMHG | HEIGHT: 72 IN | HEART RATE: 70 BPM | DIASTOLIC BLOOD PRESSURE: 84 MMHG

## 2020-11-11 DIAGNOSIS — N20.0 NEPHROLITHIASIS: Primary | ICD-10-CM

## 2020-11-11 PROCEDURE — 3079F DIAST BP 80-89 MM HG: CPT | Performed by: PHYSICIAN ASSISTANT

## 2020-11-11 PROCEDURE — 1036F TOBACCO NON-USER: CPT | Performed by: PHYSICIAN ASSISTANT

## 2020-11-11 PROCEDURE — 3074F SYST BP LT 130 MM HG: CPT | Performed by: PHYSICIAN ASSISTANT

## 2020-11-11 PROCEDURE — 3008F BODY MASS INDEX DOCD: CPT | Performed by: PHYSICIAN ASSISTANT

## 2020-11-11 PROCEDURE — 99213 OFFICE O/P EST LOW 20 MIN: CPT | Performed by: PHYSICIAN ASSISTANT

## 2020-11-17 DIAGNOSIS — I10 ESSENTIAL HYPERTENSION: ICD-10-CM

## 2020-11-18 RX ORDER — LISINOPRIL 40 MG/1
TABLET ORAL
Qty: 90 TABLET | Refills: 1 | Status: SHIPPED | OUTPATIENT
Start: 2020-11-18 | End: 2021-05-10

## 2021-02-11 DIAGNOSIS — E03.9 HYPOTHYROIDISM, UNSPECIFIED TYPE: ICD-10-CM

## 2021-02-11 RX ORDER — LEVOTHYROXINE SODIUM 0.07 MG/1
TABLET ORAL
Qty: 30 TABLET | Refills: 3 | Status: SHIPPED | OUTPATIENT
Start: 2021-02-11 | End: 2021-06-14

## 2021-04-05 DIAGNOSIS — Z20.822 SUSPECTED COVID-19 VIRUS INFECTION: Primary | ICD-10-CM

## 2021-04-05 DIAGNOSIS — Z20.822 SUSPECTED COVID-19 VIRUS INFECTION: ICD-10-CM

## 2021-04-05 PROCEDURE — U0005 INFEC AGEN DETEC AMPLI PROBE: HCPCS | Performed by: FAMILY MEDICINE

## 2021-04-05 PROCEDURE — U0003 INFECTIOUS AGENT DETECTION BY NUCLEIC ACID (DNA OR RNA); SEVERE ACUTE RESPIRATORY SYNDROME CORONAVIRUS 2 (SARS-COV-2) (CORONAVIRUS DISEASE [COVID-19]), AMPLIFIED PROBE TECHNIQUE, MAKING USE OF HIGH THROUGHPUT TECHNOLOGIES AS DESCRIBED BY CMS-2020-01-R: HCPCS | Performed by: FAMILY MEDICINE

## 2021-04-06 LAB — SARS-COV-2 RNA RESP QL NAA+PROBE: NEGATIVE

## 2021-04-13 ENCOUNTER — OFFICE VISIT (OUTPATIENT)
Dept: FAMILY MEDICINE CLINIC | Facility: CLINIC | Age: 61
End: 2021-04-13
Payer: COMMERCIAL

## 2021-04-13 VITALS
OXYGEN SATURATION: 97 % | TEMPERATURE: 98 F | DIASTOLIC BLOOD PRESSURE: 82 MMHG | HEART RATE: 69 BPM | SYSTOLIC BLOOD PRESSURE: 122 MMHG | WEIGHT: 172 LBS | BODY MASS INDEX: 23.3 KG/M2 | HEIGHT: 72 IN

## 2021-04-13 DIAGNOSIS — M54.50 LUMBAR BACK PAIN: Primary | ICD-10-CM

## 2021-04-13 DIAGNOSIS — R10.9 ABDOMINAL SPASMS: ICD-10-CM

## 2021-04-13 DIAGNOSIS — R15.9 INCONTINENCE OF FECES, UNSPECIFIED FECAL INCONTINENCE TYPE: ICD-10-CM

## 2021-04-13 PROCEDURE — 1036F TOBACCO NON-USER: CPT | Performed by: FAMILY MEDICINE

## 2021-04-13 PROCEDURE — 99214 OFFICE O/P EST MOD 30 MIN: CPT | Performed by: FAMILY MEDICINE

## 2021-04-13 PROCEDURE — 3008F BODY MASS INDEX DOCD: CPT | Performed by: FAMILY MEDICINE

## 2021-04-13 RX ORDER — HYOSCYAMINE SULFATE 0.125 MG
0.12 TABLET,DISINTEGRATING ORAL EVERY 4 HOURS PRN
Qty: 40 TABLET | Refills: 1 | Status: SHIPPED | OUTPATIENT
Start: 2021-04-13 | End: 2021-10-07 | Stop reason: ALTCHOICE

## 2021-04-13 RX ORDER — CLONAZEPAM 0.5 MG/1
0.5 TABLET ORAL
COMMUNITY

## 2021-04-20 NOTE — PROGRESS NOTES
Patient ID: Sharifa Donovan is a 61 y o  male  HPI: 61 y o male presents for worsening lumbar back pain with worsening symptoms of pain, and now incontinence of feces intermittently as well as abdominal spasms  He had an MRI years ago which showed both central spinal stenosis and foraminal stenosis at L4-5 and L5-S1  He is also experiencing some neurogenic claudication as well        SUBJECTIVE    Family History   Problem Relation Age of Onset    Cancer Mother     Stroke Mother     Colon cancer Father     Colon polyps Father     Valvular heart disease Father     Cancer Sister     Suicidality Brother      Social History     Socioeconomic History    Marital status: /Civil Union     Spouse name: Not on file    Number of children: Not on file    Years of education: Not on file    Highest education level: Not on file   Occupational History    Not on file   Social Needs    Financial resource strain: Not on file    Food insecurity     Worry: Not on file     Inability: Not on file   [employer] needs     Medical: Not on file     Non-medical: Not on file   Tobacco Use    Smoking status: Never Smoker    Smokeless tobacco: Never Used   Substance and Sexual Activity    Alcohol use: Yes     Frequency: Monthly or less     Drinks per session: 1 or 2     Binge frequency: Never     Comment: social    Drug use: Never    Sexual activity: Yes     Partners: Female   Lifestyle    Physical activity     Days per week: Not on file     Minutes per session: Not on file    Stress: Not on file   Relationships    Social connections     Talks on phone: Not on file     Gets together: Not on file     Attends [employer]ist service: Not on file     Active member of club or organization: Not on file     Attends meetings of clubs or organizations: Not on file     Relationship status: Not on file    Intimate partner violence     Fear of current or ex partner: Not on file     Emotionally abused: Not on file Physically abused: Not on file     Forced sexual activity: Not on file   Other Topics Concern    Not on file   Social History Narrative    Not on file     Past Medical History:   Diagnosis Date    Anxiety     Change in bowel habits     Chronic urticaria     Depression     GERD (gastroesophageal reflux disease)     Hashimoto's disease     Hashimoto's thyroiditis     Hypertension     Renal stones      Past Surgical History:   Procedure Laterality Date    FL RETROGRADE PYELOGRAM  7/28/2019    NASAL SEPTUM SURGERY      [place] CYSTO/URETERO W/LITHOTRIPSY &INDWELL STENT INSRT Right 7/28/2019    Procedure: CYSTOSCOPY URETEROSCOPY WITH LITHOTRIPSY HOLMIUM LASER, basket stone extraction, RETROGRADE PYELOGRAM AND INSERTION STENT URETERAL;  Surgeon: Ross Epperson MD;  [facility];  Service: Urology    TONSILLECTOMY  1979     Allergies   Allergen Reactions    Compazine [Prochlorperazine] GI Intolerance    Sulfa Antibiotics Rash       Current Outpatient Medications:     cetirizine (ZyrTEC) 10 MG chewable tablet, Chew 10 mg daily, Disp: , Rfl:     clonazePAM (KlonoPIN) 0 5 mg tablet, Take 0 5 mg by mouth daily at bedtime, Disp: , Rfl:     Coenzyme Q10 (COQ10) 200 MG CAPS, Take by mouth, Disp: , Rfl:     levothyroxine 75 mcg tablet, TAKE ONE TABLET BY MOUTH EVERY DAY, Disp: 30 tablet, Rfl: 3    lisinopril (ZESTRIL) 40 mg tablet, TAKE ONE TABLET BY MOUTH EVERY DAY, Disp: 90 tablet, Rfl: 1    pantoprazole (PROTONIX) 40 mg tablet, TAKE ONE TABLET BY MOUTH EVERY DAY BEFORE BREAKFAST, Disp: 30 tablet, Rfl: 3    hyoscyamine (ANASPAZ) 0 125 mg, Take 1 tablet (0 125 mg total) by mouth every 4 (four) hours as needed (abdominal spasms), Disp: 40 tablet, Rfl: 1    Review of Systems  Constitutional:     Denies fever, chills ,fatigue ,weakness ,weight loss, weight gain     ENT: Denies earache ,loss of hearing ,nosebleed, nasal discharge,nasal congestion ,sore throat ,hoarseness  Pulmonary: Denies shortness of breath ,cough  ,dyspnea on exertion, orthopnea  ,PND   Cardiovascular:  Denies bradycardia , tachycardia  ,palpations, lower extremity edema leg, claudication  Breast:  Denies new or changing breast lumps ,nipple discharge ,nipple changes  Abdomen:  + intermittent abdominal spasms ,denies any  anorexia , indigestion, nausea, vomiting, constipation,or  Diarrhea+ fecal incontence  Musculoskeletal: + lumbar back pain with bilateral leg radiculopathy, + neurogenic claudication and intermittent fecal incontinence  Willy Childley: denies dysuria, polyuria  Skin: Denies skin rash, skin lesion, skin wound, itching, dry skin  Neuro: Denies headache, numbness, tingling, confusion, loss of consciousness, dizziness, vertigo  Psychiatric: Denies feelings of depression, suicidal ideation, anxiety, sleep disturbances    OBJECTIVE  /82   Pulse 69   Temp 98 °F (36 7 °C)   Ht 6' (1 829 m)   Wt 78 kg (172 lb)   SpO2 97%   BMI 23 33 kg/m²   Constitutional:   NAD, well appearing and well nourished      ENT:   Conjunctiva and lids: no injection, edema, or discharge     Pupils and iris: SULMA bilaterally    External inspection of ears and nose: normal without deformities or discharge  Otoscopic exam: Canals patent without erythema  Nasal mucosa, septum and turbinates: Normal or edema or discharge         Oropharynx:  Moist mucosa, normal tongue and tonsils without lesions  No erythema        Pulmonary:Respiratory effort normal rate and rhythm, no increased work of breathing   Auscultation of lungs:  Clear bilaterally with no adventitious breath sounds       Cardiovascular: regular rate and rhythm, S1 and S2, no murmur, no edema and/or varicosities of LE      Abdomen: Soft and non-distended     Positive bowel sounds     No heptomegaly or splenomegaly      Gu: no suprapubic tenderness or CVA tenderness, no urethral discharge  Lymphatic:  No anterior or posterior cervical lymphadenopathy         Musculoskeletal:  Gait and station: Normal gait      Digits and nails normal without clubbing or cyanosis       Inspection/palpation of joints, bones, and muscles:  No joint tenderness, swelling, full active and passive range of motion of lumbar spine with pain + SLR bilaterally  at 30 degrees      Skin: Normal skin turgor and no rashes      Neuro:    Normal  CN 2-12     Normal reflexes     Normal sensation    Psych:   alert and oriented to person, place and time   d and affect       Assessment/Plan:Diagnoses and all orders for this visit:    Lumbar back pain  -     MRI lumbar spine wo contrast; Future    Incontinence of feces, unspecified fecal incontinence type  -     MRI lumbar spine wo contrast; Future    Abdominal spasms  -     hyoscyamine (ANASPAZ) 0 125 mg; Take 1 tablet (0 125 mg total) by mouth every 4 (four) hours as needed (abdominal spasms)    Other orders  -     clonazePAM (KlonoPIN) 0 5 mg tablet; Take 0 5 mg by mouth daily at bedtime        Reviewed with patient plan to treat with above plan      Patient instructed to call in 72 hours if not feeling better or if symptoms worsen

## 2021-05-03 ENCOUNTER — HOSPITAL ENCOUNTER (OUTPATIENT)
Dept: MRI IMAGING | Facility: HOSPITAL | Age: 61
Discharge: HOME | End: 2021-05-03
Payer: COMMERCIAL

## 2021-05-03 DIAGNOSIS — M54.50 LUMBAR BACK PAIN: ICD-10-CM

## 2021-05-03 DIAGNOSIS — R15.9 INCONTINENCE OF FECES, UNSPECIFIED FECAL INCONTINENCE TYPE: ICD-10-CM

## 2021-05-03 PROCEDURE — 72148 MRI LUMBAR SPINE W/O DYE: CPT

## 2021-05-03 PROCEDURE — G1004 CDSM NDSC: HCPCS

## 2021-05-06 DIAGNOSIS — M51.36 DDD (DEGENERATIVE DISC DISEASE), LUMBAR: Primary | ICD-10-CM

## 2021-05-07 ENCOUNTER — TELEPHONE (OUTPATIENT)
Dept: FAMILY MEDICINE CLINIC | Facility: CLINIC | Age: 61
End: 2021-05-07

## 2021-05-07 ENCOUNTER — TELEMEDICINE (OUTPATIENT)
Dept: FAMILY MEDICINE CLINIC | Facility: CLINIC | Age: 61
End: 2021-05-07
Payer: COMMERCIAL

## 2021-05-07 DIAGNOSIS — M48.061 LUMBAR FORAMINAL STENOSIS: Primary | ICD-10-CM

## 2021-05-07 DIAGNOSIS — M54.50 LUMBAR BACK PAIN: Primary | ICD-10-CM

## 2021-05-07 PROCEDURE — 99213 OFFICE O/P EST LOW 20 MIN: CPT | Performed by: FAMILY MEDICINE

## 2021-05-07 RX ORDER — DIAZEPAM 10 MG/1
10 TABLET ORAL
Qty: 30 TABLET | Refills: 0 | Status: SHIPPED | OUTPATIENT
Start: 2021-05-07 | End: 2021-09-01

## 2021-05-10 ENCOUNTER — TELEPHONE (OUTPATIENT)
Dept: PAIN MEDICINE | Facility: CLINIC | Age: 61
End: 2021-05-10

## 2021-05-10 DIAGNOSIS — I10 ESSENTIAL HYPERTENSION: ICD-10-CM

## 2021-05-10 RX ORDER — LISINOPRIL 40 MG/1
TABLET ORAL
Qty: 90 TABLET | Refills: 1 | Status: SHIPPED | OUTPATIENT
Start: 2021-05-10 | End: 2021-11-08

## 2021-05-11 NOTE — PROGRESS NOTES
Virtual Regular Visit      Assessment/Plan:    Problem List Items Addressed This Visit     None      Visit Diagnoses     Lumbar foraminal stenosis    -  Primary    Relevant Orders    Ambulatory referral to Pain Management               Reason for visit is No chief complaint on file  Encounter provider Matthew Covington DO    Provider located at [address] [address]      Recent Visits  Date Type Provider Dept   05/07/21 Telephone Saadia Santos [facility], [address] [place] [address] recent visits within past 7 days and meeting all other requirements     Future Appointments  No visits were found meeting these conditions  Showing future appointments within next 150 days and meeting all other requirements        The patient was identified by name and date of birth  Ramo Hodges III was informed that this is a telemedicine visit and that the visit is being conducted through [address] [employer] and patient was informed that this is a secure, HIPAA-compliant platform  He agrees to proceed     My office door was closed  No one else was in the room  He acknowledged consent and understanding of privacy and security of the video platform  The patient has agreed to participate and understands they can discontinue the visit at any time  Patient is aware this is a billable service  Subjective  Ramo Hodges III is a 61 y o  male to discuss findings of lumbar MRI showing foraminal stenosis          HPI     Past Medical History:   Diagnosis Date    Anxiety     Change in bowel habits     Chronic urticaria     Depression     GERD (gastroesophageal reflux disease)     Hashimoto's disease     Hashimoto's thyroiditis     Hypertension     Renal stones        Past Surgical History:   Procedure Laterality Date    FL RETROGRADE PYELOGRAM  7/28/2019    NASAL SEPTUM SURGERY      [place] CYSTO/URETERO W/LITHOTRIPSY &INDWELL STENT INSRT Right 7/28/2019 Procedure: CYSTOSCOPY URETEROSCOPY WITH LITHOTRIPSY HOLMIUM LASER, basket stone extraction, RETROGRADE PYELOGRAM AND INSERTION STENT URETERAL;  Surgeon: Ruy Calvo MD;  [facility];  Service: Urology    TONSILLECTOMY  1979       Current Outpatient Medications   Medication Sig Dispense Refill    cetirizine (ZyrTEC) 10 MG chewable tablet Chew 10 mg daily      clonazePAM (KlonoPIN) 0 5 mg tablet Take 0 5 mg by mouth daily at bedtime      Coenzyme Q10 (COQ10) 200 MG CAPS Take by mouth      diazepam (VALIUM) 10 mg tablet Take 1 tablet (10 mg total) by mouth daily at bedtime 30 tablet 0    hyoscyamine (ANASPAZ) 0 125 mg Take 1 tablet (0 125 mg total) by mouth every 4 (four) hours as needed (abdominal spasms) 40 tablet 1    levothyroxine 75 mcg tablet TAKE ONE TABLET BY MOUTH EVERY DAY 30 tablet 3    lisinopril (ZESTRIL) 40 mg tablet TAKE ONE TABLET BY MOUTH EVERY DAY 90 tablet 1    pantoprazole (PROTONIX) 40 mg tablet TAKE ONE TABLET BY MOUTH EVERY DAY BEFORE BREAKFAST 30 tablet 3     No current facility-administered medications for this visit  Allergies   Allergen Reactions    Compazine [Prochlorperazine] GI Intolerance    Sulfa Antibiotics Rash       Review of Systems   Musculoskeletal: Positive for arthralgias  Lumbar back pain with radiation to hips bilaterally   All other systems reviewed and are negative  Video Exam    There were no vitals filed for this visit  Physical Exam  Vitals signs reviewed  Eyes:      General:         Right eye: No discharge  Left eye: No discharge  Neurological:      Mental Status: He is oriented to person, place, and time  Mental status is at baseline  Psychiatric:         Mood and Affect: Mood normal          Behavior: Behavior normal          Thought Content:  Thought content normal          Judgment: Judgment normal           I spent 15 minutes directly with the patient during this visit      VIRTUAL VISIT [address] Deemaronaldo Kraus III acknowledges that he has consented to an online visit or consultation  He understands that the online visit is based solely on information provided by him, and that, in the absence of a face-to-face physical evaluation by the physician, the diagnosis he receives is both limited and provisional in terms of accuracy and completeness  This is not intended to replace a full medical face-to-face evaluation by the physician  Ariadna Gaines understands and accepts these terms

## 2021-05-12 ENCOUNTER — CONSULT (OUTPATIENT)
Dept: PAIN MEDICINE | Facility: CLINIC | Age: 61
End: 2021-05-12
Payer: COMMERCIAL

## 2021-05-12 ENCOUNTER — TRANSCRIBE ORDERS (OUTPATIENT)
Dept: PAIN MEDICINE | Facility: CLINIC | Age: 61
End: 2021-05-12

## 2021-05-12 VITALS
DIASTOLIC BLOOD PRESSURE: 84 MMHG | WEIGHT: 172 LBS | SYSTOLIC BLOOD PRESSURE: 118 MMHG | BODY MASS INDEX: 23.33 KG/M2 | HEART RATE: 71 BPM

## 2021-05-12 DIAGNOSIS — M48.061 LUMBAR FORAMINAL STENOSIS: ICD-10-CM

## 2021-05-12 DIAGNOSIS — M51.16 INTERVERTEBRAL DISC DISORDER WITH RADICULOPATHY OF LUMBAR REGION: Primary | ICD-10-CM

## 2021-05-12 DIAGNOSIS — M51.36 LUMBAR DEGENERATIVE DISC DISEASE: ICD-10-CM

## 2021-05-12 PROCEDURE — 99204 OFFICE O/P NEW MOD 45 MIN: CPT | Performed by: ANESTHESIOLOGY

## 2021-05-12 PROCEDURE — 1036F TOBACCO NON-USER: CPT | Performed by: ANESTHESIOLOGY
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
## 2021-05-12 NOTE — PATIENT INSTRUCTIONS
Degenerative Disc Disease   WHAT YOU NEED TO KNOW:   What is degenerative disc disease? Degenerative disc disease happens when one or more discs between the vertebrae (bones in your spine) wear down  Discs act like a cushion between your vertebrae and help to stabilize your spine  Degenerative disc disease commonly occurs in the neck or lower back as you get older  What increases my risk for degenerative disc disease? · A previous herniated disc or spinal injury     · A job that requires heavy, physical work    · Obesity     · Inherited genes    · Smoking    What are the signs and symptoms of degenerative disc disease? Your symptoms may depend on where you have the degenerative disc  You may have headaches or neck, shoulder, or lower back pain that gets worse with activity  How is degenerative disc disease diagnosed? An x-ray, CT scan, or MRI may show signs of disc degeneration  You may be given contrast dye to help the spinal canal show up better in the pictures  Tell the healthcare provider if you have ever had an allergic reaction to contrast dye  Do not enter the MRI room with anything metal  Metal can cause serious injury  Tell the healthcare provider if you have any metal in or on your body  How is degenerative disc disease treated? · NSAIDs , such as ibuprofen, help decrease swelling, pain, and fever  This medicine is available with or without a doctor's order  NSAIDs can cause stomach bleeding or kidney problems in certain people  If you take blood thinner medicine, always ask your healthcare provider if NSAIDs are safe for you  Always read the medicine label and follow directions  · Acetaminophen  decreases pain  It is available without a doctor's order  Ask how much to take and how often to take it  Follow directions  Acetaminophen can cause liver damage if not taken correctly  · Prescription pain medicine  may be given  Ask how to take this medicine safely      · Physical therapy  may be recommended to decrease pain and help improve movement and strength  A physical therapist may also do spinal decompression to stretch and open the area between your vertebrae  · Spinal injections  may help to decrease pain and inflammation around the disc  · Surgery  may be needed if other treatments do not work  You may need surgery on the vertebrae, spinal fusion, or a disc replacement  How can I manage my symptoms? · Avoid activities that make your symptoms worse  Ask your healthcare provider for ways to decrease your symptoms  Certain stretches or exercises may relieve your symptoms  Ask how to stay active without further injury  · Apply heat or ice as directed  Heat or ice may help decrease pain, inflammation, or muscle spasms  · Maintain a healthy weight  If you are overweight, weight loss may help improve your symptoms  Ask your healthcare provider to help you create a weight loss plan if you are overweight  · Find ways to manage your stress  Behavioral therapy may help you learn ways to manage stress and decrease pain  Ask for more information about behavioral therapy  · Do not smoke  If you smoke, it is never too late to quit  Ask for information if you need help quitting  When should I contact my healthcare provider? · Your pain gets worse, or you cannot control it with pain medicine  · Your symptoms get worse  · You have questions or concerns about your condition or care  When should I seek immediate care? · You have severe pain or weakness, or you cannot move your arm or leg  · You lose control of your bladder or bowels  CARE AGREEMENT:   You have the right to help plan your care  Learn about your health condition and how it may be treated  Discuss treatment options with your healthcare providers to decide what care you want to receive  You always have the right to refuse treatment  The above information is an  only   It is not intended as medical advice for individual conditions or treatments  Talk to your doctor, nurse or pharmacist before following any medical regimen to see if it is safe and effective for you  © Copyright [address] Information is for End User's use only and may not be sold, redistributed or otherwise used for commercial purposes   All illustrations and images included in CareNotes® are the copyrighted property of [employer] D [employer] M [employer]  or [address] [place] [address]

## 2021-05-13 ENCOUNTER — TELEPHONE (OUTPATIENT)
Dept: PAIN MEDICINE | Facility: CLINIC | Age: 61
End: 2021-05-13

## 2021-05-13 NOTE — TELEPHONE ENCOUNTER
Left message for patient to call me back directly to schedule procedure L4 JILLIAN  at [place] [id number]

## 2021-05-13 NOTE — TELEPHONE ENCOUNTER
Scheduled pt for L4 LESI for 6/3/2021  Pt denies rx blood thinners  Went over pre-procedure instructions below:  Nothing to eat or drink 1 hr prior to procedure  Need to arrange transportation  Proper clothing for procedure  If ill or placed on antibiotics please call to reschedule  Pt had covid booster on 4/30/2021  Covid/travel/ and vaccine instructions

## 2021-06-02 DIAGNOSIS — Z82.49 FAMILY HISTORY OF ABDOMINAL AORTIC ANEURYSM (AAA): Primary | ICD-10-CM

## 2021-06-03 ENCOUNTER — HOSPITAL ENCOUNTER (OUTPATIENT)
Dept: RADIOLOGY | Facility: CLINIC | Age: 61
Discharge: HOME | End: 2021-06-03
Attending: ANESTHESIOLOGY | Admitting: ANESTHESIOLOGY
Payer: COMMERCIAL

## 2021-06-03 VITALS
SYSTOLIC BLOOD PRESSURE: 123 MMHG | RESPIRATION RATE: 18 BRPM | OXYGEN SATURATION: 97 % | TEMPERATURE: 97.6 F | HEART RATE: 61 BPM | DIASTOLIC BLOOD PRESSURE: 82 MMHG

## 2021-06-03 DIAGNOSIS — M51.16 INTERVERTEBRAL DISC DISORDER WITH RADICULOPATHY OF LUMBAR REGION: ICD-10-CM

## 2021-06-03 PROCEDURE — 62323 NJX INTERLAMINAR LMBR/SAC: CPT | Performed by: ANESTHESIOLOGY

## 2021-06-03 RX ORDER — METHYLPREDNISOLONE ACETATE 80 MG/ML
80 INJECTION, SUSPENSION INTRA-ARTICULAR; INTRALESIONAL; INTRAMUSCULAR; PARENTERAL; SOFT TISSUE ONCE
Status: COMPLETED | OUTPATIENT
Start: 2021-06-03 | End: 2021-06-03

## 2021-06-03 RX ORDER — BUPIVACAINE HCL/PF 2.5 MG/ML
10 VIAL (ML) INJECTION ONCE
Status: COMPLETED | OUTPATIENT
Start: 2021-06-03 | End: 2021-06-03

## 2021-06-03 RX ADMIN — METHYLPREDNISOLONE ACETATE 80 MG: 80 INJECTION, SUSPENSION INTRA-ARTICULAR; INTRALESIONAL; INTRAMUSCULAR; PARENTERAL; SOFT TISSUE at 10:02

## 2021-06-03 RX ADMIN — BUPIVACAINE HYDROCHLORIDE 2 ML: 2.5 INJECTION, SOLUTION EPIDURAL; INFILTRATION; INTRACAUDAL at 10:02

## 2021-06-03 RX ADMIN — IOHEXOL 1 ML: 300 INJECTION, SOLUTION INTRAVENOUS at 10:01

## 2021-06-03 NOTE — DISCHARGE INSTR - LAB
Epidural Steroid Injection   WHAT YOU NEED TO KNOW:   An epidural steroid injection (MICHELLE) is a procedure to inject steroid medicine into the epidural space  The epidural space is between your spinal cord and vertebrae  Steroids reduce inflammation and fluid buildup in your spine that may be causing pain  You may be given pain medicine along with the steroids  ACTIVITY  · Do not drive or operate machinery today  · No strenuous activity today - bending, lifting, etc   · You may resume normal activites starting tomorrow - start slowly and as tolerated  · You may shower today, but no tub baths or hot tubs  · You may have numbness for several hours from the local anesthetic  Please use caution and common sense, especially with weight-bearing activities  CARE OF THE INJECTION SITE  · If you have soreness or pain, apply ice to the area today (20 minutes on/20 minutes off)  · Starting tomorrow, you may use warm, moist heat or ice if needed  · You may have an increase or change in your discomfort for 36-48 hours after your treatment  · Apply ice and continue with any pain medication you have been prescribed  · [facility] if you have any of the following: redness, drainage, swelling, headache, stiff neck or fever above 100°F     SPECIAL INSTRUCTIONS  · Our office will contact you in approximately 7 days for a progress report  MEDICATIONS  · Continue to take all routine medications  · Our office may have instructed you to hold some medications  As no general anesthesia was used in today's procedure, you should not experience any side effects related to anesthesia  If you have a problem specifically related to your procedure, please call our office at [phone]  Problems not related to your procedure should be directed to your primary care physician
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
## 2021-06-06 ENCOUNTER — HOSPITAL ENCOUNTER (OUTPATIENT)
Dept: ULTRASOUND IMAGING | Facility: HOSPITAL | Age: 61
Discharge: HOME | End: 2021-06-06
Payer: COMMERCIAL

## 2021-06-06 DIAGNOSIS — Z82.49 FAMILY HISTORY OF ABDOMINAL AORTIC ANEURYSM (AAA): ICD-10-CM

## 2021-06-06 PROCEDURE — 76706 US ABDL AORTA SCREEN AAA: CPT

## 2021-06-10 ENCOUNTER — TELEPHONE (OUTPATIENT)
Dept: PAIN MEDICINE | Facility: CLINIC | Age: 61
End: 2021-06-10

## 2021-06-10 NOTE — TELEPHONE ENCOUNTER
Pt reports 10-20% improvement post inj   Pain level 2-3/10  Pt aware I will call next week for an update

## 2021-06-14 DIAGNOSIS — E03.9 HYPOTHYROIDISM, UNSPECIFIED TYPE: ICD-10-CM

## 2021-06-14 RX ORDER — LEVOTHYROXINE SODIUM 0.07 MG/1
TABLET ORAL
Qty: 30 TABLET | Refills: 0 | Status: SHIPPED | OUTPATIENT
Start: 2021-06-14 | End: 2021-07-13

## 2021-06-17 NOTE — TELEPHONE ENCOUNTER
No change in relief since last week  Pain is worse when he sits to stand position  or laying down in bed

## 2021-06-22 NOTE — TELEPHONE ENCOUNTER
Scheduled pt for L4 LESI for 7/29/21  Pt denies rx blood thinners  Went over pre-procedure instructions below:  Nothing to eat or drink 1 hr prior to procedure  Need to arrange transportation  Proper clothing for procedure  If ill or placed on antibiotics please call to reschedule  Covid/travel/ and vaccine instructions

## 2021-07-12 ENCOUNTER — APPOINTMENT (OUTPATIENT)
Dept: RADIOLOGY | Facility: CLINIC | Age: 61
End: 2021-07-12

## 2021-07-12 ENCOUNTER — TRANSCRIBE ORDERS (OUTPATIENT)
Dept: URGENT CARE | Facility: CLINIC | Age: 61
End: 2021-07-12

## 2021-07-12 ENCOUNTER — CLINICAL SUPPORT (OUTPATIENT)
Dept: URGENT CARE | Facility: CLINIC | Age: 61
End: 2021-07-12

## 2021-07-12 DIAGNOSIS — E03.9 HYPOTHYROIDISM, UNSPECIFIED TYPE: ICD-10-CM

## 2021-07-12 DIAGNOSIS — Z02.1 ENCOUNTER FOR PRE-EMPLOYMENT EXAMINATION: ICD-10-CM

## 2021-07-12 DIAGNOSIS — Z02.1 ENCOUNTER FOR PRE-EMPLOYMENT EXAMINATION: Primary | ICD-10-CM

## 2021-07-12 LAB
ALBUMIN SERPL BCP-MCNC: 4.1 G/DL (ref 3.5–5)
ALP SERPL-CCNC: 69 U/L (ref 46–116)
ALT SERPL W P-5'-P-CCNC: 22 U/L (ref 12–78)
ANION GAP SERPL CALCULATED.3IONS-SCNC: 3 MMOL/L (ref 4–13)
AST SERPL W P-5'-P-CCNC: 19 U/L (ref 5–45)
BASOPHILS # BLD AUTO: 0.03 THOUSANDS/ΜL (ref 0–0.1)
BASOPHILS NFR BLD AUTO: 1 % (ref 0–1)
BILIRUB SERPL-MCNC: 0.98 MG/DL (ref 0.2–1)
BUN SERPL-MCNC: 11 MG/DL (ref 5–25)
CALCIUM SERPL-MCNC: 8.9 MG/DL (ref 8.3–10.1)
CHLORIDE SERPL-SCNC: 102 MMOL/L (ref 100–108)
CHOLEST SERPL-MCNC: 203 MG/DL (ref 50–200)
CO2 SERPL-SCNC: 32 MMOL/L (ref 21–32)
CREAT SERPL-MCNC: 0.86 MG/DL (ref 0.6–1.3)
EOSINOPHIL # BLD AUTO: 0.19 THOUSAND/ΜL (ref 0–0.61)
EOSINOPHIL NFR BLD AUTO: 4 % (ref 0–6)
ERYTHROCYTE [DISTWIDTH] IN BLOOD BY AUTOMATED COUNT: 12.5 % (ref 11.6–15.1)
GFR SERPL CREATININE-BSD FRML MDRD: 94 ML/MIN/1.73SQ M
GLUCOSE P FAST SERPL-MCNC: 54 MG/DL (ref 65–99)
HCT VFR BLD AUTO: 45.8 % (ref 36.5–49.3)
HDLC SERPL-MCNC: 45 MG/DL
HGB BLD-MCNC: 15.4 G/DL (ref 12–17)
IMM GRANULOCYTES # BLD AUTO: 0.01 THOUSAND/UL (ref 0–0.2)
IMM GRANULOCYTES NFR BLD AUTO: 0 % (ref 0–2)
LDLC SERPL CALC-MCNC: 133 MG/DL (ref 0–100)
LYMPHOCYTES # BLD AUTO: 1.45 THOUSANDS/ΜL (ref 0.6–4.47)
LYMPHOCYTES NFR BLD AUTO: 33 % (ref 14–44)
MCH RBC QN AUTO: 30.3 PG (ref 26.8–34.3)
MCHC RBC AUTO-ENTMCNC: 33.6 G/DL (ref 31.4–37.4)
MCV RBC AUTO: 90 FL (ref 82–98)
MONOCYTES # BLD AUTO: 0.46 THOUSAND/ΜL (ref 0.17–1.22)
MONOCYTES NFR BLD AUTO: 10 % (ref 4–12)
NEUTROPHILS # BLD AUTO: 2.3 THOUSANDS/ΜL (ref 1.85–7.62)
NEUTS SEG NFR BLD AUTO: 52 % (ref 43–75)
NONHDLC SERPL-MCNC: 158 MG/DL
NRBC BLD AUTO-RTO: 0 /100 WBCS
PLATELET # BLD AUTO: 201 THOUSANDS/UL (ref 149–390)
PMV BLD AUTO: 9.2 FL (ref 8.9–12.7)
POTASSIUM SERPL-SCNC: 3.3 MMOL/L (ref 3.5–5.3)
PROT SERPL-MCNC: 6.7 G/DL (ref 6.4–8.2)
PSA SERPL-MCNC: 1.9 NG/ML (ref 0–4)
RBC # BLD AUTO: 5.08 MILLION/UL (ref 3.88–5.62)
SODIUM SERPL-SCNC: 137 MMOL/L (ref 136–145)
TRIGL SERPL-MCNC: 127 MG/DL
WBC # BLD AUTO: 4.44 THOUSAND/UL (ref 4.31–10.16)

## 2021-07-12 PROCEDURE — 80053 COMPREHEN METABOLIC PANEL: CPT | Performed by: PHYSICIAN ASSISTANT

## 2021-07-12 PROCEDURE — 36415 COLL VENOUS BLD VENIPUNCTURE: CPT

## 2021-07-12 PROCEDURE — 71045 X-RAY EXAM CHEST 1 VIEW: CPT

## 2021-07-12 PROCEDURE — 85025 COMPLETE CBC W/AUTO DIFF WBC: CPT | Performed by: PHYSICIAN ASSISTANT

## 2021-07-12 PROCEDURE — G0103 PSA SCREENING: HCPCS | Performed by: PHYSICIAN ASSISTANT

## 2021-07-12 PROCEDURE — 83655 ASSAY OF LEAD: CPT | Performed by: PHYSICIAN ASSISTANT

## 2021-07-12 PROCEDURE — 80061 LIPID PANEL: CPT | Performed by: PHYSICIAN ASSISTANT

## 2021-07-13 LAB
ATRIAL RATE: 61 BPM
LEAD BLD-MCNC: <1 UG/DL (ref 0–4)
P AXIS: 78 DEGREES
PR INTERVAL: 186 MS
QRS AXIS: 11 DEGREES
QRSD INTERVAL: 86 MS
QT INTERVAL: 386 MS
QTC INTERVAL: 388 MS
T WAVE AXIS: 48 DEGREES
VENTRICULAR RATE: 61 BPM

## 2021-07-13 PROCEDURE — 93010 ELECTROCARDIOGRAM REPORT: CPT | Performed by: PHYSICIAN ASSISTANT

## 2021-07-13 RX ORDER — LEVOTHYROXINE SODIUM 0.07 MG/1
TABLET ORAL
Qty: 30 TABLET | Refills: 0 | Status: SHIPPED | OUTPATIENT
Start: 2021-07-13 | End: 2021-08-13

## 2021-07-23 ENCOUNTER — TELEPHONE (OUTPATIENT)
Dept: FAMILY MEDICINE CLINIC | Facility: CLINIC | Age: 61
End: 2021-07-23

## 2021-07-23 NOTE — TELEPHONE ENCOUNTER
Patient called  He had labs done for his employer on 07/12  The work physician told him to get in touch with you to review labs  He is asking when you can see him to go over blood work

## 2021-07-28 ENCOUNTER — TELEMEDICINE (OUTPATIENT)
Dept: FAMILY MEDICINE CLINIC | Facility: CLINIC | Age: 61
End: 2021-07-28
Payer: COMMERCIAL

## 2021-07-28 DIAGNOSIS — E87.6 HYPOKALEMIA: ICD-10-CM

## 2021-07-28 DIAGNOSIS — E03.9 HYPOTHYROIDISM, UNSPECIFIED TYPE: ICD-10-CM

## 2021-07-28 DIAGNOSIS — R53.83 OTHER FATIGUE: ICD-10-CM

## 2021-07-28 DIAGNOSIS — K31.84 GASTROPARESIS: Primary | ICD-10-CM

## 2021-07-28 PROCEDURE — 1036F TOBACCO NON-USER: CPT | Performed by: FAMILY MEDICINE

## 2021-07-28 PROCEDURE — 99213 OFFICE O/P EST LOW 20 MIN: CPT | Performed by: FAMILY MEDICINE

## 2021-07-28 RX ORDER — METOCLOPRAMIDE 5 MG/1
TABLET ORAL
Qty: 120 TABLET | Refills: 1 | Status: SHIPPED | OUTPATIENT
Start: 2021-07-28 | End: 2021-08-28

## 2021-07-28 RX ORDER — POTASSIUM CHLORIDE 750 MG/1
20 CAPSULE, EXTENDED RELEASE ORAL DAILY
Qty: 20 CAPSULE | Refills: 1 | Status: SHIPPED | OUTPATIENT
Start: 2021-07-28 | End: 2021-08-09 | Stop reason: SDUPTHER

## 2021-07-29 ENCOUNTER — HOSPITAL ENCOUNTER (OUTPATIENT)
Dept: RADIOLOGY | Facility: CLINIC | Age: 61
Discharge: HOME | End: 2021-07-29
Attending: ANESTHESIOLOGY
Payer: COMMERCIAL

## 2021-07-29 VITALS
RESPIRATION RATE: 18 BRPM | DIASTOLIC BLOOD PRESSURE: 86 MMHG | HEART RATE: 56 BPM | TEMPERATURE: 97.7 F | OXYGEN SATURATION: 96 % | SYSTOLIC BLOOD PRESSURE: 151 MMHG

## 2021-07-29 DIAGNOSIS — M51.16 LUMBAR DISC DISEASE WITH RADICULOPATHY: ICD-10-CM

## 2021-07-29 PROCEDURE — 62323 NJX INTERLAMINAR LMBR/SAC: CPT | Performed by: ANESTHESIOLOGY

## 2021-07-29 RX ORDER — BUPIVACAINE HCL/PF 2.5 MG/ML
10 VIAL (ML) INJECTION ONCE
Status: COMPLETED | OUTPATIENT
Start: 2021-07-29 | End: 2021-07-29

## 2021-07-29 RX ORDER — METHYLPREDNISOLONE ACETATE 80 MG/ML
80 INJECTION, SUSPENSION INTRA-ARTICULAR; INTRALESIONAL; INTRAMUSCULAR; PARENTERAL; SOFT TISSUE ONCE
Status: COMPLETED | OUTPATIENT
Start: 2021-07-29 | End: 2021-07-29

## 2021-07-29 RX ADMIN — METHYLPREDNISOLONE ACETATE 80 MG: 80 INJECTION, SUSPENSION INTRA-ARTICULAR; INTRALESIONAL; INTRAMUSCULAR; PARENTERAL; SOFT TISSUE at 15:08

## 2021-07-29 RX ADMIN — IOHEXOL 1 ML: 300 INJECTION, SOLUTION INTRAVENOUS at 15:08

## 2021-07-29 RX ADMIN — BUPIVACAINE HYDROCHLORIDE 2 ML: 2.5 INJECTION, SOLUTION EPIDURAL; INFILTRATION; INTRACAUDAL at 15:08

## 2021-07-29 NOTE — DISCHARGE INSTR - LAB
Epidural Steroid Injection   WHAT YOU NEED TO KNOW:   An epidural steroid injection (MICHELLE) is a procedure to inject steroid medicine into the epidural space  The epidural space is between your spinal cord and vertebrae  Steroids reduce inflammation and fluid buildup in your spine that may be causing pain  You may be given pain medicine along with the steroids  ACTIVITY  · Do not drive or operate machinery today  · No strenuous activity today - bending, lifting, etc   · You may resume normal activites starting tomorrow - start slowly and as tolerated  · You may shower today, but no tub baths or hot tubs  · You may have numbness for several hours from the local anesthetic  Please use caution and common sense, especially with weight-bearing activities  CARE OF THE INJECTION SITE  · If you have soreness or pain, apply ice to the area today (20 minutes on/20 minutes off)  · Starting tomorrow, you may use warm, moist heat or ice if needed  · You may have an increase or change in your discomfort for 36-48 hours after your treatment  · Apply ice and continue with any pain medication you have been prescribed  · [facility] if you have any of the following: redness, drainage, swelling, headache, stiff neck or fever above 100°F     SPECIAL INSTRUCTIONS  · Our office will contact you in approximately 7 days for a progress report  MEDICATIONS  · Continue to take all routine medications  · Our office may have instructed you to hold some medications  As no general anesthesia was used in today's procedure, you should not experience any side effects related to anesthesia  If you have a problem specifically related to your procedure, please call our office at [phone]  Problems not related to your procedure should be directed to your primary care physician
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
## 2021-07-29 NOTE — PROGRESS NOTES
Virtual Regular Visit    Verification of patient location:    Patient is located in the following state in which I hold an active license [place]      Assessment/Plan:    Problem List Items Addressed This Visit        Digestive    Gastroparesis - Primary    Relevant Medications    metoclopramide (REGLAN) 5 mg tablet       Other    Hypokalemia    Relevant Medications    potassium chloride (MICRO-K) 10 MEQ CR capsule    Other Relevant Orders    Potassium    Magnesium      Other Visit Diagnoses     Hypothyroidism, unspecified type        Relevant Orders    TSH, 3rd generation    Other fatigue        Relevant Orders    Testosterone, free, total               Reason for visit is   Chief Complaint   Patient presents with    Virtual Regular Visit        Encounter provider Mitzi Gibbs DO    Provider located at [address] [place] [id number]      Recent Visits  Date Type Provider Dept   07/28/21 [address] [place] [facility], [address] [place]    07/23/21 Telephone [facility] recent visits within past 7 days and meeting all other requirements  Future Appointments  No visits were found meeting these conditions  Showing future appointments within next 150 days and meeting all other requirements       The patient was identified by name and date of birth  Marylee Mound III was informed that this is a telemedicine visit and that the visit is being conducted through [address] [place] [address] Now and patient was informed that this is a secure, HIPAA-compliant platform  He agrees to proceed     My office door was closed  No one else was in the room  He acknowledged consent and understanding of privacy and security of the video platform  The patient has agreed to participate and understands they can discontinue the visit at any time  Patient is aware this is a billable service       Subjective  Marylee Mound III is a 61 y o  male is presenting to discuss his low potassium on labs done for work, as well as fatigue and early satiety as well as intermittent stabbing epigastric pain  Ines Sole HPI     Past Medical History:   Diagnosis Date    Anxiety     Change in bowel habits     Chronic urticaria     Depression     GERD (gastroesophageal reflux disease)     Hashimoto's disease     Hashimoto's thyroiditis     Hypertension     Renal stones        Past Surgical History:   Procedure Laterality Date    FL RETROGRADE PYELOGRAM  7/28/2019    NASAL SEPTUM SURGERY      [place] CYSTO/URETERO W/LITHOTRIPSY &INDWELL STENT INSRT Right 7/28/2019    Procedure: CYSTOSCOPY URETEROSCOPY WITH LITHOTRIPSY HOLMIUM LASER, basket stone extraction, RETROGRADE PYELOGRAM AND INSERTION STENT URETERAL;  Surgeon: Marcie Jackson MD;  [facility];  Service: Urology    TONSILLECTOMY  1979       Current Outpatient Medications   Medication Sig Dispense Refill    cetirizine (ZyrTEC) 10 MG chewable tablet Chew 10 mg daily      clonazePAM (KlonoPIN) 0 5 mg tablet Take 0 5 mg by mouth daily at bedtime      Coenzyme Q10 (COQ10) 200 MG CAPS Take by mouth      diazepam (VALIUM) 10 mg tablet Take 1 tablet (10 mg total) by mouth daily at bedtime 30 tablet 0    hyoscyamine (ANASPAZ) 0 125 mg Take 1 tablet (0 125 mg total) by mouth every 4 (four) hours as needed (abdominal spasms) 40 tablet 1    levothyroxine 75 mcg tablet TAKE 1 TABLET BY MOUTH EVERY DAY 30 tablet 0    lisinopril (ZESTRIL) 40 mg tablet TAKE ONE TABLET BY MOUTH EVERY DAY 90 tablet 1    metoclopramide (REGLAN) 5 mg tablet 1 tab AC and  tablet 1    pantoprazole (PROTONIX) 40 mg tablet TAKE ONE TABLET BY MOUTH EVERY DAY BEFORE BREAKFAST 30 tablet 3    potassium chloride (MICRO-K) 10 MEQ CR capsule Take 2 capsules (20 mEq total) by mouth daily for 10 days 20 capsule 1     No current facility-administered medications for this visit          Allergies   Allergen Reactions    Compazine [Prochlorperazine] GI Intolerance    Sulfa Antibiotics Rash       Review of Systems   Constitutional: Positive for appetite change and fatigue  Gastrointestinal: Positive for abdominal distention and abdominal pain  + early satiety and intermittent epigastric pain  Video Exam    There were no vitals filed for this visit  Physical Exam  Constitutional:       Appearance: Normal appearance  Eyes:      General:         Right eye: No discharge  Left eye: No discharge  Pulmonary:      Effort: No respiratory distress  Neurological:      Mental Status: He is alert and oriented to person, place, and time  Mental status is at baseline  Psychiatric:         Mood and Affect: Mood normal          Behavior: Behavior normal          Thought Content: Thought content normal          Judgment: Judgment normal           I spent 15 minutes directly with the patient during this visit    VIRTUAL VISIT [place] III verbally agrees to participate in [employer]  Pt is aware that [employer] could be limited without vital signs or the ability to perform a full hands-on physical Fredo Barry III understands he or the provider may request at any time to terminate the video visit and request the patient to seek care or treatment in person

## 2021-07-29 NOTE — PROGRESS NOTES
Virtual Regular Visit    Verification of patient location:    Patient is located in the following state in which I hold an active license [place]      Assessment/Plan:    Problem List Items Addressed This Visit        Digestive    Gastroparesis - Primary    Relevant Medications    metoclopramide (REGLAN) 5 mg tablet       Other    Hypokalemia    Relevant Medications    potassium chloride (MICRO-K) 10 MEQ CR capsule    Other Relevant Orders    Potassium    Magnesium      Other Visit Diagnoses     Hypothyroidism, unspecified type        Relevant Orders    TSH, 3rd generation    Other fatigue        Relevant Orders    Testosterone, free, total               Reason for visit is   Chief Complaint   Patient presents with    Virtual Regular Visit        Encounter provider Jed Amaral DO    Provider located at [address] [place], [place] [address] [id number]      Recent Visits  Date Type Provider Dept   07/28/21 [address] [place] [facility], [address] [place]    07/23/21 Telephone [facility] recent visits within past 7 days and meeting all other requirements  Future Appointments  No visits were found meeting these conditions  Showing future appointments within next 150 days and meeting all other requirements       The patient was identified by name and date of birth  Beverly Ding III was informed that this is a telemedicine visit and that the visit is being conducted through [address] [place] [address] Now and patient was informed that this is a secure, HIPAA-compliant platform  He agrees to proceed     My office door was closed  No one else was in the room  He acknowledged consent and understanding of privacy and security of the video platform  The patient has agreed to participate and understands they can discontinue the visit at any time  Patient is aware this is a billable service       Subjective  Beverly Ding III is a 61 y o  male to discuss his low potassium that was seen on work labs  He also continues to experience fatigue, as well as early satiety and intermittent sharp jabs to epigastric area  Oz Danielle HPI     Past Medical History:   Diagnosis Date    Anxiety     Change in bowel habits     Chronic urticaria     Depression     GERD (gastroesophageal reflux disease)     Hashimoto's disease     Hashimoto's thyroiditis     Hypertension     Renal stones        Past Surgical History:   Procedure Laterality Date    FL RETROGRADE PYELOGRAM  7/28/2019    NASAL SEPTUM SURGERY      ME CYSTO/URETERO W/LITHOTRIPSY &INDWELL STENT INSRT Right 7/28/2019    Procedure: CYSTOSCOPY URETEROSCOPY WITH LITHOTRIPSY HOLMIUM LASER, basket stone extraction, RETROGRADE PYELOGRAM AND INSERTION STENT URETERAL;  Surgeon: Ketty Taylor MD;  [facility];  Service: Urology    TONSILLECTOMY  1979       Current Outpatient Medications   Medication Sig Dispense Refill    cetirizine (ZyrTEC) 10 MG chewable tablet Chew 10 mg daily      clonazePAM (KlonoPIN) 0 5 mg tablet Take 0 5 mg by mouth daily at bedtime      Coenzyme Q10 (COQ10) 200 MG CAPS Take by mouth      diazepam (VALIUM) 10 mg tablet Take 1 tablet (10 mg total) by mouth daily at bedtime 30 tablet 0    hyoscyamine (ANASPAZ) 0 125 mg Take 1 tablet (0 125 mg total) by mouth every 4 (four) hours as needed (abdominal spasms) 40 tablet 1    levothyroxine 75 mcg tablet TAKE 1 TABLET BY MOUTH EVERY DAY 30 tablet 0    lisinopril (ZESTRIL) 40 mg tablet TAKE ONE TABLET BY MOUTH EVERY DAY 90 tablet 1    metoclopramide (REGLAN) 5 mg tablet 1 tab AC and  tablet 1    pantoprazole (PROTONIX) 40 mg tablet TAKE ONE TABLET BY MOUTH EVERY DAY BEFORE BREAKFAST 30 tablet 3    potassium chloride (MICRO-K) 10 MEQ CR capsule Take 2 capsules (20 mEq total) by mouth daily for 10 days 20 capsule 1     No current facility-administered medications for this visit          Allergies   Allergen Reactions  Compazine [Prochlorperazine] GI Intolerance    Sulfa Antibiotics Rash       Review of Systems   Constitutional: Positive for fatigue  Negative for appetite change and unexpected weight change  Gastrointestinal: Positive for abdominal distention and abdominal pain  + early satiety and sharp jabbing pain intermitently to epigastric area  Video Exam    There were no vitals filed for this visit  Physical Exam  Constitutional:       Appearance: Normal appearance  He is not ill-appearing or diaphoretic  Eyes:      General:         Right eye: No discharge  Left eye: No discharge  Pulmonary:      Effort: No respiratory distress  Neurological:      Mental Status: He is oriented to person, place, and time  Mental status is at baseline  Psychiatric:         Mood and Affect: Mood normal          Behavior: Behavior normal          Thought Content: Thought content normal          Judgment: Judgment normal           I spent 15 minutes directly with the patient during this visit    VIRTUAL VISIT [place] III verbally agrees to participate in [employer]  Pt is aware that [employer] could be limited without vital signs or the ability to perform a full hands-on physical [facility] Bari III understands he or the provider may request at any time to terminate the video visit and request the patient to seek care or treatment in person

## 2021-08-05 ENCOUNTER — TELEPHONE (OUTPATIENT)
Dept: PAIN MEDICINE | Facility: CLINIC | Age: 61
End: 2021-08-05

## 2021-08-06 ENCOUNTER — APPOINTMENT (OUTPATIENT)
Dept: LAB | Facility: CLINIC | Age: 61
End: 2021-08-06
Payer: COMMERCIAL

## 2021-08-06 DIAGNOSIS — E03.9 HYPOTHYROIDISM, UNSPECIFIED TYPE: ICD-10-CM

## 2021-08-06 DIAGNOSIS — E87.6 HYPOKALEMIA: ICD-10-CM

## 2021-08-06 LAB
MAGNESIUM SERPL-MCNC: 2 MG/DL (ref 1.6–2.6)
POTASSIUM SERPL-SCNC: 3.4 MMOL/L (ref 3.5–5.3)
TSH SERPL DL<=0.05 MIU/L-ACNC: 2.73 UIU/ML (ref 0.36–3.74)

## 2021-08-06 PROCEDURE — 83735 ASSAY OF MAGNESIUM: CPT

## 2021-08-06 PROCEDURE — 84132 ASSAY OF SERUM POTASSIUM: CPT

## 2021-08-06 PROCEDURE — 36415 COLL VENOUS BLD VENIPUNCTURE: CPT

## 2021-08-06 PROCEDURE — 84443 ASSAY THYROID STIM HORMONE: CPT

## 2021-08-09 ENCOUNTER — APPOINTMENT (OUTPATIENT)
Dept: LAB | Facility: CLINIC | Age: 61
End: 2021-08-09
Payer: COMMERCIAL

## 2021-08-09 ENCOUNTER — TELEPHONE (OUTPATIENT)
Dept: FAMILY MEDICINE CLINIC | Facility: CLINIC | Age: 61
End: 2021-08-09

## 2021-08-09 DIAGNOSIS — E87.6 HYPOKALEMIA: ICD-10-CM

## 2021-08-09 DIAGNOSIS — R53.83 OTHER FATIGUE: ICD-10-CM

## 2021-08-09 PROCEDURE — 84403 ASSAY OF TOTAL TESTOSTERONE: CPT

## 2021-08-09 PROCEDURE — 36415 COLL VENOUS BLD VENIPUNCTURE: CPT

## 2021-08-09 PROCEDURE — 84402 ASSAY OF FREE TESTOSTERONE: CPT

## 2021-08-09 RX ORDER — POTASSIUM CHLORIDE 750 MG/1
20 CAPSULE, EXTENDED RELEASE ORAL DAILY
Qty: 20 CAPSULE | Refills: 1 | Status: CANCELLED | OUTPATIENT
Start: 2021-08-09 | End: 2021-08-19

## 2021-08-09 NOTE — TELEPHONE ENCOUNTER
Patient is aware and requested refill   No questions or concerns     Medication refill requested: Potassium   Last office visit: 7/28/21  Next office visit: 11/17/21  Last refilled: 7/28/21  Pharmacy:   [address] [address]  Phone: [phone] Fax: [phone]      Pended: 20 x 1

## 2021-08-09 NOTE — TELEPHONE ENCOUNTER
----- Message from Vicky Stein MD sent at 8/9/2021 12:00 PM EDT -----  I reviewed this patients results on behalf of Dr Mauricio Connie  His thyroid is stable  Potassium slightly low, continue to supplement  No repeat labs at this time

## 2021-08-10 LAB
TESTOST FREE SERPL-MCNC: 8.3 PG/ML (ref 6.6–18.1)
TESTOST SERPL-MCNC: 517 NG/DL (ref 264–916)

## 2021-08-12 NOTE — TELEPHONE ENCOUNTER
Patient states that he is feeling very uncomfortable. Patient states that the pain is still on the right side. However he has noticed that the pain is moving upwards 3 to 4 inches.     Pain level - 8/10    0% relief

## 2021-08-13 DIAGNOSIS — E03.9 HYPOTHYROIDISM, UNSPECIFIED TYPE: ICD-10-CM

## 2021-08-13 RX ORDER — LEVOTHYROXINE SODIUM 0.07 MG/1
TABLET ORAL
Qty: 30 TABLET | Refills: 0 | Status: SHIPPED | OUTPATIENT
Start: 2021-08-13 | End: 2021-10-11

## 2021-08-17 DIAGNOSIS — R10.13 EPIGASTRIC PAIN: ICD-10-CM

## 2021-08-17 RX ORDER — PANTOPRAZOLE SODIUM 40 MG/1
TABLET, DELAYED RELEASE ORAL
Qty: 30 TABLET | Refills: 3 | Status: SHIPPED | OUTPATIENT
Start: 2021-08-17 | End: 2021-11-12 | Stop reason: ALTCHOICE

## 2021-08-19 ENCOUNTER — APPOINTMENT (EMERGENCY)
Dept: CT IMAGING | Facility: HOSPITAL | Age: 61
End: 2021-08-19
Payer: COMMERCIAL

## 2021-08-19 ENCOUNTER — APPOINTMENT (EMERGENCY)
Dept: ULTRASOUND IMAGING | Facility: HOSPITAL | Age: 61
End: 2021-08-19
Payer: COMMERCIAL

## 2021-08-19 ENCOUNTER — HOSPITAL ENCOUNTER (EMERGENCY)
Facility: HOSPITAL | Age: 61
Discharge: HOME | End: 2021-08-19
Attending: EMERGENCY MEDICINE | Admitting: EMERGENCY MEDICINE
Payer: COMMERCIAL

## 2021-08-19 VITALS
WEIGHT: 161.6 LBS | DIASTOLIC BLOOD PRESSURE: 91 MMHG | RESPIRATION RATE: 18 BRPM | BODY MASS INDEX: 21.89 KG/M2 | OXYGEN SATURATION: 99 % | SYSTOLIC BLOOD PRESSURE: 133 MMHG | HEART RATE: 74 BPM | HEIGHT: 72 IN | TEMPERATURE: 97.6 F

## 2021-08-19 DIAGNOSIS — M54.16 LUMBAR RADICULOPATHY: ICD-10-CM

## 2021-08-19 DIAGNOSIS — M54.50 LOWER BACK PAIN: Primary | ICD-10-CM

## 2021-08-19 LAB
ALBUMIN SERPL BCP-MCNC: 3.8 G/DL (ref 3.5–5)
ALP SERPL-CCNC: 74 U/L (ref 46–116)
ALT SERPL W P-5'-P-CCNC: 27 U/L (ref 12–78)
ANION GAP SERPL CALCULATED.3IONS-SCNC: 8 MMOL/L (ref 4–13)
AST SERPL W P-5'-P-CCNC: 19 U/L (ref 5–45)
BASOPHILS # BLD AUTO: 0.03 THOUSANDS/ΜL (ref 0–0.1)
BASOPHILS NFR BLD AUTO: 1 % (ref 0–1)
BILIRUB SERPL-MCNC: 0.78 MG/DL (ref 0.2–1)
BILIRUB UR QL STRIP: NEGATIVE
BUN SERPL-MCNC: 13 MG/DL (ref 5–25)
CALCIUM SERPL-MCNC: 9 MG/DL (ref 8.3–10.1)
CHLORIDE SERPL-SCNC: 105 MMOL/L (ref 100–108)
CLARITY UR: CLEAR
CO2 SERPL-SCNC: 31 MMOL/L (ref 21–32)
COLOR UR: YELLOW
CREAT SERPL-MCNC: 1.11 MG/DL (ref 0.6–1.3)
EOSINOPHIL # BLD AUTO: 0.23 THOUSAND/ΜL (ref 0–0.61)
EOSINOPHIL NFR BLD AUTO: 5 % (ref 0–6)
ERYTHROCYTE [DISTWIDTH] IN BLOOD BY AUTOMATED COUNT: 12.2 % (ref 11.6–15.1)
GFR SERPL CREATININE-BSD FRML MDRD: 71 ML/MIN/1.73SQ M
GLUCOSE SERPL-MCNC: 113 MG/DL (ref 65–140)
GLUCOSE UR STRIP-MCNC: NEGATIVE MG/DL
HCT VFR BLD AUTO: 44.2 % (ref 36.5–49.3)
HGB BLD-MCNC: 15.5 G/DL (ref 12–17)
HGB UR QL STRIP.AUTO: NEGATIVE
IMM GRANULOCYTES # BLD AUTO: 0.02 THOUSAND/UL (ref 0–0.2)
IMM GRANULOCYTES NFR BLD AUTO: 0 % (ref 0–2)
KETONES UR STRIP-MCNC: NEGATIVE MG/DL
LEUKOCYTE ESTERASE UR QL STRIP: NEGATIVE
LIPASE SERPL-CCNC: 327 U/L (ref 73–393)
LYMPHOCYTES # BLD AUTO: 1.31 THOUSANDS/ΜL (ref 0.6–4.47)
LYMPHOCYTES NFR BLD AUTO: 26 % (ref 14–44)
MCH RBC QN AUTO: 31.2 PG (ref 26.8–34.3)
MCHC RBC AUTO-ENTMCNC: 35.1 G/DL (ref 31.4–37.4)
MCV RBC AUTO: 89 FL (ref 82–98)
MONOCYTES # BLD AUTO: 0.44 THOUSAND/ΜL (ref 0.17–1.22)
MONOCYTES NFR BLD AUTO: 9 % (ref 4–12)
NEUTROPHILS # BLD AUTO: 3.07 THOUSANDS/ΜL (ref 1.85–7.62)
NEUTS SEG NFR BLD AUTO: 59 % (ref 43–75)
NITRITE UR QL STRIP: NEGATIVE
NRBC BLD AUTO-RTO: 0 /100 WBCS
PH UR STRIP.AUTO: 6 [PH]
PLATELET # BLD AUTO: 187 THOUSANDS/UL (ref 149–390)
PMV BLD AUTO: 9.2 FL (ref 8.9–12.7)
POTASSIUM SERPL-SCNC: 3.8 MMOL/L (ref 3.5–5.3)
PROT SERPL-MCNC: 6.7 G/DL (ref 6.4–8.2)
PROT UR STRIP-MCNC: NEGATIVE MG/DL
RBC # BLD AUTO: 4.97 MILLION/UL (ref 3.88–5.62)
SODIUM SERPL-SCNC: 144 MMOL/L (ref 136–145)
SP GR UR STRIP.AUTO: 1.02 (ref 1–1.03)
UROBILINOGEN UR QL STRIP.AUTO: 0.2 E.U./DL
WBC # BLD AUTO: 5.1 THOUSAND/UL (ref 4.31–10.16)

## 2021-08-19 PROCEDURE — 96361 HYDRATE IV INFUSION ADD-ON: CPT

## 2021-08-19 PROCEDURE — 99285 EMERGENCY DEPT VISIT HI MDM: CPT | Performed by: EMERGENCY MEDICINE

## 2021-08-19 PROCEDURE — 74176 CT ABD & PELVIS W/O CONTRAST: CPT

## 2021-08-19 PROCEDURE — 76870 US EXAM SCROTUM: CPT

## 2021-08-19 PROCEDURE — 81003 URINALYSIS AUTO W/O SCOPE: CPT | Performed by: EMERGENCY MEDICINE

## 2021-08-19 PROCEDURE — 85025 COMPLETE CBC W/AUTO DIFF WBC: CPT | Performed by: EMERGENCY MEDICINE

## 2021-08-19 PROCEDURE — 36415 COLL VENOUS BLD VENIPUNCTURE: CPT | Performed by: EMERGENCY MEDICINE

## 2021-08-19 PROCEDURE — 99284 EMERGENCY DEPT VISIT MOD MDM: CPT

## 2021-08-19 PROCEDURE — G1004 CDSM NDSC: HCPCS

## 2021-08-19 PROCEDURE — 80053 COMPREHEN METABOLIC PANEL: CPT | Performed by: EMERGENCY MEDICINE

## 2021-08-19 PROCEDURE — 96375 TX/PRO/DX INJ NEW DRUG ADDON: CPT

## 2021-08-19 PROCEDURE — 96374 THER/PROPH/DIAG INJ IV PUSH: CPT

## 2021-08-19 PROCEDURE — 83690 ASSAY OF LIPASE: CPT | Performed by: EMERGENCY MEDICINE

## 2021-08-19 RX ORDER — ONDANSETRON 2 MG/ML
4 INJECTION INTRAMUSCULAR; INTRAVENOUS ONCE
Status: COMPLETED | OUTPATIENT
Start: 2021-08-19 | End: 2021-08-19

## 2021-08-19 RX ORDER — TIZANIDINE HYDROCHLORIDE 2 MG/1
2 CAPSULE, GELATIN COATED ORAL 3 TIMES DAILY PRN
Qty: 21 CAPSULE | Refills: 0 | Status: SHIPPED | OUTPATIENT
Start: 2021-08-19 | End: 2021-09-01

## 2021-08-19 RX ORDER — NAPROXEN 500 MG/1
500 TABLET ORAL 2 TIMES DAILY WITH MEALS
Qty: 14 TABLET | Refills: 0 | Status: SHIPPED | OUTPATIENT
Start: 2021-08-19 | End: 2021-09-07

## 2021-08-19 RX ORDER — HYDROMORPHONE HCL/PF 1 MG/ML
0.5 SYRINGE (ML) INJECTION ONCE
Status: COMPLETED | OUTPATIENT
Start: 2021-08-19 | End: 2021-08-19

## 2021-08-19 RX ORDER — LIDOCAINE 50 MG/G
1 PATCH TOPICAL DAILY
Qty: 15 PATCH | Refills: 0 | Status: SHIPPED | OUTPATIENT
Start: 2021-08-19 | End: 2021-11-12 | Stop reason: ALTCHOICE

## 2021-08-19 RX ORDER — KETOROLAC TROMETHAMINE 30 MG/ML
15 INJECTION, SOLUTION INTRAMUSCULAR; INTRAVENOUS ONCE
Status: COMPLETED | OUTPATIENT
Start: 2021-08-19 | End: 2021-08-19

## 2021-08-19 RX ORDER — LIDOCAINE 50 MG/G
1 PATCH TOPICAL ONCE
Status: DISCONTINUED | OUTPATIENT
Start: 2021-08-19 | End: 2021-08-19 | Stop reason: HOSPADM

## 2021-08-19 RX ADMIN — LIDOCAINE 5% 1 PATCH: 700 PATCH TOPICAL at 13:56

## 2021-08-19 RX ADMIN — SODIUM CHLORIDE 1000 ML: 0.9 INJECTION, SOLUTION INTRAVENOUS at 11:39

## 2021-08-19 RX ADMIN — ONDANSETRON 4 MG: 2 INJECTION INTRAMUSCULAR; INTRAVENOUS at 11:41

## 2021-08-19 RX ADMIN — KETOROLAC TROMETHAMINE 15 MG: 30 INJECTION, SOLUTION INTRAMUSCULAR at 11:41

## 2021-08-19 RX ADMIN — HYDROMORPHONE HYDROCHLORIDE 0.5 MG: 1 INJECTION, SOLUTION INTRAMUSCULAR; INTRAVENOUS; SUBCUTANEOUS at 11:42

## 2021-08-19 NOTE — ED PROVIDER NOTES
History  Chief Complaint   Patient presents with    Back Pain     pt reports lower right back pain that began about a week ago  Pt reports previously having kidney stones and the pain feels similar  The patient is a 64year old male with a PMHx significant for nephrolithiasis s/p surgical removal, and DDD w/ chronic back pain presents to the ED with right flank pain that started 8 days ago  The pain is aching in nature, initially intermittent but is now constant, waxing and waning and rated 9/10 at it's worse  He states that the pain radiates around to the RLQ, groin and testicle  Reports decreased appetite for the last few days, had some associated nausea initially but denies nausea now  He also denies vomiting, fever/chills, urinary symptoms, hematuria, diarrhea or constipation  During previous evaluation for kidney stone, additional stones present in the kidney on CT and PCP has continued surveillance  NSAIDs and other pain meds not effective  History provided by:  Patient and spouse  Back Pain  Pain location: right flank  Quality:  Aching  Radiates to: RLQ  Pain severity:  Moderate  Onset quality:  Gradual  Duration:  8 days  Timing:  Intermittent  Progression:  Waxing and waning  Associated symptoms: abdominal pain    Associated symptoms: no chest pain, no dysuria, no fever and no headaches        Prior to Admission Medications   Prescriptions Last Dose Informant Patient Reported? Taking?    Coenzyme Q10 (COQ10) 200 MG CAPS Past Week at Unknown time Self Yes Yes   Sig: Take by mouth   cetirizine (ZyrTEC) 10 MG chewable tablet Not Taking at Unknown time Self Yes No   Sig: Chew 10 mg daily   Patient not taking: Reported on 8/19/2021   clonazePAM (KlonoPIN) 0 5 mg tablet 8/18/2021 at Unknown time Self Yes Yes   Sig: Take 0 5 mg by mouth daily at bedtime   diazepam (VALIUM) 10 mg tablet Not Taking at Unknown time  No No   Sig: Take 1 tablet (10 mg total) by mouth daily at bedtime   Patient not taking: Reported on 2021   hyoscyamine (ANASPAZ) 0 125 mg Not Taking at Unknown time  No No   Sig: Take 1 tablet (0 125 mg total) by mouth every 4 (four) hours as needed (abdominal spasms)   Patient not taking: Reported on 2021   levothyroxine 75 mcg tablet 2021 at Unknown time  No Yes   Sig: TAKE ONE TABLET BY MOUTH DAILY   lisinopril (ZESTRIL) 40 mg tablet 2021 at Unknown time  No Yes   Sig: TAKE ONE TABLET BY MOUTH EVERY DAY   metoclopramide (REGLAN) 5 mg tablet Past Week at Unknown time  No Yes   Si tab AC and HS   pantoprazole (PROTONIX) 40 mg tablet 2021 at Unknown time  No Yes   Sig: TAKE ONE TABLET BY MOUTH EVERY DAY BEFORE BREAKFAST   potassium chloride (MICRO-K) 10 MEQ CR capsule 2021 at Unknown time  No Yes   Sig: Take 2 capsules (20 mEq total) by mouth daily for 10 days      Facility-Administered Medications: None       Past Medical History:   Diagnosis Date    Anxiety     Change in bowel habits     Chronic urticaria     Depression     GERD (gastroesophageal reflux disease)     Hashimoto's disease     Hashimoto's thyroiditis     Hypertension     Renal stones        Past Surgical History:   Procedure Laterality Date    FL RETROGRADE PYELOGRAM  2019    NASAL SEPTUM SURGERY      CO CYSTO/URETERO W/LITHOTRIPSY &INDWELL STENT INSRT Right 2019    Procedure: CYSTOSCOPY URETEROSCOPY WITH LITHOTRIPSY HOLMIUM LASER, basket stone extraction, RETROGRADE PYELOGRAM AND INSERTION STENT URETERAL;  Surgeon: Vanessa Pena MD;  [facility];  Service: Urology    TONSILLECTOMY         Family History   Problem Relation Age of Onset    Cancer Mother     Stroke Mother     Colon cancer Father     Colon polyps Father     Valvular heart disease Father     Cancer Sister     Suicidality Brother      I have reviewed and agree with the history as documented      E-Cigarette/Vaping    E-Cigarette Use Never User      E-Cigarette/Vaping Substances     Social History     Tobacco Use    Smoking status: Never Smoker    Smokeless tobacco: Never Used   Vaping Use    Vaping Use: Never used   Substance Use Topics    Alcohol use: Yes     Comment: social    Drug use: Never        Review of Systems   Constitutional: Positive for appetite change (decreased)  Negative for chills and fever  HENT: Negative for congestion, rhinorrhea and sore throat  Eyes: Negative for visual disturbance  Respiratory: Negative for cough and shortness of breath  Cardiovascular: Negative for chest pain and palpitations  Gastrointestinal: Positive for abdominal pain  Negative for nausea and vomiting  Genitourinary: Positive for flank pain and testicular pain  Negative for difficulty urinating, dysuria, hematuria and scrotal swelling  Musculoskeletal: Negative for back pain and neck pain  Neurological: Negative for dizziness, light-headedness and headaches  Psychiatric/Behavioral: The patient is not nervous/anxious  Physical Exam  ED Triage Vitals [08/19/21 1038]   Temperature Pulse Respirations Blood Pressure SpO2   97 6 °F (36 4 °C) 78 18 133/91 97 %      Temp Source Heart Rate Source Patient Position - Orthostatic VS BP Location FiO2 (%)   Oral Monitor Lying Right arm --      Pain Score       6             Orthostatic Vital Signs  Vitals:    08/19/21 1038 08/19/21 1045   BP: 133/91 133/91   Pulse: 78 74   Patient Position - Orthostatic VS: Lying Lying       Physical Exam  Exam conducted with a chaperone present  Constitutional:       General: He is not in acute distress  Appearance: Normal appearance  He is normal weight  He is not ill-appearing or diaphoretic  HENT:      Head: Normocephalic and atraumatic  Mouth/Throat:      Mouth: Mucous membranes are moist    Eyes:      Pupils: Pupils are equal, round, and reactive to light  Cardiovascular:      Rate and Rhythm: Normal rate and regular rhythm  Pulses: Normal pulses        Heart sounds: Normal heart sounds  Pulmonary:      Effort: Pulmonary effort is normal  No respiratory distress  Breath sounds: Normal breath sounds  Abdominal:      General: Abdomen is flat  Bowel sounds are normal       Palpations: Abdomen is soft  Tenderness: There is abdominal tenderness (RLQ)  There is right CVA tenderness (mild)  There is no left CVA tenderness, guarding or rebound  Hernia: No hernia is present  There is no hernia in the left inguinal area or right inguinal area  Genitourinary:     Testes: Normal          Right: Tenderness not present  Left: Tenderness not present  Musculoskeletal:         General: No signs of injury  Cervical back: Normal range of motion and neck supple  Neurological:      General: No focal deficit present  Mental Status: He is alert and oriented to person, place, and time  Sensory: No sensory deficit  Motor: No weakness           ED Medications  Medications   ondansetron (ZOFRAN) injection 4 mg (4 mg Intravenous Given 8/19/21 1141)   ketorolac (TORADOL) injection 15 mg (15 mg Intravenous Given 8/19/21 1141)   HYDROmorphone (DILAUDID) injection 0 5 mg (0 5 mg Intravenous Given 8/19/21 1142)   sodium chloride 0 9 % bolus 1,000 mL (0 mL Intravenous Stopped 8/19/21 1357)       Diagnostic Studies  Results Reviewed     Procedure Component Value Units Date/Time    CMP [[id number]] Collected: 08/19/21 1145    Lab Status: Final result Specimen: Blood from Arm, Left Updated: 08/19/21 1213     Sodium 144 mmol/L      Potassium 3 8 mmol/L      Chloride 105 mmol/L      CO2 31 mmol/L      ANION GAP 8 mmol/L      BUN 13 mg/dL      Creatinine 1 11 mg/dL      Glucose 113 mg/dL      Calcium 9 0 mg/dL      AST 19 U/L      ALT 27 U/L      Alkaline Phosphatase 74 U/L      Total Protein 6 7 g/dL      Albumin 3 8 g/dL      Total Bilirubin 0 78 mg/dL      eGFR 71 ml/min/1 73sq m     Narrative:      [place] guidelines for Chronic Kidney Disease (CKD):   Stage 1 with normal or high GFR (GFR > 90 mL/min/1 73 square meters)    Stage 2 Mild CKD (GFR = 60-89 mL/min/1 73 square meters)    Stage 3A Moderate CKD (GFR = 45-59 mL/min/1 73 square meters)    Stage 3B Moderate CKD (GFR = 30-44 mL/min/1 73 square meters)    Stage 4 Severe CKD (GFR = 15-29 mL/min/1 73 square meters)    Stage 5 End Stage CKD (GFR <15 mL/min/1 73 square meters)  Note: GFR calculation is accurate only with a steady state creatinine    Lipase [[id number]]  (Normal) Collected: 08/19/21 1145    Lab Status: Final result Specimen: Blood from Arm, Left Updated: 08/19/21 1213     Lipase 327 u/L     UA w Reflex to Microscopic w Reflex to Culture [[id number]] Collected: 08/19/21 1145    Lab Status: Final result Specimen: Urine, Clean Catch Updated: 08/19/21 1203     Color, UA Yellow     Clarity, UA Clear     Specific Gravity, UA 1 025     pH, UA 6 0     Leukocytes, UA Negative     Nitrite, UA Negative     Protein, UA Negative mg/dl      Glucose, UA Negative mg/dl      Ketones, UA Negative mg/dl      Urobilinogen, UA 0 2 E U /dl      Bilirubin, UA Negative     Blood, UA Negative    CBC and differential [[id number]] Collected: 08/19/21 1145    Lab Status: Final result Specimen: Blood from Arm, Left Updated: 08/19/21 1155     WBC 5 10 Thousand/uL      RBC 4 97 Million/uL      Hemoglobin 15 5 g/dL      Hematocrit 44 2 %      MCV 89 fL      MCH 31 2 pg      MCHC 35 1 g/dL      RDW 12 2 %      MPV 9 2 fL      Platelets 161 Thousands/uL      nRBC 0 /100 WBCs      Neutrophils Relative 59 %      Immat GRANS % 0 %      Lymphocytes Relative 26 %      Monocytes Relative 9 %      Eosinophils Relative 5 %      Basophils Relative 1 %      Neutrophils Absolute 3 07 Thousands/µL      Immature Grans Absolute 0 02 Thousand/uL      Lymphocytes Absolute 1 31 Thousands/µL      Monocytes Absolute 0 44 Thousand/µL      Eosinophils Absolute 0 23 Thousand/µL      Basophils Absolute 0 03 Thousands/µL                  US scrotum and testicles   Final Result by Kaleb Hudson MD (08/19 4640)       No torsion, orchitis or epididymitis  Workstation performed: [id number]         CT renal stone study abdomen pelvis without contrast   Final Result by Sue Neves MD (08/19 6035)      No obstructive uropathy  Multiple nonobstructing right renal calculi measuring up to 6 mm, stable from 2017  Workstation performed: [id number]               Procedures  Procedures      ED Course                                       MDM  Number of Diagnoses or Management Options  Lower back pain  Lumbar radiculopathy  Diagnosis management comments: The patient is a 64year old male with a PMHx significant for nephrolithiasis and DDD who presents today with right flank pain for 8 days  Concern for nephrolithiasis vs  lumbar radiculopathy vs  Testicular torsion or hernia  PE and imaging r/o kidney stone, torsion and hernia  Most likely flare up of radiculopathy/muscle spasm  Prescribed NSAID, muscle relaxant and Lidoderm patch and advised pt to follow up with PCP if pain does not improve  Disposition  Final diagnoses:   Lower back pain   Lumbar radiculopathy     Time reflects when diagnosis was documented in both MDM as applicable and the Disposition within this note     Time User Action Codes Description Comment    8/19/2021  1:43 PM Bearl Callow Add [M54 5] Lower back pain     8/19/2021  1:43 PM Bearl Callow Add [L27 22] Lumbar radiculopathy       ED Disposition     ED Disposition Condition Date/Time Comment    Discharge Stable u Aug 19, 2021  1:43 PM Shankar Mayfield III discharge to home/self care  Follow-up Information     Follow up With Specialties Details Why DO [facility] Call in 1 day Return the ED if you symptoms worsen and you develop, fever, intractable vomiting or other concerning symptoms 405[facility]    [address] [place] [address]  [phone] Discharge Medication List as of 8/19/2021  1:48 PM      START taking these medications    Details   lidocaine (LIDODERM) 5 % Apply 1 patch topically daily Remove & Discard patch within 12 hours or as directed by MD, Starting Thu 8/19/2021, Normal      naproxen (NAPROSYN) 500 mg tablet Take 1 tablet (500 mg total) by mouth 2 (two) times a day with meals for 7 days, Starting Thu 8/19/2021, Until Thu 8/26/2021, Normal      TiZANidine (ZANAFLEX) 2 MG capsule Take 1 capsule (2 mg total) by mouth 3 (three) times a day as needed for muscle spasms for up to 7 days, Starting Thu 8/19/2021, Until u 8/26/2021 at 2359, Normal         CONTINUE these medications which have NOT CHANGED    Details   clonazePAM (KlonoPIN) 0 5 mg tablet Take 0 5 mg by mouth daily at bedtime, Historical Med      Coenzyme Q10 (COQ10) 200 MG CAPS Take by mouth, Historical Med      levothyroxine 75 mcg tablet TAKE ONE TABLET BY MOUTH DAILY, Normal      lisinopril (ZESTRIL) 40 mg tablet TAKE ONE TABLET BY MOUTH EVERY DAY, Normal      metoclopramide (REGLAN) 5 mg tablet 1 tab AC and HS, Normal      pantoprazole (PROTONIX) 40 mg tablet TAKE ONE TABLET BY MOUTH EVERY DAY BEFORE BREAKFAST, Normal      potassium chloride (MICRO-K) 10 MEQ CR capsule Take 2 capsules (20 mEq total) by mouth daily for 10 days, Starting Mon 8/9/2021, Until Thu 8/19/2021, Normal      cetirizine (ZyrTEC) 10 MG chewable tablet Chew 10 mg daily, Historical Med      diazepam (VALIUM) 10 mg tablet Take 1 tablet (10 mg total) by mouth daily at bedtime, Starting Fri 5/7/2021, Normal      hyoscyamine (ANASPAZ) 0 125 mg Take 1 tablet (0 125 mg total) by mouth every 4 (four) hours as needed (abdominal spasms), Starting Tue 4/13/2021, Normal           No discharge procedures on file  PDMP Review       Value Time User    PDMP Reviewed  Yes 5/7/2021  2:56 PM Octaviano Thurman DO           ED Provider  Attending physically available and evaluated Sang CASTANO I managed the patient along with the ED Attending      Electronically Signed by         Umair Guy MD  08/20/21 Mich Edwards DO  08/20/21 1474

## 2021-08-20 NOTE — ED ATTENDING ATTESTATION
8/19/2021  IKristina DO, saw and evaluated the patient  I have discussed the patient with the resident/non-physician practitioner and agree with the resident's/non-physician practitioner's findings, Plan of Care, and MDM as documented in the resident's/non-physician practitioner's note, except where noted  All available labs and Radiology studies were reviewed  I was present for key portions of any procedure(s) performed by the resident/non-physician practitioner and I was immediately available to provide assistance  At this point I agree with the current assessment done in the Emergency Department  I have conducted an independent evaluation of this patient a history and physical is as follows:    63 yo M w/ hx of chronic back pain, also kidney stones, presenting for eval of right sided back and falnk pain over the past week  He feels it radiating at times into his right lower abdomen and testicle  On physical exam: pt very well appearing, in NAD  mucous membranes moist   CTA b/l , heart RRR  Abdomen NT, ND, no R/R/G  Normal bowel sounds  Neuro intact, gcs 15  Cap refill < 2 sec, skin warm and dry  B/l testicles nontender, no erythema warmth or fullness  No hernias  + right paraspinal lumbar tenderness and increased tone w/ spasms  ED Course     Pt evaluated for possible kidney stone vs testicular pathology such as hernia or torsion  Workup negative  Suspect primary back etiology such as lumbar radiculopathy, treat w/ nsaids, muscle relaxers, d/c home w/ outpt pcp f/u      Critical Care Time  Procedures

## 2021-08-27 ENCOUNTER — TELEPHONE (OUTPATIENT)
Dept: PAIN MEDICINE | Facility: CLINIC | Age: 61
End: 2021-08-27

## 2021-08-27 NOTE — TELEPHONE ENCOUNTER
Pt called in to let the doctor know that the procedure did not work and what would the next level of care  Please be advised thank you    Pt can be reached @ [phone]

## 2021-09-01 ENCOUNTER — OFFICE VISIT (OUTPATIENT)
Dept: PAIN MEDICINE | Facility: CLINIC | Age: 61
End: 2021-09-01
Payer: COMMERCIAL

## 2021-09-01 VITALS
HEIGHT: 72 IN | RESPIRATION RATE: 18 BRPM | BODY MASS INDEX: 22.75 KG/M2 | SYSTOLIC BLOOD PRESSURE: 136 MMHG | WEIGHT: 168 LBS | DIASTOLIC BLOOD PRESSURE: 88 MMHG | HEART RATE: 71 BPM

## 2021-09-01 DIAGNOSIS — M79.18 MYOFASCIAL PAIN SYNDROME: Primary | ICD-10-CM

## 2021-09-01 DIAGNOSIS — M51.36 LUMBAR DEGENERATIVE DISC DISEASE: ICD-10-CM

## 2021-09-01 PROCEDURE — 3008F BODY MASS INDEX DOCD: CPT | Performed by: FAMILY MEDICINE

## 2021-09-01 PROCEDURE — 99214 OFFICE O/P EST MOD 30 MIN: CPT | Performed by: ANESTHESIOLOGY

## 2021-09-01 RX ORDER — TIZANIDINE 2 MG/1
2 TABLET ORAL 3 TIMES DAILY PRN
COMMUNITY
Start: 2021-08-19 | End: 2021-09-01

## 2021-09-01 RX ORDER — PREGABALIN 75 MG/1
75 CAPSULE ORAL 2 TIMES DAILY
Qty: 60 CAPSULE | Refills: 1 | Status: SHIPPED | OUTPATIENT
Start: 2021-09-01 | End: 2021-10-07 | Stop reason: ALTCHOICE
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
## 2021-09-07 ENCOUNTER — TELEPHONE (OUTPATIENT)
Dept: PAIN MEDICINE | Facility: CLINIC | Age: 61
End: 2021-09-07

## 2021-09-07 DIAGNOSIS — M79.18 MYOFASCIAL PAIN SYNDROME: Primary | ICD-10-CM

## 2021-09-07 RX ORDER — DICLOFENAC SODIUM 75 MG/1
75 TABLET, DELAYED RELEASE ORAL 2 TIMES DAILY
Qty: 60 TABLET | Refills: 2 | Status: SHIPPED | OUTPATIENT
Start: 2021-09-07 | End: 2021-12-09

## 2021-09-07 NOTE — TELEPHONE ENCOUNTER
S/w pt, states he had been given naproxen BID after being seen in ED  States naproxen is minimally helpful for him, it takes the edge off of his pain but not much more  Pt asked if there is something else he should try taking or continue naproxen? Please advise, thank you

## 2021-09-07 NOTE — TELEPHONE ENCOUNTER
S/w pt, states he started lyrica last week, as of Monday 9/6 he increased to BID dosing  Pt denies s/e, states he has not noticed any benefit from lyrica yet

## 2021-09-07 NOTE — TELEPHONE ENCOUNTER
Pt is looking for a refill of naproxen for his back pain  Or something that's better  He is not sleeping well   # [phone]

## 2021-09-08 NOTE — TELEPHONE ENCOUNTER
Pt says he has to start STD through [employer] and we might be getting contacted by [facility] about it   Ph# [phone]

## 2021-09-09 ENCOUNTER — TELEPHONE (OUTPATIENT)
Dept: FAMILY MEDICINE CLINIC | Facility: CLINIC | Age: 61
End: 2021-09-09

## 2021-09-09 DIAGNOSIS — Z20.822 SUSPECTED COVID-19 VIRUS INFECTION: Primary | ICD-10-CM

## 2021-09-09 PROCEDURE — U0003 INFECTIOUS AGENT DETECTION BY NUCLEIC ACID (DNA OR RNA); SEVERE ACUTE RESPIRATORY SYNDROME CORONAVIRUS 2 (SARS-COV-2) (CORONAVIRUS DISEASE [COVID-19]), AMPLIFIED PROBE TECHNIQUE, MAKING USE OF HIGH THROUGHPUT TECHNOLOGIES AS DESCRIBED BY CMS-2020-01-R: HCPCS | Performed by: FAMILY MEDICINE

## 2021-09-09 PROCEDURE — U0005 INFEC AGEN DETEC AMPLI PROBE: HCPCS | Performed by: FAMILY MEDICINE
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
## 2021-09-14 NOTE — TELEPHONE ENCOUNTER
Pt called with a update the medication pregabalin (LYRICA) 75 mg capsule is not working and his lower back is bothering him again  And wanted to know should he schedule a follow up  Please be advised thank you    Pt can be reached @ [phone]

## 2021-09-14 NOTE — TELEPHONE ENCOUNTER
S/w pt, advised of the same  Pt verbalized understanding and agreeable to abdominal TPI and lyrica increase  Please place order for TPI, thank you  Pt would like to keep appt with NM for 9/20 to further discuss

## 2021-09-14 NOTE — TELEPHONE ENCOUNTER
S/w pt, states he has been taking Lyrica BID with little benefit  Pt also taking diclofenac which he reports takes the edge off pain, but wears off prior to next dose  Pt was given OV with NM next Monday 9/20  Any further recommendations in the meantime?

## 2021-09-16 ENCOUNTER — TELEPHONE (OUTPATIENT)
Dept: PAIN MEDICINE | Facility: CLINIC | Age: 61
End: 2021-09-16

## 2021-09-16 NOTE — TELEPHONE ENCOUNTER
JACINTA from [facility] called for fax number to sent over a form that needs to be completed by Dr Ozzie Kwon regarding the pts STD

## 2021-09-20 ENCOUNTER — OFFICE VISIT (OUTPATIENT)
Dept: PAIN MEDICINE | Facility: CLINIC | Age: 61
End: 2021-09-20
Payer: COMMERCIAL

## 2021-09-20 VITALS
RESPIRATION RATE: 18 BRPM | BODY MASS INDEX: 23.16 KG/M2 | HEART RATE: 63 BPM | HEIGHT: 72 IN | SYSTOLIC BLOOD PRESSURE: 150 MMHG | WEIGHT: 171 LBS | DIASTOLIC BLOOD PRESSURE: 81 MMHG

## 2021-09-20 DIAGNOSIS — M79.18 MYOFASCIAL PAIN SYNDROME: ICD-10-CM

## 2021-09-20 DIAGNOSIS — G89.4 CHRONIC PAIN SYNDROME: ICD-10-CM

## 2021-09-20 DIAGNOSIS — M47.816 LUMBAR FACET ARTHROPATHY: Primary | ICD-10-CM

## 2021-09-20 DIAGNOSIS — M51.36 DISC DEGENERATION, LUMBAR: ICD-10-CM

## 2021-09-20 DIAGNOSIS — G89.29 CHRONIC BILATERAL LOW BACK PAIN, UNSPECIFIED WHETHER SCIATICA PRESENT: ICD-10-CM

## 2021-09-20 DIAGNOSIS — M51.16 INTERVERTEBRAL DISC DISORDER WITH RADICULOPATHY OF LUMBAR REGION: ICD-10-CM

## 2021-09-20 DIAGNOSIS — M54.50 CHRONIC BILATERAL LOW BACK PAIN, UNSPECIFIED WHETHER SCIATICA PRESENT: ICD-10-CM

## 2021-09-20 PROCEDURE — 3008F BODY MASS INDEX DOCD: CPT | Performed by: NURSE PRACTITIONER

## 2021-09-20 PROCEDURE — 1036F TOBACCO NON-USER: CPT | Performed by: NURSE PRACTITIONER

## 2021-09-20 PROCEDURE — 99214 OFFICE O/P EST MOD 30 MIN: CPT | Performed by: NURSE PRACTITIONER

## 2021-09-20 NOTE — TELEPHONE ENCOUNTER
Rep from [address] is asking if we received request for certification for disability/FMLA form for this pt? Are you willing to do and did you receive form?

## 2021-09-20 NOTE — TELEPHONE ENCOUNTER
Odette Nieto from [employer] is requesting to know if we received request for certification for disability / FMLA form for patient  It was faxed last week   Please advise, thx    Claim# [id number]    Call back# [phone]
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
## 2021-09-21 ENCOUNTER — TELEPHONE (OUTPATIENT)
Dept: PAIN MEDICINE | Facility: CLINIC | Age: 61
End: 2021-09-21

## 2021-09-21 NOTE — TELEPHONE ENCOUNTER
Scheduled pt for bilateral L3-L5 MBB for 10/7/21  No as needed pain meds for 6 hrs before and 6/8 hrs after procedure  Nothing to eat or drink for 1 hr prior to procedure  Pain level must be 5 or greater  Need to arrange transportation  Proper clothing for procedure  If ill or placed on antibiotics please call to reschedule  Covid/travel/ and vaccine instructions

## 2021-09-22 ENCOUNTER — PROCEDURE VISIT (OUTPATIENT)
Dept: PAIN MEDICINE | Facility: CLINIC | Age: 61
End: 2021-09-22
Payer: COMMERCIAL

## 2021-09-22 VITALS
HEART RATE: 76 BPM | DIASTOLIC BLOOD PRESSURE: 94 MMHG | SYSTOLIC BLOOD PRESSURE: 148 MMHG | WEIGHT: 171 LBS | BODY MASS INDEX: 23.19 KG/M2

## 2021-09-22 DIAGNOSIS — M79.18 MYOFASCIAL PAIN SYNDROME: ICD-10-CM

## 2021-09-22 DIAGNOSIS — R10.9 ABDOMINAL PAIN, UNSPECIFIED ABDOMINAL LOCATION: Primary | ICD-10-CM

## 2021-09-22 PROCEDURE — 76942 ECHO GUIDE FOR BIOPSY: CPT | Performed by: ANESTHESIOLOGY

## 2021-09-22 PROCEDURE — 20552 NJX 1/MLT TRIGGER POINT 1/2: CPT | Performed by: ANESTHESIOLOGY

## 2021-09-22 RX ORDER — BUPIVACAINE HYDROCHLORIDE 2.5 MG/ML
10 INJECTION, SOLUTION EPIDURAL; INFILTRATION; INTRACAUDAL ONCE
Status: COMPLETED | OUTPATIENT
Start: 2021-09-22 | End: 2021-09-22

## 2021-09-22 RX ORDER — METHYLPREDNISOLONE ACETATE 40 MG/ML
40 INJECTION, SUSPENSION INTRA-ARTICULAR; INTRALESIONAL; INTRAMUSCULAR; SOFT TISSUE ONCE
Status: COMPLETED | OUTPATIENT
Start: 2021-09-22 | End: 2021-09-22

## 2021-09-22 RX ORDER — LIDOCAINE HYDROCHLORIDE 10 MG/ML
1 INJECTION, SOLUTION INFILTRATION; PERINEURAL ONCE
Status: CANCELLED | OUTPATIENT
Start: 2021-09-22 | End: 2021-09-22

## 2021-09-22 RX ADMIN — METHYLPREDNISOLONE ACETATE 40 MG: 40 INJECTION, SUSPENSION INTRA-ARTICULAR; INTRALESIONAL; INTRAMUSCULAR; SOFT TISSUE at 11:50

## 2021-09-22 RX ADMIN — BUPIVACAINE HYDROCHLORIDE 10 ML: 2.5 INJECTION, SOLUTION EPIDURAL; INFILTRATION; INTRACAUDAL at 11:50

## 2021-09-22 NOTE — PROGRESS NOTES
Pre-procedure Diagnosis:   1  Abdominal pain, unspecified abdominal location    2  Myofascial pain syndrome      Post-procedure Diagnosis:   1  Abdominal pain, unspecified abdominal location    2  Myofascial pain syndrome      Operation Title(s):  Right transverse abdominal plane injection x 4 under ultrasound guidance  Attending Surgeon:   Sylvester Martinez MD  Anesthesia:   Local    Indications: The patient is a 64y o  year-old male with a diagnosis of   1  Abdominal pain, unspecified abdominal location    2  Myofascial pain syndrome      The patient's history and physical exam were reviewed  The risks, benefits and alternatives to the procedure were discussed, and all questions were answered to the patient's satisfaction  The patient agreed to proceed, and written informed consent was obtained  Procedure in Detail: The patient was brought into the exam room and placed in the supine position on the exam room table  4 points of maximal tenderness were chosen in each quadrant of the abdomen  A sterile ultrasound probe cover and gel was placed over a 3 75 MHz curvilinear transducer probe  The probe was placed over the right upper quadrant  Optimal needle path was determined and the skin and subcutaneous tissues in the area was anesthetized with 1% lidocaine  Then, under ultrasound guidance, a 2 inch ultrasound needle was advanced until the needle entered the transverse abdominal plane  After visualization of the tip in the target area and negative aspiration for blood, a solution consisting of 0 75 mL 0 25% bupivacaine and 0 5 mL Depo-Medrol (40mg/ml) was easily injected  The same procedure repeated at the left upper quadrant, right lower quadrant and left lower quadrant  The patient's knee was cleaned and a bandage was placed over the sites of needle insertion  Disposition: The patient tolerated the procedure well and there were no apparent complications    The patient was given written discharge instructions for the procedure

## 2021-09-28 ENCOUNTER — TRANSCRIBE ORDERS (OUTPATIENT)
Dept: PAIN MEDICINE | Facility: CLINIC | Age: 61
End: 2021-09-28

## 2021-10-04 NOTE — TELEPHONE ENCOUNTER
Received via fax from [address] [place] [address] that they needed the orginial form re faxed over to them  Completed forms that were under media was faxed over to the following number  [phone]  Requesting fax scanned under media for future reference if needed

## 2021-10-07 ENCOUNTER — HOSPITAL ENCOUNTER (OUTPATIENT)
Dept: RADIOLOGY | Facility: CLINIC | Age: 61
Discharge: HOME | End: 2021-10-07
Attending: ANESTHESIOLOGY
Payer: COMMERCIAL

## 2021-10-07 VITALS
TEMPERATURE: 97.6 F | SYSTOLIC BLOOD PRESSURE: 131 MMHG | DIASTOLIC BLOOD PRESSURE: 86 MMHG | HEART RATE: 59 BPM | RESPIRATION RATE: 20 BRPM | OXYGEN SATURATION: 97 %

## 2021-10-07 DIAGNOSIS — M47.816 LUMBAR FACET ARTHROPATHY: ICD-10-CM

## 2021-10-07 PROCEDURE — 64493 INJ PARAVERT F JNT L/S 1 LEV: CPT | Performed by: ANESTHESIOLOGY

## 2021-10-07 PROCEDURE — 64494 INJ PARAVERT F JNT L/S 2 LEV: CPT | Performed by: ANESTHESIOLOGY

## 2021-10-07 RX ORDER — BUPIVACAINE HCL/PF 2.5 MG/ML
10 VIAL (ML) INJECTION ONCE
Status: COMPLETED | OUTPATIENT
Start: 2021-10-07 | End: 2021-10-07

## 2021-10-07 RX ADMIN — BUPIVACAINE HYDROCHLORIDE 3 ML: 2.5 INJECTION, SOLUTION EPIDURAL; INFILTRATION; INTRACAUDAL at 09:26

## 2021-10-09 DIAGNOSIS — E03.9 HYPOTHYROIDISM, UNSPECIFIED TYPE: ICD-10-CM

## 2021-10-11 RX ORDER — LEVOTHYROXINE SODIUM 0.07 MG/1
TABLET ORAL
Qty: 30 TABLET | Refills: 0 | Status: SHIPPED | OUTPATIENT
Start: 2021-10-11 | End: 2021-11-09

## 2021-10-12 ENCOUNTER — TELEPHONE (OUTPATIENT)
Dept: RADIOLOGY | Facility: CLINIC | Age: 61
End: 2021-10-12

## 2021-10-28 ENCOUNTER — HOSPITAL ENCOUNTER (OUTPATIENT)
Dept: RADIOLOGY | Facility: CLINIC | Age: 61
Discharge: HOME | End: 2021-10-28
Attending: ANESTHESIOLOGY
Payer: COMMERCIAL

## 2021-10-28 VITALS
OXYGEN SATURATION: 97 % | TEMPERATURE: 97.8 F | HEART RATE: 72 BPM | SYSTOLIC BLOOD PRESSURE: 138 MMHG | RESPIRATION RATE: 20 BRPM | DIASTOLIC BLOOD PRESSURE: 94 MMHG

## 2021-10-28 DIAGNOSIS — M47.816 FACET ARTHROPATHY, LUMBAR: ICD-10-CM

## 2021-10-28 PROCEDURE — 64494 INJ PARAVERT F JNT L/S 2 LEV: CPT | Performed by: ANESTHESIOLOGY

## 2021-10-28 PROCEDURE — 64493 INJ PARAVERT F JNT L/S 1 LEV: CPT | Performed by: ANESTHESIOLOGY

## 2021-10-28 RX ORDER — BUPIVACAINE HYDROCHLORIDE 7.5 MG/ML
5 INJECTION, SOLUTION EPIDURAL; RETROBULBAR ONCE
Status: COMPLETED | OUTPATIENT
Start: 2021-10-28 | End: 2021-10-28

## 2021-10-28 RX ADMIN — BUPIVACAINE HYDROCHLORIDE 3 ML: 7.5 INJECTION, SOLUTION EPIDURAL; RETROBULBAR at 14:34

## 2021-11-01 ENCOUNTER — TELEPHONE (OUTPATIENT)
Dept: PAIN MEDICINE | Facility: CLINIC | Age: 61
End: 2021-11-01

## 2021-11-06 DIAGNOSIS — I10 ESSENTIAL HYPERTENSION: ICD-10-CM

## 2021-11-08 RX ORDER — LISINOPRIL 40 MG/1
TABLET ORAL
Qty: 90 TABLET | Refills: 1 | Status: SHIPPED | OUTPATIENT
Start: 2021-11-08 | End: 2022-05-04

## 2021-11-09 DIAGNOSIS — E03.9 HYPOTHYROIDISM, UNSPECIFIED TYPE: ICD-10-CM

## 2021-11-09 RX ORDER — LEVOTHYROXINE SODIUM 0.07 MG/1
TABLET ORAL
Qty: 30 TABLET | Refills: 0 | Status: SHIPPED | OUTPATIENT
Start: 2021-11-09 | End: 2021-12-13

## 2021-11-12 ENCOUNTER — OFFICE VISIT (OUTPATIENT)
Dept: FAMILY MEDICINE CLINIC | Facility: CLINIC | Age: 61
End: 2021-11-12
Payer: COMMERCIAL

## 2021-11-12 VITALS
BODY MASS INDEX: 22.48 KG/M2 | WEIGHT: 166 LBS | SYSTOLIC BLOOD PRESSURE: 120 MMHG | HEIGHT: 72 IN | HEART RATE: 72 BPM | TEMPERATURE: 97.9 F | DIASTOLIC BLOOD PRESSURE: 80 MMHG

## 2021-11-12 DIAGNOSIS — K31.84 GASTROPARESIS: ICD-10-CM

## 2021-11-12 DIAGNOSIS — R10.32 LEFT INGUINAL PAIN: Primary | ICD-10-CM

## 2021-11-12 DIAGNOSIS — K21.9 GASTROESOPHAGEAL REFLUX DISEASE WITHOUT ESOPHAGITIS: ICD-10-CM

## 2021-11-12 PROCEDURE — 99214 OFFICE O/P EST MOD 30 MIN: CPT | Performed by: FAMILY MEDICINE

## 2021-11-12 RX ORDER — METOCLOPRAMIDE 5 MG/1
5 TABLET ORAL 3 TIMES DAILY
Qty: 90 TABLET | Refills: 2 | Status: SHIPPED | OUTPATIENT
Start: 2021-11-12 | End: 2022-05-09

## 2021-11-12 RX ORDER — PANTOPRAZOLE SODIUM 40 MG/1
40 TABLET, DELAYED RELEASE ORAL
Qty: 30 TABLET | Refills: 5 | Status: SHIPPED | OUTPATIENT
Start: 2021-11-12 | End: 2022-05-04

## 2021-11-17 ENCOUNTER — PREP FOR PROCEDURE (OUTPATIENT)
Dept: SURGERY | Facility: CLINIC | Age: 61
End: 2021-11-17

## 2021-11-17 ENCOUNTER — CONSULT (OUTPATIENT)
Dept: SURGERY | Facility: CLINIC | Age: 61
End: 2021-11-17
Payer: COMMERCIAL

## 2021-11-17 VITALS — WEIGHT: 167 LBS | HEIGHT: 72 IN | BODY MASS INDEX: 22.62 KG/M2

## 2021-11-17 DIAGNOSIS — K40.90 LEFT INGUINAL HERNIA: ICD-10-CM

## 2021-11-17 DIAGNOSIS — K40.90 INGUINAL HERNIA: Primary | ICD-10-CM

## 2021-11-17 PROCEDURE — 99204 OFFICE O/P NEW MOD 45 MIN: CPT | Performed by: SURGERY

## 2021-11-17 PROCEDURE — 3008F BODY MASS INDEX DOCD: CPT | Performed by: SURGERY

## 2021-11-17 PROCEDURE — 1036F TOBACCO NON-USER: CPT | Performed by: SURGERY

## 2021-11-19 ENCOUNTER — HOSPITAL ENCOUNTER (OUTPATIENT)
Dept: RADIOLOGY | Facility: CLINIC | Age: 61
Discharge: HOME | End: 2021-11-19
Attending: ANESTHESIOLOGY | Admitting: ANESTHESIOLOGY
Payer: COMMERCIAL

## 2021-11-19 ENCOUNTER — TELEPHONE (OUTPATIENT)
Dept: RADIOLOGY | Facility: CLINIC | Age: 61
End: 2021-11-19

## 2021-11-19 VITALS
TEMPERATURE: 98 F | SYSTOLIC BLOOD PRESSURE: 152 MMHG | RESPIRATION RATE: 20 BRPM | HEART RATE: 64 BPM | DIASTOLIC BLOOD PRESSURE: 95 MMHG | OXYGEN SATURATION: 98 %

## 2021-11-19 DIAGNOSIS — M47.816 LUMBAR SPONDYLOSIS: ICD-10-CM

## 2021-11-19 PROCEDURE — 64636 DESTROY L/S FACET JNT ADDL: CPT | Performed by: ANESTHESIOLOGY

## 2021-11-19 PROCEDURE — 64635 DESTROY LUMB/SAC FACET JNT: CPT | Performed by: ANESTHESIOLOGY

## 2021-11-19 RX ORDER — LIDOCAINE HYDROCHLORIDE 10 MG/ML
30 INJECTION, SOLUTION EPIDURAL; INFILTRATION; INTRACAUDAL; PERINEURAL ONCE
Status: COMPLETED | OUTPATIENT
Start: 2021-11-19 | End: 2021-11-19

## 2021-11-19 RX ORDER — BUPIVACAINE HCL/PF 2.5 MG/ML
10 VIAL (ML) INJECTION ONCE
Status: COMPLETED | OUTPATIENT
Start: 2021-11-19 | End: 2021-11-19

## 2021-11-19 RX ORDER — METHYLPREDNISOLONE ACETATE 80 MG/ML
80 INJECTION, SUSPENSION INTRA-ARTICULAR; INTRALESIONAL; INTRAMUSCULAR; PARENTERAL; SOFT TISSUE ONCE
Status: COMPLETED | OUTPATIENT
Start: 2021-11-19 | End: 2021-11-19

## 2021-11-19 RX ADMIN — METHYLPREDNISOLONE ACETATE 20 MG: 80 INJECTION, SUSPENSION INTRA-ARTICULAR; INTRALESIONAL; INTRAMUSCULAR; PARENTERAL; SOFT TISSUE at 15:55

## 2021-11-19 RX ADMIN — LIDOCAINE HYDROCHLORIDE 16 ML: 10 INJECTION, SOLUTION EPIDURAL; INFILTRATION; INTRACAUDAL; PERINEURAL at 15:46

## 2021-11-19 RX ADMIN — BUPIVACAINE HYDROCHLORIDE 3 ML: 2.5 INJECTION, SOLUTION EPIDURAL; INFILTRATION; INTRACAUDAL at 15:55

## 2021-11-24 ENCOUNTER — OFFICE VISIT (OUTPATIENT)
Dept: LAB | Facility: CLINIC | Age: 61
End: 2021-11-24
Payer: COMMERCIAL

## 2021-11-24 DIAGNOSIS — K40.90 INGUINAL HERNIA: ICD-10-CM

## 2021-11-24 LAB
ATRIAL RATE: 59 BPM
P AXIS: 85 DEGREES
PR INTERVAL: 176 MS
QRS AXIS: 16 DEGREES
QRSD INTERVAL: 86 MS
QT INTERVAL: 390 MS
QTC INTERVAL: 386 MS
T WAVE AXIS: 48 DEGREES
VENTRICULAR RATE: 59 BPM

## 2021-11-24 PROCEDURE — 93005 ELECTROCARDIOGRAM TRACING: CPT

## 2021-11-24 PROCEDURE — 93010 ELECTROCARDIOGRAM REPORT: CPT | Performed by: INTERNAL MEDICINE

## 2021-11-29 ENCOUNTER — ANESTHESIA EVENT (OUTPATIENT)
Dept: PERIOP | Facility: AMBULARY SURGERY CENTER | Age: 61
End: 2021-11-29
Payer: COMMERCIAL

## 2021-12-03 ENCOUNTER — HOSPITAL ENCOUNTER (OUTPATIENT)
Dept: RADIOLOGY | Facility: CLINIC | Age: 61
Discharge: HOME | End: 2021-12-03
Admitting: ANESTHESIOLOGY
Payer: COMMERCIAL

## 2021-12-03 ENCOUNTER — TELEPHONE (OUTPATIENT)
Dept: RADIOLOGY | Facility: CLINIC | Age: 61
End: 2021-12-03

## 2021-12-03 VITALS
RESPIRATION RATE: 20 BRPM | HEART RATE: 79 BPM | TEMPERATURE: 97.8 F | OXYGEN SATURATION: 95 % | SYSTOLIC BLOOD PRESSURE: 146 MMHG | DIASTOLIC BLOOD PRESSURE: 95 MMHG

## 2021-12-03 DIAGNOSIS — M47.816 LUMBAR SPONDYLOSIS: ICD-10-CM

## 2021-12-03 PROCEDURE — 64636 DESTROY L/S FACET JNT ADDL: CPT | Performed by: ANESTHESIOLOGY

## 2021-12-03 PROCEDURE — 64635 DESTROY LUMB/SAC FACET JNT: CPT | Performed by: ANESTHESIOLOGY

## 2021-12-03 RX ORDER — LIDOCAINE HYDROCHLORIDE 10 MG/ML
30 INJECTION, SOLUTION EPIDURAL; INFILTRATION; INTRACAUDAL; PERINEURAL ONCE
Status: COMPLETED | OUTPATIENT
Start: 2021-12-03 | End: 2021-12-03

## 2021-12-03 RX ORDER — METHYLPREDNISOLONE ACETATE 80 MG/ML
80 INJECTION, SUSPENSION INTRA-ARTICULAR; INTRALESIONAL; INTRAMUSCULAR; PARENTERAL; SOFT TISSUE ONCE
Status: COMPLETED | OUTPATIENT
Start: 2021-12-03 | End: 2021-12-03

## 2021-12-03 RX ORDER — BUPIVACAINE HCL/PF 2.5 MG/ML
10 VIAL (ML) INJECTION ONCE
Status: COMPLETED | OUTPATIENT
Start: 2021-12-03 | End: 2021-12-03

## 2021-12-03 RX ADMIN — BUPIVACAINE HYDROCHLORIDE 3 ML: 2.5 INJECTION, SOLUTION EPIDURAL; INFILTRATION; INTRACAUDAL at 15:51

## 2021-12-03 RX ADMIN — LIDOCAINE HYDROCHLORIDE 16 ML: 10 INJECTION, SOLUTION EPIDURAL; INFILTRATION; INTRACAUDAL; PERINEURAL at 15:40

## 2021-12-03 RX ADMIN — METHYLPREDNISOLONE ACETATE 20 MG: 80 INJECTION, SUSPENSION INTRA-ARTICULAR; INTRALESIONAL; INTRAMUSCULAR; PARENTERAL; SOFT TISSUE at 15:51

## 2021-12-10 RX ORDER — CEFAZOLIN SODIUM 2 G/50ML
2000 SOLUTION INTRAVENOUS ONCE
Status: COMPLETED | OUTPATIENT
Start: 2021-12-13 | End: 2021-12-13

## 2021-12-11 DIAGNOSIS — E03.9 HYPOTHYROIDISM, UNSPECIFIED TYPE: ICD-10-CM

## 2021-12-13 ENCOUNTER — ANESTHESIA (OUTPATIENT)
Dept: PERIOP | Facility: AMBULARY SURGERY CENTER | Age: 61
End: 2021-12-13
Payer: COMMERCIAL

## 2021-12-13 ENCOUNTER — HOSPITAL ENCOUNTER (OUTPATIENT)
Facility: AMBULARY SURGERY CENTER | Age: 61
Setting detail: OUTPATIENT SURGERY
Discharge: HOME | End: 2021-12-13
Attending: SURGERY | Admitting: SURGERY
Payer: COMMERCIAL

## 2021-12-13 VITALS
HEART RATE: 81 BPM | SYSTOLIC BLOOD PRESSURE: 165 MMHG | HEIGHT: 72 IN | WEIGHT: 163 LBS | BODY MASS INDEX: 22.08 KG/M2 | RESPIRATION RATE: 16 BRPM | OXYGEN SATURATION: 98 % | TEMPERATURE: 97.6 F | DIASTOLIC BLOOD PRESSURE: 95 MMHG

## 2021-12-13 DIAGNOSIS — K40.90 LEFT INGUINAL HERNIA: Primary | ICD-10-CM

## 2021-12-13 PROCEDURE — 49505 PRP I/HERN INIT REDUC >5 YR: CPT | Performed by: SURGERY

## 2021-12-13 PROCEDURE — C1781 MESH (IMPLANTABLE): HCPCS | Performed by: SURGERY

## 2021-12-13 DEVICE — BARD MESH PERFIX PLUG, MEDIUM
Type: IMPLANTABLE DEVICE | Site: INGUINAL | Status: FUNCTIONAL
Brand: BARD MESH PERFIX PLUG

## 2021-12-13 DEVICE — MODIFIED ONFLEX MESH
Type: IMPLANTABLE DEVICE | Site: INGUINAL | Status: FUNCTIONAL
Brand: MODIFIED ONFLEX MESH

## 2021-12-13 RX ORDER — MAGNESIUM HYDROXIDE 1200 MG/15ML
LIQUID ORAL AS NEEDED
Status: DISCONTINUED | OUTPATIENT
Start: 2021-12-13 | End: 2021-12-13 | Stop reason: HOSPADM

## 2021-12-13 RX ORDER — MORPHINE SULFATE 4 MG/ML
4 INJECTION, SOLUTION INTRAMUSCULAR; INTRAVENOUS
Status: DISCONTINUED | OUTPATIENT
Start: 2021-12-13 | End: 2021-12-13 | Stop reason: HOSPADM

## 2021-12-13 RX ORDER — SODIUM CHLORIDE, SODIUM LACTATE, POTASSIUM CHLORIDE, CALCIUM CHLORIDE 600; 310; 30; 20 MG/100ML; MG/100ML; MG/100ML; MG/100ML
125 INJECTION, SOLUTION INTRAVENOUS CONTINUOUS
Status: DISCONTINUED | OUTPATIENT
Start: 2021-12-13 | End: 2021-12-13 | Stop reason: HOSPADM

## 2021-12-13 RX ORDER — OXYCODONE HYDROCHLORIDE 5 MG/1
5 TABLET ORAL EVERY 4 HOURS PRN
Qty: 10 TABLET | Refills: 0 | Status: SHIPPED | OUTPATIENT
Start: 2021-12-13 | End: 2021-12-23

## 2021-12-13 RX ORDER — MIDAZOLAM HYDROCHLORIDE 2 MG/2ML
INJECTION, SOLUTION INTRAMUSCULAR; INTRAVENOUS AS NEEDED
Status: DISCONTINUED | OUTPATIENT
Start: 2021-12-13 | End: 2021-12-13

## 2021-12-13 RX ORDER — KETOROLAC TROMETHAMINE 30 MG/ML
INJECTION, SOLUTION INTRAMUSCULAR; INTRAVENOUS AS NEEDED
Status: DISCONTINUED | OUTPATIENT
Start: 2021-12-13 | End: 2021-12-13

## 2021-12-13 RX ORDER — MEPERIDINE HYDROCHLORIDE 25 MG/ML
25 INJECTION INTRAMUSCULAR; INTRAVENOUS; SUBCUTANEOUS ONCE AS NEEDED
Status: DISCONTINUED | OUTPATIENT
Start: 2021-12-13 | End: 2021-12-13 | Stop reason: HOSPADM

## 2021-12-13 RX ORDER — DEXAMETHASONE SODIUM PHOSPHATE 4 MG/ML
INJECTION, SOLUTION INTRA-ARTICULAR; INTRALESIONAL; INTRAMUSCULAR; INTRAVENOUS; SOFT TISSUE AS NEEDED
Status: DISCONTINUED | OUTPATIENT
Start: 2021-12-13 | End: 2021-12-13

## 2021-12-13 RX ORDER — FENTANYL CITRATE 50 UG/ML
INJECTION, SOLUTION INTRAMUSCULAR; INTRAVENOUS AS NEEDED
Status: DISCONTINUED | OUTPATIENT
Start: 2021-12-13 | End: 2021-12-13

## 2021-12-13 RX ORDER — PROPOFOL 10 MG/ML
INJECTION, EMULSION INTRAVENOUS AS NEEDED
Status: DISCONTINUED | OUTPATIENT
Start: 2021-12-13 | End: 2021-12-13

## 2021-12-13 RX ORDER — LIDOCAINE HYDROCHLORIDE 10 MG/ML
INJECTION, SOLUTION EPIDURAL; INFILTRATION; INTRACAUDAL; PERINEURAL AS NEEDED
Status: DISCONTINUED | OUTPATIENT
Start: 2021-12-13 | End: 2021-12-13

## 2021-12-13 RX ORDER — LIDOCAINE HYDROCHLORIDE 10 MG/ML
0.5 INJECTION, SOLUTION EPIDURAL; INFILTRATION; INTRACAUDAL; PERINEURAL ONCE AS NEEDED
Status: DISCONTINUED | OUTPATIENT
Start: 2021-12-13 | End: 2021-12-13 | Stop reason: HOSPADM

## 2021-12-13 RX ORDER — HYDROMORPHONE HCL/PF 1 MG/ML
0.5 SYRINGE (ML) INJECTION
Status: DISCONTINUED | OUTPATIENT
Start: 2021-12-13 | End: 2021-12-13 | Stop reason: HOSPADM

## 2021-12-13 RX ORDER — ONDANSETRON 2 MG/ML
INJECTION INTRAMUSCULAR; INTRAVENOUS AS NEEDED
Status: DISCONTINUED | OUTPATIENT
Start: 2021-12-13 | End: 2021-12-13

## 2021-12-13 RX ORDER — ONDANSETRON 2 MG/ML
4 INJECTION INTRAMUSCULAR; INTRAVENOUS EVERY 4 HOURS PRN
Status: DISCONTINUED | OUTPATIENT
Start: 2021-12-13 | End: 2021-12-13 | Stop reason: HOSPADM

## 2021-12-13 RX ORDER — OXYCODONE HYDROCHLORIDE 5 MG/1
5 TABLET ORAL EVERY 4 HOURS PRN
Status: DISCONTINUED | OUTPATIENT
Start: 2021-12-13 | End: 2021-12-13 | Stop reason: HOSPADM

## 2021-12-13 RX ORDER — LEVOTHYROXINE SODIUM 0.07 MG/1
TABLET ORAL
Qty: 30 TABLET | Refills: 0 | Status: SHIPPED | OUTPATIENT
Start: 2021-12-13 | End: 2022-01-03

## 2021-12-13 RX ORDER — FENTANYL CITRATE/PF 50 MCG/ML
25 SYRINGE (ML) INJECTION
Status: COMPLETED | OUTPATIENT
Start: 2021-12-13 | End: 2021-12-13

## 2021-12-13 RX ORDER — BUPIVACAINE HYDROCHLORIDE 2.5 MG/ML
INJECTION, SOLUTION EPIDURAL; INFILTRATION; INTRACAUDAL AS NEEDED
Status: DISCONTINUED | OUTPATIENT
Start: 2021-12-13 | End: 2021-12-13 | Stop reason: HOSPADM

## 2021-12-13 RX ADMIN — FENTANYL CITRATE 50 MCG: 50 INJECTION, SOLUTION INTRAMUSCULAR; INTRAVENOUS at 12:03

## 2021-12-13 RX ADMIN — DEXAMETHASONE SODIUM PHOSPHATE 8 MG: 4 INJECTION INTRA-ARTICULAR; INTRALESIONAL; INTRAMUSCULAR; INTRAVENOUS; SOFT TISSUE at 11:57

## 2021-12-13 RX ADMIN — KETOROLAC TROMETHAMINE 15 MG: 30 INJECTION, SOLUTION INTRAMUSCULAR at 12:36

## 2021-12-13 RX ADMIN — ONDANSETRON 4 MG: 2 INJECTION INTRAMUSCULAR; INTRAVENOUS at 12:20

## 2021-12-13 RX ADMIN — CEFAZOLIN SODIUM 2000 MG: 2 SOLUTION INTRAVENOUS at 11:47

## 2021-12-13 RX ADMIN — FENTANYL CITRATE 25 MCG: 50 INJECTION INTRAMUSCULAR; INTRAVENOUS at 13:08

## 2021-12-13 RX ADMIN — FENTANYL CITRATE 25 MCG: 50 INJECTION INTRAMUSCULAR; INTRAVENOUS at 12:53

## 2021-12-13 RX ADMIN — SODIUM CHLORIDE, SODIUM LACTATE, POTASSIUM CHLORIDE, AND CALCIUM CHLORIDE 125 ML/HR: .6; .31; .03; .02 INJECTION, SOLUTION INTRAVENOUS at 11:14

## 2021-12-13 RX ADMIN — MIDAZOLAM HYDROCHLORIDE 2 MG: 1 INJECTION, SOLUTION INTRAMUSCULAR; INTRAVENOUS at 12:05

## 2021-12-13 RX ADMIN — FENTANYL CITRATE 50 MCG: 50 INJECTION, SOLUTION INTRAMUSCULAR; INTRAVENOUS at 12:05

## 2021-12-13 RX ADMIN — OXYCODONE HYDROCHLORIDE 5 MG: 5 TABLET ORAL at 14:33

## 2021-12-13 RX ADMIN — PROPOFOL 200 MG: 10 INJECTION, EMULSION INTRAVENOUS at 11:57

## 2021-12-13 RX ADMIN — LIDOCAINE HYDROCHLORIDE 50 MG: 10 INJECTION, SOLUTION EPIDURAL; INFILTRATION; INTRACAUDAL at 11:57

## 2021-12-13 RX ADMIN — FENTANYL CITRATE 25 MCG: 50 INJECTION INTRAMUSCULAR; INTRAVENOUS at 12:57

## 2021-12-13 RX ADMIN — FENTANYL CITRATE 25 MCG: 50 INJECTION INTRAMUSCULAR; INTRAVENOUS at 13:03

## 2022-01-03 DIAGNOSIS — E03.9 HYPOTHYROIDISM, UNSPECIFIED TYPE: ICD-10-CM

## 2022-01-03 RX ORDER — LEVOTHYROXINE SODIUM 0.07 MG/1
TABLET ORAL
Qty: 30 TABLET | Refills: 0 | Status: SHIPPED | OUTPATIENT
Start: 2022-01-03 | End: 2022-02-07

## 2022-01-05 ENCOUNTER — OFFICE VISIT (OUTPATIENT)
Dept: SURGERY | Facility: CLINIC | Age: 62
End: 2022-01-05

## 2022-01-05 DIAGNOSIS — K40.90 LEFT INGUINAL HERNIA: Primary | ICD-10-CM

## 2022-01-05 PROCEDURE — 99024 POSTOP FOLLOW-UP VISIT: CPT | Performed by: SURGERY
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
## 2022-01-06 NOTE — PROGRESS NOTES
UROLOGY PROGRESS NOTE   Patient Identifiers: Alisson Hong (MRN [id number])  Date of Service: 1/6/2022    Subjective:     70-year-old man history of kidney stones  CT done in August showed multiple nonobstructing stones in the right kidney measuring up to 6 mm  No recent imaging has been completed  PSA in July was 1 9  Reason for visit:  Kidney stone follow-up      Objective:     VITALS:    There were no vitals filed for this visit    AUA SYMPTOM SCORE      Most Recent Value   AUA SYMPTOM SCORE    How often have you had a sensation of not emptying your bladder completely after you finished urinating? 0 (P)     How often have you had to urinate again less than two hours after you finished urinating? 1 (P)     How often have you found you stopped and started again several times when you urinate? 2 (P)     How often have you found it difficult to postpone urination? 1 (P)     How often have you had a weak urinary stream? 0 (P)     How often have you had to push or strain to begin urination? 0 (P)     How many times did you most typically get up to urinate from the time you went to bed at night until the time you got up in the morning? 0 (P)     Quality of Life: If you were to spend the rest of your life with your urinary condition just the way it is now, how would you feel about that? 1 (P)     AUA SYMPTOM SCORE 4 (P)             LABS:  Lab Results   Component Value Date    HGB 15 5 08/19/2021    HCT 44 2 08/19/2021    WBC 5 10 08/19/2021     08/19/2021   ]    Lab Results   Component Value Date     09/18/2015    K 3 8 08/19/2021     08/19/2021    CO2 31 08/19/2021    BUN 13 08/19/2021    CREATININE 1 11 08/19/2021    CALCIUM 9 0 08/19/2021    GLUCOSE 91 06/03/2017   ]        INPATIENT MEDS:    Current Outpatient Medications:     clonazePAM (KlonoPIN) 0 5 mg tablet, Take 0 5 mg by mouth daily at bedtime, Disp: , Rfl:     Coenzyme Q10 (COQ10) 200 MG CAPS, Take by mouth, Disp: , Rfl:    levothyroxine 75 mcg tablet, TAKE ONE TABLET BY MOUTH EVERY DAY, Disp: 30 tablet, Rfl: 0    lisinopril (ZESTRIL) 40 mg tablet, TAKE ONE TABLET BY MOUTH EVERY DAY, Disp: 90 tablet, Rfl: 1    metoclopramide (REGLAN) 5 mg tablet, Take 1 tablet (5 mg total) by mouth 3 (three) times a day, Disp: 90 tablet, Rfl: 2    pantoprazole (PROTONIX) 40 mg tablet, Take 1 tablet (40 mg total) by mouth daily before breakfast, Disp: 30 tablet, Rfl: 5      Physical Exam:   There were no vitals taken for this visit  GEN: no acute distress    RESP: breathing comfortably with no accessory muscle use    ABD: soft, non-tender, non-distended   INCISION:    EXT: no significant peripheral edema   (Male): Penis circumcised, phallus normal, meatus patent  Testicles descended into scrotum bilaterally without masses nor tenderness  No inguinal hernias bilaterally  SHANTA: Prostate is not enlarged at 30 grams  The prostate is not boggy  The prostate is not tender  No nodules noted      RADIOLOGY:    none     Assessment:    1  Nephrolithiasis   2   BPH     Plan:   - follow-up in 1 year with a PSA and KUB prior to visit  -  -  -

## 2022-01-07 ENCOUNTER — OFFICE VISIT (OUTPATIENT)
Dept: UROLOGY | Facility: CLINIC | Age: 62
End: 2022-01-07
Payer: COMMERCIAL

## 2022-01-07 VITALS
RESPIRATION RATE: 18 BRPM | HEIGHT: 72 IN | DIASTOLIC BLOOD PRESSURE: 100 MMHG | BODY MASS INDEX: 22.75 KG/M2 | WEIGHT: 168 LBS | SYSTOLIC BLOOD PRESSURE: 158 MMHG

## 2022-01-07 DIAGNOSIS — N40.0 BENIGN PROSTATIC HYPERPLASIA WITHOUT LOWER URINARY TRACT SYMPTOMS: Primary | ICD-10-CM

## 2022-01-07 DIAGNOSIS — N20.0 KIDNEY STONES: ICD-10-CM

## 2022-01-07 PROCEDURE — 99213 OFFICE O/P EST LOW 20 MIN: CPT | Performed by: PHYSICIAN ASSISTANT

## 2022-01-10 ENCOUNTER — TELEPHONE (OUTPATIENT)
Dept: PAIN MEDICINE | Facility: CLINIC | Age: 62
End: 2022-01-10

## 2022-01-10 NOTE — LETTER
January 10, 2022     Patient: Rio Ayala III   YOB: 1960   Date of Visit: 1/10/2022       To Whom it May Concern:    Lorena Farr is under my professional care  He may return to work on 1/13/2021  If you have any questions or concerns, please don't hesitate to call           Sincerely,          Clayton Antunez MD        CC: No Recipients

## 2022-01-10 NOTE — TELEPHONE ENCOUNTER
Pt is calling asking if Dr Zelaya He can give him a return back to work slip for 1/13 (Thursday)    Please advise  Pt can be reached at [phone]

## 2022-01-10 NOTE — TELEPHONE ENCOUNTER
Work note sent via [employer]     Hopefully he's feeling better following RFA's and hernia surgery

## 2022-01-20 ENCOUNTER — OFFICE VISIT (OUTPATIENT)
Dept: PAIN MEDICINE | Facility: CLINIC | Age: 62
End: 2022-01-20
Payer: COMMERCIAL

## 2022-01-20 VITALS
DIASTOLIC BLOOD PRESSURE: 80 MMHG | SYSTOLIC BLOOD PRESSURE: 127 MMHG | BODY MASS INDEX: 22.75 KG/M2 | HEART RATE: 66 BPM | HEIGHT: 72 IN | WEIGHT: 168 LBS

## 2022-01-20 DIAGNOSIS — M47.816 LUMBAR SPONDYLOSIS: ICD-10-CM

## 2022-01-20 DIAGNOSIS — M54.50 CHRONIC BILATERAL LOW BACK PAIN, UNSPECIFIED WHETHER SCIATICA PRESENT: ICD-10-CM

## 2022-01-20 DIAGNOSIS — G89.4 CHRONIC PAIN SYNDROME: ICD-10-CM

## 2022-01-20 DIAGNOSIS — G89.29 CHRONIC BILATERAL LOW BACK PAIN, UNSPECIFIED WHETHER SCIATICA PRESENT: ICD-10-CM

## 2022-01-20 PROCEDURE — 99214 OFFICE O/P EST MOD 30 MIN: CPT | Performed by: NURSE PRACTITIONER

## 2022-01-20 PROCEDURE — 1036F TOBACCO NON-USER: CPT | Performed by: NURSE PRACTITIONER

## 2022-01-20 PROCEDURE — 3008F BODY MASS INDEX DOCD: CPT | Performed by: NURSE PRACTITIONER
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
## 2022-01-25 ENCOUNTER — EVALUATION (OUTPATIENT)
Dept: PHYSICAL THERAPY | Facility: CLINIC | Age: 62
End: 2022-01-25
Payer: COMMERCIAL

## 2022-01-25 DIAGNOSIS — M47.816 LUMBAR SPONDYLOSIS: Primary | ICD-10-CM

## 2022-01-25 PROCEDURE — 97161 PT EVAL LOW COMPLEX 20 MIN: CPT | Performed by: PHYSICAL THERAPIST

## 2022-01-25 NOTE — PROGRESS NOTES
PT EVALUATION    Today's date: 22  Patient name: Ariadna Gaines  : 1960  MRN: [id number]  Referring provider: Lizet Petit  Dx:   1  Lumbar spondylosis        Unknown Sierra Leonean III is a 64 y o  male who presents with signs and symptoms consistent with their referring diagnosis of chronic low back pain  There is a significant decrease in mobility to the low back and leg muscles  This patient would benefit from skilled physical therapy to address their listed impairments and functional limitations to maximize functional outcome  Impairments:    restricted ROM    pain with function   activity intolerance     Prognosis:  Good  Positive and negative prognostic indicator(s):  pain >3 months    Goals:    STG Patient is independent with HEP   STG Range of motion is improved by 25% in 2 weeks  LTG Range of motion is improved by 50% in 4 weeks  LTG ADL performance improved to prior level of function in 6 weeks    Planned interventions:  home exercise program, patient education, manual therapy, graded activity, flexibility, functional range of motion exercises and strengthening    Duration in visits:  8  Frequency: 2 visits per week  Duration in weeks:  4    History of Current Injury: Shari Iain reports he has had low back pain for many years  He has had diagnoses of lumbar intervertebral disc disorder with radiculopathy, and lumbar spondylosis  Patient presents today with ongoing low back pain  He had some pain relief from the L3 - L5 radiofrequency ablation he had on 12/3/21 left, 21 right  He had steroid injections in 2021 that did not seem to help  He had a left inguinal hernia repair 6 weeks         Pain location: across the low back, and across the anterior abdominals  Pain descriptors:  Mostly dull ache, sharp with position change sit to stand or rolling in bed  Pain intensity:  8/10 when transferring sit to stand    Aggravating factors: getting in and out of the chair, carrying/lifting objects, sometimes walking, inactivity --> active  Easing factors: being active    Imaging: MRI on 5/03/21 IMPRESSION: Mild degenerative changes without significant canal or foraminal stenosis  No significant interval change from prior exam     Patient goals:  decreased pain, independence with ADLs and increased mobility    Objective     Active Range of Motion     Lumbar   Flexion: 65 degrees   Extension: 7 degrees  with pain  Left lateral flexion: 10 degrees    with pain  Right lateral flexion: 14 degrees  with pain    Additional Active Range of Motion Details  Flexion limited by pulling in the gastrocs    Joint Play   Joints within functional limits: T12, L1 and L2     Hypomobile: L3, L4 and L5     Tests     Lumbar   Negative SIJ compression  Left   Negative crossed SLR and passive SLR  Right   Negative crossed SLR and passive SLR       General Comments:      Lumbar Comments  SLR left: 54  SLR right: 52       Precautions: hernia repair 6 weeks prior to eval, no direct abdominals for 3 months      Warm up 1/25            bike             Manuals             Gapping bilat SY            hasmtring  SY                                      Modalities             Neuro Re-Ed                                                                                                        Ther Ex             Prone on elbows 2'            Progressive prone ext             Prone hip ext             LTR 10            hamstring stretch :10x3            SKC :10x3            pball bridges             pball curls             Kneeling stretches             Seated trunk roll outs             Standing hamstring stretch                                       Ther Activity                                                    Gait Training

## 2022-01-27 ENCOUNTER — OFFICE VISIT (OUTPATIENT)
Dept: PHYSICAL THERAPY | Facility: CLINIC | Age: 62
End: 2022-01-27
Payer: COMMERCIAL

## 2022-01-27 DIAGNOSIS — M47.816 LUMBAR SPONDYLOSIS: Primary | ICD-10-CM

## 2022-01-27 PROCEDURE — 97110 THERAPEUTIC EXERCISES: CPT | Performed by: PHYSICAL THERAPIST

## 2022-01-27 NOTE — PROGRESS NOTES
Daily Note     Today's date: 2022  Patient name: Luci Aguilar III  : 1960  MRN: [id number]  Referring provider: Mechelle Barry  Dx:   Encounter Diagnosis     ICD-10-CM    1  Lumbar spondylosis  M47 816                  Subjective: patient reports no soreness after initial exercises last visit; he has been consistent with home exercises      Objective: See treatment diary below      Assessment: Tolerated treatment well  Patient exhibited good technique with therapeutic exercises and would benefit from continued PT  Progressed treatment with focus on improving ROM    Plan: Progress treatment as tolerated         Precautions: hernia repair 6 weeks prior to eval, no direct abdominals strengthening?        Warm up                    bike    6'                   Manuals                       Gapping bilat SY  SY                   hasmtring  SY  SY                                                                   Modalities                       Neuro Re-Ed                                                                                                                                                                                               Ther Ex                       Prone on elbows 2' 2'                   Progressive prone ext                       Prone hip ext    10ea                   LTR 10 pball 20                   hamstring stretch :10x3  :10x3                   SKC :10x3  :10x3                   pball bridges   :03x20                   pball curls    :03x20                   Kneeling stretches    :10x5                   Seated trunk roll outs                       Standing hamstring stretch                                                                       Ther Activity                                                                                               Gait Training

## 2022-02-01 ENCOUNTER — OFFICE VISIT (OUTPATIENT)
Dept: PHYSICAL THERAPY | Facility: CLINIC | Age: 62
End: 2022-02-01
Payer: COMMERCIAL

## 2022-02-01 DIAGNOSIS — M47.816 LUMBAR SPONDYLOSIS: Primary | ICD-10-CM

## 2022-02-01 PROCEDURE — 97110 THERAPEUTIC EXERCISES: CPT | Performed by: PHYSICAL THERAPIST

## 2022-02-01 NOTE — PROGRESS NOTES
Daily Note     Today's date: 2022  Patient name: Vianney Calles III  : 1960  MRN: [id number]  Referring provider: Rosa Cartwright  Dx:   Encounter Diagnosis     ICD-10-CM    1  Lumbar spondylosis  M47 816                 Subjective: patient reports continued variable pain in the low back      Objective: See treatment diary below      Assessment: Tolerated treatment well  Patient exhibited good technique with therapeutic exercises and would benefit from continued PT  Encouraged consistency with stretching at home  Decreased ROM noted with prone press ups      Plan: Progress treatment as tolerated         Precautions: hernia repair 6 weeks prior to eval, no direct abdominals strengthening?        Warm up                  bike    6'  7'                 Manuals                       Gapping bilat SY  SY  SY                 hasmtring  SY  SY  SY                                                                 Modalities                       Neuro Re-Ed                                                                                                                                                                                               Ther Ex                       Prone on elbows 2' 2'  2'                Progressive prone ext     15                Prone hip ext    07NT  66KI                LTR 10 pball 20  pball 20                hamstring stretch :10x3  :10x3  15 kick outs                SKC :10x3  :10x3 :10x3                pball bridges   :03x20 pball 20                pball curls    :03x20  pball 20                Kneeling stretches    :10x5                   Seated trunk roll outs                       Standing hamstring stretch                                                                       Ther Activity                                                                                               Gait Training

## 2022-02-03 ENCOUNTER — OFFICE VISIT (OUTPATIENT)
Dept: PHYSICAL THERAPY | Facility: CLINIC | Age: 62
End: 2022-02-03
Payer: COMMERCIAL

## 2022-02-03 DIAGNOSIS — M47.816 LUMBAR SPONDYLOSIS: Primary | ICD-10-CM

## 2022-02-03 PROCEDURE — 97110 THERAPEUTIC EXERCISES: CPT

## 2022-02-03 NOTE — PROGRESS NOTES
Daily Note     Today's date: 2/3/2022  Patient name: Santo Gonzales III  : 1960  MRN: [id number]  Referring provider: Armando Chavez  Dx:   Encounter Diagnosis     ICD-10-CM    1  Lumbar spondylosis  M47 816                   Subjective: Patient reports his back is achy today in a little bit of a higher spot than normal          Objective: See treatment diary below      Assessment: Tolerated treatment well  Patient exhibited good technique with therapeutic exercises and would benefit from continued PT  Tender to bilateral upper lumbar paraspinals (R>L) with good response to manual therapy  No increased symptoms reported throughout treatment  Plan: Progress treatment as tolerated         Precautions: hernia repair 6 weeks prior to eval, no direct abdominals strengthening?        Warm up 1/25  1/27  2/1  2/3               bike    6'  7'  6'               Manuals                       Gapping bilat SY  SY   [address]               hasmtring  SY  SY   [address]                                                               Modalities                       Neuro Re-Ed                                                                                                                                                                                               Ther Ex                       Prone on elbows 2' 2'  2' 2'               Progressive prone ext     15 15               Prone hip ext    10ea  15ea 15 ea               LTR 10 pball 20  pball 20 pball 20               hamstring stretch :10x3  :10x3  15 kick outs 15 ea               SKC :10x3  :10x3 :10x3 :10x3               pball bridges   :03x20 pball 20 pball 20               pball curls    :03x20  pball 20 pball 20               Kneeling stretches    :10x5                   Seated trunk roll outs                       Standing hamstring stretch                                                                       Ther Activity                                                                                               Gait Training

## 2022-02-05 DIAGNOSIS — E03.9 HYPOTHYROIDISM, UNSPECIFIED TYPE: ICD-10-CM

## 2022-02-07 RX ORDER — LEVOTHYROXINE SODIUM 0.07 MG/1
TABLET ORAL
Qty: 30 TABLET | Refills: 0 | Status: SHIPPED | OUTPATIENT
Start: 2022-02-07 | End: 2022-03-04

## 2022-02-08 ENCOUNTER — OFFICE VISIT (OUTPATIENT)
Dept: PHYSICAL THERAPY | Facility: CLINIC | Age: 62
End: 2022-02-08
Payer: COMMERCIAL

## 2022-02-08 DIAGNOSIS — M47.816 LUMBAR SPONDYLOSIS: Primary | ICD-10-CM

## 2022-02-08 PROCEDURE — 97140 MANUAL THERAPY 1/> REGIONS: CPT | Performed by: PHYSICAL THERAPIST

## 2022-02-08 NOTE — PROGRESS NOTES
Daily Note     Today's date: 2022  Patient name: Gaylen Gosselin III  : 1960  MRN: [id number]  Referring provider: Luis Car  Dx:   Encounter Diagnosis     ICD-10-CM    1  Lumbar spondylosis  M47 816               Subjective: patient reports no significant change; he is not getting sore after treatment      Objective: See treatment diary below      Assessment: Tolerated treatment well  Patient exhibited good technique with therapeutic exercises and would benefit from continued PT  Progressed trunk ROM activities including in transverse plane  Very limited passive trunk extension  Plan: Progress treatment as tolerated         Precautions: hernia repair 6 weeks prior to eval, no direct abdominals strengthening?        Warm up 1/25  1/27  2/1  2/3  2/8             bike    6'  7'  6'  7'             Manuals                       Gapping bilat SY  SY   [address]  SY, right mid lumbar focus             hasmtring  SY  SY   [address]  SY                                                             Modalities                       Neuro Re-Ed                                                                                                                                                                                               Ther Ex                       Prone on elbows 2' 2'  2' 2'               Progressive prone ext     15 15  15             Prone hip ext    10ea  15ea 15 ea  15ea             LTR 10 pball 20  pball 20 pball 20  pball 20             hamstring stretch :10x3  :10x3  15 kick outs 15 ea               SKC :10x3  :10x3 :10x3 :10x3               pball bridges   :03x20 pball 20 pball 20  pball 20             pball curls    :03x20  pball 20 pball 20  pball 20             Quad rocking    :10x5     :10x10             Seated trunk roll outs                       Standing hamstring stretch                       Quad cat and camel         15             Quad reach through and up                       Book openers     :10x5        Lateral flexion stretch against wall     :10x5ea                                                            Ther Activity                                                                                               Gait Training

## 2022-02-10 ENCOUNTER — OFFICE VISIT (OUTPATIENT)
Dept: PHYSICAL THERAPY | Facility: CLINIC | Age: 62
End: 2022-02-10
Payer: COMMERCIAL

## 2022-02-10 DIAGNOSIS — M47.816 LUMBAR SPONDYLOSIS: Primary | ICD-10-CM

## 2022-02-10 PROCEDURE — 97140 MANUAL THERAPY 1/> REGIONS: CPT | Performed by: PHYSICAL THERAPIST

## 2022-02-10 NOTE — PROGRESS NOTES
Daily Note     Today's date: 2/10/2022  Patient name: Jackson Kohler III  : 1960  MRN: [id number]  Referring provider: Lizet Petit  Dx:   Encounter Diagnosis     ICD-10-CM    1  Lumbar spondylosis  M47 816               Subjective: patient reports he had soreness the night after last treatment but symptoms weren't too bad today      Objective: See treatment diary below      Assessment: Tolerated treatment well  Patient exhibited good technique with therapeutic exercises and would benefit from continued PT  Mild aching in the low back noted with end range stretching  Left trunk rotation significantly tighter than right    Plan: Progress treatment as tolerated         Precautions: hernia repair 6 weeks prior to eval, no direct abdominals strengthening?        Warm up 1/25  1/27  2/1  2/3  2/8  2/20           bike    6'  7'  6'  7' 7'           Manuals                       Gapping bilat SY  SY   [address]  SY, right mid lumbar focus  SY, right mid lumbar focus           hasmtring  SY  SY   [address]  SY  SY                                                           Modalities                       Neuro Re-Ed                                                                                                                                                                                               Ther Ex                       Prone on elbows 2' 2'  2' 2'               Progressive prone ext     15 15  15  15           Prone hip ext    10ea  15ea 15 ea  15ea  15ea           LTR 10 pball 20  pball 20 pball 20  pball 20  pball 20           hamstring stretch :10x3  :10x3  15 kick outs 15 ea               SKC :10x3  :10x3 :10x3 :10x3               pball bridges   :03x20 pball 20 pball 20  pball 20  pball 20           pball curls    :03x20  pball 20 pball 20  pball 20   pball 20           Quad rocking    :10x5     :10x10  :10x10           Seated trunk roll outs                       Standing hamstring stretch                       Quad cat and camel         15  15           Quad reach through and up                       Book openers     :10x5 :15x4       Lateral flexion stretch against wall     :10x5ea                                                            Ther Activity                                                                                               Gait Training

## 2022-02-15 ENCOUNTER — OFFICE VISIT (OUTPATIENT)
Dept: PHYSICAL THERAPY | Facility: CLINIC | Age: 62
End: 2022-02-15
Payer: COMMERCIAL

## 2022-02-15 DIAGNOSIS — M47.816 LUMBAR SPONDYLOSIS: Primary | ICD-10-CM

## 2022-02-15 PROCEDURE — 97140 MANUAL THERAPY 1/> REGIONS: CPT | Performed by: PHYSICAL THERAPIST

## 2022-02-15 NOTE — PROGRESS NOTES
Daily Note     Today's date: 2/15/2022  Patient name: Louis Spine  : 1960  MRN: [id number]  Referring provider: Juan Trujillo  Dx:   Encounter Diagnosis     ICD-10-CM    1  Lumbar spondylosis  M47 816               Subjective: patient reports continued pain in the low back, no significant change      Objective: See treatment diary below      Assessment: Tolerated treatment well  Patient exhibited good technique with therapeutic exercises and would benefit from continued PT  Continued progression of ROM activities  He didn't have much soreness with end range stretching in flexion or extension but right low back pain was noted with right lateral flexion in standing  Plan: Progress treatment as tolerated         Precautions: hernia repair 6 weeks prior to eval, no direct abdominals strengthening?        Warm up 1/25  1/27  2/1  2/3  2/8  2/20  2/15         bike    6'  7'  6'  7' 7'  7'         Manuals                       Gapping bilat SY  SY   [address]  SY, right mid lumbar focus  SY, right mid lumbar focus   SY, right mid lumbar focus         hasmtring  SY  SY   [address]  SY              rotation stretch in left sidelying              SY + G4                                 Modalities                       Neuro Re-Ed                                                                                                                                                                                               Ther Ex                       Prone on elbows 2' 2'  2' 2'                prone ext     15 15  15  15  with belt block 15         Prone hip ext    10ea  15ea 15 ea  15ea  15ea           LTR 10 pball 20  pball 20 pball 20  pball 20  pball 20  pball 20         hamstring stretch :10x3  :10x3  15 kick outs 15 ea               SKC :10x3  :10x3 :10x3 :10x3               pball bridges   :03x20 pball 20 pball 20  pball 20  pball 20 pball 20         pball curls    :03x20  pball 20 pball 20  pball 20   pball 20 pball 20         Quad rocking    :10x5     :10x10  :10x10  :10x5 3-way         Seated trunk roll outs                       Long sit hamstring stretch              15 dynamic (knee flex/ext)         Quad cat and camel         15  15 15         Quad reach through and up                       Book openers     :10x5 :15x4 :10x10      Lateral flexion stretch against wall     :10x5ea  :10x3                                                          Ther Activity                                                                                               Gait Training

## 2022-02-17 ENCOUNTER — OFFICE VISIT (OUTPATIENT)
Dept: PHYSICAL THERAPY | Facility: CLINIC | Age: 62
End: 2022-02-17
Payer: COMMERCIAL

## 2022-02-17 DIAGNOSIS — M47.816 LUMBAR SPONDYLOSIS: Primary | ICD-10-CM

## 2022-02-17 PROCEDURE — 97140 MANUAL THERAPY 1/> REGIONS: CPT | Performed by: PHYSICAL THERAPIST

## 2022-02-17 NOTE — PROGRESS NOTES
Daily Note     Today's date: 2022  Patient name: Delia James  : 1960  MRN: [id number]  Referring provider: Merissa Hicks  Dx:   Encounter Diagnosis     ICD-10-CM    1  Lumbar spondylosis  M47 816                 Subjective: patient reports muscle soreness in the low back, left side  He had an active day at work with repeated bending, lifting  Objective: See treatment diary below      Assessment: Tolerated treatment well  Patient exhibited good technique with therapeutic exercises and would benefit from continued PT  Progressed stretching with decreased rotation noted to the left in quad  Soreness noted after table stretches when getting up, less after extension stretching on the pball  Plan: Progress treatment as tolerated         Precautions: hernia repair 6 weeks prior to eval, no direct abdominals strengthening?        Warm up 1/25  1/27  2/1  2/3  2/8  2/20  2/15  2/17       bike    6'  7'  6'  7' 7'  7'  7'       Manuals                       Gapping bilat SY  SY   [address]  SY, right mid lumbar focus  SY, right mid lumbar focus   SY, right mid lumbar focus   SY, right mid lumbar focus       hasmtring  SY  SY  SY  MC  SY              rotation stretch in left sidelying              SY + G4                                 Modalities                       Neuro Re-Ed                                                                                                                                                                                               Ther Ex                       Prone on elbows 2' 2'  2' 2'                prone ext     15 15  15  15  with belt block 15  15       Prone hip ext    10ea  15ea 15 ea  15ea  15ea           LTR 10 pball 20  pball 20 pball 20  pball 20  pball 20  pball 20  pball 20       hamstring stretch :10x3  :10x3  15 kick outs 15 ea               SKC :10x3  :10x3 :10x3 :10x3               pball bridges   :03x20 pball 20 pball 20  pball 20  pball 20 pball 20  pball 20       pball curls    :03x20  pball 20 pball 20  pball 20   pball 20 pball 20  pball 20       Quad rocking    :10x5     :10x10  :10x10  :10x5 3-way  :10x5 3-way       Seated trunk roll outs                       Long sit hamstring stretch              15 dynamic (knee flex/ext)  x       Quad cat and camel         15  15 15  15       Quad reach through arm on table               :10x5       Book openers     :10x5 :15x4 :10x10 :10x5     Lateral flexion stretch against wall     :10x5ea  :10x3      Ext stretch over pball        ~2' total                                            Ther Activity                                                                                               Gait Training

## 2022-02-24 ENCOUNTER — OFFICE VISIT (OUTPATIENT)
Dept: PHYSICAL THERAPY | Facility: CLINIC | Age: 62
End: 2022-02-24
Payer: COMMERCIAL

## 2022-02-24 DIAGNOSIS — M47.816 LUMBAR SPONDYLOSIS: Primary | ICD-10-CM

## 2022-02-24 PROCEDURE — 97140 MANUAL THERAPY 1/> REGIONS: CPT

## 2022-02-24 PROCEDURE — 97110 THERAPEUTIC EXERCISES: CPT

## 2022-02-24 PROCEDURE — 97112 NEUROMUSCULAR REEDUCATION: CPT

## 2022-02-24 NOTE — PROGRESS NOTES
Daily Note     Today's date: 2022  Patient name: Bard Nguyen  : 1960  MRN: [id number]  Referring provider: Cass Dc  Dx:   Encounter Diagnosis     ICD-10-CM    1  Lumbar spondylosis  M47 816                 Subjective: Pt states he continues to feel tight along the middle of his low back  Objective: See treatment diary below      Assessment: Tolerated treatment well with no increase in pain  Pt continues to be restricted in his mid lumbar spine  Pt tolerated hip flexor stretch well with decreased discomfort in his lumbar spine  Pt was educated on importance of stretching at home  Patient would benefit from continued PT  Plan: Progress treatment as tolerated         Precautions: hernia repair 6 weeks prior to eval, no direct abdominals strengthening?        Warm up  1/27  2/1  2/3  2/8  2/20  2/15  2/17  2/24      bike  6'  7'  6'  7' 7'  7'  7'  8'     Manuals                     Gapping bilat  SY   [address]  SY, right mid lumbar focus  SY, right mid lumbar focus   SY, right mid lumbar focus   SY, right mid lumbar focus  CF     hasmtring   SY  SY  MC  SY              rotation stretch in left sidelying            SY + G4                               Modalities                     Neuro Re-Ed                                                                                                                                                                               Ther Ex                     Prone on elbows 2'  2' 2'                prone ext   15 15  15  15  with belt block 15  15 Prone press up  15x   10x      Prone hip ext  10ea  15ea 15 ea  15ea  15ea           LTR pball 20  pball 20 pball 20  pball 20  pball 20  pball 20  pball 20  pball 20x      hamstring stretch  :10x3  15 kick outs 15 ea               SKC  :10x3 :10x3 :10x3               pball bridges :03x20 pball 20 pball 20  pball 20  pball 20 pball 20  pball 20  pball 20x      pball curls  :03x20  pball 20 pball 20  pball 20   pball 20 pball 20  pball 20  pball 20x      Quad rocking  :10x5     :10x10  :10x10  :10x5 3-way  :10x5 3-way  10" x 5 ea      Seated trunk roll outs                     Long sit hamstring stretch            15 dynamic (knee flex/ext)  x       Quad cat and camel       15  15 15  15  15x      Quad reach through arm on table             :10x5       Book openers    :10x5 :15x4 :10x10 :10x5 10x 10"     Lateral flexion stretch against wall    :10x5ea  :10x3      Ext stretch over pball       ~2' total     Modified clementine         20" x 5 ea with strap                             Ther Activity                                                                                       Gait Training

## 2022-03-01 ENCOUNTER — OFFICE VISIT (OUTPATIENT)
Dept: PHYSICAL THERAPY | Facility: CLINIC | Age: 62
End: 2022-03-01
Payer: COMMERCIAL

## 2022-03-01 DIAGNOSIS — M47.816 LUMBAR SPONDYLOSIS: Primary | ICD-10-CM

## 2022-03-01 PROCEDURE — 97140 MANUAL THERAPY 1/> REGIONS: CPT

## 2022-03-01 NOTE — PROGRESS NOTES
Daily Note     Today's date: 3/1/2022  Patient name: Candace Cohen  : 1960  MRN: [id number]  Referring provider: Sherron Dailey  Dx:   Encounter Diagnosis     ICD-10-CM    1  Lumbar spondylosis  M47 816                   Subjective: Patient reports increased low back soreness possibly due to laying on ball for extended period of time last night to stretch  He sometimes feels more muscular soreness following PT sessions  Objective: See treatment diary below      Assessment: Tolerated treatment well  Patient exhibited good technique with therapeutic exercises and would benefit from continued PT  Hip flexor stretching felt more in distal thigh versus anterior hip  Patient continues to exhibit mobility restrictions with mild soreness at end ranges (L rotation, extension >)  Plan: Progress treatment as tolerated         Precautions: hernia repair 6 weeks prior to eval, no direct abdominals strengthening?        Warm up  1/27  2/1  2/3  2/8  2/20  2/15  2/17  2/24   3/1   bike  6'  7'  6'  7' 7'  7'  7'  8'  nv   Manuals                     Gapping bilat  SY   [address]  SY, right mid lumbar focus  SY, right mid lumbar focus   SY, right mid lumbar focus   SY, right mid lumbar focus  CF MC   hasmtring   SY   [address]  SY              rotation stretch in left sidelying            SY + G4                               Modalities                     Neuro Re-Ed                                                                                                                                                                               Ther Ex                     Prone on elbows 2'  2' 2'                prone ext   15 15  15  15  with belt block 15  15 Prone press up  15x   10x  15x   Prone hip ext  10ea  15ea 15 ea  15ea  15ea           LTR pball 20  pball 20 pball 20  pball 20  pball 20  pball 20  pball 20  pball 20x   pball 20x   hamstring stretch  :10x3  15 kick outs 15 ea               SKC  :10x3 :10x3 :10x3               pball bridges :03x20 pball 20 pball 20  pball 20  pball 20 pball 20  pball 20  pball 20x   pball 20x   pball curls  :03x20  pball 20 pball 20  pball 20   pball 20 pball 20  pball 20  pball 20x   pball 20x   Quad rocking  :10x5     :10x10  :10x10  :10x5 3-way  :10x5 3-way  10" x 5 ea   :10x5 ea   Seated trunk roll outs                     Long sit hamstring stretch            15 dynamic (knee flex/ext)  x       Quad cat and camel       15  15 15  15  15x   15x   Quad reach through arm on table             :10x5       Book openers    :10x5 :15x4 :10x10 :10x5 10x 10"  :10x5   Lateral flexion stretch against wall    :10x5ea  :10x3      Ext stretch over pball       ~2' total     Modified clementine         20" x 5 ea with strap  :15x5 ea                           Ther Activity                                                                                       Gait Training

## 2022-03-03 ENCOUNTER — APPOINTMENT (EMERGENCY)
Dept: CT IMAGING | Facility: HOSPITAL | Age: 62
End: 2022-03-03
Payer: COMMERCIAL

## 2022-03-03 ENCOUNTER — HOSPITAL ENCOUNTER (EMERGENCY)
Facility: HOSPITAL | Age: 62
Discharge: HOME | End: 2022-03-03
Attending: EMERGENCY MEDICINE | Admitting: EMERGENCY MEDICINE
Payer: COMMERCIAL

## 2022-03-03 VITALS
WEIGHT: 165 LBS | SYSTOLIC BLOOD PRESSURE: 177 MMHG | OXYGEN SATURATION: 97 % | TEMPERATURE: 97.7 F | DIASTOLIC BLOOD PRESSURE: 91 MMHG | BODY MASS INDEX: 22.35 KG/M2 | HEART RATE: 65 BPM | RESPIRATION RATE: 18 BRPM | HEIGHT: 72 IN

## 2022-03-03 DIAGNOSIS — R74.8 ELEVATED LIPASE: ICD-10-CM

## 2022-03-03 DIAGNOSIS — N13.30 HYDRONEPHROSIS: Primary | ICD-10-CM

## 2022-03-03 DIAGNOSIS — N23 RENAL COLIC ON RIGHT SIDE: ICD-10-CM

## 2022-03-03 LAB
ALBUMIN SERPL BCP-MCNC: 3.6 G/DL (ref 3.5–5)
ALP SERPL-CCNC: 70 U/L (ref 46–116)
ALT SERPL W P-5'-P-CCNC: 26 U/L (ref 12–78)
ANION GAP SERPL CALCULATED.3IONS-SCNC: 9 MMOL/L (ref 4–13)
AST SERPL W P-5'-P-CCNC: 22 U/L (ref 5–45)
BACTERIA UR QL AUTO: NORMAL /HPF
BASOPHILS # BLD AUTO: 0.06 THOUSANDS/ΜL (ref 0–0.1)
BASOPHILS NFR BLD AUTO: 1 % (ref 0–1)
BILIRUB SERPL-MCNC: 0.54 MG/DL (ref 0.2–1)
BILIRUB UR QL STRIP: NEGATIVE
BUN SERPL-MCNC: 9 MG/DL (ref 5–25)
CALCIUM SERPL-MCNC: 8.6 MG/DL (ref 8.3–10.1)
CHLORIDE SERPL-SCNC: 105 MMOL/L (ref 100–108)
CLARITY UR: CLEAR
CO2 SERPL-SCNC: 27 MMOL/L (ref 21–32)
COLOR UR: YELLOW
CREAT SERPL-MCNC: 0.84 MG/DL (ref 0.6–1.3)
EOSINOPHIL # BLD AUTO: 0.28 THOUSAND/ΜL (ref 0–0.61)
EOSINOPHIL NFR BLD AUTO: 5 % (ref 0–6)
ERYTHROCYTE [DISTWIDTH] IN BLOOD BY AUTOMATED COUNT: 11.9 % (ref 11.6–15.1)
GFR SERPL CREATININE-BSD FRML MDRD: 94 ML/MIN/1.73SQ M
GLUCOSE SERPL-MCNC: 108 MG/DL (ref 65–140)
GLUCOSE UR STRIP-MCNC: NEGATIVE MG/DL
HCT VFR BLD AUTO: 38 % (ref 36.5–49.3)
HGB BLD-MCNC: 14.3 G/DL (ref 12–17)
HGB UR QL STRIP.AUTO: ABNORMAL
IMM GRANULOCYTES # BLD AUTO: 0.01 THOUSAND/UL (ref 0–0.2)
IMM GRANULOCYTES NFR BLD AUTO: 0 % (ref 0–2)
KETONES UR STRIP-MCNC: NEGATIVE MG/DL
LEUKOCYTE ESTERASE UR QL STRIP: NEGATIVE
LIPASE SERPL-CCNC: 493 U/L (ref 73–393)
LYMPHOCYTES # BLD AUTO: 2.71 THOUSANDS/ΜL (ref 0.6–4.47)
LYMPHOCYTES NFR BLD AUTO: 44 % (ref 14–44)
MCH RBC QN AUTO: 31.2 PG (ref 26.8–34.3)
MCHC RBC AUTO-ENTMCNC: 37.1 G/DL (ref 31.4–37.4)
MCV RBC AUTO: 83 FL (ref 82–98)
MONOCYTES # BLD AUTO: 0.58 THOUSAND/ΜL (ref 0.17–1.22)
MONOCYTES NFR BLD AUTO: 9 % (ref 4–12)
NEUTROPHILS # BLD AUTO: 2.5 THOUSANDS/ΜL (ref 1.85–7.62)
NEUTS SEG NFR BLD AUTO: 41 % (ref 43–75)
NITRITE UR QL STRIP: NEGATIVE
NON-SQ EPI CELLS URNS QL MICRO: NORMAL /HPF
NRBC BLD AUTO-RTO: 0 /100 WBCS
PH UR STRIP.AUTO: 7 [PH] (ref 4.5–8)
PLATELET # BLD AUTO: 216 THOUSANDS/UL (ref 149–390)
PMV BLD AUTO: 9.5 FL (ref 8.9–12.7)
POTASSIUM SERPL-SCNC: 3.3 MMOL/L (ref 3.5–5.3)
PROT SERPL-MCNC: 6.2 G/DL (ref 6.4–8.2)
PROT UR STRIP-MCNC: NEGATIVE MG/DL
RBC # BLD AUTO: 4.58 MILLION/UL (ref 3.88–5.62)
RBC #/AREA URNS AUTO: NORMAL /HPF
SODIUM SERPL-SCNC: 141 MMOL/L (ref 136–145)
SP GR UR STRIP.AUTO: 1.02 (ref 1–1.03)
UROBILINOGEN UR QL STRIP.AUTO: 0.2 E.U./DL
WBC # BLD AUTO: 6.14 THOUSAND/UL (ref 4.31–10.16)
WBC #/AREA URNS AUTO: NORMAL /HPF

## 2022-03-03 PROCEDURE — 36415 COLL VENOUS BLD VENIPUNCTURE: CPT | Performed by: EMERGENCY MEDICINE

## 2022-03-03 PROCEDURE — 80053 COMPREHEN METABOLIC PANEL: CPT | Performed by: EMERGENCY MEDICINE

## 2022-03-03 PROCEDURE — 96374 THER/PROPH/DIAG INJ IV PUSH: CPT

## 2022-03-03 PROCEDURE — 99284 EMERGENCY DEPT VISIT MOD MDM: CPT

## 2022-03-03 PROCEDURE — 83690 ASSAY OF LIPASE: CPT | Performed by: EMERGENCY MEDICINE

## 2022-03-03 PROCEDURE — 99285 EMERGENCY DEPT VISIT HI MDM: CPT | Performed by: EMERGENCY MEDICINE

## 2022-03-03 PROCEDURE — 96375 TX/PRO/DX INJ NEW DRUG ADDON: CPT

## 2022-03-03 PROCEDURE — 85025 COMPLETE CBC W/AUTO DIFF WBC: CPT | Performed by: EMERGENCY MEDICINE

## 2022-03-03 PROCEDURE — 74176 CT ABD & PELVIS W/O CONTRAST: CPT

## 2022-03-03 PROCEDURE — G1004 CDSM NDSC: HCPCS

## 2022-03-03 PROCEDURE — 81001 URINALYSIS AUTO W/SCOPE: CPT

## 2022-03-03 RX ORDER — POTASSIUM CHLORIDE 20 MEQ/1
40 TABLET, EXTENDED RELEASE ORAL ONCE
Status: DISCONTINUED | OUTPATIENT
Start: 2022-03-03 | End: 2022-03-03

## 2022-03-03 RX ORDER — ONDANSETRON 2 MG/ML
4 INJECTION INTRAMUSCULAR; INTRAVENOUS ONCE
Status: COMPLETED | OUTPATIENT
Start: 2022-03-03 | End: 2022-03-03

## 2022-03-03 RX ORDER — POTASSIUM CHLORIDE 20MEQ/15ML
40 LIQUID (ML) ORAL ONCE
Status: COMPLETED | OUTPATIENT
Start: 2022-03-03 | End: 2022-03-03

## 2022-03-03 RX ORDER — IBUPROFEN 600 MG/1
600 TABLET ORAL EVERY 6 HOURS PRN
Qty: 28 TABLET | Refills: 0 | Status: SHIPPED | OUTPATIENT
Start: 2022-03-03 | End: 2022-04-18

## 2022-03-03 RX ORDER — MORPHINE SULFATE 4 MG/ML
4 INJECTION, SOLUTION INTRAMUSCULAR; INTRAVENOUS ONCE
Status: COMPLETED | OUTPATIENT
Start: 2022-03-03 | End: 2022-03-03

## 2022-03-03 RX ORDER — OXYCODONE HYDROCHLORIDE 5 MG/1
5 TABLET ORAL EVERY 6 HOURS PRN
Qty: 4 TABLET | Refills: 0 | Status: SHIPPED | OUTPATIENT
Start: 2022-03-03 | End: 2022-03-04

## 2022-03-03 RX ADMIN — POTASSIUM CHLORIDE 40 MEQ: 20 SOLUTION ORAL at 20:50

## 2022-03-03 RX ADMIN — ONDANSETRON 4 MG: 2 INJECTION INTRAMUSCULAR; INTRAVENOUS at 19:04

## 2022-03-03 RX ADMIN — MORPHINE SULFATE 4 MG: 4 INJECTION INTRAVENOUS at 19:04

## 2022-03-04 DIAGNOSIS — E03.9 HYPOTHYROIDISM, UNSPECIFIED TYPE: ICD-10-CM

## 2022-03-04 DIAGNOSIS — R74.8 ELEVATED LIPASE: Primary | ICD-10-CM

## 2022-03-04 RX ORDER — LEVOTHYROXINE SODIUM 0.07 MG/1
TABLET ORAL
Qty: 30 TABLET | Refills: 0 | Status: SHIPPED | OUTPATIENT
Start: 2022-03-04 | End: 2022-04-06

## 2022-03-04 NOTE — ED PROVIDER NOTES
History  Chief Complaint   Patient presents with    Flank Pain     sudden on set r flank pain going to front 1 5 hours ago       History provided by:  Patient   used: No    Flank Pain  Associated symptoms: nausea and vomiting    Associated symptoms: no chest pain, no chills, no cough, no diarrhea, no dysuria, no fever, no shortness of breath and no sore throat      Patient is a 54-year-old male presenting to emergency department with right-sided flank pain and nausea vomiting  Sudden onset  Feels like previous kidney stone  Needed stent for the previous stone  No fevers  No chest pain or shortness breath  No urine complaints  No diarrhea  MDM will check abdominal labs, CT, pain control, Zofran, re-evaluate      Prior to Admission Medications   Prescriptions Last Dose Informant Patient Reported? Taking?    Coenzyme Q10 (COQ10) 200 MG CAPS  Self Yes No   Sig: Take by mouth   clonazePAM (KlonoPIN) 0 5 mg tablet  Self Yes No   Sig: Take 0 5 mg by mouth daily at bedtime   levothyroxine 75 mcg tablet   No No   Sig: TAKE ONE TABLET BY MOUTH EVERY DAY   lisinopril (ZESTRIL) 40 mg tablet  Self No No   Sig: TAKE ONE TABLET BY MOUTH EVERY DAY   metoclopramide (REGLAN) 5 mg tablet  Self No No   Sig: Take 1 tablet (5 mg total) by mouth 3 (three) times a day   pantoprazole (PROTONIX) 40 mg tablet  Self No No   Sig: Take 1 tablet (40 mg total) by mouth daily before breakfast      Facility-Administered Medications: None       Past Medical History:   Diagnosis Date    Anxiety     Change in bowel habits     Chronic urticaria     Depression     GERD (gastroesophageal reflux disease)     Hashimoto's disease     Hashimoto's thyroiditis     Hypertension     Kidney stone     Renal stones        Past Surgical History:   Procedure Laterality Date    FL RETROGRADE PYELOGRAM  7/28/2019    NASAL SEPTUM SURGERY      IL CYSTO/URETERO W/LITHOTRIPSY &INDWELL STENT INSRT Right 7/28/2019    Procedure: CYSTOSCOPY URETEROSCOPY WITH LITHOTRIPSY HOLMIUM LASER, basket stone extraction, RETROGRADE PYELOGRAM AND INSERTION STENT URETERAL;  Surgeon: Terri Melissa MD;  [facility];  Service: Urology    NH REPAIR Phillip Rivera 58 HERNIA,5+Y/O,REDUCIBL Left 12/13/2021    Procedure: INGUINAL HERNIA REPAIR;  Surgeon: Elaina Trejo DO;  [facility];  Service: General    TONSILLECTOMY  1979       Family History   Problem Relation Age of Onset    Cancer Mother         Thyroid    Stroke Mother     Colon cancer Father     Colon polyps Father     Valvular heart disease Father     Cancer Sister         Skin    Suicidality Brother      I have reviewed and agree with the history as documented  E-Cigarette/Vaping    E-Cigarette Use Never User      E-Cigarette/Vaping Substances    Nicotine No     THC No     CBD No     Flavoring No     Other No     Unknown No      Social History     Tobacco Use    Smoking status: Never Smoker    Smokeless tobacco: Never Used   Vaping Use    Vaping Use: Never used   Substance Use Topics    Alcohol use: Yes     Alcohol/week: 0 0 standard drinks     Comment: social    Drug use: Never       Review of Systems   Constitutional: Negative for chills, diaphoresis and fever  HENT: Negative for congestion and sore throat  Respiratory: Negative for cough, shortness of breath, wheezing and stridor  Cardiovascular: Negative for chest pain, palpitations and leg swelling  Gastrointestinal: Positive for nausea and vomiting  Negative for abdominal pain, blood in stool and diarrhea  Genitourinary: Positive for flank pain  Negative for dysuria, frequency and urgency  Musculoskeletal: Negative for neck pain and neck stiffness  Skin: Negative for pallor and rash  Neurological: Negative for dizziness, syncope, weakness, light-headedness and headaches  All other systems reviewed and are negative  Physical Exam  Physical Exam  Vitals reviewed     Constitutional: Appearance: Normal appearance  He is well-developed  HENT:      Head: Normocephalic and atraumatic  Eyes:      Extraocular Movements: Extraocular movements intact  Pupils: Pupils are equal, round, and reactive to light  Cardiovascular:      Rate and Rhythm: Normal rate and regular rhythm  Heart sounds: Normal heart sounds  Pulmonary:      Effort: Pulmonary effort is normal  No respiratory distress  Breath sounds: Normal breath sounds  Abdominal:      General: Bowel sounds are normal       Palpations: Abdomen is soft  Tenderness: There is no abdominal tenderness  Musculoskeletal:         General: Normal range of motion  Cervical back: Normal range of motion and neck supple  Comments: Right CVA tenderness   Skin:     General: Skin is warm and dry  Capillary Refill: Capillary refill takes less than 2 seconds  Neurological:      General: No focal deficit present  Mental Status: He is alert and oriented to person, place, and time           Vital Signs  ED Triage Vitals [03/03/22 1850]   Temperature Pulse Respirations Blood Pressure SpO2   97 7 °F (36 5 °C) 65 18 (!) 177/91 97 %      Temp Source Heart Rate Source Patient Position - Orthostatic VS BP Location FiO2 (%)   Oral Monitor Sitting Left arm --      Pain Score       --           Vitals:    03/03/22 1850   BP: (!) 177/91   Pulse: 65   Patient Position - Orthostatic VS: Sitting         Visual Acuity      ED Medications  Medications   ondansetron (ZOFRAN) injection 4 mg (4 mg Intravenous Given 3/3/22 1904)   morphine (PF) 4 mg/mL injection 4 mg (4 mg Intravenous Given 3/3/22 1904)   potassium chloride oral solution 40 mEq (40 mEq Oral Given 3/3/22 2050)       Diagnostic Studies  Results Reviewed     Procedure Component Value Units Date/Time    Urine Microscopic [[id number]]  (Normal) Collected: 03/03/22 2000    Lab Status: Final result Specimen: Urine, Clean Catch Updated: 03/03/22 2111     RBC, UA None Seen /hpf      WBC, UA 0-1 /hpf      Epithelial Cells None Seen /hpf      Bacteria, UA Occasional /hpf     Urine Macroscopic, POC [[id number]]  (Abnormal) Collected: 03/03/22 2000    Lab Status: Final result Specimen: Urine Updated: 03/03/22 2002     Color, UA Yellow     Clarity, UA Clear     pH, UA 7 0     Leukocytes, UA Negative     Nitrite, UA Negative     Protein, UA Negative mg/dl      Glucose, UA Negative mg/dl      Ketones, UA Negative mg/dl      Urobilinogen, UA 0 2 E U /dl      Bilirubin, UA Negative     Blood, UA Trace     Specific [place], UA 1 020    Narrative:      CLINITEK RESULT    CBC and differential [[id number]]  (Abnormal) Collected: 03/03/22 1904    Lab Status: Final result Specimen: Blood from Arm, Right Updated: 03/03/22 1955     WBC 6 14 Thousand/uL      RBC 4 58 Million/uL      Hemoglobin 14 3 g/dL      Hematocrit 38 0 %      MCV 83 fL      MCH 31 2 pg      MCHC 37 1 g/dL      RDW 11 9 %      MPV 9 5 fL      Platelets 051 Thousands/uL      nRBC 0 /100 WBCs      Neutrophils Relative 41 %      Immat GRANS % 0 %      Lymphocytes Relative 44 %      Monocytes Relative 9 %      Eosinophils Relative 5 %      Basophils Relative 1 %      Neutrophils Absolute 2 50 Thousands/µL      Immature Grans Absolute 0 01 Thousand/uL      Lymphocytes Absolute 2 71 Thousands/µL      Monocytes Absolute 0 58 Thousand/µL      Eosinophils Absolute 0 28 Thousand/µL      Basophils Absolute 0 06 Thousands/µL     Lipase [[id number]]  (Abnormal) Collected: 03/03/22 1904    Lab Status: Final result Specimen: Blood from Arm, Right Updated: 03/03/22 1934     Lipase 493 u/L     Comprehensive metabolic panel [[id number]]  (Abnormal) Collected: 03/03/22 1904    Lab Status: Final result Specimen: Blood from Arm, Right Updated: 03/03/22 1934     Sodium 141 mmol/L      Potassium 3 3 mmol/L      Chloride 105 mmol/L      CO2 27 mmol/L      ANION GAP 9 mmol/L      BUN 9 mg/dL      Creatinine 0 84 mg/dL      Glucose 108 mg/dL      Calcium 8 6 mg/dL      AST 22 U/L      ALT 26 U/L      Alkaline Phosphatase 70 U/L      Total Protein 6 2 g/dL      Albumin 3 6 g/dL      Total Bilirubin 0 54 mg/dL      eGFR 94 ml/min/1 73sq m     Narrative:      [place] guidelines for Chronic Kidney Disease (CKD):     Stage 1 with normal or high GFR (GFR > 90 mL/min/1 73 square meters)    Stage 2 Mild CKD (GFR = 60-89 mL/min/1 73 square meters)    Stage 3A Moderate CKD (GFR = 45-59 mL/min/1 73 square meters)    Stage 3B Moderate CKD (GFR = 30-44 mL/min/1 73 square meters)    Stage 4 Severe CKD (GFR = 15-29 mL/min/1 73 square meters)    Stage 5 End Stage CKD (GFR <15 mL/min/1 73 square meters)  Note: GFR calculation is accurate only with a steady state creatinine                 CT renal stone study abdomen pelvis without contrast   Final Result by Cornelius Morales MD (03/03 2039)      Mild right hydroureteronephrosis with calculus noted dependently in the bladder measuring 3 mm  This is likely a recently passed calculus and correlation for improved symptoms recommended  Multiple additional nonobstructing calculi on the right have both increased in size and number since the prior study the largest measures 7 mm in the lower pole  Workstation performed: [id number]                    Procedures  Procedures         ED Course      patient feeling better  Will follow-up with PCP and urology  Will return if worse  SBIRT 20yo+      Most Recent Value   SBIRT (22 yo +)    In order to provide better care to our patients, we are screening all of our patients for alcohol and drug use  Would it be okay to ask you these screening questions?  No Filed at: 03/03/2022 9584                    [facility]    Disposition  Final diagnoses:   Hydronephrosis - right   Renal colic on right side   Elevated lipase     Time reflects when diagnosis was documented in both MDM as applicable and the Disposition within this note     Time User Action Codes Description Comment    3/3/2022  8:49 PM Tadevosyan, Criselda Add [N13 30] Hydronephrosis     3/3/2022  8:49 PM Tadevosyan, Criselda Modify [N13 30] Hydronephrosis right    3/3/2022  8:49 PM Tadevosyan, Criselda Add [T92] Renal colic on right side     3/3/2022  8:49 PM Tadevosyan, Criselda Add [R74 8] Elevated lipase       ED Disposition     ED Disposition Condition Date/Time Comment    Discharge Stable Thu Mar 3, 2022  8:48 PM Louisa Jesica III discharge to home/self care  Follow-up Information     Follow up With Specialties Details Why Contact Info Additional [address] [facility], DO Family Medicine In 2 days re-evaluation and recheck lipase [address] [facility]    [address] [facility] [address]       [address] Emergency Department Emergency Medicine  As needed, If symptoms worsen [address] [facility] [address] [facility] Emergency Department, [address] [facility] [place], [place], [address] [facility] Urology Schedule an appointment as soon as possible for a visit  renal colic Osei Walsh [address] [address]  [address]  [facility] [place], [address] [facility] [place], [place], [address] [facility]          Discharge Medication List as of 3/3/2022  8:51 PM      START taking these medications    Details   ibuprofen (MOTRIN) 600 mg tablet Take 1 tablet (600 mg total) by mouth every 6 (six) hours as needed for mild pain for up to 7 days, Starting Thu 3/3/2022, Until Thu 3/10/2022 at 2359, Normal      oxyCODONE (Roxicodone) 5 immediate release tablet Take 1 tablet (5 mg total) by mouth every 6 (six) hours as needed for moderate pain for up to 1 day Max Daily Amount: 20 mg, Starting Thu 3/3/2022, Until Fri 3/4/2022 at 2359, Normal         CONTINUE these medications which have NOT CHANGED    Details   clonazePAM (KlonoPIN) 0 5 mg tablet Take 0 5 mg by mouth daily at bedtime, Historical Med      Coenzyme Q10 (COQ10) 200 MG CAPS Take by mouth, Historical Med      levothyroxine 75 mcg tablet TAKE ONE TABLET BY MOUTH EVERY DAY, Normal      lisinopril (ZESTRIL) 40 mg tablet TAKE ONE TABLET BY MOUTH EVERY DAY, Normal      metoclopramide (REGLAN) 5 mg tablet Take 1 tablet (5 mg total) by mouth 3 (three) times a day, Starting Fri 11/12/2021, Normal      pantoprazole (PROTONIX) 40 mg tablet Take 1 tablet (40 mg total) by mouth daily before breakfast, Starting Fri 11/12/2021, Normal             No discharge procedures on file      PDMP Review       Value Time User    PDMP Reviewed  Yes 5/7/2021  2:56 PM Napoleon Braun DO          ED Provider  Electronically Signed by           Riley Sorensen MD  03/04/22 9116

## 2022-03-11 ENCOUNTER — APPOINTMENT (OUTPATIENT)
Dept: LAB | Facility: CLINIC | Age: 62
End: 2022-03-11
Payer: COMMERCIAL

## 2022-03-11 DIAGNOSIS — N40.0 BENIGN PROSTATIC HYPERPLASIA WITHOUT LOWER URINARY TRACT SYMPTOMS: ICD-10-CM

## 2022-03-11 DIAGNOSIS — R74.8 ELEVATED LIPASE: ICD-10-CM

## 2022-03-11 LAB
ANION GAP SERPL CALCULATED.3IONS-SCNC: 7 MMOL/L (ref 4–13)
BUN SERPL-MCNC: 8 MG/DL (ref 5–25)
CALCIUM SERPL-MCNC: 8.6 MG/DL (ref 8.3–10.1)
CHLORIDE SERPL-SCNC: 106 MMOL/L (ref 100–108)
CO2 SERPL-SCNC: 30 MMOL/L (ref 21–32)
CREAT SERPL-MCNC: 1 MG/DL (ref 0.6–1.3)
GFR SERPL CREATININE-BSD FRML MDRD: 80 ML/MIN/1.73SQ M
GLUCOSE P FAST SERPL-MCNC: 92 MG/DL (ref 65–99)
LIPASE SERPL-CCNC: 373 U/L (ref 73–393)
POTASSIUM SERPL-SCNC: 4.3 MMOL/L (ref 3.5–5.3)
PSA SERPL-MCNC: 1.3 NG/ML (ref 0–4)
SODIUM SERPL-SCNC: 143 MMOL/L (ref 136–145)

## 2022-03-11 PROCEDURE — 80048 BASIC METABOLIC PNL TOTAL CA: CPT

## 2022-03-11 PROCEDURE — 36415 COLL VENOUS BLD VENIPUNCTURE: CPT

## 2022-03-11 PROCEDURE — 84153 ASSAY OF PSA TOTAL: CPT

## 2022-03-11 PROCEDURE — 83690 ASSAY OF LIPASE: CPT

## 2022-03-31 ENCOUNTER — APPOINTMENT (EMERGENCY)
Dept: CT IMAGING | Facility: HOSPITAL | Age: 62
End: 2022-03-31
Payer: COMMERCIAL

## 2022-03-31 ENCOUNTER — HOSPITAL ENCOUNTER (EMERGENCY)
Facility: HOSPITAL | Age: 62
Discharge: HOME | End: 2022-03-31
Attending: EMERGENCY MEDICINE | Admitting: EMERGENCY MEDICINE
Payer: COMMERCIAL

## 2022-03-31 ENCOUNTER — TELEPHONE (OUTPATIENT)
Dept: UROLOGY | Facility: MEDICAL CENTER | Age: 62
End: 2022-03-31

## 2022-03-31 ENCOUNTER — TELEPHONE (OUTPATIENT)
Dept: UROLOGY | Facility: CLINIC | Age: 62
End: 2022-03-31

## 2022-03-31 VITALS
BODY MASS INDEX: 22.34 KG/M2 | TEMPERATURE: 97.8 F | HEIGHT: 72 IN | OXYGEN SATURATION: 95 % | RESPIRATION RATE: 18 BRPM | DIASTOLIC BLOOD PRESSURE: 72 MMHG | WEIGHT: 164.9 LBS | HEART RATE: 68 BPM | SYSTOLIC BLOOD PRESSURE: 137 MMHG

## 2022-03-31 DIAGNOSIS — N13.30 HYDROURETERONEPHROSIS: ICD-10-CM

## 2022-03-31 DIAGNOSIS — R11.0 NAUSEA: ICD-10-CM

## 2022-03-31 DIAGNOSIS — N20.1 RIGHT URETERAL STONE: Primary | ICD-10-CM

## 2022-03-31 DIAGNOSIS — E87.6 HYPOKALEMIA: ICD-10-CM

## 2022-03-31 LAB
ALBUMIN SERPL BCP-MCNC: 4.2 G/DL (ref 3.5–5)
ALP SERPL-CCNC: 82 U/L (ref 46–116)
ALT SERPL W P-5'-P-CCNC: 27 U/L (ref 12–78)
ANION GAP SERPL CALCULATED.3IONS-SCNC: 12 MMOL/L (ref 4–13)
AST SERPL W P-5'-P-CCNC: 21 U/L (ref 5–45)
BASOPHILS # BLD AUTO: 0.06 THOUSANDS/ΜL (ref 0–0.1)
BASOPHILS NFR BLD AUTO: 1 % (ref 0–1)
BILIRUB SERPL-MCNC: 0.86 MG/DL (ref 0.2–1)
BILIRUB UR QL STRIP: NEGATIVE
BUN SERPL-MCNC: 14 MG/DL (ref 5–25)
CALCIUM SERPL-MCNC: 9.3 MG/DL (ref 8.3–10.1)
CHLORIDE SERPL-SCNC: 102 MMOL/L (ref 100–108)
CLARITY UR: CLEAR
CO2 SERPL-SCNC: 26 MMOL/L (ref 21–32)
COLOR UR: YELLOW
CREAT SERPL-MCNC: 1.19 MG/DL (ref 0.6–1.3)
EOSINOPHIL # BLD AUTO: 0.37 THOUSAND/ΜL (ref 0–0.61)
EOSINOPHIL NFR BLD AUTO: 5 % (ref 0–6)
ERYTHROCYTE [DISTWIDTH] IN BLOOD BY AUTOMATED COUNT: 11.9 % (ref 11.6–15.1)
GFR SERPL CREATININE-BSD FRML MDRD: 65 ML/MIN/1.73SQ M
GLUCOSE SERPL-MCNC: 115 MG/DL (ref 65–140)
GLUCOSE UR STRIP-MCNC: NEGATIVE MG/DL
HCT VFR BLD AUTO: 44.9 % (ref 36.5–49.3)
HGB BLD-MCNC: 16.2 G/DL (ref 12–17)
HGB UR QL STRIP.AUTO: NEGATIVE
IMM GRANULOCYTES # BLD AUTO: 0.01 THOUSAND/UL (ref 0–0.2)
IMM GRANULOCYTES NFR BLD AUTO: 0 % (ref 0–2)
KETONES UR STRIP-MCNC: ABNORMAL MG/DL
LEUKOCYTE ESTERASE UR QL STRIP: NEGATIVE
LIPASE SERPL-CCNC: 478 U/L (ref 73–393)
LYMPHOCYTES # BLD AUTO: 3.13 THOUSANDS/ΜL (ref 0.6–4.47)
LYMPHOCYTES NFR BLD AUTO: 41 % (ref 14–44)
MCH RBC QN AUTO: 30.9 PG (ref 26.8–34.3)
MCHC RBC AUTO-ENTMCNC: 36.1 G/DL (ref 31.4–37.4)
MCV RBC AUTO: 86 FL (ref 82–98)
MONOCYTES # BLD AUTO: 0.64 THOUSAND/ΜL (ref 0.17–1.22)
MONOCYTES NFR BLD AUTO: 8 % (ref 4–12)
NEUTROPHILS # BLD AUTO: 3.43 THOUSANDS/ΜL (ref 1.85–7.62)
NEUTS SEG NFR BLD AUTO: 45 % (ref 43–75)
NITRITE UR QL STRIP: NEGATIVE
NRBC BLD AUTO-RTO: 0 /100 WBCS
PH UR STRIP.AUTO: 7.5 [PH]
PLATELET # BLD AUTO: 231 THOUSANDS/UL (ref 149–390)
PMV BLD AUTO: 9.1 FL (ref 8.9–12.7)
POTASSIUM SERPL-SCNC: 2.9 MMOL/L (ref 3.5–5.3)
PROT SERPL-MCNC: 7.3 G/DL (ref 6.4–8.2)
PROT UR STRIP-MCNC: NEGATIVE MG/DL
RBC # BLD AUTO: 5.24 MILLION/UL (ref 3.88–5.62)
SODIUM SERPL-SCNC: 140 MMOL/L (ref 136–145)
SP GR UR STRIP.AUTO: 1.02 (ref 1–1.03)
UROBILINOGEN UR QL STRIP.AUTO: 0.2 E.U./DL
WBC # BLD AUTO: 7.64 THOUSAND/UL (ref 4.31–10.16)

## 2022-03-31 PROCEDURE — 96374 THER/PROPH/DIAG INJ IV PUSH: CPT

## 2022-03-31 PROCEDURE — 36415 COLL VENOUS BLD VENIPUNCTURE: CPT | Performed by: PHYSICIAN ASSISTANT

## 2022-03-31 PROCEDURE — 96376 TX/PRO/DX INJ SAME DRUG ADON: CPT

## 2022-03-31 PROCEDURE — 74176 CT ABD & PELVIS W/O CONTRAST: CPT

## 2022-03-31 PROCEDURE — 99285 EMERGENCY DEPT VISIT HI MDM: CPT | Performed by: PHYSICIAN ASSISTANT

## 2022-03-31 PROCEDURE — 83690 ASSAY OF LIPASE: CPT | Performed by: PHYSICIAN ASSISTANT

## 2022-03-31 PROCEDURE — 80053 COMPREHEN METABOLIC PANEL: CPT | Performed by: PHYSICIAN ASSISTANT

## 2022-03-31 PROCEDURE — 85025 COMPLETE CBC W/AUTO DIFF WBC: CPT | Performed by: PHYSICIAN ASSISTANT

## 2022-03-31 PROCEDURE — 96375 TX/PRO/DX INJ NEW DRUG ADDON: CPT

## 2022-03-31 PROCEDURE — 99284 EMERGENCY DEPT VISIT MOD MDM: CPT

## 2022-03-31 PROCEDURE — 96361 HYDRATE IV INFUSION ADD-ON: CPT

## 2022-03-31 PROCEDURE — G1004 CDSM NDSC: HCPCS

## 2022-03-31 PROCEDURE — 81003 URINALYSIS AUTO W/O SCOPE: CPT | Performed by: PHYSICIAN ASSISTANT

## 2022-03-31 RX ORDER — OXYCODONE HYDROCHLORIDE AND ACETAMINOPHEN 5; 325 MG/1; MG/1
1 TABLET ORAL EVERY 6 HOURS PRN
Qty: 8 TABLET | Refills: 0 | Status: SHIPPED | OUTPATIENT
Start: 2022-03-31 | End: 2022-04-19 | Stop reason: HOSPADM

## 2022-03-31 RX ORDER — KETOROLAC TROMETHAMINE 30 MG/ML
15 INJECTION, SOLUTION INTRAMUSCULAR; INTRAVENOUS ONCE
Status: COMPLETED | OUTPATIENT
Start: 2022-03-31 | End: 2022-03-31

## 2022-03-31 RX ORDER — POTASSIUM CHLORIDE 20MEQ/15ML
40 LIQUID (ML) ORAL ONCE
Status: COMPLETED | OUTPATIENT
Start: 2022-03-31 | End: 2022-03-31

## 2022-03-31 RX ORDER — TAMSULOSIN HYDROCHLORIDE 0.4 MG/1
0.4 CAPSULE ORAL ONCE
Status: COMPLETED | OUTPATIENT
Start: 2022-03-31 | End: 2022-03-31

## 2022-03-31 RX ORDER — TAMSULOSIN HYDROCHLORIDE 0.4 MG/1
0.4 CAPSULE ORAL
Qty: 2 CAPSULE | Refills: 0 | Status: SHIPPED | OUTPATIENT
Start: 2022-03-31 | End: 2022-04-02

## 2022-03-31 RX ORDER — POTASSIUM CHLORIDE 20 MEQ/1
40 TABLET, EXTENDED RELEASE ORAL ONCE
Status: DISCONTINUED | OUTPATIENT
Start: 2022-03-31 | End: 2022-03-31

## 2022-03-31 RX ORDER — HYDROMORPHONE HCL/PF 1 MG/ML
0.5 SYRINGE (ML) INJECTION ONCE
Status: COMPLETED | OUTPATIENT
Start: 2022-03-31 | End: 2022-03-31

## 2022-03-31 RX ORDER — ONDANSETRON 4 MG/1
4 TABLET, FILM COATED ORAL EVERY 6 HOURS
Qty: 12 TABLET | Refills: 0 | Status: SHIPPED | OUTPATIENT
Start: 2022-03-31 | End: 2022-04-18

## 2022-03-31 RX ORDER — DICYCLOMINE HCL 20 MG
20 TABLET ORAL ONCE
Status: COMPLETED | OUTPATIENT
Start: 2022-03-31 | End: 2022-03-31

## 2022-03-31 RX ORDER — ONDANSETRON 2 MG/ML
4 INJECTION INTRAMUSCULAR; INTRAVENOUS ONCE
Status: COMPLETED | OUTPATIENT
Start: 2022-03-31 | End: 2022-03-31

## 2022-03-31 RX ADMIN — POTASSIUM CHLORIDE 40 MEQ: 20 SOLUTION ORAL at 03:30

## 2022-03-31 RX ADMIN — ONDANSETRON 4 MG: 2 INJECTION INTRAMUSCULAR; INTRAVENOUS at 02:20

## 2022-03-31 RX ADMIN — DICYCLOMINE HYDROCHLORIDE 20 MG: 20 TABLET ORAL at 02:20

## 2022-03-31 RX ADMIN — KETOROLAC TROMETHAMINE 15 MG: 30 INJECTION, SOLUTION INTRAMUSCULAR at 03:23

## 2022-03-31 RX ADMIN — KETOROLAC TROMETHAMINE 15 MG: 30 INJECTION, SOLUTION INTRAMUSCULAR at 02:20

## 2022-03-31 RX ADMIN — SODIUM CHLORIDE 1000 ML: 0.9 INJECTION, SOLUTION INTRAVENOUS at 02:20

## 2022-03-31 RX ADMIN — SODIUM CHLORIDE 1000 ML: 0.9 INJECTION, SOLUTION INTRAVENOUS at 02:22

## 2022-03-31 RX ADMIN — HYDROMORPHONE HYDROCHLORIDE 0.5 MG: 1 INJECTION, SOLUTION INTRAMUSCULAR; INTRAVENOUS; SUBCUTANEOUS at 02:20

## 2022-03-31 RX ADMIN — TAMSULOSIN HYDROCHLORIDE 0.4 MG: 0.4 CAPSULE ORAL at 03:28

## 2022-03-31 NOTE — DISCHARGE INSTRUCTIONS
[place]  [phone] 3/31/2022      Narrative & Impression  CT ABDOMEN AND PELVIS WITHOUT IV CONTRAST - LOW DOSE RENAL STONE      INDICATION:   Flank pain, kidney stone suspected  abrupt onset right flank pain and nausea sx  COMPARISON:  March 3, 2022     TECHNIQUE:  Low radiation dose thin section CT examination of the abdomen and pelvis was performed without intravenous or oral contrast according to a protocol specifically designed to evaluate for urinary tract calculus  Axial, sagittal, and coronal 2D   reformatted images were created from the source data and submitted for interpretation  Evaluation for pathology in the abdomen and pelvis that is unrelated to urinary tract calculi is limited  Radiation dose length product (DLP) for this visit:  197 mGy-cm   This examination, like all CT scans performed in the [facility], was performed utilizing techniques to minimize radiation dose exposure, including the use of iterative   reconstruction and automated exposure control  URINARY TRACT FINDINGS:     RIGHT KIDNEY AND URETER:  Moderate right-sided hydroureteronephrosis with a 6 mm obstructing stone in the proximal right ureter  Nonobstructing right-sided intrarenal calculi measuring up to 5 mm is seen  LEFT KIDNEY AND URETER:  No urinary tract calculi  No hydronephrosis or hydroureter  URINARY BLADDER:  Thickening of the urinary bladder wall is visualized  ADDITIONAL FINDINGS:     LOWER CHEST:  No clinically significant abnormality identified in the visualized lower chest      SOLID VISCERA: Limited low radiation dose noncontrast CT evaluation demonstrates no clinically significant abnormality of the imaged portions of the liver, spleen, pancreas, or adrenal glands  GALLBLADDER/BILIARY TREE:  No calcified gallstones  No pericholecystic inflammatory change  No biliary dilatation       STOMACH AND BOWEL:  There is colonic diverticulosis without evidence of acute diverticulitis  APPENDIX:  No findings to suggest appendicitis  ABDOMINOPELVIC CAVITY:  No ascites  No pneumoperitoneum  No lymphadenopathy  REPRODUCTIVE ORGANS:  Prosthetic calcifications are seen  ABDOMINAL WALL/INGUINAL REGIONS:  Unremarkable  OSSEOUS STRUCTURES:  No acute fracture or destructive osseous lesion  Spinal degenerative changes are noted  IMPRESSION:     Moderate right-sided hydroureteronephrosis with a 6 mm obstructing stone in the proximal right ureter  Nonobstructing multiple right-sided intrarenal calculi  Thickening of the urinary bladder wall which may represent cystitis    Follow-up with urology is recommended    Workstation performed: [id number]

## 2022-03-31 NOTE — TELEPHONE ENCOUNTER
----- Message from Tom Gonzáles PA-C sent at 3/31/2022  1:13 PM EDT -----   Reyna Church was in the emergency room today with a 6 mm right ureter stone  Could you double book him tomorrow on my schedule so we can take care of him please  Apparently there on the phone for an hour trying to get through with no results and the primary doctor called me for assistance

## 2022-03-31 NOTE — Clinical Note
Stanley Huerta was seen and treated in our emergency department on 3/31/2022  Diagnosis:     Rachelle Close    He may return on this date: 04/01/2022         If you have any questions or concerns, please don't hesitate to call        Johnnie Hi PA-C    ______________________________           _______________          _______________  Hospital Representative                              Date                                Time

## 2022-03-31 NOTE — ED PROVIDER NOTES
History  Chief Complaint   Patient presents with    Flank Pain     ride sided flank pain  hx of kidney stones  N/V  Patient is a 58-year-old male with history of nephrolithiasis, GERD, ureteral stent and lithotripsy that presents emergency department with abrupt onset sharp shooting right flank pain with radiation to right lower quadrant abdomen for 2 hours  Patient has associated symptomatology of nausea symptoms beginning the current presentation of right flank pain per patient reports that he has a history of kidney stones and current symptomatology resemble that previously experienced  Patient denies palliative factors with provocative factors of pressure to right flank  Patient is not effective treatment  Patient denies fevers, chills, vomiting, diarrhea, constipation urinary symptoms  Patient states that he normal urinary stream   Patient denies recent antibiotic use  Patient denies sick contacts or recent travel  Patient denies testicular pain  Patient denies recent fall or recent trauma  Patient denies chest pain and shortness of breath  History provided by:  Patient   used: No        Prior to Admission Medications   Prescriptions Last Dose Informant Patient Reported? Taking?    Coenzyme Q10 (COQ10) 200 MG CAPS  Self Yes No   Sig: Take by mouth   clonazePAM (KlonoPIN) 0 5 mg tablet  Self Yes No   Sig: Take 0 5 mg by mouth daily at bedtime   ibuprofen (MOTRIN) 600 mg tablet   No No   Sig: Take 1 tablet (600 mg total) by mouth every 6 (six) hours as needed for mild pain for up to 7 days   levothyroxine 75 mcg tablet   No No   Sig: TAKE ONE TABLET BY MOUTH EVERY DAY   lisinopril (ZESTRIL) 40 mg tablet  Self No No   Sig: TAKE ONE TABLET BY MOUTH EVERY DAY   metoclopramide (REGLAN) 5 mg tablet  Self No No   Sig: Take 1 tablet (5 mg total) by mouth 3 (three) times a day   pantoprazole (PROTONIX) 40 mg tablet  Self No No   Sig: Take 1 tablet (40 mg total) by mouth daily before breakfast      Facility-Administered Medications: None       Past Medical History:   Diagnosis Date    Anxiety     Change in bowel habits     Chronic urticaria     Depression     GERD (gastroesophageal reflux disease)     Hashimoto's disease     Hashimoto's thyroiditis     Hypertension     Kidney stone     Renal stones        Past Surgical History:   Procedure Laterality Date    FL RETROGRADE PYELOGRAM  7/28/2019    NASAL SEPTUM SURGERY      AK CYSTO/URETERO W/LITHOTRIPSY &INDWELL STENT INSRT Right 7/28/2019    Procedure: CYSTOSCOPY URETEROSCOPY WITH LITHOTRIPSY HOLMIUM LASER, basket stone extraction, RETROGRADE PYELOGRAM AND INSERTION STENT URETERAL;  Surgeon: Jasmin Puentes MD;  Location: [facility];  Service: Urology    AK REPAIR [address] HERNIA,5+Y/O,REDUCIBL Left 12/13/2021    Procedure: INGUINAL HERNIA REPAIR;  Surgeon: Yanci Wilkins DO;  Location: [facility] MA[facility] OR;  Service: General    TONSILLECTOMY  1979       Family History   Problem Relation Age of Onset    Cancer Mother         Thyroid    Stroke Mother     Colon cancer Father     Colon polyps Father     Valvular heart disease Father     Cancer Sister         Skin    Suicidality Brother      I have reviewed and agree with the history as documented  E-Cigarette/Vaping    E-Cigarette Use Never User      E-Cigarette/Vaping Substances    Nicotine No     THC No     CBD No     Flavoring No     Other No     Unknown No      Social History     Tobacco Use    Smoking status: Never Smoker    Smokeless tobacco: Never Used   Vaping Use    Vaping Use: Never used   Substance Use Topics    Alcohol use: Yes     Alcohol/week: 0 0 standard drinks     Comment: social    Drug use: Never       Review of Systems   Constitutional: Negative for activity change, appetite change, chills and fever  HENT: Negative for congestion, postnasal drip, rhinorrhea, sinus pressure, sinus pain, sore throat and tinnitus      Eyes: Negative for photophobia and visual disturbance  Respiratory: Negative for cough, chest tightness and shortness of breath  Cardiovascular: Negative for chest pain and palpitations  Gastrointestinal: Positive for nausea  Negative for abdominal pain, constipation, diarrhea and vomiting  Genitourinary: Positive for flank pain  Negative for difficulty urinating, dysuria, frequency and urgency  Musculoskeletal: Negative for back pain, gait problem, neck pain and neck stiffness  Skin: Negative for pallor and rash  Allergic/Immunologic: Negative for environmental allergies and food allergies  Neurological: Negative for dizziness, weakness, numbness and headaches  Psychiatric/Behavioral: Negative for confusion  All other systems reviewed and are negative  Physical Exam  Physical Exam  Vitals and nursing note reviewed  Constitutional:       General: He is awake  Appearance: Normal appearance  He is well-developed  He is not ill-appearing, toxic-appearing or diaphoretic  Comments: BP (!) 198/98 (BP Location: Right arm)   Pulse 70   Temp 97 8 °F (36 6 °C) (Oral)   Resp 18   Ht 6' (1 829 m)   Wt 74 8 kg (164 lb 14 5 oz)   SpO2 100%   BMI 22 37 kg/m²      HENT:      Head: Normocephalic and atraumatic  Right Ear: Hearing and external ear normal  No decreased hearing noted  No drainage, swelling or tenderness  No mastoid tenderness  Left Ear: Hearing and external ear normal  No decreased hearing noted  No drainage, swelling or tenderness  No mastoid tenderness  Nose: Nose normal       Mouth/Throat:      Lips: Pink  Mouth: Mucous membranes are moist       Pharynx: Oropharynx is clear  Uvula midline  Eyes:      General: Lids are normal  Vision grossly intact  Right eye: No discharge  Left eye: No discharge  Extraocular Movements: Extraocular movements intact  Conjunctiva/sclera: Conjunctivae normal       Pupils: Pupils are equal, round, and reactive to light  Neck:      Vascular: No JVD  Trachea: Trachea and phonation normal  No tracheal tenderness or tracheal deviation  Cardiovascular:      Rate and Rhythm: Normal rate and regular rhythm  Pulses: Normal pulses  Radial pulses are 2+ on the right side and 2+ on the left side  Heart sounds: Normal heart sounds  Pulmonary:      Effort: Pulmonary effort is normal       Breath sounds: Normal breath sounds  No stridor  No decreased breath sounds, wheezing, rhonchi or rales  Chest:      Chest wall: No tenderness  Abdominal:      General: Abdomen is flat  Bowel sounds are normal  There is no distension  Palpations: Abdomen is soft  Abdomen is not rigid  Tenderness: There is abdominal tenderness in the right lower quadrant  There is right CVA tenderness  There is no left CVA tenderness, guarding or rebound  Musculoskeletal:         General: Normal range of motion  Cervical back: Full passive range of motion without pain, normal range of motion and neck supple  No rigidity  No spinous process tenderness or muscular tenderness  Normal range of motion  Lymphadenopathy:      Head:      Right side of head: No submental, submandibular, tonsillar, preauricular, posterior auricular or occipital adenopathy  Left side of head: No submental, submandibular, tonsillar, preauricular, posterior auricular or occipital adenopathy  Cervical: No cervical adenopathy  Right cervical: No superficial, deep or posterior cervical adenopathy  Left cervical: No superficial, deep or posterior cervical adenopathy  Skin:     General: Skin is warm  Capillary Refill: Capillary refill takes less than 2 seconds  Neurological:      General: No focal deficit present  Mental Status: He is alert and oriented to person, place, and time  GCS: GCS eye subscore is 4  GCS verbal subscore is 5  GCS motor subscore is 6  Sensory: No sensory deficit     Psychiatric:         Mood and Affect: Mood normal          Speech: Speech normal          Behavior: Behavior normal  Behavior is cooperative  Thought Content:  Thought content normal          Judgment: Judgment normal          Vital Signs  ED Triage Vitals [03/31/22 0200]   Temperature Pulse Respirations Blood Pressure SpO2   97 8 °F (36 6 °C) 70 18 (!) 198/98 100 %      Temp Source Heart Rate Source Patient Position - Orthostatic VS BP Location FiO2 (%)   Oral Monitor Lying Right arm --      Pain Score       10 - Worst Possible Pain           Vitals:    03/31/22 0200 03/31/22 0317 03/31/22 0330 03/31/22 0400   BP: (!) 198/98 144/87 153/82 137/72   Pulse: 70 68 66 68   Patient Position - Orthostatic VS: Lying            Visual Acuity      ED Medications  Medications   sodium chloride 0 9 % bolus 1,000 mL (0 mL Intravenous Stopped 3/31/22 0327)   ketorolac (TORADOL) injection 15 mg (15 mg Intravenous Given 3/31/22 0220)   ondansetron (ZOFRAN) injection 4 mg (4 mg Intravenous Given 3/31/22 0220)   sodium chloride 0 9 % bolus 1,000 mL (0 mL Intravenous Stopped 3/31/22 0328)   HYDROmorphone (DILAUDID) injection 0 5 mg (0 5 mg Intravenous Given 3/31/22 0220)   dicyclomine (BENTYL) tablet 20 mg (20 mg Oral Given 3/31/22 0220)   ketorolac (TORADOL) injection 15 mg (15 mg Intravenous Given 3/31/22 0323)   tamsulosin (FLOMAX) capsule 0 4 mg (0 4 mg Oral Given 3/31/22 0328)   potassium chloride oral solution 40 mEq (40 mEq Oral Given 3/31/22 0330)       Diagnostic Studies  Results Reviewed     Procedure Component Value Units Date/Time    UA w Reflex to Microscopic w Reflex to Culture [[id number]]  (Abnormal) Collected: 03/31/22 0319    Lab Status: Final result Specimen: Urine, Clean Catch Updated: 03/31/22 0331     Color, UA Yellow     Clarity, UA Clear     Specific [place], UA 1 020     pH, UA 7 5     Leukocytes, UA Negative     Nitrite, UA Negative     Protein, UA Negative mg/dl      Glucose, UA Negative mg/dl      Ketones, UA 15 (1+) mg/dl Urobilinogen, UA 0 2 E U /dl      Bilirubin, UA Negative     Blood, UA Negative    Comprehensive metabolic panel [[id number]]  (Abnormal) Collected: 03/31/22 0224    Lab Status: Final result Specimen: Blood from Arm, Left Updated: 03/31/22 0305     Sodium 140 mmol/L      Potassium 2 9 mmol/L      Chloride 102 mmol/L      CO2 26 mmol/L      ANION GAP 12 mmol/L      BUN 14 mg/dL      Creatinine 1 19 mg/dL      Glucose 115 mg/dL      Calcium 9 3 mg/dL      AST 21 U/L      ALT 27 U/L      Alkaline Phosphatase 82 U/L      Total Protein 7 3 g/dL      Albumin 4 2 g/dL      Total Bilirubin 0 86 mg/dL      eGFR 65 ml/min/1 73sq m     Narrative:      National Kidney Disease Foundation guidelines for Chronic Kidney Disease (CKD):     Stage 1 with normal or high GFR (GFR > 90 mL/min/1 73 square meters)    Stage 2 Mild CKD (GFR = 60-89 mL/min/1 73 square meters)    Stage 3A Moderate CKD (GFR = 45-59 mL/min/1 73 square meters)    Stage 3B Moderate CKD (GFR = 30-44 mL/min/1 73 square meters)    Stage 4 Severe CKD (GFR = 15-29 mL/min/1 73 square meters)    Stage 5 End Stage CKD (GFR <15 mL/min/1 73 square meters)  Note: GFR calculation is accurate only with a steady state creatinine    Lipase [[id number]]  (Abnormal) Collected: 03/31/22 0224    Lab Status: Final result Specimen: Blood from Arm, Left Updated: 03/31/22 0250     Lipase 478 u/L     CBC and differential [[id number]] Collected: 03/31/22 0224    Lab Status: Final result Specimen: Blood from Arm, Left Updated: 03/31/22 0240     WBC 7 64 Thousand/uL      RBC 5 24 Million/uL      Hemoglobin 16 2 g/dL      Hematocrit 44 9 %      MCV 86 fL      MCH 30 9 pg      MCHC 36 1 g/dL      RDW 11 9 %      MPV 9 1 fL      Platelets 740 Thousands/uL      nRBC 0 /100 WBCs      Neutrophils Relative 45 %      Immat GRANS % 0 %      Lymphocytes Relative 41 %      Monocytes Relative 8 %      Eosinophils Relative 5 %      Basophils Relative 1 %      Neutrophils Absolute 3 43 Thousands/µL      Immature Grans Absolute 0 01 Thousand/uL      Lymphocytes Absolute 3 13 Thousands/µL      Monocytes Absolute 0 64 Thousand/µL      Eosinophils Absolute 0 37 Thousand/µL      Basophils Absolute 0 06 Thousands/µL                  CT renal stone study abdomen pelvis without contrast   ED Interpretation by Eliberto Habermann, PA-C (03/31 0319)   Lan Zheng DO  [phone] 3/31/2022     Narrative & Impression  CT ABDOMEN AND PELVIS WITHOUT IV CONTRAST - LOW DOSE RENAL STONE      INDICATION:   Flank pain, kidney stone suspected  abrupt onset right flank pain and nausea sx      COMPARISON:  March 3, 2022     TECHNIQUE:  Low radiation dose thin section CT examination of the abdomen and pelvis was performed without intravenous or oral contrast according to a protocol specifically designed to evaluate for urinary tract calculus  Axial, sagittal, and coronal 2D   reformatted images were created from the source data and submitted for interpretation  Evaluation for pathology in the abdomen and pelvis that is unrelated to urinary tract calculi is limited        Radiation dose length product (DLP) for this visit:  197 mGy-cm   This examination, like all CT scans performed in the [facility], was performed utilizing techniques to minimize radiation dose exposure, including the use of iterative   reconstruction and au   tomated exposure control      URINARY TRACT FINDINGS:     RIGHT KIDNEY AND URETER:  Moderate right-sided hydroureteronephrosis with a 6 mm obstructing stone in the proximal right ureter  Nonobstructing right-sided intrarenal calculi measuring up to 5 mm is seen      LEFT KIDNEY AND URETER:  No urinary tract calculi    No hydronephrosis or hydroureter      URINARY BLADDER:  Thickening of the urinary bladder wall is visualized         ADDITIONAL FINDINGS:     LOWER CHEST:  No clinically significant abnormality identified in the visualized lower chest      SOLID VISCERA: Limited low radiation dose noncontrast CT evaluation demonstrates no clinically significant abnormality of the imaged portions of the liver, spleen, pancreas, or adrenal glands        GALLBLADDER/BILIARY TREE:  No calcified gallstones  No pericholecystic inflammatory change  No biliary dilatation      STOMACH AND BOWEL:  There is colonic diverticulosis without evidence of acute diverticulitis      APPENDIX:  No findings    to suggest appendicitis      ABDOMINOPELVIC CAVITY:  No ascites  No pneumoperitoneum  No lymphadenopathy      REPRODUCTIVE ORGANS:  Prosthetic calcifications are seen      ABDOMINAL WALL/INGUINAL REGIONS:  Unremarkable      OSSEOUS STRUCTURES:  No acute fracture or destructive osseous lesion  Spinal degenerative changes are noted      IMPRESSION:     Moderate right-sided hydroureteronephrosis with a 6 mm obstructing stone in the proximal right ureter      Nonobstructing multiple right-sided intrarenal calculi      Thickening of the urinary bladder wall which may represent cystitis  Follow-up with urology is recommended              Workstation performed: [id number]        Final Result by Nadine Núñez DO (03/31 0314)      Moderate right-sided hydroureteronephrosis with a 6 mm obstructing stone in the proximal right ureter  Nonobstructing multiple right-sided intrarenal calculi  Thickening of the urinary bladder wall which may represent cystitis  Follow-up with urology is recommended               Workstation performed: [id number]                    Procedures  Procedures         ED Course  ED Course as of 03/31/22 0724   Thu Mar 31, 2022   0421 Discussed patient case with [facility] with indication to discharge patient and follow-up as an outpatient to Urology Service  SBIRT 22yo+      Most Recent Value   SBIRT (22 yo +)    In order to provide better care to our patients, we are screening all of our patients for alcohol and drug use   Would it be okay to ask you these screening questions? Yes Filed at: 03/31/2022 0205   Initial Alcohol Screen: US AUDIT-C     1  How often do you have a drink containing alcohol? 1 Filed at: 03/31/2022 0205   2  How many drinks containing alcohol do you have on a typical day you are drinking? 0 Filed at: 03/31/2022 0205   3a  Male UNDER 65: How often do you have five or more drinks on one occasion? 0 Filed at: 03/31/2022 0205   Audit-C Score 1 Filed at: 03/31/2022 0205   KI: How many times in the past year have you    Used an illegal drug or used a prescription medication for non-medical reasons? Never Filed at: 03/31/2022 0205                    MDM  Number of Diagnoses or Management Options  Hydroureteronephrosis: new and requires workup  Nausea: new and requires workup  Right ureteral stone: new and requires workup     Amount and/or Complexity of Data Reviewed  Clinical lab tests: ordered and reviewed  Tests in the radiology section of CPT®: ordered and reviewed  Review and summarize past medical records: yes    Risk of Complications, Morbidity, and/or Mortality  Presenting problems: moderate  Diagnostic procedures: moderate  Management options: low    Patient Progress  Patient progress: stable     Patient is a 77-year-old male with history of nephrolithiasis, GERD, ureteral stent and lithotripsy that presents emergency department with abrupt onset sharp shooting right flank pain with radiation to right lower quadrant abdomen for 2 hours  Patient clinically stable afebrile  No evidence of TK; no leukocytosis, no banding  Urinalysis not indicative of urinary tract infection  CT renal stone study abdomen pelvis without contrast-impression-Moderate right-sided hydroureteronephrosis with a 6 mm obstructing stone in the proximal right ureter  Nonobstructing multiple right-sided intrarenal calculi   Thickening of the urinary bladder wall which may represent cystitis   Follow-up with urology is recommended"  Eileen Blake, Dilaudid, Toradol and Zofran with patient verbalized decrease in right flank pain symptoms status post medication  Hypokalemia; potassium 2 9; delivered K-Dur  Discussed patient case with Urology AP with indication to discharge patient home care and follow-up with outpatient Urology  Delivered Flomax  Patient with no acute abdomen at time of final discharge  Prescribed Zofran, 8 doses of Percocet, and Flomax and counseled patient medication administration and side effects  Counseled patient on consuming plenty of fluids and electrolytes; at least 96 oz of water daily  Follow-up with Urology, PCP and emergency department should symptoms persist or exacerbate  Patient demonstrates verbal understanding of all clinical laboratory imaging findings, discharge instructions, follow-up and verbalized agreement patient current treatment plan       Disposition  Final diagnoses:   Right ureteral stone   Hydroureteronephrosis - right   Nausea   Hypokalemia     Time reflects when diagnosis was documented in both MDM as applicable and the Disposition within this note     Time User Action Codes Description Comment    3/31/2022  3:20 AM Nola Joao Add [N20 1] Right ureteral stone     3/31/2022  3:20 AM Nola Joao Add [N13 30] Hydroureteronephrosis     3/31/2022  3:20 AM Nola Joao Modify [N13 30] Hydroureteronephrosis right    3/31/2022  4:25 AM Nola Joao Add [R11 0] Nausea     3/31/2022  7:23 AM Nola Joao Add [E87 6] Hypokalemia       ED Disposition     ED Disposition Condition Date/Time Comment    Discharge Stable Thu Mar 31, 2022  4:22 AM Lars Johnston III discharge to home/self care  Follow-up Information     Follow up With Specialties Details Why Contact Info Additional [address] [facility],  Family Medicine   [address] [facility] [address]       [place] [address] Emergency Department Emergency Medicine   [facility] [address] [facility]  [phone] [address] Emergency Department, [address], [place], [place], [address] [facility]   [address]  [address] [id number] [phone] [facility], [address] [place], [place], [address]          Discharge Medication List as of 3/31/2022  4:27 AM      START taking these medications    Details   ondansetron (ZOFRAN) 4 mg tablet Take 1 tablet (4 mg total) by mouth every 6 (six) hours for 3 days, Starting u 3/31/2022, Until Sun 4/3/2022, Normal      oxyCODONE-acetaminophen (PERCOCET) 5-325 mg per tablet Take 1 tablet by mouth every 6 (six) hours as needed for moderate pain for up to 8 doses Max Daily Amount: 4 tablets, Starting u 3/31/2022, Normal      tamsulosin (FLOMAX) 0 4 mg Take 1 capsule (0 4 mg total) by mouth daily with dinner for 2 days, Starting u 3/31/2022, Until Sat 4/2/2022, Normal         CONTINUE these medications which have NOT CHANGED    Details   clonazePAM (KlonoPIN) 0 5 mg tablet Take 0 5 mg by mouth daily at bedtime, Historical Med      Coenzyme Q10 (COQ10) 200 MG CAPS Take by mouth, Historical Med      ibuprofen (MOTRIN) 600 mg tablet Take 1 tablet (600 mg total) by mouth every 6 (six) hours as needed for mild pain for up to 7 days, Starting u 3/3/2022, Until u 3/10/2022 at 2359, Normal      levothyroxine 75 mcg tablet TAKE ONE TABLET BY MOUTH EVERY DAY, Normal      lisinopril (ZESTRIL) 40 mg tablet TAKE ONE TABLET BY MOUTH EVERY DAY, Normal      metoclopramide (REGLAN) 5 mg tablet Take 1 tablet (5 mg total) by mouth 3 (three) times a day, Starting Fri 11/12/2021, Normal      pantoprazole (PROTONIX) 40 mg tablet Take 1 tablet (40 mg total) by mouth daily before breakfast, Starting Fri 11/12/2021, Normal             No discharge procedures on file      PDMP Review       Value Time User    PDMP Reviewed  Yes 5/7/2021  2:56 PM Nanci Ormond, DO          ED Provider  Electronically Signed by           Godfrey Auguste PA-C  03/31/22 5498

## 2022-03-31 NOTE — TELEPHONE ENCOUNTER
Patients pcp contacted urology office today  Pt is scheduled to see GP YUSEF rodriguez office 4/1/22

## 2022-03-31 NOTE — TELEPHONE ENCOUNTER
Patient seen by Salina Coelho in [place]    Patient called stating he was int the ER today with flank pain   He had ct scan done and it showed     Moderate right-sided hydroureteronephrosis with a 6 mm obstructing stone in the proximal right ureter      Nonobstructing multiple right-sided intrarenal calculi      Thickening of the urinary bladder wall which may represent cystitis  Follow-up with urology is recommended  He wants to know how to proceed    Right now his pain is not as bad and last night when he was in the ER until this morning

## 2022-04-01 ENCOUNTER — HOSPITAL ENCOUNTER (OUTPATIENT)
Facility: HOSPITAL | Age: 62
Setting detail: OBSERVATION
LOS: 1 days | Discharge: HOME | End: 2022-04-02
Attending: UROLOGY | Admitting: UROLOGY
Payer: COMMERCIAL

## 2022-04-01 ENCOUNTER — OFFICE VISIT (OUTPATIENT)
Dept: UROLOGY | Facility: CLINIC | Age: 62
End: 2022-04-01
Payer: COMMERCIAL

## 2022-04-01 VITALS
WEIGHT: 165 LBS | HEART RATE: 83 BPM | OXYGEN SATURATION: 93 % | HEIGHT: 72 IN | SYSTOLIC BLOOD PRESSURE: 160 MMHG | DIASTOLIC BLOOD PRESSURE: 80 MMHG | BODY MASS INDEX: 22.35 KG/M2

## 2022-04-01 DIAGNOSIS — N20.0 NEPHROLITHIASIS: Primary | ICD-10-CM

## 2022-04-01 DIAGNOSIS — N20.0 KIDNEY STONES: Primary | ICD-10-CM

## 2022-04-01 DIAGNOSIS — N20.0 KIDNEY STONES: ICD-10-CM

## 2022-04-01 LAB
ANION GAP SERPL CALCULATED.3IONS-SCNC: 5 MMOL/L (ref 4–13)
BUN SERPL-MCNC: 9 MG/DL (ref 5–25)
CALCIUM SERPL-MCNC: 9.2 MG/DL (ref 8.3–10.1)
CHLORIDE SERPL-SCNC: 108 MMOL/L (ref 100–108)
CO2 SERPL-SCNC: 26 MMOL/L (ref 21–32)
CREAT SERPL-MCNC: 1.58 MG/DL (ref 0.6–1.3)
ERYTHROCYTE [DISTWIDTH] IN BLOOD BY AUTOMATED COUNT: 12.1 % (ref 11.6–15.1)
GFR SERPL CREATININE-BSD FRML MDRD: 46 ML/MIN/1.73SQ M
GLUCOSE SERPL-MCNC: 95 MG/DL (ref 65–140)
HCT VFR BLD AUTO: 42.6 % (ref 36.5–49.3)
HGB BLD-MCNC: 14.9 G/DL (ref 12–17)
MAGNESIUM SERPL-MCNC: 2.1 MG/DL (ref 1.6–2.6)
MCH RBC QN AUTO: 30.5 PG (ref 26.8–34.3)
MCHC RBC AUTO-ENTMCNC: 35 G/DL (ref 31.4–37.4)
MCV RBC AUTO: 87 FL (ref 82–98)
PLATELET # BLD AUTO: 188 THOUSANDS/UL (ref 149–390)
PMV BLD AUTO: 9.2 FL (ref 8.9–12.7)
POTASSIUM SERPL-SCNC: 3.4 MMOL/L (ref 3.5–5.3)
RBC # BLD AUTO: 4.88 MILLION/UL (ref 3.88–5.62)
SODIUM SERPL-SCNC: 139 MMOL/L (ref 136–145)
WBC # BLD AUTO: 9.29 THOUSAND/UL (ref 4.31–10.16)

## 2022-04-01 PROCEDURE — 83735 ASSAY OF MAGNESIUM: CPT | Performed by: PHYSICIAN ASSISTANT

## 2022-04-01 PROCEDURE — G0379 DIRECT REFER HOSPITAL OBSERV: HCPCS

## 2022-04-01 PROCEDURE — 85027 COMPLETE CBC AUTOMATED: CPT | Performed by: PHYSICIAN ASSISTANT

## 2022-04-01 PROCEDURE — 80048 BASIC METABOLIC PNL TOTAL CA: CPT | Performed by: PHYSICIAN ASSISTANT

## 2022-04-01 PROCEDURE — 99214 OFFICE O/P EST MOD 30 MIN: CPT | Performed by: PHYSICIAN ASSISTANT

## 2022-04-01 RX ORDER — TAMSULOSIN HYDROCHLORIDE 0.4 MG/1
0.4 CAPSULE ORAL
Status: DISCONTINUED | OUTPATIENT
Start: 2022-04-01 | End: 2022-04-02 | Stop reason: HOSPADM

## 2022-04-01 RX ORDER — DIPHENHYDRAMINE HCL 25 MG
25 TABLET ORAL EVERY 6 HOURS PRN
Status: DISCONTINUED | OUTPATIENT
Start: 2022-04-01 | End: 2022-04-02 | Stop reason: HOSPADM

## 2022-04-01 RX ORDER — HYDROMORPHONE HCL/PF 1 MG/ML
0.5 SYRINGE (ML) INJECTION
Status: DISCONTINUED | OUTPATIENT
Start: 2022-04-01 | End: 2022-04-02 | Stop reason: HOSPADM

## 2022-04-01 RX ORDER — ONDANSETRON 4 MG/1
4 TABLET, ORALLY DISINTEGRATING ORAL EVERY 6 HOURS PRN
Status: DISCONTINUED | OUTPATIENT
Start: 2022-04-01 | End: 2022-04-02 | Stop reason: HOSPADM

## 2022-04-01 RX ORDER — HYDROCODONE BITARTRATE AND ACETAMINOPHEN 5; 325 MG/1; MG/1
1 TABLET ORAL EVERY 6 HOURS PRN
Status: DISCONTINUED | OUTPATIENT
Start: 2022-04-01 | End: 2022-04-02 | Stop reason: HOSPADM

## 2022-04-01 RX ORDER — SODIUM CHLORIDE 450 MG/100ML
125 INJECTION, SOLUTION INTRAVENOUS CONTINUOUS
Status: DISCONTINUED | OUTPATIENT
Start: 2022-04-01 | End: 2022-04-02 | Stop reason: HOSPADM

## 2022-04-01 RX ORDER — OXYCODONE HYDROCHLORIDE AND ACETAMINOPHEN 5; 325 MG/1; MG/1
1 TABLET ORAL EVERY 4 HOURS PRN
Status: DISCONTINUED | OUTPATIENT
Start: 2022-04-01 | End: 2022-04-01

## 2022-04-01 RX ADMIN — ENOXAPARIN SODIUM 40 MG: 40 INJECTION SUBCUTANEOUS at 16:20

## 2022-04-01 RX ADMIN — SODIUM CHLORIDE 125 ML/HR: 0.45 INJECTION, SOLUTION INTRAVENOUS at 22:01

## 2022-04-01 RX ADMIN — HYDROCODONE BITARTRATE AND ACETAMINOPHEN 1 TABLET: 5; 325 TABLET ORAL at 23:37

## 2022-04-01 RX ADMIN — TAMSULOSIN HYDROCHLORIDE 0.4 MG: 0.4 CAPSULE ORAL at 22:00

## 2022-04-01 RX ADMIN — SODIUM CHLORIDE 125 ML/HR: 0.45 INJECTION, SOLUTION INTRAVENOUS at 16:20

## 2022-04-01 RX ADMIN — OXYCODONE HYDROCHLORIDE AND ACETAMINOPHEN 1 TABLET: 5; 325 TABLET ORAL at 17:18

## 2022-04-01 NOTE — PROGRESS NOTES
HISTORY AND PHYSICAL       Patient Identifiers: Bebeto Ramon (MRN: [id number])    Urology, Jennifer Dominguez PA-C  Date of Service: 4/1/2022    History of Present Illness:     Bebeto Ramon is a 64 y o  Male with history kidney stones  He went emergency room with right flank pain on Wednesday  CT shows a 6 mm stone in the mid right ureter with multiple nonobstructing stones within the right kidney as well  He has nausea and vomiting and persistent right flank pain  No fever chills  Creatinine 1  19   WBc   7 64  Urinalysis is clear  He had acute renal failure with his last stone and patient and his wife were very concerned that this may happen again      Past Medical, Past Surgical History:     Past Medical History:   Diagnosis Date    Anxiety     Change in bowel habits     Chronic urticaria     Depression     GERD (gastroesophageal reflux disease)     Hashimoto's disease     Hashimoto's thyroiditis     Hypertension     Kidney stone     Renal stones    :    Past Surgical History:   Procedure Laterality Date    FL RETROGRADE PYELOGRAM  7/28/2019    NASAL SEPTUM SURGERY      KY CYSTO/URETERO W/LITHOTRIPSY &INDWELL STENT INSRT Right 7/28/2019    Procedure: CYSTOSCOPY URETEROSCOPY WITH LITHOTRIPSY HOLMIUM LASER, basket stone extraction, RETROGRADE PYELOGRAM AND INSERTION STENT URETERAL;  Surgeon: Yeni Wolff MD;  [facility];  Service: Urology    KY REPAIR ING HERNIA,5+Y/O,REDUCIBL Left 12/13/2021    Procedure: INGUINAL HERNIA REPAIR;  Surgeon: Milo Leo DO;  Location: [facility] MAIN OR;  Service: General    TONSILLECTOMY  1979   :    Medications, Allergies:     Current Outpatient Medications:     clonazePAM (KlonoPIN) 0 5 mg tablet, Take 0 5 mg by mouth daily at bedtime, Disp: , Rfl:     Coenzyme Q10 (COQ10) 200 MG CAPS, Take by mouth, Disp: , Rfl:     ibuprofen (MOTRIN) 600 mg tablet, Take 1 tablet (600 mg total) by mouth every 6 (six) hours as needed for mild pain for up to 7 days, Disp: 28 tablet, Rfl: 0    levothyroxine 75 mcg tablet, TAKE ONE TABLET BY MOUTH EVERY DAY, Disp: 30 tablet, Rfl: 0    lisinopril (ZESTRIL) 40 mg tablet, TAKE ONE TABLET BY MOUTH EVERY DAY, Disp: 90 tablet, Rfl: 1    metoclopramide (REGLAN) 5 mg tablet, Take 1 tablet (5 mg total) by mouth 3 (three) times a day, Disp: 90 tablet, Rfl: 2    ondansetron (ZOFRAN) 4 mg tablet, Take 1 tablet (4 mg total) by mouth every 6 (six) hours for 3 days, Disp: 12 tablet, Rfl: 0    oxyCODONE-acetaminophen (PERCOCET) 5-325 mg per tablet, Take 1 tablet by mouth every 6 (six) hours as needed for moderate pain for up to 8 doses Max Daily Amount: 4 tablets, Disp: 8 tablet, Rfl: 0    pantoprazole (PROTONIX) 40 mg tablet, Take 1 tablet (40 mg total) by mouth daily before breakfast, Disp: 30 tablet, Rfl: 5    tamsulosin (FLOMAX) 0 4 mg, Take 1 capsule (0 4 mg total) by mouth daily with dinner for 2 days, Disp: 2 capsule, Rfl: 0    Allergies: Allergies   Allergen Reactions    Compazine [Prochlorperazine] GI Intolerance    Sulfa Antibiotics Rash   :    Social and Family History:   Social History:   Social History     Tobacco Use    Smoking status: Never Smoker    Smokeless tobacco: Never Used   Vaping Use    Vaping Use: Never used   Substance Use Topics    Alcohol use: Yes     Alcohol/week: 0 0 standard drinks     Comment: social    Drug use: Never     Social History     Tobacco Use   Smoking Status Never Smoker   Smokeless Tobacco Never Used       Family History:  Family History   Problem Relation Age of Onset    Cancer Mother         Thyroid    Stroke Mother     Colon cancer Father     Colon polyps Father     Valvular heart disease Father     Cancer Sister         Skin    Suicidality Brother    :     Review of Systems:     General: Fever, chills, or night sweats: negative  Cardiac: Negative for chest pain  Pulmonary: Negative for shortness of breath  Gastrointestinal: Abdominal pain negative    Nausea, vomiting, or diarrhea negative,  Genitourinary: See HPI above  Patient does not have hematuria  All other systems queried were negative  Physical Exam:   General: Patient is pleasant and in NAD  Awake and alert  /80   Pulse 83   Ht 6' (1 829 m)   Wt 74 8 kg (165 lb)   SpO2 93%   BMI 22 38 kg/m²   Cardiac: regular rate  Peripheral edema: negative  Pulmonary: Non-labored breathing lungs clear bilaterally  Abdomen: Soft, non-tender, non-distended  No surgical scars  No masses, tenderness, hernias noted  Genitourinary: Positive CVA tenderness, negative suprapubic tenderness  Labs:     Lab Results   Component Value Date    HGB 16 2 03/31/2022    HCT 44 9 03/31/2022    WBC 7 64 03/31/2022     03/31/2022   ]    Lab Results   Component Value Date     09/18/2015    K 2 9 (L) 03/31/2022     03/31/2022    CO2 26 03/31/2022    BUN 14 03/31/2022    CREATININE 1 19 03/31/2022    CALCIUM 9 3 03/31/2022    GLUCOSE 91 06/03/2017   ]    Imaging:   I personally reviewed the images and report of the following studies, and reviewed them with the patient:  CT ABDOMEN AND PELVIS WITHOUT IV CONTRAST - LOW DOSE RENAL STONE   IMPRESSION:      Moderate right-sided hydroureteronephrosis with a 6 mm obstructing stone in the proximal right ureter  Nonobstructing multiple right-sided intrarenal calculi  Thickening of the urinary bladder wall which may represent cystitis  Follow-up with urology is recommended       ASSESSMENT:     1  Right-sided ureteral calculi 6 mm   2   Right-sided nephrolithiasis     PLAN:   - I reviewed the options of management the patient  - he would like treatment as soon as possible for his stone  - he has had a ureteroscopy and a stent in the past  - he understands that if this stone is difficult to reach or other signs of infection stent would be placed and he would return in sometime for further treatment  - will direct admit to [facility] today for IV hydration pain control and antiemetics  - case requested for tomorrow morning Dr Alexa Berry and the operating room charge nurse are notified  - more than 30 minutes was spent at this visit    Fide Mortensen PA-C

## 2022-04-02 ENCOUNTER — ANESTHESIA (OUTPATIENT)
Dept: PERIOP | Facility: HOSPITAL | Age: 62
End: 2022-04-02
Payer: COMMERCIAL

## 2022-04-02 ENCOUNTER — ANESTHESIA EVENT (OUTPATIENT)
Dept: PERIOP | Facility: HOSPITAL | Age: 62
End: 2022-04-02
Payer: COMMERCIAL

## 2022-04-02 ENCOUNTER — APPOINTMENT (OUTPATIENT)
Dept: RADIOLOGY | Facility: HOSPITAL | Age: 62
End: 2022-04-02
Payer: COMMERCIAL

## 2022-04-02 VITALS
OXYGEN SATURATION: 97 % | TEMPERATURE: 98.3 F | RESPIRATION RATE: 16 BRPM | SYSTOLIC BLOOD PRESSURE: 154 MMHG | DIASTOLIC BLOOD PRESSURE: 92 MMHG | HEART RATE: 70 BPM

## 2022-04-02 PROCEDURE — 74420 UROGRAPHY RTRGR +-KUB: CPT

## 2022-04-02 PROCEDURE — C1769 GUIDE WIRE: HCPCS | Performed by: UROLOGY

## 2022-04-02 PROCEDURE — C2617 STENT, NON-COR, TEM W/O DEL: HCPCS | Performed by: UROLOGY

## 2022-04-02 PROCEDURE — NC001 PR NO CHARGE: Performed by: UROLOGY

## 2022-04-02 PROCEDURE — 87086 URINE CULTURE/COLONY COUNT: CPT | Performed by: UROLOGY

## 2022-04-02 PROCEDURE — C1758 CATHETER, URETERAL: HCPCS | Performed by: UROLOGY

## 2022-04-02 PROCEDURE — 52332 CYSTOSCOPY AND TREATMENT: CPT | Performed by: UROLOGY

## 2022-04-02 DEVICE — INLAY OPTIMA URETERAL STENT W/O GUIDEWIRE
Type: IMPLANTABLE DEVICE | Site: URETER | Status: NON-FUNCTIONAL
Brand: BARD® INLAY OPTIMA® URETERAL STENT
Removed: 2022-04-19

## 2022-04-02 RX ORDER — CEPHALEXIN 500 MG/1
500 CAPSULE ORAL EVERY 12 HOURS SCHEDULED
Qty: 6 CAPSULE | Refills: 0 | Status: SHIPPED | OUTPATIENT
Start: 2022-04-02 | End: 2022-04-05

## 2022-04-02 RX ORDER — MAGNESIUM HYDROXIDE 1200 MG/15ML
LIQUID ORAL AS NEEDED
Status: DISCONTINUED | OUTPATIENT
Start: 2022-04-02 | End: 2022-04-02 | Stop reason: HOSPADM

## 2022-04-02 RX ORDER — DEXAMETHASONE SODIUM PHOSPHATE 10 MG/ML
INJECTION, SOLUTION INTRAMUSCULAR; INTRAVENOUS AS NEEDED
Status: DISCONTINUED | OUTPATIENT
Start: 2022-04-02 | End: 2022-04-02

## 2022-04-02 RX ORDER — CEFAZOLIN SODIUM 2 G/50ML
2000 SOLUTION INTRAVENOUS
Status: DISCONTINUED | OUTPATIENT
Start: 2022-04-02 | End: 2022-04-02 | Stop reason: HOSPADM

## 2022-04-02 RX ORDER — CEFAZOLIN SODIUM 1 G/3ML
INJECTION, POWDER, FOR SOLUTION INTRAMUSCULAR; INTRAVENOUS AS NEEDED
Status: DISCONTINUED | OUTPATIENT
Start: 2022-04-02 | End: 2022-04-02

## 2022-04-02 RX ORDER — PROPOFOL 10 MG/ML
INJECTION, EMULSION INTRAVENOUS AS NEEDED
Status: DISCONTINUED | OUTPATIENT
Start: 2022-04-02 | End: 2022-04-02

## 2022-04-02 RX ORDER — ONDANSETRON 2 MG/ML
INJECTION INTRAMUSCULAR; INTRAVENOUS AS NEEDED
Status: DISCONTINUED | OUTPATIENT
Start: 2022-04-02 | End: 2022-04-02

## 2022-04-02 RX ORDER — MIDAZOLAM HYDROCHLORIDE 2 MG/2ML
INJECTION, SOLUTION INTRAMUSCULAR; INTRAVENOUS AS NEEDED
Status: DISCONTINUED | OUTPATIENT
Start: 2022-04-02 | End: 2022-04-02

## 2022-04-02 RX ORDER — ONDANSETRON 2 MG/ML
4 INJECTION INTRAMUSCULAR; INTRAVENOUS ONCE AS NEEDED
Status: DISCONTINUED | OUTPATIENT
Start: 2022-04-02 | End: 2022-04-02 | Stop reason: HOSPADM

## 2022-04-02 RX ORDER — SODIUM CHLORIDE, SODIUM LACTATE, POTASSIUM CHLORIDE, CALCIUM CHLORIDE 600; 310; 30; 20 MG/100ML; MG/100ML; MG/100ML; MG/100ML
INJECTION, SOLUTION INTRAVENOUS CONTINUOUS PRN
Status: DISCONTINUED | OUTPATIENT
Start: 2022-04-02 | End: 2022-04-02

## 2022-04-02 RX ORDER — SODIUM CHLORIDE, SODIUM LACTATE, POTASSIUM CHLORIDE, CALCIUM CHLORIDE 600; 310; 30; 20 MG/100ML; MG/100ML; MG/100ML; MG/100ML
75 INJECTION, SOLUTION INTRAVENOUS CONTINUOUS
Status: DISCONTINUED | OUTPATIENT
Start: 2022-04-02 | End: 2022-04-02 | Stop reason: HOSPADM

## 2022-04-02 RX ORDER — HYDROCODONE BITARTRATE AND ACETAMINOPHEN 5; 325 MG/1; MG/1
1 TABLET ORAL EVERY 4 HOURS PRN
Qty: 5 TABLET | Refills: 0 | Status: SHIPPED | OUTPATIENT
Start: 2022-04-02 | End: 2022-04-04

## 2022-04-02 RX ORDER — FENTANYL CITRATE/PF 50 MCG/ML
50 SYRINGE (ML) INJECTION
Status: DISCONTINUED | OUTPATIENT
Start: 2022-04-02 | End: 2022-04-02 | Stop reason: HOSPADM

## 2022-04-02 RX ORDER — FENTANYL CITRATE 50 UG/ML
INJECTION, SOLUTION INTRAMUSCULAR; INTRAVENOUS AS NEEDED
Status: DISCONTINUED | OUTPATIENT
Start: 2022-04-02 | End: 2022-04-02

## 2022-04-02 RX ADMIN — CEFAZOLIN 2000 MG: 1 INJECTION, POWDER, FOR SOLUTION INTRAMUSCULAR; INTRAVENOUS at 08:05

## 2022-04-02 RX ADMIN — SODIUM CHLORIDE, SODIUM LACTATE, POTASSIUM CHLORIDE, AND CALCIUM CHLORIDE: .6; .31; .03; .02 INJECTION, SOLUTION INTRAVENOUS at 08:00

## 2022-04-02 RX ADMIN — MIDAZOLAM 2 MG: 1 INJECTION INTRAMUSCULAR; INTRAVENOUS at 07:59

## 2022-04-02 RX ADMIN — ONDANSETRON 4 MG: 2 INJECTION INTRAMUSCULAR; INTRAVENOUS at 08:20

## 2022-04-02 RX ADMIN — FENTANYL CITRATE 50 MCG: 50 INJECTION INTRAMUSCULAR; INTRAVENOUS at 08:03

## 2022-04-02 RX ADMIN — SODIUM CHLORIDE 125 ML/HR: 0.45 INJECTION, SOLUTION INTRAVENOUS at 09:05

## 2022-04-02 RX ADMIN — PROPOFOL 300 MG: 10 INJECTION, EMULSION INTRAVENOUS at 08:05

## 2022-04-02 RX ADMIN — DEXAMETHASONE SODIUM PHOSPHATE 10 MG: 10 INJECTION, SOLUTION INTRAMUSCULAR; INTRAVENOUS at 08:20

## 2022-04-02 NOTE — H&P
HISTORY AND PHYSICAL         Patient Identifiers: Alex Gonzalez (MRN: [id number])      History of Present Illness:      Alex Gonzalez is a 64 y o  Male with history kidney stones  He went emergency room with right flank pain on Wednesday  CT shows a 6 mm stone in the mid right ureter with multiple nonobstructing stones within the right kidney as well  He has nausea and vomiting and persistent right flank pain  No fever chills  Creatinine 1  19   WBc   7 64  Urinalysis is clear  He had acute renal failure with his last stone and patient and his wife were very concerned that this may happen again  He has undergone cystoscopy and ureteroscopy in the past with stent placement    He feels that he passed a small 3 mm calculus a few weeks ago        Past Medical, Past Surgical History:      Medical History        Past Medical History:   Diagnosis Date    Anxiety      Change in bowel habits      Chronic urticaria      Depression      GERD (gastroesophageal reflux disease)      Hashimoto's disease      Hashimoto's thyroiditis      Hypertension      Kidney stone      Renal stones        :     Surgical History         Past Surgical History:   Procedure Laterality Date    FL RETROGRADE PYELOGRAM   7/28/2019    NASAL SEPTUM SURGERY        AR CYSTO/URETERO W/LITHOTRIPSY &INDWELL STENT INSRT Right 7/28/2019     Procedure: CYSTOSCOPY URETEROSCOPY WITH LITHOTRIPSY HOLMIUM LASER, basket stone extraction, RETROGRADE PYELOGRAM AND INSERTION STENT URETERAL;  Surgeon: Destiny Altman MD;  [facility];  Service: Urology    AR REPAIR [address] HERNIA,5+Y/O,REDUCIBL Left 12/13/2021     Procedure: INGUINAL HERNIA REPAIR;  Surgeon: Angeline Magallon DO;  [facility];  Service: General    TONSILLECTOMY   1979      :     Medications, Allergies:      Current Outpatient Medications:     clonazePAM (KlonoPIN) 0 5 mg tablet, Take 0 5 mg by mouth daily at bedtime, Disp: , Rfl:     Coenzyme Q10 (COQ10) 200 MG CAPS, Take by mouth, Disp: , Rfl:     ibuprofen (MOTRIN) 600 mg tablet, Take 1 tablet (600 mg total) by mouth every 6 (six) hours as needed for mild pain for up to 7 days, Disp: 28 tablet, Rfl: 0    levothyroxine 75 mcg tablet, TAKE ONE TABLET BY MOUTH EVERY DAY, Disp: 30 tablet, Rfl: 0    lisinopril (ZESTRIL) 40 mg tablet, TAKE ONE TABLET BY MOUTH EVERY DAY, Disp: 90 tablet, Rfl: 1    metoclopramide (REGLAN) 5 mg tablet, Take 1 tablet (5 mg total) by mouth 3 (three) times a day, Disp: 90 tablet, Rfl: 2    ondansetron (ZOFRAN) 4 mg tablet, Take 1 tablet (4 mg total) by mouth every 6 (six) hours for 3 days, Disp: 12 tablet, Rfl: 0    oxyCODONE-acetaminophen (PERCOCET) 5-325 mg per tablet, Take 1 tablet by mouth every 6 (six) hours as needed for moderate pain for up to 8 doses Max Daily Amount: 4 tablets, Disp: 8 tablet, Rfl: 0    pantoprazole (PROTONIX) 40 mg tablet, Take 1 tablet (40 mg total) by mouth daily before breakfast, Disp: 30 tablet, Rfl: 5    tamsulosin (FLOMAX) 0 4 mg, Take 1 capsule (0 4 mg total) by mouth daily with dinner for 2 days, Disp: 2 capsule, Rfl: 0     Allergies: Allergies   Allergen Reactions    Compazine [Prochlorperazine] GI Intolerance    Sulfa Antibiotics Rash   :     Social and Family History:   Social History:   Social History            Tobacco Use    Smoking status: Never Smoker    Smokeless tobacco: Never Used   Vaping Use    Vaping Use: Never used   Substance Use Topics    Alcohol use:  Yes       Alcohol/week: 0 0 standard drinks       Comment: social    Drug use: Never        Social History          Tobacco Use   Smoking Status Never Smoker   Smokeless Tobacco Never Used         Family History:        Family History   Problem Relation Age of Onset    Cancer Mother           Thyroid    Stroke Mother      Colon cancer Father      Colon polyps Father      Valvular heart disease Father      Cancer Sister           Skin    Suicidality Brother     :      Review of Systems:      General: Fever, chills, or night sweats: negative  Cardiac: Negative for chest pain     Pulmonary: Negative for shortness of breath  Gastrointestinal: Abdominal pain negative  Nausea, vomiting, or diarrhea negative,  Genitourinary: See HPI above   Patient does not have hematuria  All other systems queried were negative      Physical Exam:   General: Patient is pleasant and in NAD  Awake and alert  /80   Pulse 83   Ht 6' (1 829 m)   Wt 74 8 kg (165 lb)   SpO2 93%   BMI 22 38 kg/m²   Cardiac: regular rate  Peripheral edema: negative  Pulmonary: Non-labored breathing lungs clear bilaterally  Abdomen: Soft, non-tender, non-distended   No surgical scars   No masses, tenderness, hernias noted     Genitourinary: Positive CVA tenderness, negative suprapubic tenderness         Labs:            Lab Results   Component Value Date     HGB 16 2 03/31/2022     HCT 44 9 03/31/2022     WBC 7 64 03/31/2022      03/31/2022   ]     Lab Results   Component Value Date      09/18/2015     K 2 9 (L) 03/31/2022      03/31/2022     CO2 26 03/31/2022     BUN 14 03/31/2022     CREATININE 1 19 03/31/2022     CALCIUM 9 3 03/31/2022     GLUCOSE 91 06/03/2017   ]     Imaging:   I personally reviewed the images and report of the following studies, and reviewed them with the patient:  CT ABDOMEN AND PELVIS WITHOUT IV CONTRAST - LOW DOSE RENAL STONE   IMPRESSION:      Moderate right-sided hydroureteronephrosis with a 6 mm obstructing stone in the proximal right ureter       Nonobstructing multiple right-sided intrarenal calculi       Thickening of the urinary bladder wall which may represent cystitis   Follow-up with urology is recommended         ASSESSMENT:     1  Right-sided ureteral calculi 6 mm   2  Right-sided nephrolithiasis      PLAN:   We discussed his findings and his previous history of stones with obstruction  For my review appears that he has an impacted right UPJ calculus    He has multiple renal calculi as well and has had chronic issues with this  My initial impression is he would benefit from ureteral stenting alone at this time to allow for dilation of the ureter, followed by elective ureteroscopy mother is time to treat all of his calculi and hopefully make him stone free  This will depend on intraoperative findings  He and his wife understand and are comfortable with this plan  They do understand potential risk of infection from the procedure which may prolonged hospitalization  All questions answered to his satisfaction and consent was obtained

## 2022-04-02 NOTE — OP NOTE
OPERATIVE REPORT  PATIENT NAME: Tom Posada III    :  1960  MRN: [id number]  Pt Location:  CYSTO ROOM 01    SURGERY DATE: 2022    Surgeon(s) and Role:     Billy Mckay MD - Primary    Preop Diagnosis:  Kidney stones [N20 0]    Post-Op Diagnosis Codes:     * Kidney stones [N20 0]    Procedure(s) (LRB):  CYSTOSCOPY URETEROSCOPY  RETROGRADE PYELOGRAM AND INSERTION STENT URETERAL (Right)    Specimen(s):  ID Type Source Tests Collected by Time Destination   A :  Urine Urine, Renal, Right URINE CULTURE Dov Recio MD 2022 [id number]        Estimated Blood Loss:   Minimal    Drains:  * No LDAs found *    Anesthesia Type:   General    Operative Indications:  Kidney stones [N20 0]      Operative Findings:  Numerous right renal calculi and obstructing right UPJ calculus  Complications:   None    Procedure and Technique:  The patient was identified, brought to the operating room, and placed on the table in supine position  After induction of general anesthesia, the patient was placed in dorsal lithotomy position and prepped and draped in the usual sterile fashion  A complete formal timeout was performed  The 25 K[place]z rigid cystoscope was placed per urethra and cystoscopy was performed  There was no bladder abnormality identified  The Right ureteral orifice was identified and cannulated with a Solo wire  5 Fr catheter was placed, and a retrograde pyelogram was performed delineating the upper urinary tract anatomy  The ureteral length was measured  The Solo wire was replaced and a 26 cm x 6 K[place] double-J stent was placed without string  The patient tolerated the procedure well and was transferred to the recovery room awake alert and in stable condition  Plan:  Discharge home today with Stent in place and plan second-stage ureteroscopy in about 2 weeks       I was present for the entire procedure    Patient Disposition:  PACU       SIGNATURE: Dov Recio MD  DATE:  2022  TIME: 8:37 AM

## 2022-04-02 NOTE — PROGRESS NOTES
Pt informed RN that after receiving the Percocet, his face and particularly nose became itchy  Denies any SOB or difficulty swallowing  Dr Ángel Berg made aware  Awaiting new orders  Will continue to monitor

## 2022-04-02 NOTE — PROGRESS NOTES
Voiding without difficulty, tolerating fluids, denies any nausea or abd pain  Discharge instructions reviewed with patient and spouse  Discharged in stable condition via w/c

## 2022-04-02 NOTE — ANESTHESIA POSTPROCEDURE EVALUATION
Post-Op Assessment Note    CV Status:  Stable  Pain Score: 0    Pain management: adequate     Mental Status:  Alert and awake   Hydration Status:  Euvolemic   PONV Controlled:  Controlled   Airway Patency:  Patent   Two or more mitigation strategies used for obstructive sleep apnea   Post Op Vitals Reviewed: Yes      Staff: CRNA         No complications documented      BP   142/76   Temp   98 7   Pulse  80   Resp   16   SpO2   99

## 2022-04-03 NOTE — ANESTHESIA PREPROCEDURE EVALUATION
Procedure:  CYSTOSCOPY URETEROSCOPY  RETROGRADE PYELOGRAM AND INSERTION STENT URETERAL (Right Bladder)    Relevant Problems   ENDO   (+) Hypothyroidism due to Hashimoto's thyroiditis      GI/HEPATIC   (+) Esophageal reflux      /RENAL   (+) Acute kidney injury ([address] )   (+) Nephrolithiasis   (+) Renal stones      MUSCULOSKELETAL   (+) Arthritis   (+) CMC DJD(carpometacarpal degenerative joint disease), localized primary, right   (+) Primary osteoarthritis of first carpometacarpal joint of right hand      NEURO/PSYCH   (+) Anxiety   (+) Depression   (+) Headache        Physical Exam    Airway    Mallampati score: I  TM Distance: >3 FB  Neck ROM: full     Dental       Cardiovascular      Pulmonary      Other Findings        Anesthesia Plan  ASA Score- 2     Anesthesia Type- general with ASA Monitors  Additional Monitors:   Airway Plan: LMA  Plan Factors-Exercise tolerance (METS): >4 METS  Chart reviewed  EKG reviewed  Imaging results reviewed  Existing labs reviewed  Induction- intravenous  Postoperative Plan- Plan for postoperative opioid use  Planned trial extubation    Informed Consent- Anesthetic plan and risks discussed with patient  I personally reviewed this patient with the CRNA  Discussed and agreed on the Anesthesia Plan with the ROBIN Bacon Anesthesia plan and consent discussed with Lilia Toussaint and his wife who expressed understanding and agreement  Risks/benefits and alternatives discussed with patient including possible PONV, sore throat, damage to teeth/lips/gums and possibility of rare anesthetic and surgical emergencies

## 2022-04-04 LAB — BACTERIA UR CULT: NORMAL

## 2022-04-06 ENCOUNTER — APPOINTMENT (OUTPATIENT)
Dept: LAB | Facility: CLINIC | Age: 62
End: 2022-04-06
Payer: COMMERCIAL

## 2022-04-06 DIAGNOSIS — N17.9 ACUTE KIDNEY INJURY (HCC): ICD-10-CM

## 2022-04-06 DIAGNOSIS — E03.9 HYPOTHYROIDISM, UNSPECIFIED TYPE: ICD-10-CM

## 2022-04-06 LAB
ANION GAP SERPL CALCULATED.3IONS-SCNC: 8 MMOL/L (ref 4–13)
BUN SERPL-MCNC: 11 MG/DL (ref 5–25)
CALCIUM SERPL-MCNC: 8.8 MG/DL (ref 8.3–10.1)
CHLORIDE SERPL-SCNC: 104 MMOL/L (ref 100–108)
CO2 SERPL-SCNC: 32 MMOL/L (ref 21–32)
CREAT SERPL-MCNC: 1.15 MG/DL (ref 0.6–1.3)
GFR SERPL CREATININE-BSD FRML MDRD: 68 ML/MIN/1.73SQ M
GLUCOSE SERPL-MCNC: 74 MG/DL (ref 65–140)
POTASSIUM SERPL-SCNC: 3.7 MMOL/L (ref 3.5–5.3)
SODIUM SERPL-SCNC: 144 MMOL/L (ref 136–145)

## 2022-04-06 PROCEDURE — 80048 BASIC METABOLIC PNL TOTAL CA: CPT

## 2022-04-06 PROCEDURE — 36415 COLL VENOUS BLD VENIPUNCTURE: CPT

## 2022-04-06 RX ORDER — LEVOTHYROXINE SODIUM 0.07 MG/1
TABLET ORAL
Qty: 30 TABLET | Refills: 0 | Status: SHIPPED | OUTPATIENT
Start: 2022-04-06 | End: 2022-05-04

## 2022-04-08 ENCOUNTER — OFFICE VISIT (OUTPATIENT)
Dept: UROLOGY | Facility: CLINIC | Age: 62
End: 2022-04-08
Payer: COMMERCIAL

## 2022-04-08 VITALS
HEIGHT: 72 IN | WEIGHT: 162 LBS | SYSTOLIC BLOOD PRESSURE: 152 MMHG | DIASTOLIC BLOOD PRESSURE: 88 MMHG | BODY MASS INDEX: 21.94 KG/M2

## 2022-04-08 DIAGNOSIS — N20.0 CALCULUS OF KIDNEY: Primary | ICD-10-CM

## 2022-04-08 PROCEDURE — 1036F TOBACCO NON-USER: CPT | Performed by: PHYSICIAN ASSISTANT

## 2022-04-08 PROCEDURE — 99213 OFFICE O/P EST LOW 20 MIN: CPT | Performed by: PHYSICIAN ASSISTANT

## 2022-04-08 NOTE — PROGRESS NOTES
UROLOGY PROGRESS NOTE   Patient Identifiers: Mara Cockayne (MRN [id number])  Date of Service: 4/8/2022    Subjective:    22-year-old male history of kidney stones  He had a right ureteral stone which was urgently stented due to pain last Saturday April 2nd  Did have some bladder spasms and expected postop hematuria which have now resolved  There is a residual 5 mm stone as well within the right kidney        Reason for visit:  Kidney stone follow-up     Objective:     VITALS:    Vitals:    04/08/22 1404   BP: 152/88     AUA SYMPTOM SCORE      Most Recent Value   AUA SYMPTOM SCORE    How often have you had a sensation of not emptying your bladder completely after you finished urinating? 0 (P)     How often have you had to urinate again less than two hours after you finished urinating? 0 (P)     How often have you found you stopped and started again several times when you urinate? 0 (P)     How often have you found it difficult to postpone urination? 1 (P)     How often have you had a weak urinary stream? 0 (P)     How often have you had to push or strain to begin urination? 0 (P)     How many times did you most typically get up to urinate from the time you went to bed at night until the time you got up in the morning? 0 (P)     Quality of Life: If you were to spend the rest of your life with your urinary condition just the way it is now, how would you feel about that? 3 (P)     AUA SYMPTOM SCORE 1 (P)             LABS:  Lab Results   Component Value Date    HGB 14 9 04/01/2022    HCT 42 6 04/01/2022    WBC 9 29 04/01/2022     04/01/2022   ]    Lab Results   Component Value Date     09/18/2015    K 3 7 04/06/2022     04/06/2022    CO2 32 04/06/2022    BUN 11 04/06/2022    CREATININE 1 15 04/06/2022    CALCIUM 8 8 04/06/2022    GLUCOSE 91 06/03/2017   ]        INPATIENT MEDS:    Current Outpatient Medications:     clonazePAM (KlonoPIN) 0 5 mg tablet, Take 0 5 mg by mouth daily at bedtime, Disp: , Rfl:     Coenzyme Q10 (COQ10) 200 MG CAPS, Take by mouth, Disp: , Rfl:     levothyroxine 75 mcg tablet, TAKE ONE TABLET BY MOUTH EVERY DAY, Disp: 30 tablet, Rfl: 0    lisinopril (ZESTRIL) 40 mg tablet, TAKE ONE TABLET BY MOUTH EVERY DAY, Disp: 90 tablet, Rfl: 1    metoclopramide (REGLAN) 5 mg tablet, Take 1 tablet (5 mg total) by mouth 3 (three) times a day, Disp: 90 tablet, Rfl: 2    oxybutynin (DITROPAN) 5 mg tablet, Take 1 tablet (5 mg total) by mouth 3 (three) times a day as needed (spasm), Disp: 30 tablet, Rfl: 3    oxyCODONE-acetaminophen (PERCOCET) 5-325 mg per tablet, Take 1 tablet by mouth every 6 (six) hours as needed for moderate pain for up to 8 doses Max Daily Amount: 4 tablets, Disp: 8 tablet, Rfl: 0    pantoprazole (PROTONIX) 40 mg tablet, Take 1 tablet (40 mg total) by mouth daily before breakfast, Disp: 30 tablet, Rfl: 5    ibuprofen (MOTRIN) 600 mg tablet, Take 1 tablet (600 mg total) by mouth every 6 (six) hours as needed for mild pain for up to 7 days, Disp: 28 tablet, Rfl: 0    ondansetron (ZOFRAN) 4 mg tablet, Take 1 tablet (4 mg total) by mouth every 6 (six) hours for 3 days, Disp: 12 tablet, Rfl: 0    tamsulosin (FLOMAX) 0 4 mg, Take 1 capsule (0 4 mg total) by mouth daily with dinner for 2 days, Disp: 2 capsule, Rfl: 0      Physical Exam:   /88   Ht 6' (1 829 m)   Wt 73 5 kg (162 lb)   BMI 21 97 kg/m²   GEN: no acute distress    HEART:   Regular rate rhythm  RESP: breathing comfortably with no accessory muscle use    Clear to auscultation  ABD: soft, non-tender, non-distended   INCISION:    EXT: no significant peripheral edema       RADIOLOGY:   CT ABDOMEN AND PELVIS WITHOUT IV CONTRAST - LOW DOSE RENAL STONE   IMPRESSION:      Moderate right-sided hydroureteronephrosis with a 6 mm obstructing stone in the proximal right ureter  Nonobstructing multiple right-sided intrarenal calculi  Thickening of the urinary bladder wall which may represent cystitis    Follow-up with urology is recommended       Assessment:    1   Right ureteral calculi status post stent insertion     Plan:   - surgical request entered for second-stage right ureteroscopy with laser lithotripsy and stent replacement  - consent was signed and  notified  -  -

## 2022-04-11 ENCOUNTER — NURSE TRIAGE (OUTPATIENT)
Dept: OTHER | Facility: OTHER | Age: 62
End: 2022-04-11

## 2022-04-11 NOTE — TELEPHONE ENCOUNTER
Reason for Disposition   MODERATE pain (e g , interferes with normal activities or awakens from sleep)    Answer Assessment - Initial Assessment Questions  1  LOCATION: "Where does it hurt?" (e g , left, right)      Right side pain (kidney stones)  2  ONSET: "When did the pain start?"      Been going on since 3/31  3  SEVERITY: "How bad is the pain?" (e g , Scale 1-10; mild, moderate, or severe)    - MILD (1-3): doesn't interfere with normal activities     - MODERATE (4-7): interferes with normal activities or awakens from sleep     - SEVERE (8-10): excruciating pain and patient unable to do normal activities (stays in bed)        Moderate -5/10  4  PATTERN: "Does the pain come and go, or is it constant?"       Constant  5  CAUSE: "What do you think is causing the pain?"      Kidney stone  6  OTHER SYMPTOMS:  "Do you have any other symptoms?" (e g , fever, abdominal pain, vomiting, leg weakness, burning with urination, blood in urine)      Denies fever, denies nausea  Denies abdominal pain  Passed a blood clot (inch and a half long, thin like a ) the other day and some bleeding with it, urine is now clear  Patient feels like he is also having back spasms  Has been taking Tylenol, had stopped taking pain pills due to nose itching  Protocols used:  FLANK PAIN-ADULT-[place]

## 2022-04-11 NOTE — TELEPHONE ENCOUNTER
I spoke to the patient and scheduled his #5 for 4/19/2022 at [facility] with Dr Tapan Law     -instructions given verbally and mailed  -PATs complete  -patient will avoid any potentially blood thinning medications 7 days prior  -[employer] - on auth tracker 4/11/2022

## 2022-04-11 NOTE — TELEPHONE ENCOUNTER
Regarding: Kidney Stone   ----- Message from Efrain Freeman sent at 4/11/2022 11:29 AM EDT -----  "My  has a 6 mm kidney stone and is in pain "

## 2022-04-11 NOTE — TELEPHONE ENCOUNTER
Patient complaining of right flank pain and spasms due to kidney stone  Patient hasn't tried the oxybutinin yet, advised to try it; patient was concerned since the oxycodone made his nose itch  Wife mostly concerned about not getting a call yet to schedule his procedure to remove kidney stones  Wife is expecting a call today from a  to set up the procedure

## 2022-04-15 PROCEDURE — NC001 PR NO CHARGE: Performed by: UROLOGY

## 2022-04-15 NOTE — H&P
HISTORY AND PHYSICAL  ? ? Patient Name: Kelly Baez III  Patient MRN: [id number]  Attending Provider: Annie Carl MD  Service: Urology  Chief Complaint    Right renal and upper ureteral calculi    HPI   Martha Barrett is a 64 y o  male with stent placed for above problem  The obstructing stone was 6 mm  There are at least 12 other stones in the right kidney ranging from 2 mm to 8 mm    I plan cysto, right ureteroscopy, laser stone, stent  Potential risks and complications discussed, and informed consent was given by the patient  Medications  Meds/Allergies   No current facility-administered medications for this encounter  Cannot display prior to admission medications because the patient has not been admitted in this contact  No current facility-administered medications for this encounter      Current Outpatient Medications:     clonazePAM (KlonoPIN) 0 5 mg tablet, Take 0 5 mg by mouth daily at bedtime, Disp: , Rfl:     Coenzyme Q10 (COQ10) 200 MG CAPS, Take by mouth, Disp: , Rfl:     ibuprofen (MOTRIN) 600 mg tablet, Take 1 tablet (600 mg total) by mouth every 6 (six) hours as needed for mild pain for up to 7 days, Disp: 28 tablet, Rfl: 0    levothyroxine 75 mcg tablet, TAKE ONE TABLET BY MOUTH EVERY DAY, Disp: 30 tablet, Rfl: 0    lisinopril (ZESTRIL) 40 mg tablet, TAKE ONE TABLET BY MOUTH EVERY DAY, Disp: 90 tablet, Rfl: 1    metoclopramide (REGLAN) 5 mg tablet, Take 1 tablet (5 mg total) by mouth 3 (three) times a day, Disp: 90 tablet, Rfl: 2    ondansetron (ZOFRAN) 4 mg tablet, Take 1 tablet (4 mg total) by mouth every 6 (six) hours for 3 days, Disp: 12 tablet, Rfl: 0    oxybutynin (DITROPAN) 5 mg tablet, Take 1 tablet (5 mg total) by mouth 3 (three) times a day as needed (spasm), Disp: 30 tablet, Rfl: 3    oxyCODONE-acetaminophen (PERCOCET) 5-325 mg per tablet, Take 1 tablet by mouth every 6 (six) hours as needed for moderate pain for up to 8 doses Max Daily Amount: 4 tablets, Disp: 8 tablet, Rfl: 0    pantoprazole (PROTONIX) 40 mg tablet, Take 1 tablet (40 mg total) by mouth daily before breakfast, Disp: 30 tablet, Rfl: 5    tamsulosin (FLOMAX) 0 4 mg, Take 1 capsule (0 4 mg total) by mouth daily with dinner for 2 days, Disp: 2 capsule, Rfl: 0  Review of Systems  10 point review of systems negative except as noted in HPI  Allergies  Allergies   Allergen Reactions    Compazine [Prochlorperazine] GI Intolerance    Percocet [Oxycodone-Acetaminophen] Itching     Facial itching    Sulfa Antibiotics Rash     PMH  Past Medical History:   Diagnosis Date    Anxiety     Change in bowel habits     Chronic urticaria     Depression     GERD (gastroesophageal reflux disease)     Hashimoto's disease     Hashimoto's thyroiditis     Hypertension     Kidney stone     Renal stones      Past surgical history  Past Surgical History:   Procedure Laterality Date    FL RETROGRADE PYELOGRAM  7/28/2019    FL RETROGRADE PYELOGRAM  4/2/2022    NASAL SEPTUM SURGERY      NJ CYSTO/URETERO W/LITHOTRIPSY &INDWELL STENT INSRT Right 7/28/2019    Procedure: CYSTOSCOPY URETEROSCOPY WITH LITHOTRIPSY HOLMIUM LASER, basket stone extraction, RETROGRADE PYELOGRAM AND INSERTION STENT URETERAL;  Surgeon: Nandini Clifford MD;  [facility];  Service: Urology    NJ CYSTO/URETERO W/LITHOTRIPSY &INDWELL STENT INSRT Right 4/2/2022    Procedure: CYSTOSCOPY URETEROSCOPY  RETROGRADE PYELOGRAM AND INSERTION STENT URETERAL;  Surgeon: Jason Maldonado MD;  [facility];  Service: Urology    NJ REPAIR [address] HERNIA,5+Y/O,REDUCIBL Left 12/13/2021    Procedure: INGUINAL HERNIA REPAIR;  Surgeon: Ramsey Pena DO;  [facility];  Service: General    TONSILLECTOMY  1979     Social history  Social History     Tobacco Use    Smoking status: Never Smoker    Smokeless tobacco: Never Used   Vaping Use    Vaping Use: Never used   Substance Use Topics    Alcohol use:  Yes     Alcohol/week: 0 0 standard drinks     Comment: social    Drug use: Never     ?   Physical Exam     vs  General appearance: alert and oriented, in no acute distress  Head: Normocephalic, without obvious abnormality, atraumatic  Throat: lips, mucosa, and tongue normal; teeth and gums normal  Neck: no adenopathy, no carotid bruit, no JVD, supple, symmetrical, trachea midline and thyroid not enlarged, symmetric, no tenderness/mass/nodules  Lungs: clear to auscultation bilaterally  Heart: regular rate and rhythm, S1, S2 normal, no murmur, click, rub or gallop  Abdomen: soft, non-tender; bowel sounds normal; no masses,  no organomegaly  Extremities: extremities normal, warm and well-perfused; no cyanosis, clubbing, or edema  Jose Armando Guzman MD

## 2022-04-17 ENCOUNTER — ANESTHESIA EVENT (OUTPATIENT)
Dept: PERIOP | Facility: HOSPITAL | Age: 62
End: 2022-04-17
Payer: COMMERCIAL

## 2022-04-18 NOTE — TELEPHONE ENCOUNTER
[employer] FMLA / Disability paper work received from the office today  Paper completed and signed  Faxed to [facility] @ [phone]  Original scanned into chart with Fax confirmation sheet and then mailed to patients home address

## 2022-04-18 NOTE — PRE-PROCEDURE INSTRUCTIONS
Pre-Surgery Instructions:   Medication Instructions    clonazePAM (KlonoPIN) 0 5 mg tablet Take as directed    Coenzyme Q10 (COQ10) 200 MG CAPS Hold prior to surgery    ibuprofen (MOTRIN) 600 mg tablet Hold prior to surgery    levothyroxine 75 mcg tablet Take as directed    lisinopril (ZESTRIL) 40 mg tablet Hold day of surgery    metoclopramide (REGLAN) 5 mg tablet Hold day of surgery   ondansetron (ZOFRAN) 4 mg tablet PRN    oxybutynin (DITROPAN) 5 mg tablet Take as directed    pantoprazole (PROTONIX) 40 mg tablet Take as directed      My Surgical Experience    The following information was developed to assist you to prepare for your operation  What do I need to do before coming to the hospital?   Arrange for a responsible person to drive you to and from the hospital    Arrange care for your children at home  Children are not allowed in the recovery areas of the hospital   Plan to wear clothing that is easy to put on and take off  If you are having shoulder surgery, wear a shirt that buttons or zippers in the front  Bathing  o Shower the evening before and the morning of your surgery with an antibacterial soap  Please refer to the Pre Op Showering Instructions for Surgery Patients Sheet   o Remove nail polish and all body piercing jewelry  o Do not shave any body part for at least 24 hours before surgery-this includes face, arms, legs and upper body  Food  o Nothing to eat or drink after midnight the night before your surgery   This includes candy and chewing gum  o Exception: If your surgery is after 12:00pm (noon), you may have clear liquids such as 7-Up®, ginger ale, apple or cranberry juice, Jell-O®, water, or clear broth until 8:00 am  o Do not drink milk or juice with pulp on the morning before surgery  o Do not drink alcohol 24 hours before surgery  Medicine  o Follow instructions you received from your surgeon about which medicines you may take on the day of surgery  o If instructed to take medicine on the morning of surgery, take pills with just a small sip of water  Call your prescribing doctor for specific infroamtion on what to do if you take insulin    What should I bring to the hospital?    Bring:  Gopal Gonzalez or a walker, if you have them, for foot or knee surgery   A list of the daily medicines, vitamins, minerals, herbals and nutritional supplements you take  Include the dosages of medicines and the time you take them each day   Glasses, dentures or hearing aids   Minimal clothing; you will be wearing hospital sleepwear   Photo ID; required to verify your identity   If you have a Living Will or Power of , bring a copy of the documents   If you have an ostomy, bring an extra pouch and any supplies you use    Do not bring   Medicines or inhalers   Money, valuables or jewelry    What other information should I know about the day of surgery?  Notify your surgeons if you develop a cold, sore throat, cough, fever, rash or any other illness   Report to the Ambulatory Surgical/Same Day Surgery Unit   You will be instructed to stop at Registration only if you have not been pre-registered   Inform your  fi they do not stay that they will be asked by the staff to leave a phone number where they can be reached   Be available to be reached before surgery  In the event the operating room schedule changes, you may be asked to come in earlier or later than expected    *It is important to tell your doctor and others involved in your health care if you are taking or have been taking any non-prescription drugs, vitamins, minerals, herbals or other nutritional supplements   Any of these may interact with some food or medicines and cause a reaction

## 2022-04-19 ENCOUNTER — ANESTHESIA (OUTPATIENT)
Dept: PERIOP | Facility: HOSPITAL | Age: 62
End: 2022-04-19
Payer: COMMERCIAL

## 2022-04-19 ENCOUNTER — APPOINTMENT (OUTPATIENT)
Dept: RADIOLOGY | Facility: HOSPITAL | Age: 62
End: 2022-04-19
Payer: COMMERCIAL

## 2022-04-19 ENCOUNTER — HOSPITAL ENCOUNTER (OUTPATIENT)
Facility: HOSPITAL | Age: 62
Setting detail: OUTPATIENT SURGERY
Discharge: HOME | End: 2022-04-19
Attending: UROLOGY | Admitting: UROLOGY
Payer: COMMERCIAL

## 2022-04-19 VITALS
TEMPERATURE: 97.4 F | HEART RATE: 76 BPM | RESPIRATION RATE: 16 BRPM | OXYGEN SATURATION: 97 % | SYSTOLIC BLOOD PRESSURE: 155 MMHG | BODY MASS INDEX: 21.68 KG/M2 | WEIGHT: 159.83 LBS | DIASTOLIC BLOOD PRESSURE: 88 MMHG

## 2022-04-19 DIAGNOSIS — N20.1 URETEROLITHIASIS: ICD-10-CM

## 2022-04-19 DIAGNOSIS — N20.0 KIDNEY STONES: ICD-10-CM

## 2022-04-19 DIAGNOSIS — N20.0 RENAL STONES: ICD-10-CM

## 2022-04-19 DIAGNOSIS — N20.0 NEPHROLITHIASIS: Primary | ICD-10-CM

## 2022-04-19 PROCEDURE — 84560 ASSAY OF URINE/URIC ACID: CPT | Performed by: UROLOGY

## 2022-04-19 PROCEDURE — 84133 ASSAY OF URINE POTASSIUM: CPT | Performed by: UROLOGY

## 2022-04-19 PROCEDURE — 84300 ASSAY OF URINE SODIUM: CPT | Performed by: UROLOGY

## 2022-04-19 PROCEDURE — 82340 ASSAY OF CALCIUM IN URINE: CPT | Performed by: UROLOGY

## 2022-04-19 PROCEDURE — 87086 URINE CULTURE/COLONY COUNT: CPT | Performed by: UROLOGY

## 2022-04-19 PROCEDURE — 84392 ASSAY OF URINE SULFATE: CPT | Performed by: UROLOGY

## 2022-04-19 PROCEDURE — 82507 ASSAY OF CITRATE: CPT | Performed by: UROLOGY

## 2022-04-19 PROCEDURE — 82436 ASSAY OF URINE CHLORIDE: CPT | Performed by: UROLOGY

## 2022-04-19 PROCEDURE — 82140 ASSAY OF AMMONIA: CPT | Performed by: UROLOGY

## 2022-04-19 PROCEDURE — 84105 ASSAY OF URINE PHOSPHORUS: CPT | Performed by: UROLOGY

## 2022-04-19 PROCEDURE — 83945 ASSAY OF OXALATE: CPT | Performed by: UROLOGY

## 2022-04-19 PROCEDURE — 76000 FLUOROSCOPY <1 HR PHYS/QHP: CPT

## 2022-04-19 PROCEDURE — 52356 CYSTO/URETERO W/LITHOTRIPSY: CPT | Performed by: UROLOGY

## 2022-04-19 PROCEDURE — C1769 GUIDE WIRE: HCPCS | Performed by: UROLOGY

## 2022-04-19 PROCEDURE — 83935 ASSAY OF URINE OSMOLALITY: CPT | Performed by: UROLOGY

## 2022-04-19 PROCEDURE — C2617 STENT, NON-COR, TEM W/O DEL: HCPCS | Performed by: UROLOGY

## 2022-04-19 PROCEDURE — 82131 AMINO ACIDS SINGLE QUANT: CPT | Performed by: UROLOGY

## 2022-04-19 PROCEDURE — C1894 INTRO/SHEATH, NON-LASER: HCPCS | Performed by: UROLOGY

## 2022-04-19 PROCEDURE — 82570 ASSAY OF URINE CREATININE: CPT | Performed by: UROLOGY

## 2022-04-19 PROCEDURE — 81003 URINALYSIS AUTO W/O SCOPE: CPT | Performed by: UROLOGY

## 2022-04-19 PROCEDURE — 83735 ASSAY OF MAGNESIUM: CPT | Performed by: UROLOGY

## 2022-04-19 DEVICE — VARIABLE LENGTH INJECTION STENT SET
Type: IMPLANTABLE DEVICE | Site: URETER | Status: FUNCTIONAL
Brand: CONTOUR VL™ INJECTION STENT SET

## 2022-04-19 RX ORDER — MIDAZOLAM HYDROCHLORIDE 2 MG/2ML
INJECTION, SOLUTION INTRAMUSCULAR; INTRAVENOUS AS NEEDED
Status: DISCONTINUED | OUTPATIENT
Start: 2022-04-19 | End: 2022-04-19

## 2022-04-19 RX ORDER — OXYBUTYNIN CHLORIDE 5 MG/1
TABLET ORAL
Qty: 15 TABLET | Refills: 0 | Status: SHIPPED | OUTPATIENT
Start: 2022-04-19

## 2022-04-19 RX ORDER — SODIUM CHLORIDE, SODIUM LACTATE, POTASSIUM CHLORIDE, CALCIUM CHLORIDE 600; 310; 30; 20 MG/100ML; MG/100ML; MG/100ML; MG/100ML
125 INJECTION, SOLUTION INTRAVENOUS CONTINUOUS
Status: DISCONTINUED | OUTPATIENT
Start: 2022-04-19 | End: 2022-04-19 | Stop reason: HOSPADM

## 2022-04-19 RX ORDER — MAGNESIUM HYDROXIDE 1200 MG/15ML
LIQUID ORAL AS NEEDED
Status: DISCONTINUED | OUTPATIENT
Start: 2022-04-19 | End: 2022-04-19 | Stop reason: HOSPADM

## 2022-04-19 RX ORDER — FENTANYL CITRATE 50 UG/ML
INJECTION, SOLUTION INTRAMUSCULAR; INTRAVENOUS AS NEEDED
Status: DISCONTINUED | OUTPATIENT
Start: 2022-04-19 | End: 2022-04-19

## 2022-04-19 RX ORDER — HYDROCODONE BITARTRATE AND ACETAMINOPHEN 5; 325 MG/1; MG/1
1 TABLET ORAL EVERY 6 HOURS PRN
Status: DISCONTINUED | OUTPATIENT
Start: 2022-04-19 | End: 2022-04-19 | Stop reason: HOSPADM

## 2022-04-19 RX ORDER — SODIUM CHLORIDE 9 MG/ML
INJECTION, SOLUTION INTRAVENOUS CONTINUOUS PRN
Status: DISCONTINUED | OUTPATIENT
Start: 2022-04-19 | End: 2022-04-19

## 2022-04-19 RX ORDER — ONDANSETRON 2 MG/ML
4 INJECTION INTRAMUSCULAR; INTRAVENOUS ONCE AS NEEDED
Status: DISCONTINUED | OUTPATIENT
Start: 2022-04-19 | End: 2022-04-19 | Stop reason: HOSPADM

## 2022-04-19 RX ORDER — LIDOCAINE HYDROCHLORIDE 10 MG/ML
INJECTION, SOLUTION EPIDURAL; INFILTRATION; INTRACAUDAL; PERINEURAL AS NEEDED
Status: DISCONTINUED | OUTPATIENT
Start: 2022-04-19 | End: 2022-04-19

## 2022-04-19 RX ORDER — FENTANYL CITRATE/PF 50 MCG/ML
25 SYRINGE (ML) INJECTION
Status: DISCONTINUED | OUTPATIENT
Start: 2022-04-19 | End: 2022-04-19 | Stop reason: HOSPADM

## 2022-04-19 RX ORDER — LIDOCAINE AND PRILOCAINE 25; 25 MG/G; MG/G
CREAM TOPICAL AS NEEDED
Qty: 30 G | Refills: 0 | Status: SHIPPED | OUTPATIENT
Start: 2022-04-19

## 2022-04-19 RX ORDER — ROCURONIUM BROMIDE 10 MG/ML
INJECTION, SOLUTION INTRAVENOUS AS NEEDED
Status: DISCONTINUED | OUTPATIENT
Start: 2022-04-19 | End: 2022-04-19

## 2022-04-19 RX ORDER — DEXAMETHASONE SODIUM PHOSPHATE 10 MG/ML
INJECTION, SOLUTION INTRAMUSCULAR; INTRAVENOUS AS NEEDED
Status: DISCONTINUED | OUTPATIENT
Start: 2022-04-19 | End: 2022-04-19

## 2022-04-19 RX ORDER — ONDANSETRON 2 MG/ML
INJECTION INTRAMUSCULAR; INTRAVENOUS AS NEEDED
Status: DISCONTINUED | OUTPATIENT
Start: 2022-04-19 | End: 2022-04-19

## 2022-04-19 RX ORDER — NEOSTIGMINE METHYLSULFATE 1 MG/ML
INJECTION INTRAVENOUS AS NEEDED
Status: DISCONTINUED | OUTPATIENT
Start: 2022-04-19 | End: 2022-04-19

## 2022-04-19 RX ORDER — CEPHALEXIN 500 MG/1
1000 CAPSULE ORAL EVERY 12 HOURS SCHEDULED
Qty: 6 CAPSULE | Refills: 0 | Status: SHIPPED | OUTPATIENT
Start: 2022-04-19 | End: 2022-04-22

## 2022-04-19 RX ORDER — GLYCOPYRROLATE 0.2 MG/ML
INJECTION INTRAMUSCULAR; INTRAVENOUS AS NEEDED
Status: DISCONTINUED | OUTPATIENT
Start: 2022-04-19 | End: 2022-04-19

## 2022-04-19 RX ORDER — HYDROCODONE BITARTRATE AND ACETAMINOPHEN 5; 325 MG/1; MG/1
1-2 TABLET ORAL EVERY 6 HOURS PRN
Qty: 12 TABLET | Refills: 0 | Status: SHIPPED | OUTPATIENT
Start: 2022-04-19 | End: 2022-04-29

## 2022-04-19 RX ORDER — CEFAZOLIN SODIUM 2 G/50ML
2000 SOLUTION INTRAVENOUS
Status: COMPLETED | OUTPATIENT
Start: 2022-04-19 | End: 2022-04-19

## 2022-04-19 RX ORDER — MEPERIDINE HYDROCHLORIDE 25 MG/ML
12.5 INJECTION INTRAMUSCULAR; INTRAVENOUS; SUBCUTANEOUS
Status: DISCONTINUED | OUTPATIENT
Start: 2022-04-19 | End: 2022-04-19 | Stop reason: HOSPADM

## 2022-04-19 RX ORDER — PROPOFOL 10 MG/ML
INJECTION, EMULSION INTRAVENOUS AS NEEDED
Status: DISCONTINUED | OUTPATIENT
Start: 2022-04-19 | End: 2022-04-19

## 2022-04-19 RX ADMIN — LIDOCAINE HYDROCHLORIDE 100 MG: 10 INJECTION, SOLUTION EPIDURAL; INFILTRATION; INTRACAUDAL; PERINEURAL at 12:38

## 2022-04-19 RX ADMIN — GLYCOPYRROLATE 0.8 MG: 0.2 INJECTION, SOLUTION INTRAMUSCULAR; INTRAVENOUS at 13:43

## 2022-04-19 RX ADMIN — DEXAMETHASONE SODIUM PHOSPHATE 10 MG: 10 INJECTION INTRAMUSCULAR; INTRAVENOUS at 12:54

## 2022-04-19 RX ADMIN — FENTANYL CITRATE 50 MCG: 50 INJECTION INTRAMUSCULAR; INTRAVENOUS at 13:02

## 2022-04-19 RX ADMIN — ROCURONIUM BROMIDE 10 MG: 50 INJECTION, SOLUTION INTRAVENOUS at 13:20

## 2022-04-19 RX ADMIN — SODIUM CHLORIDE: 0.9 INJECTION, SOLUTION INTRAVENOUS at 13:46

## 2022-04-19 RX ADMIN — ROCURONIUM BROMIDE 40 MG: 50 INJECTION, SOLUTION INTRAVENOUS at 12:38

## 2022-04-19 RX ADMIN — SODIUM CHLORIDE: 0.9 INJECTION, SOLUTION INTRAVENOUS at 14:28

## 2022-04-19 RX ADMIN — CEFAZOLIN SODIUM 2000 MG: 2 SOLUTION INTRAVENOUS at 12:33

## 2022-04-19 RX ADMIN — SODIUM CHLORIDE, SODIUM LACTATE, POTASSIUM CHLORIDE, AND CALCIUM CHLORIDE 125 ML/HR: .6; .31; .03; .02 INJECTION, SOLUTION INTRAVENOUS at 11:15

## 2022-04-19 RX ADMIN — MIDAZOLAM 2 MG: 1 INJECTION INTRAMUSCULAR; INTRAVENOUS at 12:38

## 2022-04-19 RX ADMIN — NEOSTIGMINE METHYLSULFATE 5 MG: 1 INJECTION INTRAVENOUS at 13:43

## 2022-04-19 RX ADMIN — PROPOFOL 200 MG: 10 INJECTION, EMULSION INTRAVENOUS at 12:38

## 2022-04-19 RX ADMIN — ONDANSETRON 4 MG: 2 INJECTION INTRAMUSCULAR; INTRAVENOUS at 13:25

## 2022-04-19 RX ADMIN — FENTANYL CITRATE 50 MCG: 50 INJECTION INTRAMUSCULAR; INTRAVENOUS at 12:38

## 2022-04-19 RX ADMIN — SUGAMMADEX 200 MG: 100 INJECTION, SOLUTION INTRAVENOUS at 14:00

## 2022-04-19 NOTE — DISCHARGE INSTRUCTIONS
ALL YOUR  PREVIOUS MEDS ARE THE SAME  NEW MEDS:  Hydrocodone if needed for pain  Cefuroxime antibiotic  EMLA cream at tip of penis for irritation    BE CAREFUL NOT TO PULL THE STRING  EXPECT SOME BLOOD IN URINE, BURNING, FREQUENT URINATION  ACTIVITY:  RESUME FULL ACTIVITY 2-3 days  ***** Do the 24 hour urine collection test in 2-3 weeks   a special container at any [facility] lab, there is a order for this in the computer system  Take the container home, pick a date where for 24 hours, every drop of urine goes into the container  People often do this on a Sunday morning to  Monday morning  Then  take the container back to the lab, they will analyze the minerals dissolved in the urine  It takes up to two weeks for results, and we will call you with the results  ***** ADVICE TO REDUCE KIDNEY STONES:    DRINK 3 QUARTS (96 Oz ) LIQUIDS EACH DAY - ALL LIQUIDS COUNT       ** THESE FOODS ARE HIGH IN [place] - TRY TO LIMIT HOW MUCH OF THESE YOU EAT:  Coke and Pepsi  Nuts  Dark Leafy Greens:     Spinach and Kale, Rhubarb, Chard  Asparagus  Beets  Sweet potatoes   Blueberries, Strawberries   Dark tea (Green tea is okay)  Tofu    ADD LEMON JUICE 3 OZ  DAILY - Fresh squeezed or lemon juice concentrate - Not MinuteMaid, [place] [facility] Hill, Crystal Lite, etc         ** Recipe for [employer] [employer] LEMONADE:         One ounce of lemon juice, glass of water, sweetener of your choice    Coffee is okay! Cranberry juice is good to prevent infections, but does not help for stones

## 2022-04-19 NOTE — ANESTHESIA PREPROCEDURE EVALUATION
Procedure:  CYSTOSCOPY URETEROSCOPY WITH LITHOTRIPSY HOLMIUM LASER, RETROGRADE PYELOGRAM AND INSERTION STENT URETERAL (Right Bladder)    Relevant Problems   ENDO   (+) Hypothyroidism due to Hashimoto's thyroiditis      GI/HEPATIC   (+) Esophageal reflux      /RENAL   (+) Acute kidney injury ([address] )   (+) Nephrolithiasis   (+) Renal stones      MUSCULOSKELETAL   (+) Arthritis   (+) CMC DJD(carpometacarpal degenerative joint disease), localized primary, right   (+) Primary osteoarthritis of first carpometacarpal joint of right hand      NEURO/PSYCH   (+) Anxiety   (+) Depression   (+) Headache        Physical Exam    Airway    Mallampati score: I  TM Distance: >3 FB  Neck ROM: full     Dental   No notable dental hx     Cardiovascular  Rhythm: regular, Rate: normal, Cardiovascular exam normal    Pulmonary  Pulmonary exam normal Breath sounds clear to auscultation,     Other Findings        Anesthesia Plan  ASA Score- 2     Anesthesia Type- general with ASA Monitors  Additional Monitors:   Airway Plan: ETT and LMA  Comment: ETT vs LMA  Pt had airway trauma with last procedure  Denies Hx difficult intubation          Plan Factors-    Chart reviewed  Patient summary reviewed  Induction- intravenous  Postoperative Plan-     Informed Consent- Anesthetic plan and risks discussed with patient and spouse  I personally reviewed this patient with the CRNA  Discussed and agreed on the Anesthesia Plan with the CRNA  Narendra Ye

## 2022-04-19 NOTE — INTERVAL H&P NOTE
H&P reviewed  After examining the patient I find no changes in the patients condition since the H&P had been written      Vitals:    04/19/22 1055   BP: 163/94   Pulse: 84   Resp: 16   Temp: 97 9 °F (36 6 °C)   SpO2: 97%

## 2022-04-19 NOTE — OP NOTE
OPERATIVE REPORT  PATIENT NAME: Fred Alvaradotingham OMAIRA    :  1960  MRN: [id number]  Pt Location: [facility] OR ROOM 05    SURGERY DATE: 2022    Surgeon(s) and Role:     * Con Patel MD - Primary    Preop Diagnosis:  Kidney stones [N20 0]    Post-Op Diagnosis Codes:     * Kidney stones [N20 0]    Procedure(s) (LRB):  CYSTOSCOPY URETEROSCOPY WITH LITHOTRIPSY HOLMIUM LASER, RETROGRADE PYELOGRAM AND INSERTION STENT URETERAL  Basket stone extraction  (Right)    Specimen(s):  ID Type Source Tests Collected by Time Destination   A : urine Urine Urine, Cystoscopic URINE CULTURE Con Patel MD 2022  1:16 PM        Estimated Blood Loss:   Minimal    Drains:  * No LDAs found *    Anesthesia Type:   General    Operative Indications:  Kidney stones [N20 0]      Operative Findings:  7 mm stone  in upper ureter, it  moved into lower pole calyx as the wires were passed  Other stones that were seen on CT scan, were not visible free-floating in the collecting system, I suspect they are in the cortex solid tissue of the kidney and not treatable with laser  Complications:   None    Procedure and Technique:      PLAN FOR STENT:  Will be removed by string next Monday       The patient was brought into the OR, properly identified and positioned on the table  General anesthesia was induced, and they were placed in lithotomy position and prepped and draped using chlorhexidine solution  The 22F scope was introduced in the urethra, which showed no stricture  Other findings upon placement of the scope included mild prostate enlargement, mild bladder inflammation from the stent  The right ureteral stent was grasped and withdrawn  Initially it did not come out easily, it seemed to be hung up in the mid ureter, possibly alongside the ureteral stone  I passed a rigid scope along side the old stent, and that loosened the stent up, and the old stent came out easily then    Two wires were placed, and an access sheath was placed over one wire  The flexible scope was placed thru a sheath and advanced to stone in the lower pole calyx  It was not reachable with the laser, so I used a basket to replace the stone into a upper pole calyx    The Holmium laser was used on various settings to break up stone into small particles  Care was taken not to laser tissue  All particles appeared small enough to pass around the stent  There were other stones visible on CT scan, and I searched all the calices, everywhere in the collecting system  However, I could not see any stones  I injected contrast to outline the collecting system, and then used that as a roadmap to make sure I was in all different calices  I did not see any other stones I could laser  Presumably they are in the solid cortex of the kidney    The scope was removed from the ureter, and the cystoscope was used to place a 6F Contour VL stent in the ureter, with the upper coils in the renal pelvis, and the distal coil in the bladder  Retrograde pyelogram on that side confirmed good position and no extravasation of contrast    The patient was awaken from anesthesia and taken to the PACU in good condition        I was present for the entire procedure    Patient Disposition:  PACU       SIGNATURE: Jose A White MD  DATE: April 19, 2022  TIME: 1:49 PM

## 2022-04-19 NOTE — ANESTHESIA POSTPROCEDURE EVALUATION
Post-Op Assessment Note    CV Status:  Stable    Pain management: adequate     Mental Status:  Alert and awake   Hydration Status:  Euvolemic   PONV Controlled:  Controlled   Airway Patency:  Patent      Post Op Vitals Reviewed: Yes      Staff: Anesthesiologist         No complications documented      /92 (04/19/22 1405)    Temp (!) 97 4 °F (36 3 °C) (04/19/22 1405)    Pulse 80 (04/19/22 1405)   Resp 16 (04/19/22 1405)    SpO2 96 % (04/19/22 1405)

## 2022-04-20 ENCOUNTER — TELEPHONE (OUTPATIENT)
Dept: UROLOGY | Facility: MEDICAL CENTER | Age: 62
End: 2022-04-20

## 2022-04-20 DIAGNOSIS — E87.6 HYPOKALEMIA: Primary | ICD-10-CM

## 2022-04-20 RX ORDER — POTASSIUM CHLORIDE 750 MG/1
20 CAPSULE, EXTENDED RELEASE ORAL DAILY
Qty: 60 CAPSULE | Refills: 3 | Status: SHIPPED | OUTPATIENT
Start: 2022-04-20 | End: 2022-05-20

## 2022-04-20 NOTE — TELEPHONE ENCOUNTER
Call placed to patient and spoke with him  Informed him of the recommendations of Dr Urban Rider and stent removal    Pt stated he will not remove stent at home and is requesting an appointment at the [address] [place] [address] for stent removal since this is 10 mins from his [facility] and patient is not willing to travel at this time given his proximity to [facility]  Informed patient that I will have to reach out to clinical team in regards to appointment the office will contact him with appointment time  Reviewed recommendations of Dr Urban Rider and post op follow up recommendations  Pt is aware and would like follow up arranged at [facility]

## 2022-04-20 NOTE — TELEPHONE ENCOUNTER
----- Message from Jose Armando Guzman MD sent at 4/19/2022  2:06 PM EDT -----   Ureteroscopy, laser, stent today  He has had stents before, he might want to remove it himself  Monday    I put an order in for 24 urine, he should do that 2-3 weeks after the stent is out

## 2022-04-20 NOTE — TELEPHONE ENCOUNTER
Called pt and left msg on VM per comm consent to call office back and speak to clinical member with the following message:     Aleksandra Garland MD  [address] Urology [facility] Clinical   Ureteroscopy, laser, stent today     He has had stents before, he might want to remove it himself   [facility] (4/25/22)   I put an order in for 24 urine, he should do that 2-3 weeks after the stent is out and fu in about 8 weeks with an AP            Electronically signed by Humberto Aguilar RN at 4/20/2022 10:12 AM ambulate

## 2022-04-20 NOTE — TELEPHONE ENCOUNTER
Called pt and left msg on VM per comm consent to call office back and speak to clinical member with the following message:    Terra Blackwood MD  [address] [facility] Urology [place] Clinical   Ureteroscopy, laser, stent today     He has had stents before, he might want to remove it himself   [facility] (4/25/22)   I put an order in for 24 urine, he should do that 2-3 weeks after the stent is out and fu in about 8 weeks with an AP

## 2022-04-21 LAB — BACTERIA UR CULT: NORMAL

## 2022-04-25 ENCOUNTER — TELEPHONE (OUTPATIENT)
Dept: OTHER | Facility: OTHER | Age: 62
End: 2022-04-25

## 2022-04-25 ENCOUNTER — TELEPHONE (OUTPATIENT)
Dept: UROLOGY | Facility: MEDICAL CENTER | Age: 62
End: 2022-04-25

## 2022-04-25 ENCOUNTER — PROCEDURE VISIT (OUTPATIENT)
Dept: UROLOGY | Facility: CLINIC | Age: 62
End: 2022-04-25

## 2022-04-25 VITALS
DIASTOLIC BLOOD PRESSURE: 120 MMHG | HEART RATE: 95 BPM | HEIGHT: 72 IN | WEIGHT: 159 LBS | SYSTOLIC BLOOD PRESSURE: 195 MMHG | BODY MASS INDEX: 21.54 KG/M2

## 2022-04-25 DIAGNOSIS — N20.0 NEPHROLITHIASIS: Primary | ICD-10-CM

## 2022-04-25 PROCEDURE — 3008F BODY MASS INDEX DOCD: CPT | Performed by: PHYSICIAN ASSISTANT

## 2022-04-25 PROCEDURE — 99024 POSTOP FOLLOW-UP VISIT: CPT

## 2022-04-25 NOTE — PROGRESS NOTES
4/25/2022  Lars Johnston III is a 64 y o  male  [id number]        Diagnosis  Chief Complaint     Nephrolithiasis          Patient is s/p right ureteroscopy with stone extraction on 4/19/22 with Dr Kuldip Madrigal  24 hr urine testing and f/u per Dr Jamel Morales      Procedure Stent with String Removal    Vitals:    04/25/22 0818   BP: (!) 195/120   BP Location: Right arm   Pulse: 95   Weight: 72 1 kg (159 lb)   Height: 6' (1 829 m)       Stent with string removed intact without difficulty  Reviewed post stent removal symptoms including flank pain, dysuria, and hematuria  Instructed patient to increase oral fluid intake  Encouraged the use of NSAIDS and other prescribed pain medication as needed for discomfort  Patient instructed to call the office or report to the ER for uncontrolled pain, fever, chills, nausea or vomiting  Patient was strongly advised to contact PCP due to highly elevated BP  Patient did not seem to be in any acute distress  It is unsure if he is going to follow through on nursing advice  Patient's wife was very insistent and demanding regarding patient's follow up moving forward with his stones  Explained Dr Jamel Morales will have to review the follow up with her and patient as they state no one has answered their questions  She states her frustration with this practice in general and asked to see   Kerrie Brown was not in the office and patient's wife was informed of this  Informed her this will be sent to Dr Tu Mcclendon RN to reach out to patient's wife and explain how Dr Jamel Morales will be following up on patient's stone status  He is aware of the 24 hr urine that needs to be done  He and wife did not want to wait to speak with clerical staff and see if appt was scheduled      Abdiel Everett, RN,BSN

## 2022-04-25 NOTE — TELEPHONE ENCOUNTER
Efrain De La Torre     DF    4/25/22 9:22 AM  Note  Patients wife is requesting a return to work letter (expected to go back 4/26/22)  for her  she also stated she has questions about his procedure (stent removal) and would like a call back

## 2022-04-25 NOTE — TELEPHONE ENCOUNTER
Patients wife is requesting a return to work letter (expected to go back 4/26/22)  for her  she also stated she has questions about his procedure (stent removal) and would like a call back

## 2022-04-25 NOTE — TELEPHONE ENCOUNTER
Removed patient's stent with string today  His wife is demanding to be called by Dr Aric Burgess clinical team to find out specifics on his future follow up  She states she was not given any answers and no one gets back to her  Explained to patient and wife this would need to be addressed by Dr Claire Malik since he was the last surgeon to perform his most recent procedure  Patient's wife also asked for , but explained to her he is not in the office currently    Please reach out to patient's wife, thanks

## 2022-04-25 NOTE — TELEPHONE ENCOUNTER
Returned call to patient wife   No answer  Left message   Patient is requesting a note to return back to work  Patient state he is on the line with  and I did advise if they had any clinical questions they can request to speak with RN after they finish the call       Issue addressed in different encounter from today 4/25/22

## 2022-04-25 NOTE — TELEPHONE ENCOUNTER
Patients wife Aubrie Henry is requesting a call back from the office today 4/25/2022  She stated that [facility] had his stent removed today  Per wife, his return to work note stated a possible return to work date of 4/26/2022  Aubrie Henry also stated she has additional medical questions to ask the nurse and is requesting a call back

## 2022-04-26 NOTE — TELEPHONE ENCOUNTER
Spoke with spouse to review concerns  Generated return to work letter to return 4/27 with no restrictions per Jacinto Cobos RN

## 2022-05-03 DIAGNOSIS — E03.9 HYPOTHYROIDISM, UNSPECIFIED TYPE: ICD-10-CM

## 2022-05-03 DIAGNOSIS — I10 ESSENTIAL HYPERTENSION: ICD-10-CM

## 2022-05-03 DIAGNOSIS — K21.9 GASTROESOPHAGEAL REFLUX DISEASE WITHOUT ESOPHAGITIS: ICD-10-CM

## 2022-05-04 ENCOUNTER — TELEPHONE (OUTPATIENT)
Dept: UROLOGY | Facility: CLINIC | Age: 62
End: 2022-05-04

## 2022-05-04 RX ORDER — LISINOPRIL 40 MG/1
TABLET ORAL
Qty: 90 TABLET | Refills: 1 | Status: SHIPPED | OUTPATIENT
Start: 2022-05-04

## 2022-05-04 RX ORDER — LEVOTHYROXINE SODIUM 0.07 MG/1
TABLET ORAL
Qty: 30 TABLET | Refills: 0 | Status: SHIPPED | OUTPATIENT
Start: 2022-05-04 | End: 2022-06-06

## 2022-05-04 RX ORDER — PANTOPRAZOLE SODIUM 40 MG/1
TABLET, DELAYED RELEASE ORAL
Qty: 30 TABLET | Refills: 5 | Status: SHIPPED | OUTPATIENT
Start: 2022-05-04

## 2022-05-04 NOTE — TELEPHONE ENCOUNTER
I called and spoke with Fannie Fernandez regarding her letter of concerns regarding her 's stone management  Her letter also addressed the concerns of not being able to reach our office  I explained our workflow with the call center and non urgent messages are routed to our clinical pools  I further explained that it may take a few hours for our nurses to call patients back because of the multiple patients in the pools  She appreciated the explanation, but still had multiple issues with the care her  received for his stone management  I told her I would reach out to Sydni Garza so he could review the chart and provider further clinical explanations on the decisions that were made for his 's care

## 2022-05-05 ENCOUNTER — TELEPHONE (OUTPATIENT)
Dept: UROLOGY | Facility: AMBULATORY SURGERY CENTER | Age: 62
End: 2022-05-05

## 2022-05-05 DIAGNOSIS — N20.0 NEPHROLITHIASIS: Primary | ICD-10-CM

## 2022-05-05 NOTE — TELEPHONE ENCOUNTER
I called Mrs Janusz Solorio today to discuss the care of her , Melchor Smith in late March/early April 2022  She send a letter complaining about her 's care to [facility]  I informed her that I reviewed the medical/surgical treatment of her husbands kidney stone from his ER visit 3/31, to his stent in the OR with Dr Gerry Ortiz 4/2, to his ureteroscopy in the OR with Dr Opal Chris 4/19  I informed her that after my review his case was handled appropriately and to the standard of care  Her concerns were mostly regarding communication  She was frustrated on many levels including not being able to contact the office via telephone  She was very upset and tearful stating that their only child "went for outpatient surgery 10 years ago" and never came home  I apologized for her experience with communication, but re-assured her that the standard of care was followed and that leaving a stent for at least 2 weeks was appropriate before ureteroscopy  I also explained that he was never in renal failure but had just a mild rise of Cr to 1 58 that resolved back to baseline  In addition, I explained that a CT scan after URS is not always indicated and that a KUB is appropriate  I reviewed notes that Dr Opal Chris left for a plan and follow up that she said did not exist    Jo-Ann Mcdaniel RN called the patient and left a VM for follow up  For each of my explanations, she was quick to retort her story  I asked how I can improve her customer experience and how she would  She is willing to see me in the office for follow up  I will ask Garth Santos RN to call Mrs Janusz Solorio to schedule Melchor Smith with me in approximately 4 weeks with a KUB  She is amenable to this plan  Total call time approached 25 minutes

## 2022-05-06 NOTE — TELEPHONE ENCOUNTER
Called and spoke with patient's wife  Offered 6/2 at 4pm  She stated they will be away in [place] that whole week  Offered 6/9, she accepted that date and asked for a later appointment due to patient's work schedule  She accepted a 4pm appointment  Advised that he will need a KUB ptv and that it is walk in at any [facility]  Order placed  Email sent to Dianna Garcia to open up a spot to formally schedule patient

## 2022-05-08 DIAGNOSIS — K31.84 GASTROPARESIS: ICD-10-CM

## 2022-05-09 RX ORDER — METOCLOPRAMIDE 5 MG/1
TABLET ORAL
Qty: 90 TABLET | Refills: 2 | Status: SHIPPED | OUTPATIENT
Start: 2022-05-09

## 2022-05-25 ENCOUNTER — TELEPHONE (OUTPATIENT)
Dept: UROLOGY | Facility: MEDICAL CENTER | Age: 62
End: 2022-05-25

## 2022-05-25 LAB
AMMONIA 24H UR-MRATE: 28 MEQ/24 HR
AMMONIA UR-SCNC: ABNORMAL UG/DL
CA H2 PHOS DIHYD CRY URNS QL MICRO: 0.36 RATIO (ref 0–3)
CALCIUM 24H UR-MCNC: 4.2 MG/DL
CALCIUM 24H UR-MRATE: 102.9 MG/24 HR (ref 0–320)
CHLORIDE 24H UR-SCNC: 40 MMOL/L
CHLORIDE 24H UR-SRATE: 98 MMOL/24 HR (ref 52–264)
CITRATE 24H UR-MCNC: 103 MG/L
CITRATE 24H UR-MRATE: 252 MG/24 HR (ref 320–1240)
COM CRY STONE QL IR: 1.33 RATIO (ref 0–6)
CREAT 24H UR-MCNC: 49.6 MG/DL
CREAT 24H UR-MRATE: 1215.2 MG/24 HR (ref 1000–2000)
CYSTINE 24H UR-MCNC: 5.93 MG/L
CYSTINE 24H UR-MRATE: 14.53 MG/24 HR (ref 2.1–58)
MAGNESIUM 24H UR-MRATE: 66 MG/24 HR (ref 12–293)
MAGNESIUM UR-MCNC: 2.7 MG/DL
NA URATE CRY STONE QL IR: 1.01 RATIO (ref 0–4)
OSMOLALITY UR: 235 MOSMOL/KG (ref 300–900)
OXALATE 24H UR-MRATE: 15 MG/24 HR (ref 7–44)
OXALATE UR-MCNC: 6 MG/L
PH 24H UR: 6.1 [PH] (ref 4.5–8)
PHOSPHATE 24H UR-MCNC: 23 MG/DL
PHOSPHATE 24H UR-MRATE: 563.5 MG/24 HR (ref 390–1425)
PLEASE NOTE (STONE RISK): ABNORMAL
POTASSIUM 24H UR-SCNC: 16.7 MMOL/24 HR (ref 20–116)
POTASSIUM UR-SCNC: 6.8 MMOL/L
PRESERVED URINE: 2450 ML/24 HR (ref 800–1800)
SODIUM 24H UR-SCNC: 50 MMOL/L
SODIUM 24H UR-SRATE: 123 MMOL/24 HR (ref 58–337)
SPECIMEN VOL 24H UR: 2450 ML/24 HR (ref 800–1800)
SULFATE 24H UR-MCNC: 20 MEQ/24 HR (ref 0–30)
SULFATE UR-MCNC: 8 MEQ/L
TRI-PHOS CRY STONE MICRO: 0.01 RATIO (ref 0–1)
URATE 24H UR-MCNC: 18.7 MG/DL
URATE 24H UR-MRATE: 458 MG/24 HR (ref 182–937)
URATE DIHYD CRY STONE QL IR: 0.63 RATIO (ref 0–1.2)

## 2022-05-25 NOTE — TELEPHONE ENCOUNTER
Tj Viveros MD  [address] [facility] [place] [facility]  Tell pt:  Good volume   Low citrate   3 oz lemon juice daily   The alternative is medication, Urocit-K which I will prescribe if he wants me to

## 2022-05-27 NOTE — TELEPHONE ENCOUNTER
Returned call patients wife advised per recommendations  Patient wife also had questions about low potassium and Urocit-K  Would like to know if that would help treat his  low potassium  Requested to ask "specifically because it is associated with chronic kidney diease"        Advised that message would be sent to provider for review and recommendation

## 2022-06-01 ENCOUNTER — TELEPHONE (OUTPATIENT)
Dept: UROLOGY | Facility: AMBULATORY SURGERY CENTER | Age: 62
End: 2022-06-01

## 2022-06-01 NOTE — TELEPHONE ENCOUNTER
Called patient to let him know that he does need his XR completed  Pt did not answer left a detailed message explaining everything

## 2022-06-05 DIAGNOSIS — E03.9 HYPOTHYROIDISM, UNSPECIFIED TYPE: ICD-10-CM

## 2022-06-06 ENCOUNTER — TELEPHONE (OUTPATIENT)
Dept: UROLOGY | Facility: AMBULATORY SURGERY CENTER | Age: 62
End: 2022-06-06

## 2022-06-06 ENCOUNTER — HOSPITAL ENCOUNTER (OUTPATIENT)
Dept: RADIOLOGY | Facility: HOSPITAL | Age: 62
Discharge: HOME | End: 2022-06-06
Attending: UROLOGY
Payer: COMMERCIAL

## 2022-06-06 DIAGNOSIS — N20.0 NEPHROLITHIASIS: ICD-10-CM

## 2022-06-06 PROCEDURE — 74018 RADEX ABDOMEN 1 VIEW: CPT

## 2022-06-06 RX ORDER — LEVOTHYROXINE SODIUM 0.07 MG/1
TABLET ORAL
Qty: 30 TABLET | Refills: 0 | Status: SHIPPED | OUTPATIENT
Start: 2022-06-06 | End: 2022-07-05

## 2022-06-06 NOTE — TELEPHONE ENCOUNTER
Spoke with patient  Advised that per Dr Martha Salamanca, he would like patient to come in at 3 pm so that he can spend more time with patient  Patient was fine with plan

## 2022-06-09 ENCOUNTER — OFFICE VISIT (OUTPATIENT)
Dept: UROLOGY | Facility: AMBULATORY SURGERY CENTER | Age: 62
End: 2022-06-09
Payer: COMMERCIAL

## 2022-06-09 VITALS
DIASTOLIC BLOOD PRESSURE: 90 MMHG | HEART RATE: 63 BPM | BODY MASS INDEX: 21.94 KG/M2 | OXYGEN SATURATION: 98 % | SYSTOLIC BLOOD PRESSURE: 150 MMHG | HEIGHT: 72 IN | WEIGHT: 162 LBS

## 2022-06-09 DIAGNOSIS — N20.0 NEPHROLITHIASIS: Primary | ICD-10-CM

## 2022-06-09 LAB
SL AMB  POCT GLUCOSE, UA: NORMAL
SL AMB LEUKOCYTE ESTERASE,UA: NORMAL
SL AMB POCT BILIRUBIN,UA: NORMAL
SL AMB POCT BLOOD,UA: NORMAL
SL AMB POCT CLARITY,UA: CLEAR
SL AMB POCT COLOR,UA: YELLOW
SL AMB POCT KETONES,UA: NORMAL
SL AMB POCT NITRITE,UA: NORMAL
SL AMB POCT PH,UA: 6
SL AMB POCT SPECIFIC GRAVITY,UA: 1.01
SL AMB POCT URINE PROTEIN: NORMAL
SL AMB POCT UROBILINOGEN: 0.2

## 2022-06-09 PROCEDURE — 3008F BODY MASS INDEX DOCD: CPT | Performed by: UROLOGY

## 2022-06-09 PROCEDURE — 99214 OFFICE O/P EST MOD 30 MIN: CPT | Performed by: UROLOGY

## 2022-06-09 PROCEDURE — 81002 URINALYSIS NONAUTO W/O SCOPE: CPT | Performed by: UROLOGY

## 2022-06-09 RX ORDER — AMOXICILLIN 500 MG/1
CAPSULE ORAL
COMMUNITY
Start: 2022-06-06

## 2022-06-09 NOTE — LETTER
June 9, 2022     Jayne Taylor [facility], [address] [place] [address]  [address]    Patient: Charlene Mittal III   YOB: 1960   Date of Visit: 6/9/2022       Dear Dr Caitlyn Gerardo: Thank you for referring Joseph Gautam to me for evaluation  Below are my notes for this consultation  If you have questions, please do not hesitate to call me  I look forward to following your patient along with you  Sincerely,        Mary Lombardi MD        CC: No Recipients  Mary Lombardi MD  6/9/2022  3:50 PM  Signed  6/9/2022    Charlene Mittal III  1960  [id number]        Assessment  Recurrent right-sided Nephrolithiasis with possible nephrocalcinosis, hypertension, routine prostate cancer screening      Discussion  I had a lengthy discussion with Cecilia Kline and his wife in the office today regarding his nephrolithiasis  I stressed the importance of hydration  We reviewed his low urinary citrate level  I recommend a referral to Nephrology  As he has chronically low potassium for which he takes a potassium supplement and kidney stones, he may be a candidate to switch from potassium chloride to potassium citrate  We discussed that potassium citrate can help reduce the risk of further calcium oxalate stone formation  In addition with his longstanding hypertension a referral to nephrology is also advised  I recommend that he return in 6 months time with a follow-up KUB  In the interim I have ordered a repeat basic metabolic panel to ensure that his kidney function is stable  In the interim he was instructed to call immediately if he were to have flank pain  Provided the patient and his wife with my contact information for easy communication  History of Present Illness  64 y o  male with a history of multiple episodes of right-sided nephrolithiasis  In April 2022 he underwent right ureteral stenting by Dr Juice Gray for an impacted stone in the right proximal ureter    Later that month he underwent completion ureteroscopy by Dr Chucho Martel who removed the 7 mm stone  At that time Dr Chucho Martel also performed ureteroscopy and found that the stones visualized on the CT scan were likely intraparenchymal   Artie Ayala is a history of hypertension and hypokalemia  He currently takes a potassium supplement  A few weeks after his surgical intervention with Dr Chucho Martel he underwent a 24 hour urine analysis  This was remarkable for 2 45 L of urine within 24 hours and a low urinary citrate level  Sandip's wife Smita Boudreaux was present with him in the office today  She states she was frustrated with communication with the office and hospital previously  She was concerned that he was discharged home with an elevated creatinine level  His most recent creatinine level from April 2022 is 1 15 down from 1 58 prior  She was anxious over this and stated we lost a daughter to medical malpractice "    Artie Ayala denies any significant lower urinary tract symptoms or hematuria  He denies any flank pain at this time  He has a history of chronic back pain  He underwent RFA of a disc in his back  He also has a longstanding history of hypertension  His blood pressure in the office today was 150/90  His most recent PSA level from March of 2022 was noted to be 1 3  His PSA level in 2018 was 1 4  He denies any family history of prostate cancer          AUA Symptom Score  AUA SYMPTOM SCORE    Flowsheet Row Most Recent Value   AUA SYMPTOM SCORE    How often have you had a sensation of not emptying your bladder completely after you finished urinating? 1 (P)     How often have you had to urinate again less than two hours after you finished urinating? 0 (P)     How often have you found you stopped and started again several times when you urinate? 2 (P)     How often have you found it difficult to postpone urination? 1 (P)     How often have you had a weak urinary stream? 0 (P)     How often have you had to push or strain to begin urination? 0 (P)     How many times did you most typically get up to urinate from the time you went to bed at night until the time you got up in the morning? 0 (P)     Quality of Life: If you were to spend the rest of your life with your urinary condition just the way it is now, how would you feel about that? 1 (P)     AUA SYMPTOM SCORE 4 (P)           Review of Systems  Review of Systems   Constitutional: Negative  HENT: Negative  Eyes: Negative  Respiratory: Negative  Cardiovascular: Negative  Gastrointestinal: Negative  Endocrine: Negative  Genitourinary:        Per HPI   Musculoskeletal: Negative  Skin: Negative  Allergic/Immunologic: Negative  Neurological: Negative  Hematological: Negative  Psychiatric/Behavioral: Negative            Past Medical History  Past Medical History:   Diagnosis Date    Anxiety     Change in bowel habits     Chronic urticaria     Depression     Disease of thyroid gland     GERD (gastroesophageal reflux disease)     Hashimoto's disease     Hashimoto's thyroiditis     Hypertension     Kidney stone     Renal stones        Past Social History  Past Surgical History:   Procedure Laterality Date    BACK SURGERY      FL RETROGRADE PYELOGRAM  7/28/2019    FL RETROGRADE PYELOGRAM  4/2/2022    HERNIA REPAIR      NASAL SEPTUM SURGERY      NM CYSTO/URETERO W/LITHOTRIPSY &INDWELL STENT INSRT Right 7/28/2019    Procedure: CYSTOSCOPY URETEROSCOPY WITH LITHOTRIPSY HOLMIUM LASER, basket stone extraction, RETROGRADE PYELOGRAM AND INSERTION STENT URETERAL;  Surgeon: Destiny Altman MD;  [facility];  Service: Urology    NM CYSTO/URETERO W/LITHOTRIPSY &INDWELL STENT INSRT Right 4/2/2022    Procedure: CYSTOSCOPY URETEROSCOPY  RETROGRADE PYELOGRAM AND INSERTION STENT URETERAL;  Surgeon: Jamal Landis MD;  [facility]: [facility];  Service: Urology    NM CYSTO/URETERO W/LITHOTRIPSY &INDWELL STENT INSRT Right 4/19/2022    Procedure: CYSTOSCOPY URETEROSCOPY WITH LITHOTRIPSY HOLMIUM LASER, RETROGRADE PYELOGRAM AND EXCHANGE STENT URETERAL ;  Surgeon: Jordy Quiroz MD;  Location: [facility];  Service: Urology    FL REPAIR [address] HERNIA,5+Y/O,REDUCIBL Left 12/13/2021    Procedure: INGUINAL HERNIA REPAIR;  Surgeon: Viv Escobar DO;  [facility]: [facility];  Service: [facility] 18       Past Family History  Family History   Problem Relation Age of Onset    Cancer Mother         Thyroid    Stroke Mother     Colon cancer Father     Colon polyps Father     Valvular heart disease Father     Cancer Sister         Skin    Suicidality Brother        Past Social history  Social History     Socioeconomic History    Marital status: /Civil Union     Spouse name: Not on file    Number of children: Not on file    Years of education: Not on file    Highest education level: Not on file   Occupational History    Not on file   Tobacco Use    Smoking status: Never Smoker    Smokeless tobacco: Never Used   Vaping Use    Vaping Use: Never used   Substance and Sexual Activity    Alcohol use:  Yes     Alcohol/week: 0 0 standard drinks     Comment: social    Drug use: Never    Sexual activity: Yes     Partners: Female   Other Topics Concern    Not on file   Social History Narrative    Not on file     Social Determinants of Health     Financial Resource Strain: Not on file   Food Insecurity: Not on file   Transportation Needs: Not on file   Physical Activity: Not on file   Stress: Not on file   Social Connections: Not on file   Intimate Partner Violence: Not on file   Housing Stability: Not on file       Current Medications  Current Outpatient Medications   Medication Sig Dispense Refill    amoxicillin (AMOXIL) 500 mg capsule TAKE ONE CAPSULE BY MOUTH EVERY 8 HOURS UNTIL FINISHED      clonazePAM (KlonoPIN) 0 5 mg tablet Take 0 5 mg by mouth daily at bedtime      levothyroxine 75 mcg tablet TAKE ONE TABLET BY MOUTH EVERY DAY 30 tablet 0    lisinopril (ZESTRIL) 40 mg tablet TAKE ONE TABLET BY MOUTH EVERY DAY 90 tablet 1    metoclopramide (REGLAN) 5 mg tablet TAKE ONE TABLET BY MOUTH THREE TIMES A DAY 90 tablet 2    pantoprazole (PROTONIX) 40 mg tablet TAKE ONE TABLET BY MOUTH EVERY DAY BEFORE BREAKFAST 30 tablet 5    Coenzyme Q10 (COQ10) 200 MG CAPS Take by mouth      ibuprofen (MOTRIN) 600 mg tablet Take 1 tablet (600 mg total) by mouth every 6 (six) hours as needed for mild pain for up to 7 days 28 tablet 0    lidocaine-prilocaine (EMLA) cream Apply topically as needed for mild pain Tip of penis as needed for irritation 30 g 0    ondansetron (ZOFRAN) 4 mg tablet Take 1 tablet (4 mg total) by mouth every 6 (six) hours for 3 days 12 tablet 0    oxybutynin (DITROPAN) 5 mg tablet Take 1 tablet (5 mg total) by mouth 3 (three) times a day as needed (spasm) 30 tablet 3    oxybutynin (DITROPAN) 5 mg tablet Every 6-8 hours if needed for bladder pressure or spasm 15 tablet 0    potassium chloride (MICRO-K) 10 MEQ CR capsule Take 2 capsules (20 mEq total) by mouth daily 60 capsule 3    tamsulosin (FLOMAX) 0 4 mg Take 1 capsule (0 4 mg total) by mouth daily with dinner for 2 days 2 capsule 0     No current facility-administered medications for this visit  Allergies  Allergies   Allergen Reactions    Compazine [Prochlorperazine] GI Intolerance    Percocet [Oxycodone-Acetaminophen] Itching     Facial itching    Sulfa Antibiotics Rash       Past Medical History, Social History, Family History, medications and allergies were reviewed  Vitals  Vitals:    06/09/22 1451   BP: 150/90   Pulse: 63   SpO2: 98%   Weight: 73 5 kg (162 lb)   Height: 6' (1 829 m)       Physical Exam  Physical Exam  On examination he is in no acute distress  His abdomen is soft nontender nondistended   examination reveals no CVA tenderness    The patient defers digital rectal examination which was recently performed by the advanced practitioner revealing a 30 g benign prostate  There is mild point tenderness in the right paraspinous region  This is over the region of his prior disc procedure  There is no CVA tenderness  Skin is warm  Extremities without edema    Neurologic is grossly intact and nonfocal   Gait normal   Affect normal      Results  Lab Results   Component Value Date    PSA 1 3 03/11/2022    PSA 1 9 07/12/2021    PSA 1 4 09/17/2018     Lab Results   Component Value Date    GLUCOSE 91 06/03/2017    CALCIUM 8 8 04/06/2022     09/18/2015    K 3 7 04/06/2022    CO2 32 04/06/2022     04/06/2022    BUN 11 04/06/2022    CREATININE 1 15 04/06/2022     Lab Results   Component Value Date    WBC 9 29 04/01/2022    HGB 14 9 04/01/2022    HCT 42 6 04/01/2022    MCV 87 04/01/2022     04/01/2022         Office Urine Dip  Recent Results (from the past 1 hour(s))   POCT urine dip    Collection Time: 06/09/22  2:58 PM   Result Value Ref Range    LEUKOCYTE ESTERASE,UA -     NITRITE,UA -     SL AMB POCT UROBILINOGEN 0 2     POCT URINE PROTEIN -      PH,UA 6 0     BLOOD,UA trace     SPECIFIC GRAVITY,UA 1 010     KETONES,UA -     BILIRUBIN,UA -     GLUCOSE, UA -      COLOR,UA yellow     CLARITY,UA clear    ]

## 2022-06-09 NOTE — PROGRESS NOTES
6/9/2022    [facility]  1960  [id number]        Assessment  Recurrent right-sided Nephrolithiasis with possible nephrocalcinosis, hypertension, routine prostate cancer screening      Discussion  I had a lengthy discussion with Omaira Canchola and his wife in the office today regarding his nephrolithiasis  I stressed the importance of hydration  We reviewed his low urinary citrate level  I recommend a referral to Nephrology  As he has chronically low potassium for which he takes a potassium supplement and kidney stones, he may be a candidate to switch from potassium chloride to potassium citrate  We discussed that potassium citrate can help reduce the risk of further calcium oxalate stone formation  In addition with his longstanding hypertension a referral to nephrology is also advised  I recommend that he return in 6 months time with a follow-up KUB  In the interim I have ordered a repeat basic metabolic panel to ensure that his kidney function is stable  In the interim he was instructed to call immediately if he were to have flank pain  Provided the patient and his wife with my contact information for easy communication  History of Present Illness  64 y o  male with a history of multiple episodes of right-sided nephrolithiasis  In April 2022 he underwent right ureteral stenting by Dr Rayna Ward for an impacted stone in the right proximal ureter  Later that month he underwent completion ureteroscopy by Dr Tiffany Conn who removed the 7 mm stone  At that time Dr Tiffany Conn also performed ureteroscopy and found that the stones visualized on the CT scan were likely intraparenchymal   Omaira Canchola is a history of hypertension and hypokalemia  He currently takes a potassium supplement  A few weeks after his surgical intervention with Dr Tiffany Conn he underwent a 24 hour urine analysis  This was remarkable for 2 45 L of urine within 24 hours and a low urinary citrate level      Sandip's wife Eileen Escobar was present with him in the office today  She states she was frustrated with communication with the office and hospital previously  She was concerned that he was discharged home with an elevated creatinine level  His most recent creatinine level from April 2022 is 1 15 down from 1 58 prior  She was anxious over this and stated we lost a daughter to medical malpractice "    Rainer Jin denies any significant lower urinary tract symptoms or hematuria  He denies any flank pain at this time  He has a history of chronic back pain  He underwent RFA of a disc in his back  He also has a longstanding history of hypertension  His blood pressure in the office today was 150/90  His most recent PSA level from March of 2022 was noted to be 1 3  His PSA level in 2018 was 1 4  He denies any family history of prostate cancer  AUA Symptom Score  AUA SYMPTOM SCORE    Flowsheet Row Most Recent Value   AUA SYMPTOM SCORE    How often have you had a sensation of not emptying your bladder completely after you finished urinating? 1 (P)     How often have you had to urinate again less than two hours after you finished urinating? 0 (P)     How often have you found you stopped and started again several times when you urinate? 2 (P)     How often have you found it difficult to postpone urination? 1 (P)     How often have you had a weak urinary stream? 0 (P)     How often have you had to push or strain to begin urination? 0 (P)     How many times did you most typically get up to urinate from the time you went to bed at night until the time you got up in the morning? 0 (P)     Quality of Life: If you were to spend the rest of your life with your urinary condition just the way it is now, how would you feel about that? 1 (P)     AUA SYMPTOM SCORE 4 (P)           Review of Systems  Review of Systems   Constitutional: Negative  HENT: Negative  Eyes: Negative  Respiratory: Negative  Cardiovascular: Negative  Gastrointestinal: Negative  Endocrine: Negative  Genitourinary:        Per HPI   Musculoskeletal: Negative  Skin: Negative  Allergic/Immunologic: Negative  Neurological: Negative  Hematological: Negative  Psychiatric/Behavioral: Negative            Past Medical History  Past Medical History:   Diagnosis Date    Anxiety     Change in bowel habits     Chronic urticaria     Depression     Disease of thyroid gland     GERD (gastroesophageal reflux disease)     Hashimoto's disease     Hashimoto's thyroiditis     Hypertension     Kidney stone     Renal stones        Past Social History  Past Surgical History:   Procedure Laterality Date    BACK SURGERY      FL RETROGRADE PYELOGRAM  7/28/2019    FL RETROGRADE PYELOGRAM  4/2/2022    HERNIA REPAIR      NASAL SEPTUM SURGERY      CA CYSTO/URETERO W/LITHOTRIPSY &INDWELL STENT INSRT Right 7/28/2019    Procedure: CYSTOSCOPY URETEROSCOPY WITH LITHOTRIPSY HOLMIUM LASER, basket stone extraction, RETROGRADE PYELOGRAM AND INSERTION STENT URETERAL;  Surgeon: Destiny Altman MD;  [facility];  Service: Urology    CA CYSTO/URETERO W/LITHOTRIPSY &INDWELL STENT INSRT Right 4/2/2022    Procedure: CYSTOSCOPY URETEROSCOPY  RETROGRADE PYELOGRAM AND INSERTION STENT URETERAL;  Surgeon: Jamal Landis MD;  [facility];  Service: Urology    CA CYSTO/URETERO W/LITHOTRIPSY &INDWELL STENT INSRT Right 4/19/2022    Procedure: CYSTOSCOPY URETEROSCOPY WITH LITHOTRIPSY HOLMIUM LASER, RETROGRADE PYELOGRAM AND EXCHANGE STENT URETERAL ;  Surgeon: Regine Reyes MD;  [facility];  Service: Urology    CA REPAIR [address] HERNIA,5+Y/O,REDUCIBL Left 12/13/2021    Procedure: INGUINAL HERNIA REPAIR;  Surgeon: Angleine Magallon DO;  [facility];  Service: General    TONSILLECTOMY  1979       Past Family History  Family History   Problem Relation Age of Onset    Cancer Mother         Thyroid    Stroke Mother     Colon cancer Father     Colon polyps Father    Dav Palafoxr Valvular heart disease Father     Cancer Sister         Skin    Suicidality Brother        Past Social history  Social History     Socioeconomic History    Marital status: /Civil Union     Spouse name: Not on file    Number of children: Not on file    Years of education: Not on file    Highest education level: Not on file   Occupational History    Not on file   Tobacco Use    Smoking status: Never Smoker    Smokeless tobacco: Never Used   Vaping Use    Vaping Use: Never used   Substance and Sexual Activity    Alcohol use:  Yes     Alcohol/week: 0 0 standard drinks     Comment: social    Drug use: Never    Sexual activity: Yes     Partners: Female   Other Topics Concern    Not on file   Social History Narrative    Not on file     Social Determinants of Health     Financial Resource Strain: Not on file   Food Insecurity: Not on file   Transportation Needs: Not on file   Physical Activity: Not on file   Stress: Not on file   Social Connections: Not on file   Intimate Partner Violence: Not on file   Housing Stability: Not on file       Current Medications  Current Outpatient Medications   Medication Sig Dispense Refill    amoxicillin (AMOXIL) 500 mg capsule TAKE ONE CAPSULE BY MOUTH EVERY 8 HOURS UNTIL FINISHED      clonazePAM (KlonoPIN) 0 5 mg tablet Take 0 5 mg by mouth daily at bedtime      levothyroxine 75 mcg tablet TAKE ONE TABLET BY MOUTH EVERY DAY 30 tablet 0    lisinopril (ZESTRIL) 40 mg tablet TAKE ONE TABLET BY MOUTH EVERY DAY 90 tablet 1    metoclopramide (REGLAN) 5 mg tablet TAKE ONE TABLET BY MOUTH THREE TIMES A DAY 90 tablet 2    pantoprazole (PROTONIX) 40 mg tablet TAKE ONE TABLET BY MOUTH EVERY DAY BEFORE BREAKFAST 30 tablet 5    Coenzyme Q10 (COQ10) 200 MG CAPS Take by mouth      ibuprofen (MOTRIN) 600 mg tablet Take 1 tablet (600 mg total) by mouth every 6 (six) hours as needed for mild pain for up to 7 days 28 tablet 0    lidocaine-prilocaine (EMLA) cream Apply topically as needed for mild pain Tip of penis as needed for irritation 30 g 0    ondansetron (ZOFRAN) 4 mg tablet Take 1 tablet (4 mg total) by mouth every 6 (six) hours for 3 days 12 tablet 0    oxybutynin (DITROPAN) 5 mg tablet Take 1 tablet (5 mg total) by mouth 3 (three) times a day as needed (spasm) 30 tablet 3    oxybutynin (DITROPAN) 5 mg tablet Every 6-8 hours if needed for bladder pressure or spasm 15 tablet 0    potassium chloride (MICRO-K) 10 MEQ CR capsule Take 2 capsules (20 mEq total) by mouth daily 60 capsule 3    tamsulosin (FLOMAX) 0 4 mg Take 1 capsule (0 4 mg total) by mouth daily with dinner for 2 days 2 capsule 0     No current facility-administered medications for this visit  Allergies  Allergies   Allergen Reactions    Compazine [Prochlorperazine] GI Intolerance    Percocet [Oxycodone-Acetaminophen] Itching     Facial itching    Sulfa Antibiotics Rash       Past Medical History, Social History, Family History, medications and allergies were reviewed  Vitals  Vitals:    06/09/22 1451   BP: 150/90   Pulse: 63   SpO2: 98%   Weight: 73 5 kg (162 lb)   Height: 6' (1 829 m)       Physical Exam  Physical Exam  On examination he is in no acute distress  His abdomen is soft nontender nondistended   examination reveals no CVA tenderness  The patient defers digital rectal examination which was recently performed by the advanced practitioner revealing a 30 g benign prostate  There is mild point tenderness in the right paraspinous region  This is over the region of his prior disc procedure  There is no CVA tenderness  Skin is warm  Extremities without edema    Neurologic is grossly intact and nonfocal   Gait normal   Affect normal      Results  Lab Results   Component Value Date    PSA 1 3 03/11/2022    PSA 1 9 07/12/2021    PSA 1 4 09/17/2018     Lab Results   Component Value Date    GLUCOSE 91 06/03/2017    CALCIUM 8 8 04/06/2022     09/18/2015    K 3 7 04/06/2022    CO2 32 04/06/2022     04/06/2022    BUN 11 04/06/2022    CREATININE 1 15 04/06/2022     Lab Results   Component Value Date    WBC 9 29 04/01/2022    HGB 14 9 04/01/2022    HCT 42 6 04/01/2022    MCV 87 04/01/2022     04/01/2022         Office Urine Dip  Recent Results (from the past 1 hour(s))   POCT urine dip    Collection Time: 06/09/22  2:58 PM   Result Value Ref Range    LEUKOCYTE ESTERASE,UA -     NITRITE,UA -     SL AMB POCT UROBILINOGEN 0 2     POCT URINE PROTEIN -      PH,UA 6 0     BLOOD,UA trace     SPECIFIC GRAVITY,UA 1 010     KETONES,UA -     BILIRUBIN,UA -     GLUCOSE, UA -      COLOR,UA yellow     CLARITY,UA clear    ]

## 2022-06-10 ENCOUNTER — APPOINTMENT (OUTPATIENT)
Dept: LAB | Facility: CLINIC | Age: 62
End: 2022-06-10
Payer: COMMERCIAL

## 2022-06-10 ENCOUNTER — TELEPHONE (OUTPATIENT)
Dept: NEPHROLOGY | Facility: CLINIC | Age: 62
End: 2022-06-10

## 2022-06-10 DIAGNOSIS — E87.6 HYPOKALEMIA: ICD-10-CM

## 2022-06-10 DIAGNOSIS — N20.0 NEPHROLITHIASIS: ICD-10-CM

## 2022-06-10 LAB
ANION GAP SERPL CALCULATED.3IONS-SCNC: 4 MMOL/L (ref 4–13)
BUN SERPL-MCNC: 9 MG/DL (ref 5–25)
CALCIUM SERPL-MCNC: 8.9 MG/DL (ref 8.4–10.2)
CHLORIDE SERPL-SCNC: 105 MMOL/L (ref 96–108)
CO2 SERPL-SCNC: 33 MMOL/L (ref 21–32)
CREAT SERPL-MCNC: 0.91 MG/DL (ref 0.6–1.3)
GFR SERPL CREATININE-BSD FRML MDRD: 90 ML/MIN/1.73SQ M
GLUCOSE P FAST SERPL-MCNC: 88 MG/DL (ref 65–99)
POTASSIUM SERPL-SCNC: 3.8 MMOL/L (ref 3.5–5.3)
SODIUM SERPL-SCNC: 142 MMOL/L (ref 135–147)

## 2022-06-10 PROCEDURE — 36415 COLL VENOUS BLD VENIPUNCTURE: CPT

## 2022-06-10 PROCEDURE — 80048 BASIC METABOLIC PNL TOTAL CA: CPT

## 2022-06-10 NOTE — TELEPHONE ENCOUNTER
New Patient Intake Form   Patient Details   Romana Ling     1960     [id number]     Appointment Information   Who is calling to schedule? If not patient, what is callers name? [address]    Referring Provider  Dr Moses Ying   Referring Provider Number [id number]   Reason for Appt (Diagnosis) nephrolithiasis   Is patient aware of why they are being referred? yes   Does Patient have labs done at [place] [address]? If not, where do they go? yes    Has patient had labs / urine work done? List date of most recent lab / urine work yes    Has patient had a BMP & CBC done in the past 2 years? If so, list the date yes    Has patient been hospitalized recently? If yes, list name and location of hospital they were in no    Has patient been seen by a Nephrologist before? If yes, list name, location and phone number  no   Has patient been see by another Specialty before (ex  Neurology, urology, cardiology)? If yes, please list name, and specialty  urology - Dr Moses Ying   Has the patient had imaging done? If so, list the most recent date and type of imaging yes - CT 3/2022   Does the patient has a stone analysis report if history of kidney stones? yes   Appointment Details   Is there a referral on file?  yes    Appointment Date  8/25   Location [place]    Miscellaneous

## 2022-07-02 DIAGNOSIS — E03.9 HYPOTHYROIDISM, UNSPECIFIED TYPE: ICD-10-CM

## 2022-07-05 RX ORDER — LEVOTHYROXINE SODIUM 0.07 MG/1
TABLET ORAL
Qty: 30 TABLET | Refills: 0 | Status: SHIPPED | OUTPATIENT
Start: 2022-07-05 | End: 2022-08-01

## 2022-08-01 DIAGNOSIS — E03.9 HYPOTHYROIDISM, UNSPECIFIED TYPE: ICD-10-CM

## 2022-08-01 RX ORDER — LEVOTHYROXINE SODIUM 0.07 MG/1
TABLET ORAL
Qty: 30 TABLET | Refills: 0 | Status: SHIPPED | OUTPATIENT
Start: 2022-08-01 | End: 2022-09-06

## 2022-08-13 ENCOUNTER — OFFICE VISIT (OUTPATIENT)
Dept: URGENT CARE | Age: 62
End: 2022-08-13
Payer: COMMERCIAL

## 2022-08-13 VITALS
DIASTOLIC BLOOD PRESSURE: 84 MMHG | OXYGEN SATURATION: 97 % | HEART RATE: 90 BPM | TEMPERATURE: 97.4 F | SYSTOLIC BLOOD PRESSURE: 138 MMHG | RESPIRATION RATE: 20 BRPM

## 2022-08-13 DIAGNOSIS — U07.1 COVID-19: Primary | ICD-10-CM

## 2022-08-13 PROCEDURE — U0005 INFEC AGEN DETEC AMPLI PROBE: HCPCS | Performed by: NURSE PRACTITIONER

## 2022-08-13 PROCEDURE — U0003 INFECTIOUS AGENT DETECTION BY NUCLEIC ACID (DNA OR RNA); SEVERE ACUTE RESPIRATORY SYNDROME CORONAVIRUS 2 (SARS-COV-2) (CORONAVIRUS DISEASE [COVID-19]), AMPLIFIED PROBE TECHNIQUE, MAKING USE OF HIGH THROUGHPUT TECHNOLOGIES AS DESCRIBED BY CMS-2020-01-R: HCPCS | Performed by: NURSE PRACTITIONER

## 2022-08-13 PROCEDURE — G0382 LEV 3 HOSP TYPE B ED VISIT: HCPCS | Performed by: NURSE PRACTITIONER

## 2022-08-13 RX ORDER — DEXAMETHASONE 4 MG/1
4 TABLET ORAL 2 TIMES DAILY WITH MEALS
Qty: 10 TABLET | Refills: 0 | Status: SHIPPED | OUTPATIENT
Start: 2022-08-13

## 2022-08-13 NOTE — PROGRESS NOTES
[facility]        NAME: Abelardo Kincaid is a 58 y o  male  : 1960    MRN: [id number]  DATE: 2022  TIME: 9:34 AM    Assessment and Plan   COVID-19 [U07 1]  1  COVID-19  dexamethasone (DECADRON) 4 mg tablet    COVID Only -Office Collect         Patient Instructions       Follow up with PCP in 3-5 days  Proceed to  ER if symptoms worsen  Chief Complaint     Chief Complaint   Patient presents with    Cough    Cold Like Symptoms    Sore Throat     Patient been sick since Thursday with his s/s- he test positive home test- he states the cough is his worse sympton         History of Present Illness       HPI   Reports cough, sore throat and sinus congestion  Tested positive for covid at home  Says his pcp wants a PCR test  Also reports shortness of breath with climbing stairs  Review of Systems   Review of Systems   Constitutional: Negative for fever  HENT: Positive for congestion, postnasal drip, sinus pressure and sore throat  Respiratory: Positive for cough  Negative for shortness of breath and wheezing  Gastrointestinal: Negative for diarrhea and vomiting  Musculoskeletal: Positive for myalgias           Current Medications       Current Outpatient Medications:     dexamethasone (DECADRON) 4 mg tablet, Take 1 tablet (4 mg total) by mouth 2 (two) times a day with meals, Disp: 10 tablet, Rfl: 0    clonazePAM (KlonoPIN) 0 5 mg tablet, Take 0 5 mg by mouth daily at bedtime, Disp: , Rfl:     Coenzyme Q10 (COQ10) 200 MG CAPS, Take by mouth, Disp: , Rfl:     ibuprofen (MOTRIN) 600 mg tablet, Take 1 tablet (600 mg total) by mouth every 6 (six) hours as needed for mild pain for up to 7 days, Disp: 28 tablet, Rfl: 0    levothyroxine 75 mcg tablet, TAKE ONE TABLET BY MOUTH EVERY DAY, Disp: 30 tablet, Rfl: 0    lidocaine-prilocaine (EMLA) cream, Apply topically as needed for mild pain Tip of penis as needed for irritation, Disp: 30 g, Rfl: 0    lisinopril (ZESTRIL) 40 mg tablet, TAKE ONE TABLET BY MOUTH EVERY DAY, Disp: 90 tablet, Rfl: 1    metoclopramide (REGLAN) 5 mg tablet, TAKE ONE TABLET BY MOUTH THREE TIMES A DAY, Disp: 90 tablet, Rfl: 2    ondansetron (ZOFRAN) 4 mg tablet, Take 1 tablet (4 mg total) by mouth every 6 (six) hours for 3 days, Disp: 12 tablet, Rfl: 0    oxybutynin (DITROPAN) 5 mg tablet, Take 1 tablet (5 mg total) by mouth 3 (three) times a day as needed (spasm), Disp: 30 tablet, Rfl: 3    oxybutynin (DITROPAN) 5 mg tablet, Every 6-8 hours if needed for bladder pressure or spasm, Disp: 15 tablet, Rfl: 0    pantoprazole (PROTONIX) 40 mg tablet, TAKE ONE TABLET BY MOUTH EVERY DAY BEFORE BREAKFAST, Disp: 30 tablet, Rfl: 5    potassium chloride (MICRO-K) 10 MEQ CR capsule, Take 2 capsules (20 mEq total) by mouth daily, Disp: 60 capsule, Rfl: 3    tamsulosin (FLOMAX) 0 4 mg, Take 1 capsule (0 4 mg total) by mouth daily with dinner for 2 days, Disp: 2 capsule, Rfl: 0    Current Allergies     Allergies as of 08/13/2022 - Reviewed 08/13/2022   Allergen Reaction Noted    Compazine [prochlorperazine] GI Intolerance 06/03/2017    Percocet [oxycodone-acetaminophen] Itching 04/02/2022    Sulfa antibiotics Rash 06/03/2017            The following portions of the patient's history were reviewed and updated as appropriate: allergies, current medications, past family history, past medical history, past social history, past surgical history and problem list      Past Medical History:   Diagnosis Date    Anxiety     Change in bowel habits     Chronic urticaria     Depression     Disease of thyroid gland     GERD (gastroesophageal reflux disease)     Hashimoto's disease     Hashimoto's thyroiditis     Hypertension     Kidney stone     Renal stones        Past Surgical History:   Procedure Laterality Date    BACK SURGERY      FL RETROGRADE PYELOGRAM  7/28/2019    FL RETROGRADE PYELOGRAM  4/2/2022    HERNIA REPAIR      NASAL SEPTUM SURGERY      AL CYSTO/URETERO W/LITHOTRIPSY &INDWELL STENT INSRT Right 7/28/2019    Procedure: CYSTOSCOPY URETEROSCOPY WITH LITHOTRIPSY HOLMIUM LASER, basket stone extraction, RETROGRADE PYELOGRAM AND INSERTION STENT URETERAL;  Surgeon: Courtney Blankenship MD;  [facility];  Service: Urology    [place] CYSTO/URETERO W/LITHOTRIPSY &INDWELL STENT INSRT Right 4/2/2022    Procedure: CYSTOSCOPY URETEROSCOPY  RETROGRADE PYELOGRAM AND INSERTION STENT URETERAL;  Surgeon: Bianka Cline MD;  [facility];  Service: Urology    [place] CYSTO/URETERO W/LITHOTRIPSY &INDWELL STENT INSRT Right 4/19/2022    Procedure: CYSTOSCOPY URETEROSCOPY WITH LITHOTRIPSY HOLMIUM LASER, RETROGRADE PYELOGRAM AND EXCHANGE STENT URETERAL ;  Surgeon: Yg Salvador MD;  [facility];  Service: Urology    [place] REPAIR [address] HERNIA,5+Y/O,REDUCIBL Left 12/13/2021    Procedure: INGUINAL HERNIA REPAIR;  Surgeon: Elaine Arango DO;  [facility];  Service: General    TONSILLECTOMY  1979       Family History   Problem Relation Age of Onset    Cancer Mother         Thyroid    Stroke Mother     Colon cancer Father     Colon polyps Father     Valvular heart disease Father     Cancer Sister         Skin    Suicidality Brother          Medications have been verified  Objective   /84 (BP Location: Right arm, Patient Position: Sitting, Cuff Size: Standard)   Pulse 90   Temp (!) 97 4 °F (36 3 °C)   Resp 20   SpO2 97%   No LMP for male patient  Physical Exam     Physical Exam  Constitutional:       Appearance: He is not diaphoretic  HENT:      Right Ear: Tympanic membrane normal       Left Ear: Tympanic membrane normal       Nose: Rhinorrhea present  Mouth/Throat:      Mouth: Mucous membranes are moist       Pharynx: No pharyngeal swelling or posterior oropharyngeal erythema  Tonsils: No tonsillar exudate  0 on the right  0 on the left  Cardiovascular:      Rate and Rhythm: Regular rhythm     Pulmonary: Effort: Pulmonary effort is normal       Breath sounds: Normal breath sounds  No wheezing

## 2022-08-14 LAB — SARS-COV-2 RNA RESP QL NAA+PROBE: POSITIVE

## 2022-08-22 ENCOUNTER — TELEPHONE (OUTPATIENT)
Dept: FAMILY MEDICINE CLINIC | Facility: CLINIC | Age: 62
End: 2022-08-22

## 2022-08-22 DIAGNOSIS — U07.1 COVID-19: Primary | ICD-10-CM

## 2022-08-22 RX ORDER — AZITHROMYCIN 250 MG/1
TABLET, FILM COATED ORAL
Qty: 6 TABLET | Refills: 0 | Status: SHIPPED | OUTPATIENT
Start: 2022-08-22 | End: 2022-08-26

## 2022-08-22 RX ORDER — BROMPHENIRAMINE MALEATE, PSEUDOEPHEDRINE HYDROCHLORIDE, AND DEXTROMETHORPHAN HYDROBROMIDE 2; 30; 10 MG/5ML; MG/5ML; MG/5ML
5 SYRUP ORAL EVERY 6 HOURS PRN
Qty: 118 ML | Refills: 0 | Status: SHIPPED | OUTPATIENT
Start: 2022-08-22 | End: 2022-08-28

## 2022-08-22 NOTE — TELEPHONE ENCOUNTER
Unfortunately, Sven Alegria is still very symptomatic  This is a great concern  He does have congestion, coughing, aching, abdominal discomfort (not nausea) and very tired (which I am as well so I assume that is normal )  The only thing Marissa Hayes gave him was 10 steroids  I'm not too sure if there is anything that you can do to help  Any guidance is appreciated    He can't go back to work until he has a negative covid test   As always, Baldemar Opitz  used

## 2022-08-25 ENCOUNTER — CONSULT (OUTPATIENT)
Dept: NEPHROLOGY | Facility: CLINIC | Age: 62
End: 2022-08-25
Payer: COMMERCIAL

## 2022-08-25 VITALS — HEIGHT: 72 IN | WEIGHT: 155 LBS | BODY MASS INDEX: 20.99 KG/M2

## 2022-08-25 DIAGNOSIS — E87.6 HYPOKALEMIA: ICD-10-CM

## 2022-08-25 DIAGNOSIS — N25.89 RENAL TUBULAR ACIDOSIS: Primary | ICD-10-CM

## 2022-08-25 DIAGNOSIS — N20.0 NEPHROLITHIASIS: ICD-10-CM

## 2022-08-25 LAB
SL AMB  POCT GLUCOSE, UA: NORMAL
SL AMB LEUKOCYTE ESTERASE,UA: NORMAL
SL AMB POCT BILIRUBIN,UA: NORMAL
SL AMB POCT BLOOD,UA: NORMAL
SL AMB POCT CLARITY,UA: CLEAR
SL AMB POCT COLOR,UA: YELLOW
SL AMB POCT KETONES,UA: NORMAL
SL AMB POCT NITRITE,UA: NORMAL
SL AMB POCT PH,UA: 5
SL AMB POCT SPECIFIC GRAVITY,UA: 1
SL AMB POCT URINE PROTEIN: NORMAL
SL AMB POCT UROBILINOGEN: 0.2

## 2022-08-25 PROCEDURE — 99214 OFFICE O/P EST MOD 30 MIN: CPT | Performed by: STUDENT IN AN ORGANIZED HEALTH CARE EDUCATION/TRAINING PROGRAM

## 2022-08-25 PROCEDURE — 81002 URINALYSIS NONAUTO W/O SCOPE: CPT | Performed by: STUDENT IN AN ORGANIZED HEALTH CARE EDUCATION/TRAINING PROGRAM

## 2022-08-25 RX ORDER — POTASSIUM CITRATE 10 MEQ/1
10 TABLET, EXTENDED RELEASE ORAL 2 TIMES DAILY
Qty: 180 TABLET | Refills: 1 | Status: SHIPPED | OUTPATIENT
Start: 2022-08-25 | End: 2022-09-01

## 2022-08-25 NOTE — PROGRESS NOTES
NEPHROLOGY OUTPATIENT CONSULTATION   Monica Romano III 58 y o  male MRN: [id number]  Date: 8/25/2022  Reason for consultation:   Chief Complaint   Patient presents with    Consult       ASSESSMENT AND PLAN:   58 y o  man PMH recurrent nephrolithiasis (8-9mm stone, multiple small stones vs calcifications on right kidney), HTN, COVID infection on 8/2022  Patient is here for initial consultation of nephrolithiasis     PLAN:    #Recurrent Nephrolithiasis RTA ? 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 · Potassium citrate as above       #Volume status/hypertension:   Volume:euvolemic    Blood pressure:normotenisve , goal 140/90mmhg    Recommend:   Low sodium diet   Lisinopril         HISTORY OF PRESENT ILLNESS:  Monica Romano III is a 58 y o  male with medical issues of recurrent nephrolithiasis (8-9mm stone, multiple small stones vs calcifications on right kidney), hypertension, COVID infection on 8/2022  who presents for initial consultation for nephrolithiasis     REVIEW OF SYSTEMS:    More than 10 point review of systems were obtained and discussed in length with the patient  Complete review of systems were negative / unremarkable except mentioned in the HPI section      Review of Systems - Psychological ROS: negative  Ophthalmic ROS: negative  ENT ROS: negative  Hematological and Lymphatic ROS: negative  Endocrine ROS: negative  Respiratory ROS: no cough, shortness of breath, or wheezing  Cardiovascular ROS: no chest pain or dyspnea on exertion  Gastrointestinal ROS: no abdominal pain, change in bowel habits, or black or bloody stools  Genito-Urinary ROS: no dysuria, trouble voiding, or hematuria  Musculoskeletal ROS: positive for - pain in back - right  Neurological ROS: no TIA or stroke symptoms  Dermatological ROS: negative     PHYSICAL EXAM:  Vitals:    08/25/22 1100   Weight: 70 3 kg (155 lb)   Height: 6' (1 829 m)     Body mass index is 21 02 kg/m²      Physical Exam  General: , no acute distress at this time  Skin:  No acute rash  Eyes:  No scleral icterus and noninjected  ENT:  mucous membranes moist  Neck:  no carotid bruits  Chest:  Clear to auscultation percussion, good respiratory effort, no use of accessory respiratory muscles  CVS:  Regular rate and rhythm without a murmur rub  Abdomen:  soft and nontender   Extremities:   no lower extremity edema  Neuro:  No gross focality  Psych:  Alert , cooperative     PAST MEDICAL HISTORY:  Past Medical History:   Diagnosis Date    Anxiety     Change in bowel habits     Chronic urticaria     Depression     Disease of thyroid gland     GERD (gastroesophageal reflux disease)     Hashimoto's disease     Hashimoto's thyroiditis     Hypertension     Kidney stone     Renal stones        PAST SURGICAL HISTORY:  Past Surgical History:   Procedure Laterality Date    BACK SURGERY      FL RETROGRADE PYELOGRAM  7/28/2019    FL RETROGRADE PYELOGRAM  4/2/2022    HERNIA REPAIR      NASAL SEPTUM SURGERY      [place] CYSTO/URETERO W/LITHOTRIPSY &INDWELL STENT INSRT Right 7/28/2019    Procedure: CYSTOSCOPY URETEROSCOPY WITH LITHOTRIPSY HOLMIUM LASER, basket stone extraction, RETROGRADE PYELOGRAM AND INSERTION STENT URETERAL;  Surgeon: Donya Oh MD;  [facility];  Service: Urology    HI CYSTO/URETERO W/LITHOTRIPSY &INDWELL STENT INSRT Right 4/2/2022    Procedure: CYSTOSCOPY URETEROSCOPY  RETROGRADE PYELOGRAM AND INSERTION STENT URETERAL;  Surgeon: Mitchel Wong MD;  [facility];  Service: Urology    [place] CYSTO/URETERO W/LITHOTRIPSY &INDWELL STENT INSRT Right 4/19/2022    Procedure: CYSTOSCOPY URETEROSCOPY WITH LITHOTRIPSY HOLMIUM LASER, RETROGRADE PYELOGRAM AND EXCHANGE STENT URETERAL ;  Surgeon: Morgan Guillen MD;  [facility];  Service: Urology    [place] REPAIR [address] HERNIA,5+Y/O,REDUCIBL Left 12/13/2021    Procedure: INGUINAL HERNIA REPAIR;  Surgeon: Davis Osullivan DO;  [facility];  Service: General    TONSILLECTOMY  1979       ALLERGIES:  Allergies   Allergen Reactions    Compazine [Prochlorperazine] GI Intolerance    Percocet [Oxycodone-Acetaminophen] Itching     Facial itching    Sulfa Antibiotics Rash       SOCIAL HISTORY:  Social History     Substance and Sexual Activity   Alcohol Use Yes    Alcohol/week: 0 0 standard drinks    Comment: social     Social History     Substance and Sexual Activity   Drug Use Never     Social History     Tobacco Use   Smoking Status Never Smoker   Smokeless Tobacco Never Used       FAMILY HISTORY:  Family History   Problem Relation Age of Onset    Cancer Mother         Thyroid    Stroke Mother     Colon cancer Father     Colon polyps Father     Valvular heart disease Father     Cancer Sister         Skin    Suicidality Brother        MEDICATIONS:    Current Outpatient Medications:     azithromycin (ZITHROMAX) 250 mg tablet, Take 2 tabs on Day 1 than 1 tab Days 2-5, Disp: 6 tablet, Rfl: 0    clonazePAM (KlonoPIN) 0 5 mg tablet, Take 0 5 mg by mouth daily at bedtime, Disp: , Rfl:     Coenzyme Q10 (COQ10) 200 MG CAPS, Take by mouth, Disp: , Rfl:     levothyroxine 75 mcg tablet, TAKE ONE TABLET BY MOUTH EVERY DAY, Disp: 30 tablet, Rfl: 0    lisinopril (ZESTRIL) 40 mg tablet, TAKE ONE TABLET BY MOUTH EVERY DAY, Disp: 90 tablet, Rfl: 1    metoclopramide (REGLAN) 5 mg tablet, TAKE ONE TABLET BY MOUTH THREE TIMES A DAY, Disp: 90 tablet, Rfl: 2   pantoprazole (PROTONIX) 40 mg tablet, TAKE ONE TABLET BY MOUTH EVERY DAY BEFORE BREAKFAST, Disp: 30 tablet, Rfl: 5    potassium citrate (UROCIT-K) 10 mEq, Take 1 tablet (10 mEq total) by mouth 2 (two) times a day, Disp: 180 tablet, Rfl: 1    brompheniramine-pseudoephedrine-DM 30-2-10 MG/5ML syrup, Take 5 mL by mouth every 6 (six) hours as needed for congestion or cough for up to 6 days (Patient not taking: Reported on 8/25/2022), Disp: 118 mL, Rfl: 0    dexamethasone (DECADRON) 4 mg tablet, Take 1 tablet (4 mg total) by mouth 2 (two) times a day with meals (Patient not taking: Reported on 8/25/2022), Disp: 10 tablet, Rfl: 0    lidocaine-prilocaine (EMLA) cream, Apply topically as needed for mild pain Tip of penis as needed for irritation, Disp: 30 g, Rfl: 0    ondansetron (ZOFRAN) 4 mg tablet, Take 1 tablet (4 mg total) by mouth every 6 (six) hours for 3 days, Disp: 12 tablet, Rfl: 0    oxybutynin (DITROPAN) 5 mg tablet, Take 1 tablet (5 mg total) by mouth 3 (three) times a day as needed (spasm), Disp: 30 tablet, Rfl: 3    oxybutynin (DITROPAN) 5 mg tablet, Every 6-8 hours if needed for bladder pressure or spasm, Disp: 15 tablet, Rfl: 0    tamsulosin (FLOMAX) 0 4 mg, Take 1 capsule (0 4 mg total) by mouth daily with dinner for 2 days, Disp: 2 capsule, Rfl: 0    Lab Results:   Results for orders placed or performed in visit on 08/25/22   POCT urine dip   Result Value Ref Range    LEUKOCYTE ESTERASE,UA neg     NITRITE,UA neg     SL AMB POCT UROBILINOGEN 0 2     POCT URINE PROTEIN neg      PH,UA 5 0     BLOOD,UA neg     SPECIFIC GRAVITY,UA 1 005     KETONES,UA neg     BILIRUBIN,UA neg     GLUCOSE, UA neg      COLOR,UA yellow     CLARITY,UA clear        Portions of the record may have been created with voice recognition software  Occasional wrong word or "sound a like" substitutions may have occurred due to the inherent limitations of voice recognition software   Read the chart carefully and recognize, using context, where substitutions have occurred

## 2022-08-25 NOTE — PATIENT INSTRUCTIONS
Thank you for coming to your visit today  As we discussed you kidney function is normal  Based on the urine results and the hx of kidney stones you have RTA type 1 (Renal tubular acidosis)  Please follow the recommendations below       Please start taking potassium citrate     Labs and urine in 4 weeks     Recommend low sodium (salt) food    Avoid nonsteroidal anti-inflammatory drugs such as Naprosyn, ibuprofen, Aleve, Advil, Celebrex, Meloxicam (Mobic) etc   You can use Tylenol as needed if you do not have any liver condition to be concerned about    Try to avoid medications such as pantoprazole or  Protonix/Nexium or Esomeprazole)/Prilosec or omeprazole/Prevacid or lansoprazole/AcipHex or Rabeprazole  If you are able to, use Pepcid as this is safer for your kidneys      Try to exercise at least 30 minutes 3 days a week to begin with with an ultimate goal of 5 days a week for at least 30 minutes  Measures to reduce stone development:    Please Drink  oz of water or 2 5L-3 L a day, throughout the day  Avoid salt/low-salt diet   Try to decrease animal protein intake, dairy protein and vegetable base protein are better  Increase fruits and vegetables as much as possible  Avoid calcium products such as Tums or other types of calcium containing antacids, you can use Pepcid for indigestion (but you do not have to restrict your dietary calcium)  Avoid excessive vitamin C  Try to avoid oxalate products (please refer to the diet sheet)  Limit fructose and sucrose type drinks such as coke      Next Visit in 4 months with results   If you need to see us earlier we can change the appointment for you        Dorian Heller MD  Nephrology Attending

## 2022-08-28 ENCOUNTER — HOSPITAL ENCOUNTER (OUTPATIENT)
Dept: ULTRASOUND IMAGING | Facility: HOSPITAL | Age: 62
Discharge: HOME | End: 2022-08-28
Attending: STUDENT IN AN ORGANIZED HEALTH CARE EDUCATION/TRAINING PROGRAM
Payer: COMMERCIAL

## 2022-08-28 DIAGNOSIS — N25.89 RENAL TUBULAR ACIDOSIS: ICD-10-CM

## 2022-08-28 PROCEDURE — 76770 US EXAM ABDO BACK WALL COMP: CPT

## 2022-09-01 DIAGNOSIS — N20.0 NEPHROLITHIASIS: Primary | ICD-10-CM

## 2022-09-03 DIAGNOSIS — E03.9 HYPOTHYROIDISM, UNSPECIFIED TYPE: ICD-10-CM

## 2022-09-05 ENCOUNTER — TELEPHONE (OUTPATIENT)
Dept: OTHER | Facility: HOSPITAL | Age: 62
End: 2022-09-05

## 2022-09-06 RX ORDER — LEVOTHYROXINE SODIUM 0.07 MG/1
TABLET ORAL
Qty: 30 TABLET | Refills: 0 | Status: SHIPPED | OUTPATIENT
Start: 2022-09-06 | End: 2022-09-30

## 2022-09-27 ENCOUNTER — TELEPHONE (OUTPATIENT)
Dept: FAMILY MEDICINE CLINIC | Facility: CLINIC | Age: 62
End: 2022-09-27

## 2022-09-27 DIAGNOSIS — E87.6 HYPOKALEMIA: Primary | ICD-10-CM

## 2022-09-27 NOTE — TELEPHONE ENCOUNTER
Patients wife called   She is requesting an appoint with you for multiple issues this week     For kidney stones, back pain, and abd pain     he's not sure where the pain is coming from since he's had all these issues this past year      Please advise

## 2022-09-29 ENCOUNTER — OFFICE VISIT (OUTPATIENT)
Dept: FAMILY MEDICINE CLINIC | Facility: CLINIC | Age: 62
End: 2022-09-29
Payer: COMMERCIAL

## 2022-09-29 ENCOUNTER — APPOINTMENT (OUTPATIENT)
Dept: LAB | Facility: CLINIC | Age: 62
End: 2022-09-29
Payer: COMMERCIAL

## 2022-09-29 ENCOUNTER — HOSPITAL ENCOUNTER (OUTPATIENT)
Dept: RADIOLOGY | Facility: HOSPITAL | Age: 62
Discharge: HOME | End: 2022-09-29
Payer: COMMERCIAL

## 2022-09-29 VITALS
BODY MASS INDEX: 21.4 KG/M2 | RESPIRATION RATE: 16 BRPM | SYSTOLIC BLOOD PRESSURE: 128 MMHG | OXYGEN SATURATION: 97 % | HEART RATE: 78 BPM | WEIGHT: 158 LBS | DIASTOLIC BLOOD PRESSURE: 74 MMHG | TEMPERATURE: 97.3 F | HEIGHT: 72 IN

## 2022-09-29 DIAGNOSIS — R10.84 DIFFUSE ABDOMINAL PAIN: ICD-10-CM

## 2022-09-29 DIAGNOSIS — M54.6 CHRONIC BILATERAL THORACIC BACK PAIN: ICD-10-CM

## 2022-09-29 DIAGNOSIS — R10.2 PELVIC PAIN: ICD-10-CM

## 2022-09-29 DIAGNOSIS — M25.50 ARTHRALGIA, UNSPECIFIED JOINT: ICD-10-CM

## 2022-09-29 DIAGNOSIS — G89.29 CHRONIC BILATERAL THORACIC BACK PAIN: ICD-10-CM

## 2022-09-29 DIAGNOSIS — K21.9 GASTROESOPHAGEAL REFLUX DISEASE WITHOUT ESOPHAGITIS: Primary | ICD-10-CM

## 2022-09-29 LAB
CRP SERPL QL: <1 MG/L
ERYTHROCYTE [SEDIMENTATION RATE] IN BLOOD: 1 MM/HOUR (ref 0–19)

## 2022-09-29 PROCEDURE — 85652 RBC SED RATE AUTOMATED: CPT

## 2022-09-29 PROCEDURE — 99214 OFFICE O/P EST MOD 30 MIN: CPT | Performed by: FAMILY MEDICINE

## 2022-09-29 PROCEDURE — 86140 C-REACTIVE PROTEIN: CPT

## 2022-09-29 PROCEDURE — 86430 RHEUMATOID FACTOR TEST QUAL: CPT

## 2022-09-29 PROCEDURE — 72072 X-RAY EXAM THORAC SPINE 3VWS: CPT

## 2022-09-29 PROCEDURE — 36415 COLL VENOUS BLD VENIPUNCTURE: CPT

## 2022-09-29 PROCEDURE — 86618 LYME DISEASE ANTIBODY: CPT

## 2022-09-29 PROCEDURE — 86038 ANTINUCLEAR ANTIBODIES: CPT

## 2022-09-29 PROCEDURE — 86235 NUCLEAR ANTIGEN ANTIBODY: CPT

## 2022-09-29 RX ORDER — PANTOPRAZOLE SODIUM 40 MG/1
40 TABLET, DELAYED RELEASE ORAL 2 TIMES DAILY
Qty: 60 TABLET | Refills: 3 | Status: SHIPPED | OUTPATIENT
Start: 2022-09-29 | End: 2022-10-29

## 2022-09-30 DIAGNOSIS — E03.9 HYPOTHYROIDISM, UNSPECIFIED TYPE: ICD-10-CM

## 2022-09-30 LAB
ANA TITR SER IF: NEGATIVE {TITER}
B BURGDOR IGG+IGM SER-ACNC: <0.2 AI
ENA SS-A AB SER-ACNC: <0.2 AI (ref 0–0.9)
ENA SS-B AB SER-ACNC: <0.2 AI (ref 0–0.9)
RHEUMATOID FACT SER QL LA: NEGATIVE

## 2022-09-30 RX ORDER — LEVOTHYROXINE SODIUM 0.07 MG/1
TABLET ORAL
Qty: 30 TABLET | Refills: 0 | Status: SHIPPED | OUTPATIENT
Start: 2022-09-30

## 2022-09-30 NOTE — PROGRESS NOTES
Patient ID: Víctor Al is a 58 y o  male  HPI: 58 y o male presents for evaluation of diffuse arthralgia  He is also having a sore throat and breakthrough acid reflux and we discussed increasing his PPI to see if it resolves the sore throat from LPR  Also, Pt has diffuse abdominal pain and pelvic pain  We discussed the possibility of his hernia repair site causing some of the lower tenderness but upper abdominal pain may be radicular in nature from the thoracic spine  SUBJECTIVE    Family History   Problem Relation Age of Onset   Arturo Polio Cancer Mother         Thyroid    Stroke Mother     Colon cancer Father     Colon polyps Father     Valvular heart disease Father     Cancer Sister         Skin    Suicidality Brother      Social History     Socioeconomic History    Marital status: /Civil Union     Spouse name: Not on file    Number of children: Not on file    Years of education: Not on file    Highest education level: Not on file   Occupational History    Not on file   Tobacco Use    Smoking status: Never Smoker    Smokeless tobacco: Never Used   Vaping Use    Vaping Use: Never used   Substance and Sexual Activity    Alcohol use:  Yes     Alcohol/week: 0 0 standard drinks     Comment: social    Drug use: Never    Sexual activity: Yes     Partners: Female   Other Topics Concern    Not on file   Social History Narrative    Not on file     Social Determinants of Health     Financial Resource Strain: Not on file   Food Insecurity: Not on file   Transportation Needs: Not on file   Physical Activity: Not on file   Stress: Not on file   Social Connections: Not on file   Intimate Partner Violence: Not on file   Housing Stability: Not on file     Past Medical History:   Diagnosis Date    Anxiety     Change in bowel habits     Chronic urticaria     Depression     Disease of thyroid gland     GERD (gastroesophageal reflux disease)     Hashimoto's disease     Hashimoto's thyroiditis  Hypertension     Kidney stone     Renal stones      Past Surgical History:   Procedure Laterality Date    BACK SURGERY      FL RETROGRADE PYELOGRAM  7/28/2019    FL RETROGRADE PYELOGRAM  4/2/2022    HERNIA REPAIR      NASAL SEPTUM SURGERY      [place] CYSTO/URETERO W/LITHOTRIPSY &INDWELL STENT INSRT Right 7/28/2019    Procedure: CYSTOSCOPY URETEROSCOPY WITH LITHOTRIPSY HOLMIUM LASER, basket stone extraction, RETROGRADE PYELOGRAM AND INSERTION STENT URETERAL;  Surgeon: Pawan Marshall MD;  [facility];  Service: Urology    [place] CYSTO/URETERO W/LITHOTRIPSY &INDWELL STENT INSRT Right 4/2/2022    Procedure: CYSTOSCOPY URETEROSCOPY  RETROGRADE PYELOGRAM AND INSERTION STENT URETERAL;  Surgeon: Kelly Moran MD;  [facility];  Service: Urology    [place] CYSTO/URETERO W/LITHOTRIPSY &INDWELL STENT INSRT Right 4/19/2022    Procedure: CYSTOSCOPY URETEROSCOPY WITH LITHOTRIPSY HOLMIUM LASER, RETROGRADE PYELOGRAM AND EXCHANGE STENT URETERAL ;  Surgeon: Adrain Phalen, MD;  [facility];  Service: Urology    [place] REPAIR [place] HERNIA,5+Y/O,REDUCIBL Left 12/13/2021    Procedure: INGUINAL HERNIA REPAIR;  Surgeon: Abby Danielle DO;  [facility];  Service: General    TONSILLECTOMY  1979     Allergies   Allergen Reactions    Compazine [Prochlorperazine] GI Intolerance    Percocet [Oxycodone-Acetaminophen] Itching     Facial itching    Sulfa Antibiotics Rash       Current Outpatient Medications:     citric acid-potassium citrate (Cytra-K) 1,100-334 mg/5 mL solution, Take 15 mL by mouth 2 (two) times a day (Patient taking differently: Take 15 mL by mouth 2 (two) times a day Daily on weekdays and BID on weekends), Disp: 473 mL, Rfl: 1    clonazePAM (KlonoPIN) 0 5 mg tablet, Take 0 5 mg by mouth daily at bedtime, Disp: , Rfl:     levothyroxine 75 mcg tablet, TAKE ONE TABLET BY MOUTH EVERY DAY, Disp: 30 tablet, Rfl: 0    lisinopril (ZESTRIL) 40 mg tablet, TAKE ONE TABLET BY MOUTH EVERY DAY, Disp: 90 tablet, Rfl: 1    metoclopramide (REGLAN) 5 mg tablet, TAKE ONE TABLET BY MOUTH THREE TIMES A DAY (Patient taking differently: 2 (two) times a day), Disp: 90 tablet, Rfl: 2    pantoprazole (PROTONIX) 40 mg tablet, Take 1 tablet (40 mg total) by mouth 2 (two) times a day, Disp: 60 tablet, Rfl: 3    Coenzyme Q10 (COQ10) 200 MG CAPS, Take by mouth, Disp: , Rfl:     dexamethasone (DECADRON) 4 mg tablet, Take 1 tablet (4 mg total) by mouth 2 (two) times a day with meals, Disp: 10 tablet, Rfl: 0    lidocaine-prilocaine (EMLA) cream, Apply topically as needed for mild pain Tip of penis as needed for irritation, Disp: 30 g, Rfl: 0    ondansetron (ZOFRAN) 4 mg tablet, Take 1 tablet (4 mg total) by mouth every 6 (six) hours for 3 days, Disp: 12 tablet, Rfl: 0    oxybutynin (DITROPAN) 5 mg tablet, Take 1 tablet (5 mg total) by mouth 3 (three) times a day as needed (spasm), Disp: 30 tablet, Rfl: 3    oxybutynin (DITROPAN) 5 mg tablet, Every 6-8 hours if needed for bladder pressure or spasm, Disp: 15 tablet, Rfl: 0    tamsulosin (FLOMAX) 0 4 mg, Take 1 capsule (0 4 mg total) by mouth daily with dinner for 2 days, Disp: 2 capsule, Rfl: 0    Review of Systems  Constitutional:     Denies fever, chills ,fatigue ,weakness ,weight loss, weight gain     ENT: Denies earache ,loss of hearing ,nosebleed, nasal discharge,nasal congestion ,sore throat ,hoarseness  Pulmonary: Denies shortness of breath ,cough  ,dyspnea on exertion, orthopnea  ,PND   Cardiovascular:  Denies bradycardia , tachycardia  ,palpations, lower extremity edema leg, claudication  Breast:  Denies new or changing breast lumps ,nipple discharge ,nipple changes  Abdomen:  + diffuse abdominal pain/pelvic pain ,no  anorexia ,+ indigestion,denies any  nausea, vomiting, constipation, or diarrhea  Musculoskeletal: Denies myalgias,+ diffuse arthralgia , denies any joint swelling, joint stiffness , limb pain,or  limb swelling  Gu: denies dysuria, polyuria  Skin: Denies skin rash, skin lesion, skin wound, itching, dry skin  Neuro: Denies headache, numbness, tingling, confusion, loss of consciousness, dizziness, vertigo  Psychiatric: Denies feelings of depression, suicidal ideation, anxiety, sleep disturbances    OBJECTIVE  /74   Pulse 78   Temp (!) 97 3 °F (36 3 °C)   Resp 16   Ht 6' (1 829 m)   Wt 71 7 kg (158 lb)   SpO2 97%   BMI 21 43 kg/m²   Constitutional:   NAD, well appearing and well nourished      ENT:   Conjunctiva and lids: no injection, edema, or discharge     Pupils and iris: SULMA bilaterally    External inspection of ears and nose: normal without deformities or discharge  Otoscopic exam: Canals patent without erythema  Nasal mucosa, septum and turbinates: Normal or edema or discharge         Oropharynx:  Moist mucosa, normal tongue and tonsils without lesions  No erythema        Pulmonary:Respiratory effort normal rate and rhythm, no increased work of breathing   Auscultation of lungs:  Clear bilaterally with no adventitious breath sounds       Cardiovascular: regular rate and rhythm, S1 and S2, no murmur, no edema and/or varicosities of LE      Abdomen: Soft and non-distended, diffuse tenderness on exam of abdomen and pelvic area on right + tenderness along ribs on right      Positive bowel sounds      No heptomegaly or splenomegaly      Gu: no suprapubic tenderness or CVA tenderness, no urethral discharge  Lymphatic:  No anterior or posterior cervical lymphadenopathy         Musculoskeletal:  Gait and station: Normal gait      Digits and nails normal without clubbing or cyanosis       Inspection/palpation of joints, bones, and muscles:  No joint tenderness, swelling, full active and passive range of motion of thoracic spine      Skin: Normal skin turgor and no rashes      Neuro:     Normal reflexes       Psych:   alert and oriented to person, place and time     normal mood and affect       Assessment/Plan:Diagnoses and all orders for this visit:    Gastroesophageal reflux disease without esophagitis  -     pantoprazole (PROTONIX) 40 mg tablet; Take 1 tablet (40 mg total) by mouth 2 (two) times a day    Arthralgia, unspecified joint  -     C-reactive protein; Future  -     Sedimentation rate, automated; Future  -     RF Screen w/ Reflex to Titer; Future  -     Antinuclear Antibodies (CELSO), IFA; Future  -     Sjogren's Antibodies; Future  -     Lyme Antibody Profile with reflex to WB; Future    Diffuse abdominal pain  -     CT abdomen pelvis wo contrast; Future    Pelvic pain  -     CT abdomen pelvis wo contrast; Future    Chronic bilateral thoracic back pain  -     XR spine thoracic 3 vw; Future        Reviewed with patient plan to treat with above plan      Patient instructed to call in 72 hours if not feeling better or if symptoms worsen

## 2022-10-07 ENCOUNTER — HOSPITAL ENCOUNTER (OUTPATIENT)
Dept: CT IMAGING | Facility: HOSPITAL | Age: 62
Discharge: HOME | End: 2022-10-07
Payer: COMMERCIAL

## 2022-10-07 DIAGNOSIS — R10.2 PELVIC PAIN: ICD-10-CM

## 2022-10-07 DIAGNOSIS — R10.84 DIFFUSE ABDOMINAL PAIN: ICD-10-CM

## 2022-10-07 PROCEDURE — G1004 CDSM NDSC: HCPCS

## 2022-10-07 PROCEDURE — 74176 CT ABD & PELVIS W/O CONTRAST: CPT

## 2022-10-14 DIAGNOSIS — K22.89 ESOPHAGEAL THICKENING: Primary | ICD-10-CM

## 2022-10-20 ENCOUNTER — TRANSCRIBE ORDERS (OUTPATIENT)
Dept: URGENT CARE | Facility: CLINIC | Age: 62
End: 2022-10-20
Payer: COMMERCIAL

## 2022-10-20 ENCOUNTER — APPOINTMENT (OUTPATIENT)
Dept: RADIOLOGY | Facility: CLINIC | Age: 62
End: 2022-10-20
Payer: COMMERCIAL

## 2022-10-20 DIAGNOSIS — Z00.00 ANNUAL PHYSICAL EXAM: ICD-10-CM

## 2022-10-20 DIAGNOSIS — E87.6 HYPOKALEMIA: ICD-10-CM

## 2022-10-20 DIAGNOSIS — Z00.00 ANNUAL PHYSICAL EXAM: Primary | ICD-10-CM

## 2022-10-20 LAB
ALBUMIN SERPL BCP-MCNC: 4.3 G/DL (ref 3.5–5)
ALP SERPL-CCNC: 79 U/L (ref 46–116)
ALT SERPL W P-5'-P-CCNC: 29 U/L (ref 12–78)
ANION GAP SERPL CALCULATED.3IONS-SCNC: 2 MMOL/L (ref 4–13)
AST SERPL W P-5'-P-CCNC: 24 U/L (ref 5–45)
BASOPHILS # BLD AUTO: 0.05 THOUSANDS/ΜL (ref 0–0.1)
BASOPHILS NFR BLD AUTO: 1 % (ref 0–1)
BILIRUB SERPL-MCNC: 0.97 MG/DL (ref 0.2–1)
BUN SERPL-MCNC: 9 MG/DL (ref 5–25)
CALCIUM SERPL-MCNC: 9.7 MG/DL (ref 8.3–10.1)
CHLORIDE SERPL-SCNC: 106 MMOL/L (ref 96–108)
CHOLEST SERPL-MCNC: 260 MG/DL
CO2 SERPL-SCNC: 33 MMOL/L (ref 21–32)
CREAT SERPL-MCNC: 0.9 MG/DL (ref 0.6–1.3)
EOSINOPHIL # BLD AUTO: 0.15 THOUSAND/ΜL (ref 0–0.61)
EOSINOPHIL NFR BLD AUTO: 3 % (ref 0–6)
ERYTHROCYTE [DISTWIDTH] IN BLOOD BY AUTOMATED COUNT: 12.2 % (ref 11.6–15.1)
GFR SERPL CREATININE-BSD FRML MDRD: 91 ML/MIN/1.73SQ M
GLUCOSE P FAST SERPL-MCNC: 75 MG/DL (ref 65–99)
HCT VFR BLD AUTO: 47.3 % (ref 36.5–49.3)
HDLC SERPL-MCNC: 56 MG/DL
HGB BLD-MCNC: 15.9 G/DL (ref 12–17)
IMM GRANULOCYTES # BLD AUTO: 0.01 THOUSAND/UL (ref 0–0.2)
IMM GRANULOCYTES NFR BLD AUTO: 0 % (ref 0–2)
LDLC SERPL CALC-MCNC: 183 MG/DL (ref 0–100)
LYMPHOCYTES # BLD AUTO: 1.84 THOUSANDS/ΜL (ref 0.6–4.47)
LYMPHOCYTES NFR BLD AUTO: 35 % (ref 14–44)
MCH RBC QN AUTO: 30.3 PG (ref 26.8–34.3)
MCHC RBC AUTO-ENTMCNC: 33.6 G/DL (ref 31.4–37.4)
MCV RBC AUTO: 90 FL (ref 82–98)
MONOCYTES # BLD AUTO: 0.44 THOUSAND/ΜL (ref 0.17–1.22)
MONOCYTES NFR BLD AUTO: 8 % (ref 4–12)
NEUTROPHILS # BLD AUTO: 2.81 THOUSANDS/ΜL (ref 1.85–7.62)
NEUTS SEG NFR BLD AUTO: 53 % (ref 43–75)
NONHDLC SERPL-MCNC: 204 MG/DL
NRBC BLD AUTO-RTO: 0 /100 WBCS
PLATELET # BLD AUTO: 224 THOUSANDS/UL (ref 149–390)
PMV BLD AUTO: 9.7 FL (ref 8.9–12.7)
POTASSIUM SERPL-SCNC: 5.3 MMOL/L (ref 3.5–5.3)
PROT SERPL-MCNC: 7.2 G/DL (ref 6.4–8.4)
PSA SERPL-MCNC: 1.7 NG/ML (ref 0–4)
RBC # BLD AUTO: 5.25 MILLION/UL (ref 3.88–5.62)
SODIUM SERPL-SCNC: 141 MMOL/L (ref 135–147)
TRIGL SERPL-MCNC: 106 MG/DL
WBC # BLD AUTO: 5.3 THOUSAND/UL (ref 4.31–10.16)

## 2022-10-20 PROCEDURE — 80053 COMPREHEN METABOLIC PANEL: CPT | Performed by: PHYSICIAN ASSISTANT

## 2022-10-20 PROCEDURE — 83655 ASSAY OF LEAD: CPT | Performed by: PHYSICIAN ASSISTANT

## 2022-10-20 PROCEDURE — G0103 PSA SCREENING: HCPCS | Performed by: PHYSICIAN ASSISTANT

## 2022-10-20 PROCEDURE — 71045 X-RAY EXAM CHEST 1 VIEW: CPT

## 2022-10-20 PROCEDURE — 85025 COMPLETE CBC W/AUTO DIFF WBC: CPT | Performed by: PHYSICIAN ASSISTANT

## 2022-10-20 PROCEDURE — 36415 COLL VENOUS BLD VENIPUNCTURE: CPT

## 2022-10-20 PROCEDURE — 80061 LIPID PANEL: CPT | Performed by: PHYSICIAN ASSISTANT

## 2022-10-21 LAB
ATRIAL RATE: 54 BPM
LEAD BLD-MCNC: <1 UG/DL (ref 0–3.4)
P AXIS: 60 DEGREES
PR INTERVAL: 192 MS
QRS AXIS: 23 DEGREES
QRSD INTERVAL: 82 MS
QT INTERVAL: 402 MS
QTC INTERVAL: 381 MS
T WAVE AXIS: 31 DEGREES
VENTRICULAR RATE: 54 BPM

## 2022-10-30 DIAGNOSIS — I10 ESSENTIAL HYPERTENSION: ICD-10-CM

## 2022-10-31 RX ORDER — LISINOPRIL 40 MG/1
TABLET ORAL
Qty: 90 TABLET | Refills: 1 | Status: SHIPPED | OUTPATIENT
Start: 2022-10-31

## 2022-11-02 DIAGNOSIS — E03.9 HYPOTHYROIDISM, UNSPECIFIED TYPE: ICD-10-CM

## 2022-11-02 RX ORDER — LEVOTHYROXINE SODIUM 0.07 MG/1
TABLET ORAL
Qty: 30 TABLET | Refills: 0 | Status: SHIPPED | OUTPATIENT
Start: 2022-11-02

## 2022-11-15 ENCOUNTER — CONSULT (OUTPATIENT)
Dept: GASTROENTEROLOGY | Facility: CLINIC | Age: 62
End: 2022-11-15

## 2022-11-15 VITALS
HEIGHT: 72 IN | SYSTOLIC BLOOD PRESSURE: 139 MMHG | TEMPERATURE: 98.6 F | DIASTOLIC BLOOD PRESSURE: 86 MMHG | BODY MASS INDEX: 21.26 KG/M2 | WEIGHT: 157 LBS

## 2022-11-15 DIAGNOSIS — K58.0 IRRITABLE BOWEL SYNDROME WITH DIARRHEA: Primary | ICD-10-CM

## 2022-11-15 DIAGNOSIS — K22.89 ESOPHAGEAL THICKENING: ICD-10-CM

## 2022-11-15 RX ORDER — DICYCLOMINE HYDROCHLORIDE 10 MG/1
10 CAPSULE ORAL
Qty: 120 CAPSULE | Refills: 0 | Status: SHIPPED | OUTPATIENT
Start: 2022-11-15 | End: 2022-12-15

## 2022-11-15 NOTE — PATIENT INSTRUCTIONS
Scheduled date of EGD(as of today): 12/23/22  Physician performing EGD: Dr Camilo  Location of EGD: [facility]   Instructions reviewed with patient by: Tehrese Summers   Clearances:  n/a

## 2022-11-15 NOTE — PROGRESS NOTES
[facility] - Outpatient Consultation  Ramo Hodges III 58 y o  male MRN: [id number]  Encounter: [id number]          ASSESSMENT AND PLAN:      1  Esophageal thickening  Patient underwent CT of the abdomen for generalized abdominal pain  He was found to have nonspecific concentric mural thickening of the distal esophagus, images were personally reviewed and interpreted  Patient discloses history of GERD and he is taking PPI twice a day although he still symptomatic and is complaining about globus sensation  Will perform EGD for assessment of these findings, this could represent esophagitis from reflux  Continue PPI    2   Irritable bowel syndrome with diarrhea  Patient with longstanding abdominal pain alternating mainly with diarrhea  States he has multiple bowel movements per day  After he has a bowel movement he feels like he has to have another one  He has made many changes on his diet but persists with symptoms  He has tried loperamide when he has severe diarrhea, he also has tried fiber supplementation, as well as Dulcolax when he is constipated  Recent endoscopic evaluation in 2020, his father has history of colon cancer  Will start Bentyl as needed, will hold off on endoscopic evaluation given recent colonoscopy  Will check fecal calprotectin  Follow-up in 2-3 months      ______________________________________________________________________    HPI:  Patient seen and examined, he is a 35-year-old male patient with past medical history significant for chronic abdominal pain associated with diarrhea, the patient was recently found to have esophageal thickening during CT of the abdomen and was referred to GI for further evaluation, otherwise he denies any recent events, currently is tolerating PO route, denies nausea or vomiting, is passing flatus and having daily bowel movements however he is complaining about the feeling of incomplete evacuation every single day, he denies any alarm symptoms such as weight loss or  GI bleeding, he does have family history of colon cancer in his father which was diagnosed at age [de-identified], he recent had a colonoscopy in 2020 with Dr Sriram Mike and is up-to-date for colon cancer screening      REVIEW OF SYSTEMS:    CONSTITUTIONAL: Denies any fever, chills, rigors, and weight loss  HEENT: No earache or tinnitus  Denies hearing loss or visual disturbances  CARDIOVASCULAR: No chest pain or palpitations  RESPIRATORY: Denies any cough, hemoptysis, shortness of breath or dyspnea on exertion  GASTROINTESTINAL: As noted in the History of Present Illness  GENITOURINARY: No problems with urination  Denies any hematuria or dysuria  NEUROLOGIC: No dizziness or vertigo, denies headaches  MUSCULOSKELETAL: Denies any muscle or joint pain  SKIN: Denies skin rashes or itching  ENDOCRINE: Denies excessive thirst  Denies intolerance to heat or cold  PSYCHOSOCIAL: Denies depression or anxiety  Denies any recent memory loss         Historical Information   Past Medical History:   Diagnosis Date   • Anxiety    • Change in bowel habits    • Chronic urticaria    • Depression    • Disease of thyroid gland    • GERD (gastroesophageal reflux disease)    • Hashimoto's disease    • Hashimoto's thyroiditis    • Hypertension    • Kidney stone    • Renal stones      Past Surgical History:   Procedure Laterality Date   • BACK SURGERY     • FL RETROGRADE PYELOGRAM  7/28/2019   • FL RETROGRADE PYELOGRAM  4/2/2022   • HERNIA REPAIR     • NASAL SEPTUM SURGERY     • FL CYSTO/URETERO W/LITHOTRIPSY &INDWELL STENT INSRT Right 7/28/2019    Procedure: CYSTOSCOPY URETEROSCOPY WITH LITHOTRIPSY HOLMIUM LASER, basket stone extraction, RETROGRADE PYELOGRAM AND INSERTION STENT URETERAL;  Surgeon: Oliva Thomas MD;  [facility];  Service: Urology   • FL CYSTO/URETERO W/LITHOTRIPSY &INDWELL STENT INSRT Right 4/2/2022    Procedure: CYSTOSCOPY URETEROSCOPY  RETROGRADE PYELOGRAM AND INSERTION STENT URETERAL;  Surgeon: Ana Cristina Gomez MD;  [facility];  Service: Urology   • [place] CYSTO/URETERO W/LITHOTRIPSY &INDWELL STENT INSRT Right 4/19/2022    Procedure: CYSTOSCOPY URETEROSCOPY WITH LITHOTRIPSY HOLMIUM LASER, RETROGRADE PYELOGRAM AND EXCHANGE STENT URETERAL ;  Surgeon: Skyler Rollins MD;  [facility];  Service: Urology   • CO REPAIR ING HERNIA,5+Y/O,REDUCIBL Left 12/13/2021    Procedure: INGUINAL HERNIA REPAIR;  Surgeon: Sinai Martínez DO;  [facility];  Service: General   • TONSILLECTOMY  1979     Social History   Social History     Substance and Sexual Activity   Alcohol Use Yes   • Alcohol/week: 0 0 standard drinks    Comment: social     Social History     Substance and Sexual Activity   Drug Use Never     Social History     Tobacco Use   Smoking Status Never Smoker   Smokeless Tobacco Never Used     Family History   Problem Relation Age of Onset   • Cancer Mother         Thyroid   • Stroke Mother    • Colon cancer Father    • Colon polyps Father    • Valvular heart disease Father    • Cancer Sister         Skin   • Suicidality Brother        Meds/Allergies       Current Outpatient Medications:   •  citric acid-potassium citrate (Cytra-K) 1,100-334 mg/5 mL solution  •  clonazePAM (KlonoPIN) 0 5 mg tablet  •  dicyclomine (BENTYL) 10 mg capsule  •  levothyroxine 75 mcg tablet  •  lisinopril (ZESTRIL) 40 mg tablet  •  Coenzyme Q10 (COQ10) 200 MG CAPS  •  dexamethasone (DECADRON) 4 mg tablet  •  lidocaine-prilocaine (EMLA) cream  •  metoclopramide (REGLAN) 5 mg tablet  •  ondansetron (ZOFRAN) 4 mg tablet  •  oxybutynin (DITROPAN) 5 mg tablet  •  oxybutynin (DITROPAN) 5 mg tablet  •  pantoprazole (PROTONIX) 40 mg tablet  •  tamsulosin (FLOMAX) 0 4 mg    Allergies   Allergen Reactions   • Compazine [Prochlorperazine] GI Intolerance   • Percocet [Oxycodone-Acetaminophen] Itching     Facial itching   • Sulfa Antibiotics Rash           Objective     Blood pressure 139/86, temperature 98 6 °F (37 °C), temperature source Tympanic, height 6' (1 829 m), weight 71 2 kg (157 lb)  Body mass index is 21 29 kg/m²  PHYSICAL EXAM:      General Appearance:   Alert, cooperative, no distress   HEENT:   Normocephalic, atraumatic, anicteric      Neck:  Supple, symmetrical, trachea midline   Lungs:   Clear to auscultation bilaterally; no rales, rhonchi or wheezing; respirations unlabored    Heart[de-identified]   Regular rate and rhythm; no murmur, rub, or gallop  Abdomen:   Soft, non-tender, non-distended; normal bowel sounds; no masses, no organomegaly    Genitalia:   Deferred    Rectal:   Deferred    Extremities:  No cyanosis, clubbing or edema    Pulses:  2+ and symmetric    Skin:  No jaundice, rashes, or lesions    Lymph nodes:  No palpable cervical lymphadenopathy        Lab Results:   No visits with results within 1 Day(s) from this visit  Latest known visit with results is:   Orders Only on 10/20/2022   Component Date Value   • Cholesterol 10/20/2022 260 (A)   • Triglycerides 10/20/2022 106    • HDL, Direct 10/20/2022 56    • LDL Calculated 10/20/2022 183 (A)   • Non-HDL-Chol (CHOL-HDL) 10/20/2022 204    • Lead 10/20/2022 <1 0    • Sodium 10/20/2022 141    • Potassium 10/20/2022 5 3    • Chloride 10/20/2022 106    • CO2 10/20/2022 33 (A)   • ANION GAP 10/20/2022 2 (A)   • BUN 10/20/2022 9    • Creatinine 10/20/2022 0 90    • Glucose, Fasting 10/20/2022 75    • Calcium 10/20/2022 9 7    • AST 10/20/2022 24    • ALT 10/20/2022 29    • Alkaline Phosphatase 10/20/2022 79    • Total Protein 10/20/2022 7 2    • Albumin 10/20/2022 4 3    • Total Bilirubin 10/20/2022 0 97    • eGFR 10/20/2022 91    • PSA 10/20/2022 1 7          Radiology Results:   XR CHEST B READER    Result Date: 10/22/2022  Narrative: CHEST INDICATION:   Z00 00: Encounter for general adult medical examination without abnormal findings  COMPARISON:  CXR 7/12/2021, abdomen CT 10/7/2022, chest CT 6/3/2017   EXAM PERFORMED/VIEWS:  XR CHEST B READER FINDINGS: Cardiomediastinal silhouette appears unremarkable  The lungs are clear  No pneumothorax or pleural effusion  Osseous structures appear within normal limits for patient age  Healed bilateral rib fractures  Impression: No acute cardiopulmonary disease   Workstation performed: [id number]     Answers for HPI/ROS submitted by the patient on 11/13/2022  Chronicity: chronic  Onset: more than 1 year ago  Onset quality: undetermined  Frequency: 2 to 4 times per day  Episode duration: 10 hours  Progression since onset: gradually worsening  Pain location: generalized abdominal region  Pain - numeric: 6/10  Pain quality: aching, burning, cramping, dull, a sensation of fullness, sharp, tearing  Radiates to: LLQ, LUQ, RLQ, RUQ, epigastric region, periumbilical region, suprapubic region, left shoulder, right shoulder, chest, back, left flank, right flank, pelvis, perineum, scrotum  anorexia: Yes  arthralgias: Yes  belching: No  constipation: Yes  diarrhea: Yes  dysuria: No  fever: No  flatus: No  frequency: No  headaches: No  hematochezia: No  hematuria: No  melena: No  myalgias: Yes  nausea: Yes  weight loss: No  vomiting: No  Aggravated by: nothing  Relieved by: nothing  Diagnostic workup: CT scan

## 2022-11-21 DIAGNOSIS — N20.0 NEPHROLITHIASIS: ICD-10-CM

## 2022-11-30 DIAGNOSIS — E03.9 HYPOTHYROIDISM, UNSPECIFIED TYPE: ICD-10-CM

## 2022-12-01 RX ORDER — LEVOTHYROXINE SODIUM 0.07 MG/1
TABLET ORAL
Qty: 30 TABLET | Refills: 0 | Status: SHIPPED | OUTPATIENT
Start: 2022-12-01

## 2022-12-09 ENCOUNTER — APPOINTMENT (OUTPATIENT)
Dept: LAB | Facility: CLINIC | Age: 62
End: 2022-12-09

## 2022-12-09 DIAGNOSIS — K58.0 IRRITABLE BOWEL SYNDROME WITH DIARRHEA: ICD-10-CM

## 2022-12-09 LAB
BACTERIA UR QL AUTO: NORMAL /HPF
BILIRUB UR QL STRIP: NEGATIVE
CALCIUM PRE 500 MG CA PO UR-SCNC: <5 MG/DL
CHLORIDE UR-SCNC: 94 MMOL/L
CLARITY UR: CLEAR
COLOR UR: NORMAL
CREAT UR-MCNC: 134.4 MG/DL
GLUCOSE UR STRIP-MCNC: NEGATIVE MG/DL
HGB UR QL STRIP.AUTO: NEGATIVE
KETONES UR STRIP-MCNC: NEGATIVE MG/DL
LEUKOCYTE ESTERASE UR QL STRIP: NEGATIVE
NITRITE UR QL STRIP: NEGATIVE
NON-SQ EPI CELLS URNS QL MICRO: NORMAL /HPF
OSMOLALITY UR: 503 MMOL/KG
PH UR STRIP.AUTO: 5.5 [PH]
POTASSIUM UR-SCNC: 29.5 MMOL/L
PROT UR STRIP-MCNC: NEGATIVE MG/DL
RBC #/AREA URNS AUTO: NORMAL /HPF
SODIUM 24H UR-SCNC: 106 MOL/L
SP GR UR STRIP.AUTO: 1.02 (ref 1–1.03)
UROBILINOGEN UR STRIP-ACNC: <2 MG/DL
WBC #/AREA URNS AUTO: NORMAL /HPF

## 2022-12-13 ENCOUNTER — OFFICE VISIT (OUTPATIENT)
Dept: UROLOGY | Facility: AMBULATORY SURGERY CENTER | Age: 62
End: 2022-12-13

## 2022-12-13 VITALS
HEART RATE: 62 BPM | SYSTOLIC BLOOD PRESSURE: 112 MMHG | DIASTOLIC BLOOD PRESSURE: 80 MMHG | WEIGHT: 161 LBS | OXYGEN SATURATION: 97 % | BODY MASS INDEX: 21.84 KG/M2

## 2022-12-13 DIAGNOSIS — N20.0 CALCULUS OF KIDNEY: Primary | ICD-10-CM

## 2022-12-13 NOTE — PROGRESS NOTES
12/13/2022    Leonardo Aguillon III  1960  [id number]        Assessment  History of right nephrolithiasis status post right ureteroscopy, possible RTA, routine prostate cancer screening      Discussion  I provided Ebb Sender with reassurance that his PSA has remained between 1 3 and 1 9 over the course of the last 4 years  PSA and digital rectal examination can be performed every other year per AUA guidelines  We discussed his current right-sided stone burden based on his most recent CT scan from October 2022  I recommend conservative management at this time and continue to follow-up with nephrology  He will continue on the potassium citrate  He was instructed to call if he has right-sided flank pain  The patient is amenable with this plan  History of Present Illness  58 y o  male with a history of nephrolithiasis  Perhaps there is a history of renal tubular acidosis  He is following with nephrology  He previously underwent right stenting followed by right ureteroscopy in 2022  By report from Dr Lemon Player operative note there were intraparenchymal stones that were unable to be removed  He is feeling well at this time  He was referred to nephrology and was switched from potassium chloride to potassiums citrate  His PSA levels over the last 4 years have remained stable between 1 3 and 1 9  There is no family history of prostate cancer          AUA Symptom Score  AUA SYMPTOM SCORE    Flowsheet Row Most Recent Value   AUA SYMPTOM SCORE    How often have you had a sensation of not emptying your bladder completely after you finished urinating? 0 (P)     How often have you had to urinate again less than two hours after you finished urinating? 1 (P)     How often have you found you stopped and started again several times when you urinate? 0 (P)     How often have you found it difficult to postpone urination? 0 (P)     How often have you had a weak urinary stream? 0 (P)     How often have you had to push or strain to begin urination? 0 (P)     How many times did you most typically get up to urinate from the time you went to bed at night until the time you got up in the morning? 0 (P)     Quality of Life: If you were to spend the rest of your life with your urinary condition just the way it is now, how would you feel about that? 1 (P)     AUA SYMPTOM SCORE 1 (P)           Review of Systems  Review of Systems   Constitutional: Negative  HENT: Negative  Eyes: Negative  Respiratory: Negative  Cardiovascular: Negative  Gastrointestinal: Negative  Endocrine: Negative  Genitourinary:        Per HPI   Musculoskeletal: Negative  Skin: Negative  Allergic/Immunologic: Negative  Neurological: Negative  Hematological: Negative  Psychiatric/Behavioral: Negative            Past Medical History  Past Medical History:   Diagnosis Date   • Anxiety    • Change in bowel habits    • Chronic urticaria    • Depression    • Disease of thyroid gland    • GERD (gastroesophageal reflux disease)    • Hashimoto's disease    • Hashimoto's thyroiditis    • Hypertension    • Kidney stone    • Renal stones        Past Social History  Past Surgical History:   Procedure Laterality Date   • BACK SURGERY     • FL RETROGRADE PYELOGRAM  7/28/2019   • FL RETROGRADE PYELOGRAM  4/2/2022   • HERNIA REPAIR     • NASAL SEPTUM SURGERY     • [place] CYSTO/URETERO W/LITHOTRIPSY &INDWELL STENT INSRT Right 7/28/2019    Procedure: CYSTOSCOPY URETEROSCOPY WITH LITHOTRIPSY HOLMIUM LASER, basket stone extraction, RETROGRADE PYELOGRAM AND INSERTION STENT URETERAL;  Surgeon: Vitaly Dubon MD;  [facility];  Service: Urology   • [place] CYSTO/URETERO W/LITHOTRIPSY &INDWELL STENT INSRT Right 4/2/2022    Procedure: CYSTOSCOPY URETEROSCOPY  RETROGRADE PYELOGRAM AND INSERTION STENT URETERAL;  Surgeon: Teofilo Stone MD;  [facility];  Service: Urology   • [place] CYSTO/URETERO W/LITHOTRIPSY &INDWELL STENT INSRT Right 4/19/2022 Procedure: CYSTOSCOPY URETEROSCOPY WITH LITHOTRIPSY HOLMIUM LASER, RETROGRADE PYELOGRAM AND EXCHANGE STENT URETERAL ;  Surgeon: Skyler Rollins MD;  [facility];  Service: Urology   • [place] REPAIR ING HERNIA,5+Y/O,REDUCIBL Left 12/13/2021    Procedure: INGUINAL HERNIA REPAIR;  Surgeon: Sinai Martínez DO;  [facility];  Service: General   • TONSILLECTOMY  1979       Past Family History  Family History   Problem Relation Age of Onset   • Cancer Mother         Thyroid   • Stroke Mother    • Colon cancer Father    • Colon polyps Father    • Valvular heart disease Father    • Cancer Sister         Skin   • Suicidality Brother        Past Social history  Social History     Socioeconomic History   • Marital status: /Civil Union     Spouse name: Not on file   • Number of children: Not on file   • Years of education: Not on file   • Highest education level: Not on file   Occupational History   • Not on file   Tobacco Use   • Smoking status: Never   • Smokeless tobacco: Never   Vaping Use   • Vaping Use: Never used   Substance and Sexual Activity   • Alcohol use:  Yes     Alcohol/week: 0 0 standard drinks     Comment: social   • Drug use: Never   • Sexual activity: Yes     Partners: Female   Other Topics Concern   • Not on file   Social History Narrative   • Not on file     Social Determinants of Health     Financial Resource Strain: Not on file   Food Insecurity: Not on file   Transportation Needs: Not on file   Physical Activity: Not on file   Stress: Not on file   Social Connections: Not on file   Intimate Partner Violence: Not on file   Housing Stability: Not on file       Current Medications  Current Outpatient Medications   Medication Sig Dispense Refill   • citric acid-potassium citrate (POLYCITRA K) 1,100-334 mg/5 mL solution Take 15 mL by mouth 2 (two) times a day Daily on weekdays and BID on weekends (Patient taking differently: Take 15 mL by mouth in the morning) 473 mL 1   • clonazePAM (KlonoPIN) 0 5 mg tablet Take 0 5 mg by mouth daily at bedtime     • dicyclomine (BENTYL) 10 mg capsule Take 1 capsule (10 mg total) by mouth 4 (four) times a day (before meals and at bedtime) 120 capsule 0   • levothyroxine 75 mcg tablet TAKE ONE TABLET BY MOUTH EVERY DAY 30 tablet 0   • lisinopril (ZESTRIL) 40 mg tablet TAKE ONE TABLET BY MOUTH EVERY DAY 90 tablet 1   • pantoprazole (PROTONIX) 40 mg tablet Take 1 tablet (40 mg total) by mouth 2 (two) times a day 60 tablet 3     No current facility-administered medications for this visit  Allergies  Allergies   Allergen Reactions   • Compazine [Prochlorperazine] GI Intolerance   • Percocet [Oxycodone-Acetaminophen] Itching     Facial itching   • Sulfa Antibiotics Rash       Past Medical History, Social History, Family History, medications and allergies were reviewed  Vitals  Vitals:    12/13/22 1501   BP: 112/80   BP Location: Left arm   Patient Position: Sitting   Cuff Size: Adult   Pulse: 62   SpO2: 97%   Weight: 73 kg (161 lb)       Physical Exam  Physical Exam  On examination he is in no acute distress    Gait normal   Affect normal      Results  Lab Results   Component Value Date    PSA 1 7 10/20/2022    PSA 1 3 03/11/2022    PSA 1 9 07/12/2021     Lab Results   Component Value Date    GLUCOSE 91 06/03/2017    CALCIUM 9 7 10/20/2022     09/18/2015    K 5 3 10/20/2022    CO2 33 (H) 10/20/2022     10/20/2022    BUN 9 10/20/2022    CREATININE 0 90 10/20/2022     Lab Results   Component Value Date    WBC 5 30 10/20/2022    HGB 15 9 10/20/2022    HCT 47 3 10/20/2022    MCV 90 10/20/2022     10/20/2022         Office Urine Dip  No results found for this or any previous visit (from the past 1 hour(s)) ]

## 2022-12-14 LAB — CALPROTECTIN STL-MCNT: 43 UG/G (ref 0–120)

## 2022-12-16 ENCOUNTER — TELEPHONE (OUTPATIENT)
Dept: NEPHROLOGY | Facility: CLINIC | Age: 62
End: 2022-12-16

## 2022-12-20 DIAGNOSIS — N17.9 ACUTE KIDNEY INJURY (HCC): ICD-10-CM

## 2022-12-20 DIAGNOSIS — E87.6 HYPOKALEMIA: Primary | ICD-10-CM

## 2022-12-20 DIAGNOSIS — N20.0 RENAL STONES: ICD-10-CM

## 2022-12-22 ENCOUNTER — TELEPHONE (OUTPATIENT)
Dept: NEPHROLOGY | Facility: CLINIC | Age: 62
End: 2022-12-22

## 2022-12-22 DIAGNOSIS — N17.9 ACUTE KIDNEY INJURY (HCC): ICD-10-CM

## 2022-12-22 DIAGNOSIS — E87.6 HYPOKALEMIA: Primary | ICD-10-CM

## 2022-12-22 DIAGNOSIS — N20.0 CALCULUS OF KIDNEY: ICD-10-CM

## 2022-12-22 RX ORDER — SODIUM CHLORIDE 9 MG/ML
125 INJECTION, SOLUTION INTRAVENOUS CONTINUOUS
Status: CANCELLED | OUTPATIENT
Start: 2022-12-22

## 2022-12-23 ENCOUNTER — HOSPITAL ENCOUNTER (OUTPATIENT)
Dept: GASTROENTEROLOGY | Facility: MEDICAL CENTER | Age: 62
Setting detail: OUTPATIENT SURGERY
End: 2022-12-23

## 2022-12-23 ENCOUNTER — ANESTHESIA EVENT (OUTPATIENT)
Dept: GASTROENTEROLOGY | Facility: MEDICAL CENTER | Age: 62
End: 2022-12-23

## 2022-12-23 ENCOUNTER — ANESTHESIA (OUTPATIENT)
Dept: GASTROENTEROLOGY | Facility: MEDICAL CENTER | Age: 62
End: 2022-12-23

## 2022-12-23 VITALS
DIASTOLIC BLOOD PRESSURE: 79 MMHG | SYSTOLIC BLOOD PRESSURE: 139 MMHG | WEIGHT: 160 LBS | HEART RATE: 85 BPM | RESPIRATION RATE: 16 BRPM | OXYGEN SATURATION: 97 % | BODY MASS INDEX: 21.67 KG/M2 | HEIGHT: 72 IN

## 2022-12-23 DIAGNOSIS — K22.89 ESOPHAGEAL THICKENING: ICD-10-CM

## 2022-12-23 RX ORDER — SODIUM CHLORIDE 9 MG/ML
125 INJECTION, SOLUTION INTRAVENOUS CONTINUOUS
Status: DISCONTINUED | OUTPATIENT
Start: 2022-12-23 | End: 2022-12-27 | Stop reason: HOSPADM

## 2022-12-23 RX ORDER — PROPOFOL 10 MG/ML
INJECTION, EMULSION INTRAVENOUS AS NEEDED
Status: DISCONTINUED | OUTPATIENT
Start: 2022-12-23 | End: 2022-12-23

## 2022-12-23 RX ORDER — LIDOCAINE HYDROCHLORIDE 20 MG/ML
INJECTION, SOLUTION EPIDURAL; INFILTRATION; INTRACAUDAL; PERINEURAL AS NEEDED
Status: DISCONTINUED | OUTPATIENT
Start: 2022-12-23 | End: 2022-12-23

## 2022-12-23 RX ORDER — PROPOFOL 10 MG/ML
INJECTION, EMULSION INTRAVENOUS CONTINUOUS PRN
Status: DISCONTINUED | OUTPATIENT
Start: 2022-12-23 | End: 2022-12-23

## 2022-12-23 RX ORDER — ONDANSETRON 2 MG/ML
INJECTION INTRAMUSCULAR; INTRAVENOUS AS NEEDED
Status: DISCONTINUED | OUTPATIENT
Start: 2022-12-23 | End: 2022-12-23

## 2022-12-23 RX ADMIN — LIDOCAINE HYDROCHLORIDE 100 MG: 20 INJECTION, SOLUTION EPIDURAL; INFILTRATION; INTRACAUDAL at 13:37

## 2022-12-23 RX ADMIN — SODIUM CHLORIDE 125 ML/HR: 0.9 INJECTION, SOLUTION INTRAVENOUS at 13:04

## 2022-12-23 RX ADMIN — ONDANSETRON 4 MG: 2 INJECTION INTRAMUSCULAR; INTRAVENOUS at 13:37

## 2022-12-23 RX ADMIN — PROPOFOL 150 MCG/KG/MIN: 10 INJECTION, EMULSION INTRAVENOUS at 13:37

## 2022-12-23 RX ADMIN — PROPOFOL 150 MG: 10 INJECTION, EMULSION INTRAVENOUS at 13:37

## 2022-12-23 NOTE — ANESTHESIA PREPROCEDURE EVALUATION
Procedure:  EGD    Relevant Problems   ENDO   (+) Hypothyroidism due to Hashimoto's thyroiditis      GI/HEPATIC   (+) Esophageal reflux      /RENAL   (+) Acute kidney injury ([address] )   (+) Calculus of kidney   (+) Renal stones      MUSCULOSKELETAL   (+) Arthritis   (+) CMC DJD(carpometacarpal degenerative joint disease), localized primary, right   (+) Primary osteoarthritis of first carpometacarpal joint of right hand      NEURO/PSYCH   (+) Anxiety   (+) Depression   (+) Headache        Physical Exam    Airway    Mallampati score: I         Dental       Cardiovascular  Rhythm: regular, Rate: normal, Cardiovascular exam normal    Pulmonary  Pulmonary exam normal     Other Findings        Anesthesia Plan  ASA Score- 2     Anesthesia Type- IV sedation with anesthesia with ASA Monitors  Additional Monitors:   Airway Plan:           Plan Factors-Exercise tolerance (METS): >4 METS  Chart reviewed  Existing labs reviewed  Patient summary reviewed  Patient is not a current smoker  Induction- intravenous  Postoperative Plan-     Informed Consent- Anesthetic plan and risks discussed with patient

## 2022-12-23 NOTE — ANESTHESIA POSTPROCEDURE EVALUATION
Post-Op Assessment Note    CV Status:  Stable    Pain management: adequate     Mental Status:  Alert and awake   Hydration Status:  Euvolemic   PONV Controlled:  Controlled   Airway Patency:  Patent      Post Op Vitals Reviewed: Yes      Staff: Anesthesiologist         No notable events documented      /92 (12/23/22 1350)    Temp      Pulse 80 (12/23/22 1350)   Resp 16 (12/23/22 1350)    SpO2 94 % (12/23/22 1350)

## 2022-12-23 NOTE — H&P
History and Physical - [facility]  Yudith Figueroa III 58 y o  male MRN: [id number]                  HPI: Mariam Goldman is a 58y o  year old male who presents for GERD and abnormal imaging of the esophagus  REVIEW OF SYSTEMS: Per the HPI, and otherwise unremarkable      Historical Information   Past Medical History:   Diagnosis Date   • Anxiety    • Change in bowel habits    • Chronic urticaria    • Depression    • Disease of thyroid gland    • GERD (gastroesophageal reflux disease)    • Hashimoto's disease    • Hashimoto's thyroiditis    • Hypertension    • Kidney stone    • Renal stones      Past Surgical History:   Procedure Laterality Date   • BACK SURGERY     • FL RETROGRADE PYELOGRAM  7/28/2019   • FL RETROGRADE PYELOGRAM  4/2/2022   • HERNIA REPAIR     • NASAL SEPTUM SURGERY     • [place] CYSTO/URETERO W/LITHOTRIPSY &INDWELL STENT INSRT Right 7/28/2019    Procedure: CYSTOSCOPY URETEROSCOPY WITH LITHOTRIPSY HOLMIUM LASER, basket stone extraction, RETROGRADE PYELOGRAM AND INSERTION STENT URETERAL;  Surgeon: Magda Alvarez MD;  [facility];  Service: Urology   • [place] CYSTO/URETERO W/LITHOTRIPSY &INDWELL STENT INSRT Right 4/2/2022    Procedure: CYSTOSCOPY URETEROSCOPY  RETROGRADE PYELOGRAM AND INSERTION STENT URETERAL;  Surgeon: Agatha Cook MD;  [facility];  Service: Urology   • [place] CYSTO/URETERO W/LITHOTRIPSY &INDWELL STENT INSRT Right 4/19/2022    Procedure: CYSTOSCOPY URETEROSCOPY WITH LITHOTRIPSY HOLMIUM LASER, RETROGRADE PYELOGRAM AND EXCHANGE STENT URETERAL ;  Surgeon: Sharmila Fagan MD;  [facility];  Service: Urology   • HI REPAIR [address] HERNIA,5+Y/O,REDUCIBL Left 12/13/2021    Procedure: INGUINAL HERNIA REPAIR;  Surgeon: John Bautista DO;  [facility];  Service: General   • TONSILLECTOMY  1979     Social History   Social History     Substance and Sexual Activity   Alcohol Use Yes   • Alcohol/week: 0 0 standard drinks    Comment: social     Social History     Substance and Sexual Activity   Drug Use Never     Social History     Tobacco Use   Smoking Status Never   Smokeless Tobacco Never     Family History   Problem Relation Age of Onset   • Cancer Mother         Thyroid   • Stroke Mother    • Colon cancer Father    • Colon polyps Father    • Valvular heart disease Father    • Cancer Sister         Skin   • Suicidality Brother        Meds/Allergies     (Not in a hospital admission)      Allergies   Allergen Reactions   • Compazine [Prochlorperazine] GI Intolerance   • Percocet [Oxycodone-Acetaminophen] Itching     Facial itching   • Sulfa Antibiotics Rash       Objective     There were no vitals taken for this visit  PHYSICAL EXAMINATION:    General Appearance:   Alert, cooperative, no distress   HEENT:  Normocephalic, atraumatic, anicteric  Neck supple, symmetrical, trachea midline  Lungs:   Equal chest rise and unlabored breathing, normal effort, no coughing  Cardiovascular:   No visualized JVD  Abdomen:   No abdominal distension  Skin:   No jaundice, rashes, or lesions  Musculoskeletal:   Normal range of motion visualized  Psych:  Normal affect and normal insight  Neuro:  Alert and appropriate  ASSESSMENT/PLAN:  This is a 58y o  year old male here for EGD, and he is stable and optimized for his procedure

## 2022-12-24 ENCOUNTER — APPOINTMENT (OUTPATIENT)
Dept: LAB | Facility: CLINIC | Age: 62
End: 2022-12-24

## 2022-12-24 DIAGNOSIS — N25.89 RENAL TUBULAR ACIDOSIS: ICD-10-CM

## 2022-12-24 DIAGNOSIS — E87.6 HYPOKALEMIA: ICD-10-CM

## 2022-12-24 LAB
ANION GAP SERPL CALCULATED.3IONS-SCNC: 5 MMOL/L (ref 4–13)
BUN SERPL-MCNC: 10 MG/DL (ref 5–25)
CALCIUM SERPL-MCNC: 9 MG/DL (ref 8.4–10.2)
CHLORIDE SERPL-SCNC: 106 MMOL/L (ref 96–108)
CO2 SERPL-SCNC: 31 MMOL/L (ref 21–32)
CORTIS AM PEAK SERPL-MCNC: 18 UG/DL (ref 4.2–22.4)
CREAT SERPL-MCNC: 0.92 MG/DL (ref 0.6–1.3)
GFR SERPL CREATININE-BSD FRML MDRD: 88 ML/MIN/1.73SQ M
GLUCOSE P FAST SERPL-MCNC: 92 MG/DL (ref 65–99)
POTASSIUM SERPL-SCNC: 4.2 MMOL/L (ref 3.5–5.3)
PTH-INTACT SERPL-MCNC: 41.1 PG/ML (ref 18.4–80.1)
SODIUM SERPL-SCNC: 142 MMOL/L (ref 135–147)

## 2022-12-27 ENCOUNTER — TELEPHONE (OUTPATIENT)
Dept: NEPHROLOGY | Facility: CLINIC | Age: 62
End: 2022-12-27

## 2022-12-27 LAB — ALDOST SERPL-MCNC: 5.7 NG/DL (ref 0–30)

## 2022-12-27 NOTE — TELEPHONE ENCOUNTER
Lm to inform pt we do need to cancel and reschedule his appt with Dr Roscoe Green   Provided our call back number

## 2022-12-28 ENCOUNTER — OFFICE VISIT (OUTPATIENT)
Dept: FAMILY MEDICINE CLINIC | Facility: CLINIC | Age: 62
End: 2022-12-28

## 2022-12-28 ENCOUNTER — OFFICE VISIT (OUTPATIENT)
Dept: NEPHROLOGY | Facility: CLINIC | Age: 62
End: 2022-12-28

## 2022-12-28 VITALS
OXYGEN SATURATION: 100 % | TEMPERATURE: 97.8 F | BODY MASS INDEX: 21.54 KG/M2 | HEIGHT: 72 IN | WEIGHT: 159 LBS | SYSTOLIC BLOOD PRESSURE: 128 MMHG | DIASTOLIC BLOOD PRESSURE: 82 MMHG | HEART RATE: 72 BPM

## 2022-12-28 VITALS
WEIGHT: 159 LBS | SYSTOLIC BLOOD PRESSURE: 158 MMHG | HEIGHT: 72 IN | DIASTOLIC BLOOD PRESSURE: 96 MMHG | BODY MASS INDEX: 21.54 KG/M2

## 2022-12-28 DIAGNOSIS — N20.0 NEPHROLITHIASIS: Primary | ICD-10-CM

## 2022-12-28 DIAGNOSIS — Z00.00 ANNUAL PHYSICAL EXAM: Primary | ICD-10-CM

## 2022-12-28 PROBLEM — J10.1 INFLUENZA DUE TO INFLUENZA VIRUS, TYPE B: Status: RESOLVED | Noted: 2017-12-21 | Resolved: 2022-12-28

## 2022-12-28 PROBLEM — R10.9 RIGHT FLANK PAIN: Status: RESOLVED | Noted: 2019-07-27 | Resolved: 2022-12-28

## 2022-12-28 PROBLEM — S63.509A WRIST SPRAIN: Status: RESOLVED | Noted: 2017-06-04 | Resolved: 2022-12-28

## 2022-12-28 PROBLEM — J32.9 SINUSITIS: Status: RESOLVED | Noted: 2017-12-22 | Resolved: 2022-12-28

## 2022-12-28 PROBLEM — S13.9XXA CERVICAL SPRAIN: Status: RESOLVED | Noted: 2017-06-04 | Resolved: 2022-12-28

## 2022-12-28 PROBLEM — R51.9 HEADACHE: Status: RESOLVED | Noted: 2017-12-22 | Resolved: 2022-12-28
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
## 2022-12-28 PROBLEM — E87.6 HYPOKALEMIA: Status: RESOLVED | Noted: 2017-12-20 | Resolved: 2022-12-28
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
## 2022-12-28 NOTE — PATIENT INSTRUCTIONS

## 2022-12-28 NOTE — PROGRESS NOTES
NEPHROLOGY OUTPATIENT PROGRESS NOTE   Nasir Macedo III 58 y o  male MRN: [id number]  DATE: 12/29/2022    Reason for visit:   Chief Complaint   Patient presents with   • Follow-up        Patient Instructions   Thank you for coming to your visit today  As we discussed you kidney function is normal  Please follow the recommendations below       • Recommend low sodium (salt) food    • Avoid nonsteroidal anti-inflammatory drugs such as Naprosyn, ibuprofen, Aleve, Advil, Celebrex, Meloxicam (Mobic) etc   You can use Tylenol as needed if you do not have any liver condition to be concerned about    • Try to avoid medications such as pantoprazole or  Protonix/Nexium or Esomeprazole)/Prilosec or omeprazole/Prevacid or lansoprazole/AcipHex or Rabeprazole  If you are able to, use Pepcid as this is safer for your kidneys  Measures to reduce stone development:    · Please Drink  oz of water or 2 5L-3 L a day, throughout the day  · Avoid salt/low-salt diet   · Try to decrease animal protein intake, dairy protein and vegetable base protein are better  · Increase fruits and vegetables as much as possible  · Avoid calcium products such as Tums or other types of calcium containing antacids, you can use Pepcid for indigestion (but you do not have to restrict your dietary calcium)  · Avoid excessive vitamin D   · Avoid excessive vitamin C  · Try to avoid oxalate products (please refer to the diet sheet)  · Limit fructose and sucrose type drinks such as coke    Next Visit in 6 months with results   If you need to see us earlier we can change the appointment for you      Tg Gotti MD  Nephrology Attending               Jenifer Li was seen today for follow-up  Diagnoses and all orders for this visit:    Nephrolithiasis  -     Basic metabolic panel;  Future  -     Urinalysis with microscopic  -     Protein / creatinine ratio, urine        Assessment/Plan:   58 y o  man PMH recurrent nephrolithiasis (8-9mm stone, multiple small stones vs calcifications on right kidney), HTN, COVID infection on 8/2022  Patient is here for follow up of nephrolithiasis      PLAN:     #Recurrent Nephrolithiasis RTA ? 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 • Potassium citrate as above         #Volume status/hypertension:  • Volume:euvolemic   • Blood pressure:hypertensive, goal 158/96mmhg   • Recommend:  • Low sodium diet  • Continue Lisinopril  40 mg        SUBJECTIVE / INTERVAL HISTORY:  58 y o  male presents in follow up of hypokalemia and nephrolithiasis  Patient has kidney stones on right kidney as per patient no intervention recommended by urology  No SOB, no CP, taking potassium citrate     Shin Fay III denies any recent illness/hospitalizations/medication changes since last office visit  Review of Systems   Constitutional: Negative for activity change  HENT: Negative for dental problem  Eyes: Negative for discharge  Respiratory: Negative for apnea  Cardiovascular: Negative for chest pain, palpitations and leg swelling  Gastrointestinal: Negative for abdominal distention and abdominal pain  Endocrine: Negative for cold intolerance  Genitourinary: Negative for dysuria  Musculoskeletal: Negative for back pain  Skin: Negative for color change and pallor  Neurological: Negative for dizziness  Psychiatric/Behavioral: Negative for agitation         OBJECTIVE:  /96 (BP Location: Right arm, Patient Position: Sitting, Cuff Size: Standard)   Ht 6' (1 829 m)   Wt 72 1 kg (159 lb)   BMI 21 56 kg/m²  Body mass index is 21 56 kg/m²  Physical exam:  Physical Exam   General:   no acute distress at this time  Skin:  No acute rash  Eyes:  No scleral icterus and noninjected  ENT:  mucous membranes moist  Neck:  no carotid bruits  Chest:  Clear to auscultation percussion, good respiratory effort, no use of accessory respiratory muscles  CVS:  Regular rate and rhythm without rub ,  Abdomen:  soft and nontender   Extremities no significant lower extremity edema  Neuro:  No gross focality  Psych:  Alert , cooperative       Medications:    Current Outpatient Medications:   •  citric acid-potassium citrate (POLYCITRA K) 1,100-334 mg/5 mL solution, Take 15 mL by mouth 2 (two) times a day Daily on weekdays and BID on weekends (Patient taking differently: Take 15 mL by mouth in the morning), Disp: 473 mL, Rfl: 1  •  clonazePAM (KlonoPIN) 0 5 mg tablet, Take 0 5 mg by mouth daily at bedtime, Disp: , Rfl:   •  dicyclomine (BENTYL) 10 mg capsule, Take 1 capsule (10 mg total) by mouth 4 (four) times a day (before meals and at bedtime), Disp: 120 capsule, Rfl: 0  •  levothyroxine 75 mcg tablet, TAKE ONE TABLET BY MOUTH EVERY DAY, Disp: 30 tablet, Rfl: 0  •  lisinopril (ZESTRIL) 40 mg tablet, TAKE ONE TABLET BY MOUTH EVERY DAY, Disp: 90 tablet, Rfl: 1  •  pantoprazole (PROTONIX) 40 mg tablet, Take 1 tablet (40 mg total) by mouth 2 (two) times a day, Disp: 60 tablet, Rfl: 3    Allergies:   Allergies as of 12/28/2022 - Reviewed 12/28/2022   Allergen Reaction Noted   • Compazine [prochlorperazine] GI Intolerance 06/03/2017   • Percocet [oxycodone-acetaminophen] Itching 04/02/2022   • Sulfa antibiotics Rash 06/03/2017       The following portions of the patient's history were reviewed and updated as appropriate: past family history, past surgical history and problem list     Laboratory Results:  Lab Results   Component Value Date    SODIUM 142 12/24/2022 K 4 2 12/24/2022     12/24/2022    CO2 31 12/24/2022    BUN 10 12/24/2022    CREATININE 0 92 12/24/2022    GLUC 74 04/06/2022    CALCIUM 9 0 12/24/2022        Lab Results   Component Value Date    PTH 41 1 12/24/2022    CALCIUM 9 0 12/24/2022       Portions of the record may have been created with voice recognition software  Occasional wrong word or "sound a like" substitutions may have occurred due to the inherent limitations of voice recognition software  Read the chart carefully and recognize, using context, where substitutions have occurred

## 2022-12-28 NOTE — PATIENT INSTRUCTIONS
Thank you for coming to your visit today  As we discussed you kidney function is normal  Please follow the recommendations below       Recommend low sodium (salt) food    Avoid nonsteroidal anti-inflammatory drugs such as Naprosyn, ibuprofen, Aleve, Advil, Celebrex, Meloxicam (Mobic) etc   You can use Tylenol as needed if you do not have any liver condition to be concerned about    Try to avoid medications such as pantoprazole or  Protonix/Nexium or Esomeprazole)/Prilosec or omeprazole/Prevacid or lansoprazole/AcipHex or Rabeprazole  If you are able to, use Pepcid as this is safer for your kidneys      Measures to reduce stone development:    Please Drink  oz of water or 2 5L-3 L a day, throughout the day  Avoid salt/low-salt diet   Try to decrease animal protein intake, dairy protein and vegetable base protein are better  Increase fruits and vegetables as much as possible  Avoid calcium products such as Tums or other types of calcium containing antacids, you can use Pepcid for indigestion (but you do not have to restrict your dietary calcium)  Avoid excessive vitamin D   Avoid excessive vitamin C  Try to avoid oxalate products (please refer to the diet sheet)  Limit fructose and sucrose type drinks such as coke    Next Visit in 6 months with results   If you need to see us earlier we can change the appointment for you      Doyle Sotelo MD  Nephrology Attending

## 2023-01-01 DIAGNOSIS — E03.9 HYPOTHYROIDISM, UNSPECIFIED TYPE: ICD-10-CM

## 2023-01-01 LAB — RENIN PLAS-CCNC: 0.64 NG/ML/HR (ref 0.17–5.38)

## 2023-01-03 RX ORDER — LEVOTHYROXINE SODIUM 0.07 MG/1
TABLET ORAL
Qty: 30 TABLET | Refills: 0 | Status: SHIPPED | OUTPATIENT
Start: 2023-01-03

## 2023-01-17 ENCOUNTER — OFFICE VISIT (OUTPATIENT)
Dept: GASTROENTEROLOGY | Facility: CLINIC | Age: 63
End: 2023-01-17

## 2023-01-17 VITALS
SYSTOLIC BLOOD PRESSURE: 155 MMHG | DIASTOLIC BLOOD PRESSURE: 92 MMHG | HEIGHT: 72 IN | TEMPERATURE: 98.6 F | BODY MASS INDEX: 21.67 KG/M2 | WEIGHT: 160 LBS

## 2023-01-17 DIAGNOSIS — K21.9 GASTROESOPHAGEAL REFLUX DISEASE, UNSPECIFIED WHETHER ESOPHAGITIS PRESENT: ICD-10-CM

## 2023-01-17 DIAGNOSIS — K22.2 SCHATZKI'S RING: ICD-10-CM

## 2023-01-17 DIAGNOSIS — K58.0 IRRITABLE BOWEL SYNDROME WITH DIARRHEA: Primary | ICD-10-CM

## 2023-01-17 RX ORDER — HYOSCYAMINE SULFATE 0.125 MG
0.12 TABLET ORAL EVERY 4 HOURS PRN
Qty: 30 TABLET | Refills: 0 | Status: SHIPPED | OUTPATIENT
Start: 2023-01-17
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
## 2023-01-28 DIAGNOSIS — E03.9 HYPOTHYROIDISM, UNSPECIFIED TYPE: ICD-10-CM

## 2023-01-30 RX ORDER — LEVOTHYROXINE SODIUM 0.07 MG/1
TABLET ORAL
Qty: 30 TABLET | Refills: 0 | Status: SHIPPED | OUTPATIENT
Start: 2023-01-30 | End: 2023-01-31

## 2023-01-31 DIAGNOSIS — E03.9 HYPOTHYROIDISM, UNSPECIFIED TYPE: ICD-10-CM

## 2023-01-31 RX ORDER — LEVOTHYROXINE SODIUM 0.07 MG/1
TABLET ORAL
Qty: 30 TABLET | Refills: 0 | Status: SHIPPED | OUTPATIENT
Start: 2023-01-31

## 2023-02-04 DIAGNOSIS — K21.9 GASTROESOPHAGEAL REFLUX DISEASE WITHOUT ESOPHAGITIS: ICD-10-CM

## 2023-02-06 RX ORDER — PANTOPRAZOLE SODIUM 40 MG/1
TABLET, DELAYED RELEASE ORAL
Qty: 60 TABLET | Refills: 3 | Status: SHIPPED | OUTPATIENT
Start: 2023-02-06

## 2023-03-02 DIAGNOSIS — E03.9 HYPOTHYROIDISM, UNSPECIFIED TYPE: ICD-10-CM

## 2023-03-02 RX ORDER — LEVOTHYROXINE SODIUM 0.07 MG/1
TABLET ORAL
Qty: 30 TABLET | Refills: 0 | Status: SHIPPED | OUTPATIENT
Start: 2023-03-02

## 2023-03-29 DIAGNOSIS — E06.3 HYPOTHYROIDISM DUE TO HASHIMOTO'S THYROIDITIS: Primary | ICD-10-CM

## 2023-03-29 DIAGNOSIS — E03.8 HYPOTHYROIDISM DUE TO HASHIMOTO'S THYROIDITIS: Primary | ICD-10-CM

## 2023-03-29 DIAGNOSIS — E03.9 HYPOTHYROIDISM, UNSPECIFIED TYPE: ICD-10-CM

## 2023-03-29 RX ORDER — LEVOTHYROXINE SODIUM 0.07 MG/1
TABLET ORAL
Qty: 30 TABLET | Refills: 0 | Status: SHIPPED | OUTPATIENT
Start: 2023-03-29

## 2023-04-04 ENCOUNTER — APPOINTMENT (OUTPATIENT)
Dept: LAB | Facility: CLINIC | Age: 63
End: 2023-04-04

## 2023-04-04 DIAGNOSIS — E06.3 HYPOTHYROIDISM DUE TO HASHIMOTO'S THYROIDITIS: ICD-10-CM

## 2023-04-04 DIAGNOSIS — N20.0 NEPHROLITHIASIS: ICD-10-CM

## 2023-04-04 DIAGNOSIS — E03.8 HYPOTHYROIDISM DUE TO HASHIMOTO'S THYROIDITIS: ICD-10-CM

## 2023-04-04 LAB
ANION GAP SERPL CALCULATED.3IONS-SCNC: 4 MMOL/L (ref 4–13)
BACTERIA UR QL AUTO: NORMAL /HPF
BILIRUB UR QL STRIP: NEGATIVE
BUN SERPL-MCNC: 8 MG/DL (ref 5–25)
CALCIUM SERPL-MCNC: 9.2 MG/DL (ref 8.4–10.2)
CHLORIDE SERPL-SCNC: 104 MMOL/L (ref 96–108)
CLARITY UR: CLEAR
CO2 SERPL-SCNC: 34 MMOL/L (ref 21–32)
COLOR UR: COLORLESS
CREAT SERPL-MCNC: 1.17 MG/DL (ref 0.6–1.3)
CREAT UR-MCNC: 87.5 MG/DL
GFR SERPL CREATININE-BSD FRML MDRD: 66 ML/MIN/1.73SQ M
GLUCOSE P FAST SERPL-MCNC: 86 MG/DL (ref 65–99)
GLUCOSE UR STRIP-MCNC: NEGATIVE MG/DL
HGB UR QL STRIP.AUTO: NEGATIVE
KETONES UR STRIP-MCNC: NEGATIVE MG/DL
LEUKOCYTE ESTERASE UR QL STRIP: NEGATIVE
NITRITE UR QL STRIP: NEGATIVE
NON-SQ EPI CELLS URNS QL MICRO: NORMAL /HPF
PH UR STRIP.AUTO: 8 [PH]
POTASSIUM SERPL-SCNC: 4.4 MMOL/L (ref 3.5–5.3)
PROT UR STRIP-MCNC: NEGATIVE MG/DL
PROT UR-MCNC: 5 MG/DL
PROT/CREAT UR: 0.06 MG/G{CREAT} (ref 0–0.1)
RBC #/AREA URNS AUTO: NORMAL /HPF
SODIUM SERPL-SCNC: 142 MMOL/L (ref 135–147)
SP GR UR STRIP.AUTO: 1.01 (ref 1–1.03)
T4 FREE SERPL-MCNC: 1.28 NG/DL (ref 0.76–1.46)
TSH SERPL DL<=0.05 MIU/L-ACNC: 4.54 UIU/ML (ref 0.45–4.5)
UROBILINOGEN UR STRIP-ACNC: <2 MG/DL
WBC #/AREA URNS AUTO: NORMAL /HPF

## 2023-04-05 ENCOUNTER — TELEPHONE (OUTPATIENT)
Dept: NEPHROLOGY | Facility: CLINIC | Age: 63
End: 2023-04-05

## 2023-04-05 NOTE — TELEPHONE ENCOUNTER
----- Message from Devi Matt PA-C sent at 4/5/2023 12:13 PM EDT -----  Urine studies reviewed  No proteinuria or hematuria  UA bland  Please call patient and let him know his urine tests are normal   F/u with Dr Hui Maldonado as previously scheduled

## 2023-04-05 NOTE — TELEPHONE ENCOUNTER
Called patient and went over the following information:    Urine studies reviewed  No proteinuria or hematuria  UA bland  Patients urine tests are normal  F/u with Dr Tiffany Trinh as previously scheduled  Patient verbally understood and had no further questions for me at this time

## 2023-04-05 NOTE — TELEPHONE ENCOUNTER
----- Message from Essie Gary PA-C sent at 4/5/2023 12:13 PM EDT -----  Urine studies reviewed  No proteinuria or hematuria  UA bland  Please call patient and let him know his urine tests are normal   F/u with Dr David Armas as previously scheduled

## 2023-04-28 DIAGNOSIS — I10 ESSENTIAL HYPERTENSION: ICD-10-CM

## 2023-04-28 RX ORDER — LISINOPRIL 40 MG/1
TABLET ORAL
Qty: 90 TABLET | Refills: 1 | Status: SHIPPED | OUTPATIENT
Start: 2023-04-28

## 2023-05-01 DIAGNOSIS — E03.9 HYPOTHYROIDISM, UNSPECIFIED TYPE: ICD-10-CM

## 2023-05-01 RX ORDER — LEVOTHYROXINE SODIUM 0.07 MG/1
TABLET ORAL
Qty: 30 TABLET | Refills: 0 | Status: SHIPPED | OUTPATIENT
Start: 2023-05-01

## 2023-05-18 NOTE — TELEPHONE ENCOUNTER
----- Message from Efrain Burton sent at 5/7/2021  7:33 AM EDT -----  Regarding: FW: Test Results Question  Contact: [phone]    ----- Message -----  From: Calvin Luong OMAIRA  Sent: 5/6/2021   5:19 PM EDT  To: [facility]  Subject: Test Results Question                            Dr Tiffanie Echevarria received a call today from your nurse regarding the MRI results  She did not seem to have many answers  The first call was just "there was no significant change", after he asked her some questions, the second answer was "pain management"  I believe that we would prefer a little more of a dialogue regarding his condition  Does he have a specific ICD-10 code or are there potential multiple codes? What are the codes for his treatment? What is the plan for his treatment and what is the timing? What are the expected outcomes? It does appear that there are some degenerative changes, bulging, and stenosis  How will pain management help this? I believe a conversation, including a comprehensive plan of action is necessary in order for us to feel comfortable to continue  Our concern is, since you are saying there is NOT a significant change in his condition than what is causing the increased change in pain? We need to understand this  As his PCP your position is to provide coordination and communication  Please schedule a call or appointment with us, so that we may discuss this further  Thank you,  Omari Yoo and Rogers Hernandez    VERTEBRAL BODIES: There are 5 lumbar type vertebral bodies  Dextroscoliosis with the apex at L2-3  Scattered endplate Schmorl's nodes are noted included L1 inferior endplate Schmorl's nodes with mild reactive marrow edema  Mixed Modic type I/II  endplate degenerative signal at L4-5  SACRUM: Normal signal within the sacrum  No evidence of insufficiency or stress fracture  DISTAL CORD AND CONUS: Normal size and signal within the distal cord and conus      PARASPINAL SOFT TISSUES: Paraspinal soft tissues are unremarkable  LOWER THORACIC DISC SPACES: Mild noncompressive lower thoracic degenerative change  LUMBAR DISC SPACES:    L1-L2: There is mild disc bulge and mild facet arthropathy  Mild canal stenosis  No significant foraminal narrowing  L2-L3: There is an disc bulge and mild facet arthropathy resulting in mild canal stenosis  No significant foraminal narrowing  L3-L4: Minimal disc bulge  Mild facet arthropathy  No significant canal or foraminal stenosis  L4-L5: Significant disc height loss and degenerative loss of T2 signal  There is no significant disc bulge  Mild facet arthropathy  No canal stenosis  Right greater than left mild bilateral foraminal narrowing  L5-S1: Significant disc height loss and degenerative loss of T2 signal  Minimal disc bulge  Mild facet arthropathy  No canal stenosis  Mild bilateral foraminal narrowing  PAST SURGICAL HISTORY:  No significant past surgical history

## 2023-05-28 DIAGNOSIS — E03.9 HYPOTHYROIDISM, UNSPECIFIED TYPE: ICD-10-CM

## 2023-05-30 RX ORDER — LEVOTHYROXINE SODIUM 0.07 MG/1
TABLET ORAL
Qty: 30 TABLET | Refills: 0 | Status: SHIPPED | OUTPATIENT
Start: 2023-05-30

## 2023-06-14 DIAGNOSIS — N20.0 NEPHROLITHIASIS: Primary | ICD-10-CM

## 2023-06-14 DIAGNOSIS — N17.9 ACUTE KIDNEY INJURY (HCC): ICD-10-CM

## 2023-06-19 ENCOUNTER — OFFICE VISIT (OUTPATIENT)
Dept: UROLOGY | Facility: AMBULATORY SURGERY CENTER | Age: 63
End: 2023-06-19
Payer: COMMERCIAL

## 2023-06-19 ENCOUNTER — TELEPHONE (OUTPATIENT)
Dept: NEPHROLOGY | Facility: CLINIC | Age: 63
End: 2023-06-19

## 2023-06-19 VITALS
OXYGEN SATURATION: 97 % | WEIGHT: 163 LBS | BODY MASS INDEX: 22.08 KG/M2 | SYSTOLIC BLOOD PRESSURE: 124 MMHG | DIASTOLIC BLOOD PRESSURE: 86 MMHG | HEIGHT: 72 IN | HEART RATE: 80 BPM

## 2023-06-19 DIAGNOSIS — N20.0 NEPHROLITHIASIS: Primary | ICD-10-CM

## 2023-06-19 LAB
SL AMB  POCT GLUCOSE, UA: NORMAL
SL AMB LEUKOCYTE ESTERASE,UA: NORMAL
SL AMB POCT BILIRUBIN,UA: NORMAL
SL AMB POCT BLOOD,UA: NORMAL
SL AMB POCT CLARITY,UA: CLEAR
SL AMB POCT COLOR,UA: YELLOW
SL AMB POCT KETONES,UA: NORMAL
SL AMB POCT NITRITE,UA: NORMAL
SL AMB POCT PH,UA: 7
SL AMB POCT SPECIFIC GRAVITY,UA: 1.01
SL AMB POCT URINE PROTEIN: NORMAL
SL AMB POCT UROBILINOGEN: 0.2

## 2023-06-19 PROCEDURE — 99213 OFFICE O/P EST LOW 20 MIN: CPT | Performed by: NURSE PRACTITIONER

## 2023-06-19 PROCEDURE — 81002 URINALYSIS NONAUTO W/O SCOPE: CPT | Performed by: NURSE PRACTITIONER
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
## 2023-06-21 ENCOUNTER — TELEPHONE (OUTPATIENT)
Dept: FAMILY MEDICINE CLINIC | Facility: CLINIC | Age: 63
End: 2023-06-21

## 2023-06-21 NOTE — TELEPHONE ENCOUNTER
"Patient left following vm in outlook    \"Hello, my name is Micheal Andres  My birth date is [birth year]  A patient of doctor broke and I was wondering if I could get an appointment to see her  I am having some discomfort  A year and a half ago I had a hernia surgery and I've had discomfort off and on, but recently the last couple of days it's been much it's worse and much more often almost continuously  My number here is [phone]  OK  Thank you very much  Bye \"    I called patient and asked if he had contacted the surgeon's office to see if they could see him there  He stated that he was very unhappy with the doctor and the office there and will not go back   He states if you would like to refer him to a different  nephrologist he would be glad to look into that    Can we put him on your schedule for this or should he see a nephrologist?     "

## 2023-06-22 ENCOUNTER — OFFICE VISIT (OUTPATIENT)
Dept: FAMILY MEDICINE CLINIC | Facility: CLINIC | Age: 63
End: 2023-06-22
Payer: COMMERCIAL

## 2023-06-22 VITALS
DIASTOLIC BLOOD PRESSURE: 82 MMHG | BODY MASS INDEX: 21.94 KG/M2 | SYSTOLIC BLOOD PRESSURE: 120 MMHG | HEART RATE: 78 BPM | HEIGHT: 72 IN | WEIGHT: 162 LBS | OXYGEN SATURATION: 98 %

## 2023-06-22 DIAGNOSIS — R10.32 LEFT INGUINAL PAIN: Primary | ICD-10-CM

## 2023-06-22 PROCEDURE — 99213 OFFICE O/P EST LOW 20 MIN: CPT | Performed by: FAMILY MEDICINE

## 2023-06-26 ENCOUNTER — APPOINTMENT (OUTPATIENT)
Dept: LAB | Facility: CLINIC | Age: 63
End: 2023-06-26
Payer: COMMERCIAL

## 2023-06-26 LAB
ANION GAP SERPL CALCULATED.3IONS-SCNC: 5 MMOL/L
BUN SERPL-MCNC: 7 MG/DL (ref 5–25)
CALCIUM SERPL-MCNC: 8.9 MG/DL (ref 8.4–10.2)
CHLORIDE SERPL-SCNC: 102 MMOL/L (ref 96–108)
CO2 SERPL-SCNC: 30 MMOL/L (ref 21–32)
CREAT SERPL-MCNC: 1.24 MG/DL (ref 0.6–1.3)
GFR SERPL CREATININE-BSD FRML MDRD: 61 ML/MIN/1.73SQ M
GLUCOSE SERPL-MCNC: 94 MG/DL (ref 65–140)
POTASSIUM SERPL-SCNC: 3.8 MMOL/L (ref 3.5–5.3)
SODIUM SERPL-SCNC: 137 MMOL/L (ref 135–147)
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
## 2023-06-28 ENCOUNTER — HOSPITAL ENCOUNTER (OUTPATIENT)
Dept: ULTRASOUND IMAGING | Facility: HOSPITAL | Age: 63
Discharge: HOME | End: 2023-06-28
Payer: COMMERCIAL

## 2023-06-28 DIAGNOSIS — R10.32 LEFT INGUINAL PAIN: ICD-10-CM

## 2023-06-28 PROCEDURE — 76705 ECHO EXAM OF ABDOMEN: CPT

## 2023-06-30 ENCOUNTER — TELEMEDICINE (OUTPATIENT)
Dept: NEPHROLOGY | Facility: CLINIC | Age: 63
End: 2023-06-30
Payer: COMMERCIAL

## 2023-06-30 VITALS
WEIGHT: 165 LBS | BODY MASS INDEX: 22.35 KG/M2 | HEIGHT: 72 IN | SYSTOLIC BLOOD PRESSURE: 122 MMHG | DIASTOLIC BLOOD PRESSURE: 80 MMHG

## 2023-06-30 DIAGNOSIS — E78.00 PURE HYPERCHOLESTEROLEMIA: ICD-10-CM

## 2023-06-30 DIAGNOSIS — N20.0 NEPHROLITHIASIS: ICD-10-CM

## 2023-06-30 DIAGNOSIS — E03.9 HYPOTHYROIDISM, UNSPECIFIED TYPE: ICD-10-CM

## 2023-06-30 DIAGNOSIS — I10 PRIMARY HYPERTENSION: ICD-10-CM

## 2023-06-30 DIAGNOSIS — N25.89 RTA (RENAL TUBULAR ACIDOSIS): Primary | ICD-10-CM

## 2023-06-30 PROCEDURE — 99213 OFFICE O/P EST LOW 20 MIN: CPT | Performed by: STUDENT IN AN ORGANIZED HEALTH CARE EDUCATION/TRAINING PROGRAM

## 2023-06-30 RX ORDER — LEVOTHYROXINE SODIUM 0.07 MG/1
TABLET ORAL
Qty: 30 TABLET | Refills: 0 | Status: SHIPPED | OUTPATIENT
Start: 2023-06-30
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
## 2023-07-07 DIAGNOSIS — R10.32 LEFT INGUINAL PAIN: Primary | ICD-10-CM

## 2023-07-14 ENCOUNTER — OFFICE VISIT (OUTPATIENT)
Dept: FAMILY MEDICINE CLINIC | Facility: CLINIC | Age: 63
End: 2023-07-14
Payer: COMMERCIAL

## 2023-07-14 VITALS
SYSTOLIC BLOOD PRESSURE: 104 MMHG | HEART RATE: 97 BPM | DIASTOLIC BLOOD PRESSURE: 84 MMHG | TEMPERATURE: 97.6 F | OXYGEN SATURATION: 100 % | HEIGHT: 72 IN | WEIGHT: 160 LBS | BODY MASS INDEX: 21.67 KG/M2

## 2023-07-14 DIAGNOSIS — L03.90 CELLULITIS, UNSPECIFIED CELLULITIS SITE: ICD-10-CM

## 2023-07-14 DIAGNOSIS — T63.441D BEE STING REACTION, ACCIDENTAL OR UNINTENTIONAL, SUBSEQUENT ENCOUNTER: Primary | ICD-10-CM

## 2023-07-14 PROCEDURE — 96372 THER/PROPH/DIAG INJ SC/IM: CPT

## 2023-07-14 PROCEDURE — 99213 OFFICE O/P EST LOW 20 MIN: CPT | Performed by: FAMILY MEDICINE

## 2023-07-14 RX ORDER — EPINEPHRINE 0.3 MG/.3ML
0.3 INJECTION SUBCUTANEOUS ONCE
Status: DISCONTINUED | OUTPATIENT
Start: 2023-07-14 | End: 2023-07-14

## 2023-07-14 RX ORDER — METHYLPREDNISOLONE ACETATE 80 MG/ML
80 INJECTION, SUSPENSION INTRA-ARTICULAR; INTRALESIONAL; INTRAMUSCULAR; SOFT TISSUE ONCE
Status: COMPLETED | OUTPATIENT
Start: 2023-07-14 | End: 2023-07-14

## 2023-07-14 RX ORDER — EPINEPHRINE 0.3 MG/.3ML
0.3 INJECTION SUBCUTANEOUS ONCE
Qty: 0.6 ML | Refills: 0 | Status: SHIPPED | OUTPATIENT
Start: 2023-07-14 | End: 2023-07-14

## 2023-07-14 RX ORDER — CEFTRIAXONE 1 G/1
1000 INJECTION, POWDER, FOR SOLUTION INTRAMUSCULAR; INTRAVENOUS EVERY 24 HOURS
Status: SHIPPED | OUTPATIENT
Start: 2023-07-14

## 2023-07-14 RX ORDER — CEPHALEXIN 500 MG/1
500 CAPSULE ORAL EVERY 8 HOURS SCHEDULED
Qty: 30 CAPSULE | Refills: 0 | Status: SHIPPED | OUTPATIENT
Start: 2023-07-14 | End: 2023-07-24

## 2023-07-14 RX ORDER — PREDNISONE 10 MG/1
TABLET ORAL
Qty: 26 TABLET | Refills: 0 | Status: SHIPPED | OUTPATIENT
Start: 2023-07-14

## 2023-07-14 RX ADMIN — METHYLPREDNISOLONE ACETATE 80 MG: 80 INJECTION, SUSPENSION INTRA-ARTICULAR; INTRALESIONAL; INTRAMUSCULAR; SOFT TISSUE at 12:10

## 2023-07-14 RX ADMIN — CEFTRIAXONE 1000 MG: 1 INJECTION, POWDER, FOR SOLUTION INTRAMUSCULAR; INTRAVENOUS at 12:09

## 2023-07-18 ENCOUNTER — APPOINTMENT (EMERGENCY)
Dept: CT IMAGING | Facility: HOSPITAL | Age: 63
End: 2023-07-18
Payer: COMMERCIAL

## 2023-07-18 ENCOUNTER — HOSPITAL ENCOUNTER (EMERGENCY)
Facility: HOSPITAL | Age: 63
Discharge: HOME | End: 2023-07-18
Attending: EMERGENCY MEDICINE
Payer: COMMERCIAL

## 2023-07-18 VITALS
BODY MASS INDEX: 21.67 KG/M2 | HEART RATE: 60 BPM | SYSTOLIC BLOOD PRESSURE: 151 MMHG | DIASTOLIC BLOOD PRESSURE: 88 MMHG | HEIGHT: 72 IN | WEIGHT: 160 LBS | TEMPERATURE: 97.8 F | OXYGEN SATURATION: 98 % | RESPIRATION RATE: 16 BRPM

## 2023-07-18 DIAGNOSIS — R10.9 ABDOMINAL PAIN: Primary | ICD-10-CM

## 2023-07-18 LAB
ALBUMIN SERPL BCP-MCNC: 4.4 G/DL (ref 3.5–5)
ALP SERPL-CCNC: 70 U/L (ref 34–104)
ALT SERPL W P-5'-P-CCNC: 30 U/L (ref 7–52)
ANION GAP SERPL CALCULATED.3IONS-SCNC: 8 MMOL/L
AST SERPL W P-5'-P-CCNC: 25 U/L (ref 13–39)
BASOPHILS # BLD AUTO: 0.01 THOUSANDS/ÂΜL (ref 0–0.1)
BASOPHILS NFR BLD AUTO: 0 % (ref 0–1)
BILIRUB SERPL-MCNC: 0.6 MG/DL (ref 0.2–1)
BILIRUB UR QL STRIP: NEGATIVE
BUN SERPL-MCNC: 14 MG/DL (ref 5–25)
CALCIUM SERPL-MCNC: 9.8 MG/DL (ref 8.4–10.2)
CARDIAC TROPONIN I PNL SERPL HS: 7 NG/L (ref 8–18)
CHLORIDE SERPL-SCNC: 101 MMOL/L (ref 96–108)
CLARITY UR: CLEAR
CO2 SERPL-SCNC: 25 MMOL/L (ref 21–32)
COLOR UR: COLORLESS
CREAT SERPL-MCNC: 1.07 MG/DL (ref 0.6–1.3)
EOSINOPHIL # BLD AUTO: 0 THOUSAND/ÂΜL (ref 0–0.61)
EOSINOPHIL NFR BLD AUTO: 0 % (ref 0–6)
ERYTHROCYTE [DISTWIDTH] IN BLOOD BY AUTOMATED COUNT: 12 % (ref 11.6–15.1)
GFR SERPL CREATININE-BSD FRML MDRD: 73 ML/MIN/1.73SQ M
GLUCOSE SERPL-MCNC: 132 MG/DL (ref 65–140)
GLUCOSE UR STRIP-MCNC: NEGATIVE MG/DL
HCT VFR BLD AUTO: 46 % (ref 36.5–49.3)
HGB BLD-MCNC: 16.2 G/DL (ref 12–17)
HGB UR QL STRIP.AUTO: NEGATIVE
IMM GRANULOCYTES # BLD AUTO: 0.08 THOUSAND/UL (ref 0–0.2)
IMM GRANULOCYTES NFR BLD AUTO: 1 % (ref 0–2)
KETONES UR STRIP-MCNC: NEGATIVE MG/DL
LEUKOCYTE ESTERASE UR QL STRIP: NEGATIVE
LIPASE SERPL-CCNC: 60 U/L (ref 11–82)
LYMPHOCYTES # BLD AUTO: 1.68 THOUSANDS/ÂΜL (ref 0.6–4.47)
LYMPHOCYTES NFR BLD AUTO: 12 % (ref 14–44)
MCH RBC QN AUTO: 31.2 PG (ref 26.8–34.3)
MCHC RBC AUTO-ENTMCNC: 35.2 G/DL (ref 31.4–37.4)
MCV RBC AUTO: 89 FL (ref 82–98)
MONOCYTES # BLD AUTO: 0.94 THOUSAND/ÂΜL (ref 0.17–1.22)
MONOCYTES NFR BLD AUTO: 7 % (ref 4–12)
NEUTROPHILS # BLD AUTO: 11.38 THOUSANDS/ÂΜL (ref 1.85–7.62)
NEUTS SEG NFR BLD AUTO: 80 % (ref 43–75)
NITRITE UR QL STRIP: NEGATIVE
NRBC BLD AUTO-RTO: 0 /100 WBCS
PH UR STRIP.AUTO: 7 [PH]
PLATELET # BLD AUTO: 275 THOUSANDS/UL (ref 149–390)
PMV BLD AUTO: 9.2 FL (ref 8.9–12.7)
POTASSIUM SERPL-SCNC: 3.9 MMOL/L (ref 3.5–5.3)
PROT SERPL-MCNC: 6.8 G/DL (ref 6.4–8.4)
PROT UR STRIP-MCNC: NEGATIVE MG/DL
RBC # BLD AUTO: 5.19 MILLION/UL (ref 3.88–5.62)
SODIUM SERPL-SCNC: 134 MMOL/L (ref 135–147)
SP GR UR STRIP.AUTO: 1.01 (ref 1–1.03)
UROBILINOGEN UR STRIP-ACNC: <2 MG/DL
WBC # BLD AUTO: 14.09 THOUSAND/UL (ref 4.31–10.16)

## 2023-07-18 PROCEDURE — G1004 CDSM NDSC: HCPCS

## 2023-07-18 PROCEDURE — 93005 ELECTROCARDIOGRAM TRACING: CPT

## 2023-07-18 PROCEDURE — 36415 COLL VENOUS BLD VENIPUNCTURE: CPT

## 2023-07-18 PROCEDURE — 99285 EMERGENCY DEPT VISIT HI MDM: CPT | Performed by: EMERGENCY MEDICINE

## 2023-07-18 PROCEDURE — 71275 CT ANGIOGRAPHY CHEST: CPT

## 2023-07-18 PROCEDURE — 99284 EMERGENCY DEPT VISIT MOD MDM: CPT

## 2023-07-18 PROCEDURE — 83690 ASSAY OF LIPASE: CPT

## 2023-07-18 PROCEDURE — 85025 COMPLETE CBC W/AUTO DIFF WBC: CPT | Performed by: EMERGENCY MEDICINE

## 2023-07-18 PROCEDURE — 81003 URINALYSIS AUTO W/O SCOPE: CPT | Performed by: EMERGENCY MEDICINE

## 2023-07-18 PROCEDURE — 84484 ASSAY OF TROPONIN QUANT: CPT | Performed by: EMERGENCY MEDICINE

## 2023-07-18 PROCEDURE — 80053 COMPREHEN METABOLIC PANEL: CPT | Performed by: EMERGENCY MEDICINE

## 2023-07-18 PROCEDURE — 74174 CTA ABD&PLVS W/CONTRAST: CPT

## 2023-07-18 RX ADMIN — IOHEXOL 100 ML: 350 INJECTION, SOLUTION INTRAVENOUS at 18:20
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
## 2023-07-19 LAB
ATRIAL RATE: 62 BPM
P AXIS: 85 DEGREES
PR INTERVAL: 166 MS
QRS AXIS: 79 DEGREES
QRSD INTERVAL: 94 MS
QT INTERVAL: 386 MS
QTC INTERVAL: 391 MS
T WAVE AXIS: 78 DEGREES
VENTRICULAR RATE: 62 BPM

## 2023-07-19 PROCEDURE — 93010 ELECTROCARDIOGRAM REPORT: CPT | Performed by: INTERNAL MEDICINE
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
## 2023-07-26 ENCOUNTER — CONSULT (OUTPATIENT)
Dept: SURGERY | Facility: CLINIC | Age: 63
End: 2023-07-26
Payer: COMMERCIAL

## 2023-07-26 VITALS
RESPIRATION RATE: 12 BRPM | HEIGHT: 72 IN | TEMPERATURE: 97.4 F | BODY MASS INDEX: 21.45 KG/M2 | HEART RATE: 75 BPM | DIASTOLIC BLOOD PRESSURE: 84 MMHG | SYSTOLIC BLOOD PRESSURE: 125 MMHG | OXYGEN SATURATION: 97 % | WEIGHT: 158.4 LBS

## 2023-07-26 DIAGNOSIS — R10.32 LEFT INGUINAL PAIN: ICD-10-CM

## 2023-07-26 PROCEDURE — 64425 NJX AA&/STRD II IH NERVES: CPT | Performed by: SURGERY

## 2023-07-26 PROCEDURE — 99203 OFFICE O/P NEW LOW 30 MIN: CPT | Performed by: SURGERY
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
## 2023-07-26 NOTE — ASSESSMENT & PLAN NOTE
I told him after reviewing the ultrasound and CAT scan with him and clinical exam, that there was no hernia appreciated. I explained that this may be all nerve related causing the pain and he was agreeable to a nerve block. This was administered. I asked him to let the office know in a couple days the results of the block if there was any benefit or not. I did tell him he may get complete relief and never have another problem. Told him if he gets short-term relief and the pain comes back, we could always redo the nerve block to see if that helps. If there would be continued issues, then may be need to talk about nerve ablation and lastly triple neurectomy. We will wait to hear back from him.

## 2023-07-27 DIAGNOSIS — E03.9 HYPOTHYROIDISM, UNSPECIFIED TYPE: ICD-10-CM

## 2023-07-27 RX ORDER — LEVOTHYROXINE SODIUM 0.07 MG/1
TABLET ORAL
Qty: 30 TABLET | Refills: 0 | Status: SHIPPED | OUTPATIENT
Start: 2023-07-27

## 2023-07-28 ENCOUNTER — TELEPHONE (OUTPATIENT)
Dept: SURGERY | Facility: CLINIC | Age: 63
End: 2023-07-28

## 2023-07-28 NOTE — TELEPHONE ENCOUNTER
Per Dr. Luan Martinez; this can be normal; any signs of redness or swelling, pt should go to ER. Pt can use heat/ice. Pt will call us next week with an update. mb      Pt saw Dr. Keshia Templeton in the office 7/26; pt had previous hernia repair in 2021 left; was getting soreness and pins-and-needles feelings. Dr. Keshia Templeton went over the ultrasound and Cat scan results; no recurring hernia. Dr. Keshia Templeton gave him nerve block to see if that would relieve symptoms. Pt called today to say that the nerve block area is very sore. No redness, or swelling, just sore; wanted to know if this is to be expected; stated he could not tell if groin pain was better, because soreness from nerve block was so sore    Sending this to Dr. Luan Martinez for advisement.       mb

## 2023-07-30 ENCOUNTER — HOSPITAL ENCOUNTER (OUTPATIENT)
Dept: ULTRASOUND IMAGING | Facility: HOSPITAL | Age: 63
Discharge: HOME | End: 2023-07-30
Payer: COMMERCIAL

## 2023-07-30 DIAGNOSIS — N20.0 NEPHROLITHIASIS: ICD-10-CM

## 2023-07-30 PROCEDURE — 76775 US EXAM ABDO BACK WALL LIM: CPT

## 2023-08-08 ENCOUNTER — TELEPHONE (OUTPATIENT)
Dept: UROLOGY | Facility: AMBULATORY SURGERY CENTER | Age: 63
End: 2023-08-08

## 2023-08-08 NOTE — TELEPHONE ENCOUNTER
----- Message from Bryan Kiran, [address] [facility] sent at 8/6/2023  1:28 PM EDT -----  Please inform patient results of renal ultrasound identified nonobstructing right renal calculi measuring up to 5mm. No evidence of obstruction or hydronephrosis. Patient may follow up in 1 year as he states employer performs annual prostate cancer screening. Spoke with pt and advised of results as stated above. Pt stated awareness of stone per prior CT scan from last month. He was in agreement to continue monitoring with follow-up appt in 1 year. No further questions or concerns.

## 2023-08-26 DIAGNOSIS — K21.9 GASTROESOPHAGEAL REFLUX DISEASE WITHOUT ESOPHAGITIS: ICD-10-CM

## 2023-08-28 RX ORDER — PANTOPRAZOLE SODIUM 40 MG/1
TABLET, DELAYED RELEASE ORAL
Qty: 60 TABLET | Refills: 3 | Status: SHIPPED | OUTPATIENT
Start: 2023-08-28

## 2023-08-29 DIAGNOSIS — E03.9 HYPOTHYROIDISM, UNSPECIFIED TYPE: ICD-10-CM

## 2023-08-29 RX ORDER — LEVOTHYROXINE SODIUM 0.07 MG/1
TABLET ORAL
Qty: 30 TABLET | Refills: 0 | Status: SHIPPED | OUTPATIENT
Start: 2023-08-29

## 2023-10-18 ENCOUNTER — APPOINTMENT (OUTPATIENT)
Dept: RADIOLOGY | Facility: CLINIC | Age: 63
End: 2023-10-18

## 2023-10-18 ENCOUNTER — APPOINTMENT (OUTPATIENT)
Dept: LAB | Facility: CLINIC | Age: 63
End: 2023-10-18

## 2023-10-18 DIAGNOSIS — Z00.00 ANNUAL PHYSICAL EXAM: ICD-10-CM

## 2023-10-18 LAB
ALBUMIN SERPL BCP-MCNC: 4.1 G/DL (ref 3.5–5)
ALP SERPL-CCNC: 65 U/L (ref 34–104)
ALT SERPL W P-5'-P-CCNC: 21 U/L (ref 7–52)
ANION GAP SERPL CALCULATED.3IONS-SCNC: 7 MMOL/L
AST SERPL W P-5'-P-CCNC: 27 U/L (ref 13–39)
BILIRUB SERPL-MCNC: 1.29 MG/DL (ref 0.2–1)
BUN SERPL-MCNC: 14 MG/DL (ref 5–25)
CALCIUM SERPL-MCNC: 9.3 MG/DL (ref 8.4–10.2)
CHLORIDE SERPL-SCNC: 102 MMOL/L (ref 96–108)
CHOLEST SERPL-MCNC: 199 MG/DL
CO2 SERPL-SCNC: 32 MMOL/L (ref 21–32)
CREAT SERPL-MCNC: 1.09 MG/DL (ref 0.6–1.3)
GFR SERPL CREATININE-BSD FRML MDRD: 71 ML/MIN/1.73SQ M
GLUCOSE P FAST SERPL-MCNC: 85 MG/DL (ref 65–99)
HDLC SERPL-MCNC: 52 MG/DL
LDLC SERPL CALC-MCNC: 136 MG/DL (ref 0–100)
NONHDLC SERPL-MCNC: 147 MG/DL
POTASSIUM SERPL-SCNC: 3.9 MMOL/L (ref 3.5–5.3)
PROT SERPL-MCNC: 6.1 G/DL (ref 6.4–8.4)
PSA SERPL-MCNC: 1.57 NG/ML (ref 0–4)
SODIUM SERPL-SCNC: 141 MMOL/L (ref 135–147)
TRIGL SERPL-MCNC: 55 MG/DL

## 2023-10-18 PROCEDURE — 36415 COLL VENOUS BLD VENIPUNCTURE: CPT

## 2023-10-18 PROCEDURE — 83655 ASSAY OF LEAD: CPT

## 2023-10-18 PROCEDURE — G0103 PSA SCREENING: HCPCS

## 2023-10-18 PROCEDURE — 71045 X-RAY EXAM CHEST 1 VIEW: CPT

## 2023-10-18 PROCEDURE — 80053 COMPREHEN METABOLIC PANEL: CPT

## 2023-10-18 PROCEDURE — 80061 LIPID PANEL: CPT

## 2023-10-19 LAB — LEAD BLD-MCNC: <1 UG/DL (ref 0–3.4)

## 2023-10-26 DIAGNOSIS — I10 ESSENTIAL HYPERTENSION: ICD-10-CM

## 2023-10-26 RX ORDER — LISINOPRIL 40 MG/1
TABLET ORAL
Qty: 90 TABLET | Refills: 1 | Status: SHIPPED | OUTPATIENT
Start: 2023-10-26

## 2023-10-28 DIAGNOSIS — E03.9 HYPOTHYROIDISM, UNSPECIFIED TYPE: ICD-10-CM

## 2023-10-30 RX ORDER — LEVOTHYROXINE SODIUM 0.07 MG/1
TABLET ORAL
Qty: 90 TABLET | Refills: 1 | Status: SHIPPED | OUTPATIENT
Start: 2023-10-30

## 2023-11-22 ENCOUNTER — TELEPHONE (OUTPATIENT)
Dept: OTHER | Facility: HOSPITAL | Age: 63
End: 2023-11-22

## 2023-11-22 NOTE — TELEPHONE ENCOUNTER
Patient called he made an appointment at [address] [facility] but it is not until January. Patient is hoping provider can help with manage his back pain until then. Please follow up with patient.

## 2023-11-27 ENCOUNTER — TELEPHONE (OUTPATIENT)
Age: 63
End: 2023-11-27

## 2023-11-27 ENCOUNTER — TELEPHONE (OUTPATIENT)
Dept: FAMILY MEDICINE CLINIC | Facility: CLINIC | Age: 63
End: 2023-11-27

## 2023-11-27 DIAGNOSIS — M51.16 INTERVERTEBRAL DISC DISORDER WITH RADICULOPATHY OF LUMBAR REGION: Primary | ICD-10-CM

## 2023-11-27 RX ORDER — MELOXICAM 15 MG/1
15 TABLET ORAL DAILY PRN
Qty: 30 TABLET | Refills: 3 | Status: SHIPPED | OUTPATIENT
Start: 2023-11-27

## 2023-11-27 RX ORDER — HYDROCODONE BITARTRATE AND ACETAMINOPHEN 5; 325 MG/1; MG/1
1 TABLET ORAL EVERY 6 HOURS PRN
Qty: 50 TABLET | Refills: 0 | Status: SHIPPED | OUTPATIENT
Start: 2023-11-27

## 2023-11-27 NOTE — TELEPHONE ENCOUNTER
Hydrocodone-Acetaminophen (Norco) 5-325 mg per tablet PA Submitted with clinical documentation via surescripts, waiting on determination.

## 2023-11-27 NOTE — TELEPHONE ENCOUNTER
Spoke to PT. He said he only remembers one time in his life taking hydrocodone, and does not recall having any itching from it.     Thank You

## 2023-11-29 ENCOUNTER — TELEPHONE (OUTPATIENT)
Age: 63
End: 2023-11-29

## 2023-11-29 DIAGNOSIS — N20.0 RENAL CALCULUS: Primary | ICD-10-CM

## 2023-11-29 NOTE — TELEPHONE ENCOUNTER
Spoke with patient, verbalized understanding. Current Covid symptoms are body aches, sore throat, head cold, coughing. Denies SOB, wheezing, and chest pain. More concerned about the back pain since Monday getting worse. Asking for an [address] [facility] said the stones are larger, too big to pass on its own. Please advise.

## 2023-11-29 NOTE — TELEPHONE ENCOUNTER
Juan Solis is calling to speak with Dr. Peter Melendez. He was having back pain last Monday and now he has kidney stones on his right side. He is unsure what he should do as he also tested positive for Covid on Monday. He would like a call back with recommendation on what he should do. Thank you!

## 2023-12-04 ENCOUNTER — TELEPHONE (OUTPATIENT)
Dept: NEPHROLOGY | Facility: CLINIC | Age: 63
End: 2023-12-04

## 2023-12-04 NOTE — TELEPHONE ENCOUNTER
Called patient reminding to please complete labwork prior to 12/11 appointment with Dr. Charlene Loya.

## 2023-12-05 ENCOUNTER — OFFICE VISIT (OUTPATIENT)
Dept: FAMILY MEDICINE CLINIC | Facility: CLINIC | Age: 63
End: 2023-12-05
Payer: COMMERCIAL

## 2023-12-05 ENCOUNTER — APPOINTMENT (OUTPATIENT)
Dept: LAB | Facility: CLINIC | Age: 63
End: 2023-12-05
Payer: COMMERCIAL

## 2023-12-05 VITALS
SYSTOLIC BLOOD PRESSURE: 120 MMHG | BODY MASS INDEX: 21.54 KG/M2 | HEART RATE: 75 BPM | DIASTOLIC BLOOD PRESSURE: 84 MMHG | TEMPERATURE: 97.5 F | WEIGHT: 159 LBS | HEIGHT: 72 IN | OXYGEN SATURATION: 97 %

## 2023-12-05 DIAGNOSIS — N20.0 NEPHROLITHIASIS: ICD-10-CM

## 2023-12-05 DIAGNOSIS — F32.A ANXIETY AND DEPRESSION: ICD-10-CM

## 2023-12-05 DIAGNOSIS — N20.0 RENAL LITHIASIS: ICD-10-CM

## 2023-12-05 DIAGNOSIS — F41.9 ANXIETY: ICD-10-CM

## 2023-12-05 DIAGNOSIS — M54.16 LUMBAR RADICULOPATHY: ICD-10-CM

## 2023-12-05 DIAGNOSIS — F41.9 ANXIETY AND DEPRESSION: ICD-10-CM

## 2023-12-05 DIAGNOSIS — R10.9 RIGHT FLANK PAIN: Primary | ICD-10-CM

## 2023-12-05 LAB
ANION GAP SERPL CALCULATED.3IONS-SCNC: 7 MMOL/L
BACTERIA UR QL AUTO: ABNORMAL /HPF
BILIRUB UR QL STRIP: NEGATIVE
BUN SERPL-MCNC: 9 MG/DL (ref 5–25)
CALCIUM SERPL-MCNC: 9.5 MG/DL (ref 8.4–10.2)
CHLORIDE SERPL-SCNC: 100 MMOL/L (ref 96–108)
CLARITY UR: CLEAR
CO2 SERPL-SCNC: 35 MMOL/L (ref 21–32)
COLOR UR: ABNORMAL
CREAT SERPL-MCNC: 1.18 MG/DL (ref 0.6–1.3)
CREAT UR-MCNC: 164.3 MG/DL
GFR SERPL CREATININE-BSD FRML MDRD: 65 ML/MIN/1.73SQ M
GLUCOSE P FAST SERPL-MCNC: 91 MG/DL (ref 65–99)
GLUCOSE UR STRIP-MCNC: NEGATIVE MG/DL
HGB UR QL STRIP.AUTO: NEGATIVE
KETONES UR STRIP-MCNC: NEGATIVE MG/DL
LEUKOCYTE ESTERASE UR QL STRIP: NEGATIVE
MUCOUS THREADS UR QL AUTO: ABNORMAL
NITRITE UR QL STRIP: NEGATIVE
NON-SQ EPI CELLS URNS QL MICRO: ABNORMAL /HPF
PH UR STRIP.AUTO: 6 [PH]
POTASSIUM SERPL-SCNC: 4.2 MMOL/L (ref 3.5–5.3)
PROT UR STRIP-MCNC: ABNORMAL MG/DL
PROT UR-MCNC: 8 MG/DL
PROT/CREAT UR: 0.05 MG/G{CREAT} (ref 0–0.1)
RBC #/AREA URNS AUTO: ABNORMAL /HPF
SODIUM SERPL-SCNC: 142 MMOL/L (ref 135–147)
SP GR UR STRIP.AUTO: 1.01 (ref 1–1.03)
UROBILINOGEN UR STRIP-ACNC: <2 MG/DL
WBC #/AREA URNS AUTO: ABNORMAL /HPF

## 2023-12-05 PROCEDURE — 36415 COLL VENOUS BLD VENIPUNCTURE: CPT

## 2023-12-05 PROCEDURE — 82570 ASSAY OF URINE CREATININE: CPT

## 2023-12-05 PROCEDURE — 80048 BASIC METABOLIC PNL TOTAL CA: CPT

## 2023-12-05 PROCEDURE — 99214 OFFICE O/P EST MOD 30 MIN: CPT | Performed by: FAMILY MEDICINE

## 2023-12-05 PROCEDURE — 84156 ASSAY OF PROTEIN URINE: CPT

## 2023-12-05 PROCEDURE — 81001 URINALYSIS AUTO W/SCOPE: CPT

## 2023-12-05 RX ORDER — GABAPENTIN 100 MG/1
CAPSULE ORAL
Qty: 60 CAPSULE | Refills: 1 | Status: SHIPPED | OUTPATIENT
Start: 2023-12-05

## 2023-12-05 RX ORDER — ESCITALOPRAM OXALATE 10 MG/1
10 TABLET ORAL DAILY
Qty: 30 TABLET | Refills: 5 | Status: SHIPPED | OUTPATIENT
Start: 2023-12-05 | End: 2024-06-02

## 2023-12-05 RX ORDER — CLONAZEPAM 0.5 MG/1
0.5 TABLET ORAL 2 TIMES DAILY
Qty: 60 TABLET | Refills: 1 | Status: SHIPPED | OUTPATIENT
Start: 2023-12-05

## 2023-12-07 ENCOUNTER — TELEPHONE (OUTPATIENT)
Age: 63
End: 2023-12-07
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
## 2023-12-07 NOTE — TELEPHONE ENCOUNTER
Wanda Johnson from [address] [place] [address] called to verify if patient was seen on certain dates and stated they had faxed something over today. Did not see anything uploaded in pt's media to corroborate that and asked if she could send the request again so that we can better assist her. She did leave a claim # [id number].   She will be calling back on Monday to check in to see we got her fax request.

## 2023-12-11 ENCOUNTER — OFFICE VISIT (OUTPATIENT)
Dept: NEPHROLOGY | Facility: CLINIC | Age: 63
End: 2023-12-11
Payer: COMMERCIAL

## 2023-12-11 VITALS
BODY MASS INDEX: 21.54 KG/M2 | OXYGEN SATURATION: 97 % | SYSTOLIC BLOOD PRESSURE: 132 MMHG | WEIGHT: 159 LBS | DIASTOLIC BLOOD PRESSURE: 84 MMHG | HEIGHT: 72 IN | HEART RATE: 66 BPM

## 2023-12-11 DIAGNOSIS — N25.89 RTA (RENAL TUBULAR ACIDOSIS): ICD-10-CM

## 2023-12-11 DIAGNOSIS — M54.50 ACUTE RIGHT-SIDED LOW BACK PAIN WITHOUT SCIATICA: ICD-10-CM

## 2023-12-11 DIAGNOSIS — E78.00 PURE HYPERCHOLESTEROLEMIA: ICD-10-CM

## 2023-12-11 DIAGNOSIS — I10 PRIMARY HYPERTENSION: ICD-10-CM

## 2023-12-11 DIAGNOSIS — N20.0 NEPHROLITHIASIS: Primary | ICD-10-CM

## 2023-12-11 PROCEDURE — 99213 OFFICE O/P EST LOW 20 MIN: CPT | Performed by: STUDENT IN AN ORGANIZED HEALTH CARE EDUCATION/TRAINING PROGRAM

## 2023-12-11 NOTE — PROGRESS NOTES
NEPHROLOGY OUTPATIENT PROGRESS NOTE   Sandip Dominguez III 63 y.o. male MRN: [id number]  DATE: 12/17/2023    Reason for visit:   Chief Complaint   Patient presents with    Follow-up    Nephrolithiasis        Patient Instructions   Thank you for coming to your visit today. As we discussed you kidney function is stable at your baseline, your creatinine is 1.1mg/dL Your electrolytes are normal. Please follow the recommendations below       Recommend low sodium (salt) food    Avoid nonsteroidal anti-inflammatory drugs such as Naprosyn, ibuprofen, Aleve, Advil, Celebrex, Meloxicam (Mobic) etc.  You can use Tylenol as needed if you do not have any liver condition to be concerned about    Try to avoid medications such as pantoprazole or  Protonix/Nexium or Esomeprazole)/Prilosec or omeprazole/Prevacid or lansoprazole/AcipHex or Rabeprazole.  If you are able to, use Pepcid as this is safer for your kidneys.    Try to exercise at least 30 minutes 3 days a week to begin with with an ultimate goal of 5 days a week for at least 30 minutes    Next Visit in 6 months with results   If you need to see us earlier we can change the appointment for you      Joselyn Reyes Bahamonde, MD  Nephrology Attending         Sandip was seen today for follow-up and nephrolithiasis.    Diagnoses and all orders for this visit:    Nephrolithiasis  -     Basic metabolic panel; Future  -     Urinalysis with microscopic  -     Albumin / creatinine urine ratio        Assessment/Plan:  62 y.o. man PMH recurrent nephrolithiasis (8-9mm stone, multiple small stones vs calcifications on right kidney), HTN, COVID infection on 8/2022. Patient is here for follow up of nephrolithiasis      PLAN:     #Recurrent Nephrolithiasis RTA ? 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  L secondary to poor fluid intake  Potassium citrate as above         #Volume status/hypertension:  Volume: Euvolemic  Blood pressure:hypertensive, goal 132/84mmhg   Recommend:  Low sodium diet  Continue Lisinopril  40 mg   Advised to maintain a good BP control to prevent progression of CKD      #Hyperlipidemia  Cholesterol 220  Recommend healthy diet   physical activity    # Back pain   Pending repeat CT for kidney stones  Follow-up with urology         SUBJECTIVE / INTERVAL HISTORY:  63 y.o. male presents in follow up of TIA.  Complains of back pain, fatigue, poor appetite.    Sandip Dominguez III denies any recent illness/hospitalizations/medication changes since last office visit.    Review of Systems   Constitutional:  Positive for appetite change and chills. Negative for activity change.   HENT:  Negative for congestion.    Eyes:  Negative for discharge.   Respiratory:  Negative for cough.    Cardiovascular:  Negative for chest pain and leg swelling.   Endocrine: Negative for cold intolerance.   Genitourinary:  Negative for dysuria.   Musculoskeletal:  Positive for back pain.   Skin:  Negative for color change and pallor.   Neurological:  Negative for dizziness.   Psychiatric/Behavioral:  Negative for agitation.        OBJECTIVE:  /84 (BP Location: Left arm, Patient Position: Sitting, Cuff Size: Standard)   Pulse 66   Ht 6' (1.829 m)   Wt 72.1 kg (159 lb)   SpO2 97%   BMI 21.56 kg/m²  Body mass index is 21.56 kg/m².    Physical exam:  Physical Exam  General:  no acute distress at this time  Skin:  No acute rash  Eyes:  No scleral icterus and noninjected  ENT:  mucous membranes moist  Neck:  no carotid bruits  Chest:   Clear to auscultation percussion, good respiratory effort, no use of accessory respiratory muscles  CVS:  Regular rate and rhythm without rub   Abdomen:  soft and nontender   Extremities: no significant lower extremity edema  Neuro:  No gross focality  Psych:  Alert , cooperative       Medications:    Current Outpatient Medications:     clonazePAM (KlonoPIN) 0.5 mg tablet, Take 1 tablet (0.5 mg total) by mouth 2 (two) times a day, Disp: 60 tablet, Rfl: 1    escitalopram (LEXAPRO) 10 mg tablet, Take 1 tablet (10 mg total) by mouth daily, Disp: 30 tablet, Rfl: 5    gabapentin (Neurontin) 100 mg capsule, 1-3 tablets hs as directed by physician, Disp: 60 capsule, Rfl: 1    HYDROcodone-acetaminophen (Norco) 5-325 mg per tablet, Take 1 tablet by mouth every 6 (six) hours as needed for pain Max Daily Amount: 4 tablets, Disp: 50 tablet, Rfl: 0    levothyroxine 75 mcg tablet, TAKE ONE TABLET BY MOUTH EVERY DAY, Disp: 90 tablet, Rfl: 1    lisinopril (ZESTRIL) 40 mg tablet, TAKE ONE TABLET BY MOUTH EVERY DAY, Disp: 90 tablet, Rfl: 1    meloxicam (MOBIC) 15 mg tablet, Take 1 tablet (15 mg total) by mouth daily as needed for moderate pain (Patient taking differently: Take 15 mg by mouth daily), Disp: 30 tablet, Rfl: 3    pantoprazole (PROTONIX) 40 mg tablet, TAKE ONE TABLET BY MOUTH TWICE A DAY (Patient taking differently: 40 mg daily), Disp: 60 tablet, Rfl: 3    citric acid-potassium citrate (POLYCITRA K) 1,100-334 mg/5 mL solution, Take 15 mL by mouth 2 (two) times a day Daily on weekdays and BID on weekends (Patient not taking: Reported on 12/11/2023), Disp: 473 mL, Rfl: 3    dicyclomine (BENTYL) 10 mg capsule, Take 1 capsule (10 mg total) by mouth 4 (four) times a day (before meals and at bedtime), Disp: 120 capsule, Rfl: 0    EPINEPHrine (EPIPEN) 0.3 mg/0.3 mL SOAJ, Inject 0.3 mL (0.3 mg total) into a muscle once for 1 dose, Disp: 0.6 mL, Rfl: 0    Current Facility-Administered Medications:     cefTRIAXone (ROCEPHIN)  "injection 1,000 mg, 1,000 mg, Intramuscular, Q24H, Nathaly Alvarado, , 1,000 mg at 07/14/23 1209    Allergies:  Allergies as of 12/11/2023 - Reviewed 12/11/2023   Allergen Reaction Noted    Compazine [prochlorperazine] GI Intolerance 06/03/2017    Percocet [oxycodone-acetaminophen] Itching 04/02/2022    Sulfa antibiotics Rash 06/03/2017       The following portions of the patient's history were reviewed and updated as appropriate: past family history, past surgical history and problem list.    Laboratory Results:  Lab Results   Component Value Date    SODIUM 142 12/05/2023    K 4.2 12/05/2023     12/05/2023    CO2 35 (H) 12/05/2023    BUN 9 12/05/2023    CREATININE 1.18 12/05/2023    GLUC 132 07/18/2023    CALCIUM 9.5 12/05/2023        Lab Results   Component Value Date    PTH 41.1 12/24/2022    CALCIUM 9.5 12/05/2023       Portions of the record may have been created with voice recognition software.  Occasional wrong word or \"sound a like\" substitutions may have occurred due to the inherent limitations of voice recognition software.  Read the chart carefully and recognize, using context, where substitutions have occurred.    "

## 2023-12-11 NOTE — PATIENT INSTRUCTIONS
Thank you for coming to your visit today. As we discussed you kidney function is stable at your baseline, your creatinine is 1.1mg/dL Your electrolytes are normal. Please follow the recommendations below       Recommend low sodium (salt) food    Avoid nonsteroidal anti-inflammatory drugs such as Naprosyn, ibuprofen, Aleve, Advil, Celebrex, Meloxicam (Mobic) etc.  You can use Tylenol as needed if you do not have any liver condition to be concerned about    Try to avoid medications such as pantoprazole or  Protonix/Nexium or Esomeprazole)/Prilosec or omeprazole/Prevacid or lansoprazole/AcipHex or Rabeprazole.  If you are able to, use Pepcid as this is safer for your kidneys.    Try to exercise at least 30 minutes 3 days a week to begin with with an ultimate goal of 5 days a week for at least 30 minutes    Next Visit in 6 months with results   If you need to see us earlier we can change the appointment for you      Joselyn Reyes Bahamonde, MD  Nephrology Attending

## 2023-12-12 ENCOUNTER — HOSPITAL ENCOUNTER (OUTPATIENT)
Dept: CT IMAGING | Facility: HOSPITAL | Age: 63
Discharge: HOME | End: 2023-12-12
Attending: FAMILY MEDICINE
Payer: COMMERCIAL

## 2023-12-12 ENCOUNTER — TELEPHONE (OUTPATIENT)
Dept: UROLOGY | Facility: CLINIC | Age: 63
End: 2023-12-12

## 2023-12-12 DIAGNOSIS — N20.0 RENAL LITHIASIS: ICD-10-CM

## 2023-12-12 PROCEDURE — 74176 CT ABD & PELVIS W/O CONTRAST: CPT

## 2023-12-12 PROCEDURE — G1004 CDSM NDSC: HCPCS

## 2023-12-12 NOTE — TELEPHONE ENCOUNTER
Called the patient to inform him we were cancelling his appointment with Dr. Jesika Tejada on 12-20-23, as per the Clinical team an AP is able to see him for his condition.     As per his wife, he is to see Dr. Jesika Tejada only, please advise, thank-you

## 2023-12-13 DIAGNOSIS — N20.0 RENAL CALCULI: Primary | ICD-10-CM

## 2023-12-14 NOTE — TELEPHONE ENCOUNTER
Called the patient and left a VM stating at the visit with Dr. Luci Aguilar on 12- he is to   Return in about 6 months (around 6/13/2023) for 6 mos with AP with [facility]. Please call us at [phone] to schedule the appointment, thank-you.

## 2023-12-15 ENCOUNTER — PATIENT MESSAGE (OUTPATIENT)
Dept: UROLOGY | Facility: AMBULATORY SURGERY CENTER | Age: 63
End: 2023-12-15

## 2023-12-15 NOTE — TELEPHONE ENCOUNTER
Wife is calling. Reports that patient is to only see Dr. Mario Velázquez. Reports that she is very upset. Reports if patient can not get an appointment with Dr. Mario Velázquez within the next 3 weeks than they will have to transfer care. Wife reports that they refuse to see an AP or anyone expect Dr. Mario Velázquez.  Please call wife [phone]

## 2023-12-17 PROBLEM — I10 PRIMARY HYPERTENSION: Status: ACTIVE | Noted: 2023-12-17

## 2023-12-17 PROBLEM — N25.89 RTA (RENAL TUBULAR ACIDOSIS): Status: ACTIVE | Noted: 2023-12-17

## 2023-12-17 PROBLEM — E78.00 PURE HYPERCHOLESTEROLEMIA: Status: ACTIVE | Noted: 2023-12-17

## 2023-12-17 PROBLEM — M54.50 ACUTE RIGHT-SIDED LOW BACK PAIN WITHOUT SCIATICA: Status: ACTIVE | Noted: 2023-12-17

## 2023-12-20 NOTE — TELEPHONE ENCOUNTER
Called the patient and we decided on the below appointment:    Date: 1/16/2024     Arrival Time: 7:30  AM     Visit Type: FOLLOW UP [facility] [[id number]]     Provider: Sandip Thomson MD Department: [facility] Area:  Department Phone: [phone]

## 2023-12-26 ENCOUNTER — PATIENT MESSAGE (OUTPATIENT)
Dept: FAMILY MEDICINE CLINIC | Facility: CLINIC | Age: 63
End: 2023-12-26

## 2023-12-27 ENCOUNTER — RA CDI HCC (OUTPATIENT)
Dept: OTHER | Facility: HOSPITAL | Age: 63
End: 2023-12-27

## 2023-12-27 NOTE — PROGRESS NOTES
HCC coding opportunities       Chart reviewed, no opportunity found: CHART REVIEWED, NO OPPORTUNITY FOUND        Patients Insurance        Commercial Insurance: [employer]

## 2023-12-28 NOTE — PATIENT COMMUNICATION
Beryl from [facility] disability insurance inquiring about the status of the form they sent over. I advised that Dr Alvarado responded  to have done over the week and will send back after the holiday.

## 2023-12-29 ENCOUNTER — OFFICE VISIT (OUTPATIENT)
Dept: FAMILY MEDICINE CLINIC | Facility: CLINIC | Age: 63
End: 2023-12-29
Payer: COMMERCIAL

## 2023-12-29 VITALS
DIASTOLIC BLOOD PRESSURE: 82 MMHG | OXYGEN SATURATION: 99 % | HEART RATE: 62 BPM | SYSTOLIC BLOOD PRESSURE: 130 MMHG | WEIGHT: 154 LBS | TEMPERATURE: 96.5 F | BODY MASS INDEX: 20.86 KG/M2 | HEIGHT: 72 IN

## 2023-12-29 DIAGNOSIS — R19.7 DIARRHEA, UNSPECIFIED TYPE: ICD-10-CM

## 2023-12-29 DIAGNOSIS — R79.89 AZOTEMIA: ICD-10-CM

## 2023-12-29 DIAGNOSIS — M54.14 THORACIC RADICULOPATHY: ICD-10-CM

## 2023-12-29 DIAGNOSIS — Z00.00 ANNUAL PHYSICAL EXAM: Primary | ICD-10-CM

## 2023-12-29 PROCEDURE — 99214 OFFICE O/P EST MOD 30 MIN: CPT | Performed by: FAMILY MEDICINE

## 2023-12-29 PROCEDURE — 99396 PREV VISIT EST AGE 40-64: CPT | Performed by: FAMILY MEDICINE

## 2023-12-29 RX ORDER — CHOLESTYRAMINE 4 G/9G
1 POWDER, FOR SUSPENSION ORAL 2 TIMES DAILY WITH MEALS
Qty: 60 PACKET | Refills: 0 | Status: SHIPPED | OUTPATIENT
Start: 2023-12-29
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
## 2024-01-11 ENCOUNTER — TELEPHONE (OUTPATIENT)
Age: 64
End: 2024-01-11

## 2024-01-11 NOTE — TELEPHONE ENCOUNTER
Araceli from [facility] [employer] called requesting PCP place prior auth on emergency status due to pt appointment being tonight. She states that the turn around time for auth that is not on emergency status is about 15 days. If the status is changed through [employer] to emergency, then the request should process through within 72 hours. The pt will most likely have to reschedule the MRI either way, however Araceli states that Sandip's wife is upset because she didn't realize how long it takes for the auth to go through. [employer] reference #[id number] [employer] phone # [phone]. Please advise.

## 2024-01-11 NOTE — TELEPHONE ENCOUNTER
Called Sil, they changed status to urgent, but it could still take up to 24 hours. I will check on status later today

## 2024-01-12 NOTE — TELEPHONE ENCOUNTER
Patient and his wife came into the office because they state  that they were unable to get through on the phone, due to extremely long hold times    They state that they spoke to Sil this morning and they were informed that they have no record of anyone speaking to anyone from this office    Spoke to Ofe, who spoke to [facility] yesterday and provided a reference number. She agreed that she would try calling again to try and help

## 2024-01-16 ENCOUNTER — OFFICE VISIT (OUTPATIENT)
Dept: UROLOGY | Facility: AMBULATORY SURGERY CENTER | Age: 64
End: 2024-01-16
Payer: COMMERCIAL

## 2024-01-16 VITALS
HEIGHT: 72 IN | OXYGEN SATURATION: 98 % | RESPIRATION RATE: 18 BRPM | DIASTOLIC BLOOD PRESSURE: 78 MMHG | BODY MASS INDEX: 21.4 KG/M2 | WEIGHT: 158 LBS | SYSTOLIC BLOOD PRESSURE: 126 MMHG | HEART RATE: 77 BPM

## 2024-01-16 DIAGNOSIS — N20.0 RENAL CALCULI: ICD-10-CM

## 2024-01-16 PROCEDURE — 99214 OFFICE O/P EST MOD 30 MIN: CPT | Performed by: UROLOGY
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
## 2024-01-19 ENCOUNTER — OFFICE VISIT (OUTPATIENT)
Dept: PAIN MEDICINE | Facility: CLINIC | Age: 64
End: 2024-01-19
Payer: COMMERCIAL

## 2024-01-19 VITALS
DIASTOLIC BLOOD PRESSURE: 89 MMHG | WEIGHT: 158 LBS | SYSTOLIC BLOOD PRESSURE: 132 MMHG | BODY MASS INDEX: 21.43 KG/M2 | HEART RATE: 71 BPM

## 2024-01-19 DIAGNOSIS — M51.16 INTERVERTEBRAL DISC DISORDER WITH RADICULOPATHY OF LUMBAR REGION: ICD-10-CM

## 2024-01-19 DIAGNOSIS — M51.36 DISC DEGENERATION, LUMBAR: Primary | ICD-10-CM

## 2024-01-19 DIAGNOSIS — M47.816 LUMBAR SPONDYLOSIS: ICD-10-CM

## 2024-01-19 DIAGNOSIS — M54.2 NECK PAIN: ICD-10-CM

## 2024-01-19 PROCEDURE — 99214 OFFICE O/P EST MOD 30 MIN: CPT | Performed by: ANESTHESIOLOGY

## 2024-01-19 RX ORDER — GABAPENTIN 100 MG/1
100 CAPSULE ORAL 2 TIMES DAILY
COMMUNITY

## 2024-01-26 ENCOUNTER — HOSPITAL ENCOUNTER (OUTPATIENT)
Dept: MRI IMAGING | Facility: HOSPITAL | Age: 64
Discharge: HOME | End: 2024-01-26
Attending: ANESTHESIOLOGY
Payer: COMMERCIAL

## 2024-01-26 ENCOUNTER — HOSPITAL ENCOUNTER (OUTPATIENT)
Dept: RADIOLOGY | Facility: HOSPITAL | Age: 64
Discharge: HOME | End: 2024-01-26
Attending: ANESTHESIOLOGY
Payer: COMMERCIAL

## 2024-01-26 DIAGNOSIS — M51.16 INTERVERTEBRAL DISC DISORDER WITH RADICULOPATHY OF LUMBAR REGION: ICD-10-CM

## 2024-01-26 DIAGNOSIS — M54.2 NECK PAIN: ICD-10-CM

## 2024-01-26 PROCEDURE — 72148 MRI LUMBAR SPINE W/O DYE: CPT

## 2024-01-26 PROCEDURE — 73521 X-RAY EXAM HIPS BI 2 VIEWS: CPT

## 2024-01-26 PROCEDURE — G1004 CDSM NDSC: HCPCS

## 2024-01-26 PROCEDURE — 72050 X-RAY EXAM NECK SPINE 4/5VWS: CPT

## 2024-01-29 ENCOUNTER — TELEPHONE (OUTPATIENT)
Age: 64
End: 2024-01-29

## 2024-01-29 NOTE — TELEPHONE ENCOUNTER
Caller: Omar     Doctor: Jayy    Reason for call: Please call patient back once imaging results come in     Call back#: [phone]

## 2024-01-30 DIAGNOSIS — F41.9 ANXIETY: ICD-10-CM

## 2024-01-30 RX ORDER — CLONAZEPAM 0.5 MG/1
0.5 TABLET ORAL 2 TIMES DAILY
Qty: 60 TABLET | Refills: 1 | Status: SHIPPED | OUTPATIENT
Start: 2024-01-30

## 2024-01-30 NOTE — TELEPHONE ENCOUNTER
----- Message from Sandip Dominguez III sent at 1/30/2024  3:11 PM EST -----  Regarding: Prescriptions - One Refill needed and One Cancel needed.  Contact: [phone]  Dr Alvarado,    I need a refill on Clonazepam .5mg tablets.  I need the Pantoprazole Sodium 40 mg tablets stopped (I am supposed to take it twice a day but I have only been taking it once, so I don't need this prescription for awhile, so please pause this).    I received the results of the MRI of my lower spine, and I am following up with Dr. Hope.  It appears there is more deterioration.    Many thanks,    Sandip

## 2024-01-30 NOTE — TELEPHONE ENCOUNTER
1 [id number] 01/02/2024 12/05/2023 clonazePAM (Tablet) 60.0 30 0.5 MG NA [id number] Commercial Insurance 1 / 1 PA    1 [id number] 12/05/2023 12/05/2023 clonazePAM (Tablet) 60.0 30 0.5 MG NA [id number] Commercial Insurance 0 / 1 PA    1 [id number] 11/27/2023 11/27/2023 ACETAMINOPHEN 325 MG / HYDROcodone BITARTRATE 5 MG ORAL TABLET (Tablet) 50.0 12 5.0 MG/325.0 MG 20.83 [facility]

## 2024-01-30 NOTE — TELEPHONE ENCOUNTER
Please let patient know that I reviewed the MRI lumbar spine and he does have disc degeneration at multiple levels particularly at L2-3.  Would recommend proceeding with bilateral L2 transforaminal epidural steroid injection which is higher than where I had treated him a few years ago.  The x-rays of the hip/sacroiliac joints showed mild degenerative changes.  If amenable, please send to [facility] to schedule

## 2024-01-30 NOTE — TELEPHONE ENCOUNTER
S/w pt, advised of the same. Pt verbalized understanding and agreeable to plan. Please reach out to schedule, thank you.

## 2024-02-05 ENCOUNTER — TELEPHONE (OUTPATIENT)
Age: 64
End: 2024-02-05

## 2024-02-05 NOTE — TELEPHONE ENCOUNTER
Caller: ai    Doctor: maria luisa    Reason for call: pt would like results of his xray and would like to have the procedure explained to him.    Call back#: [phone]

## 2024-02-05 NOTE — TELEPHONE ENCOUNTER
Pt said he still doesn't see any results for his cervical spine xray that he had done on 1/26/24 along with his hip XR and MRI.  Told pt that XR is still pending but I will call that dept and ask to have read for us. Told pt he will be called with results.  S/alessandro Segovia in Radiology and asked to have cervical XR from 1/26/24 read .     Pt then wanted the official name of his procedure he will be having done on 2/21/24. Pt provided with B/L L2 TFESI, explained how this injection is higher above the inj he had for his LB in 2021 and that this time the needle approach is different.

## 2024-02-06 ENCOUNTER — TELEPHONE (OUTPATIENT)
Dept: RADIOLOGY | Facility: CLINIC | Age: 64
End: 2024-02-06

## 2024-02-06 NOTE — TELEPHONE ENCOUNTER
Please let patient know that I reviewed the x-rays of the cervical spine and he has some mild disc degeneration at C3-4 and C5-6 and some arthritic changes.  His alignment looks good though.  Will review with them again at his upcoming appointment for his lower back injection

## 2024-02-21 ENCOUNTER — HOSPITAL ENCOUNTER (OUTPATIENT)
Dept: RADIOLOGY | Facility: CLINIC | Age: 64
Discharge: HOME | End: 2024-02-21
Payer: COMMERCIAL

## 2024-02-21 VITALS
DIASTOLIC BLOOD PRESSURE: 74 MMHG | RESPIRATION RATE: 20 BRPM | SYSTOLIC BLOOD PRESSURE: 117 MMHG | OXYGEN SATURATION: 97 % | HEART RATE: 72 BPM | TEMPERATURE: 98.3 F

## 2024-02-21 DIAGNOSIS — M51.16 INTERVERTEBRAL DISC DISORDER WITH RADICULOPATHY OF LUMBAR REGION: ICD-10-CM

## 2024-02-21 PROCEDURE — 64483 NJX AA&/STRD TFRM EPI L/S 1: CPT | Performed by: ANESTHESIOLOGY

## 2024-02-21 RX ORDER — BUPIVACAINE HCL/PF 2.5 MG/ML
2 VIAL (ML) INJECTION ONCE
Status: COMPLETED | OUTPATIENT
Start: 2024-02-21 | End: 2024-02-21

## 2024-02-21 RX ORDER — METHYLPREDNISOLONE ACETATE 80 MG/ML
80 INJECTION, SUSPENSION INTRA-ARTICULAR; INTRALESIONAL; INTRAMUSCULAR; PARENTERAL; SOFT TISSUE ONCE
Status: COMPLETED | OUTPATIENT
Start: 2024-02-21 | End: 2024-02-21

## 2024-02-21 RX ORDER — 0.9 % SODIUM CHLORIDE 0.9 %
4 VIAL (ML) INJECTION ONCE
Status: COMPLETED | OUTPATIENT
Start: 2024-02-21 | End: 2024-02-21

## 2024-02-21 RX ADMIN — METHYLPREDNISOLONE ACETATE 80 MG: 80 INJECTION, SUSPENSION INTRA-ARTICULAR; INTRALESIONAL; INTRAMUSCULAR; PARENTERAL; SOFT TISSUE at 09:30

## 2024-02-21 RX ADMIN — Medication 4 ML: at 09:28

## 2024-02-21 RX ADMIN — IOHEXOL 1 ML: 300 INJECTION, SOLUTION INTRAVENOUS at 09:30

## 2024-02-21 RX ADMIN — BUPIVACAINE HYDROCHLORIDE 2 ML: 2.5 INJECTION, SOLUTION EPIDURAL; INFILTRATION; INTRACAUDAL at 09:30
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
## 2024-02-21 NOTE — DISCHARGE INSTRUCTIONS
Epidural Steroid Injection   WHAT YOU NEED TO KNOW:   An epidural steroid injection (MICHELLE) is a procedure to inject steroid medicine into the epidural space. The epidural space is between your spinal cord and vertebrae. Steroids reduce inflammation and fluid buildup in your spine that may be causing pain. You may be given pain medicine along with the steroids.          ACTIVITY  Do not drive or operate machinery today.  No strenuous activity today - bending, lifting, etc.  You may resume normal activites starting tomorrow - start slowly and as tolerated.  You may shower today, but no tub baths or hot tubs.  You may have numbness for several hours from the local anesthetic. Please use caution and common sense, especially with weight-bearing activities.    CARE OF THE INJECTION SITE  If you have soreness or pain, apply ice to the area today (20 minutes on/20 minutes off).  Starting tomorrow, you may use warm, moist heat or ice if needed.  You may have an increase or change in your discomfort for 36-48 hours after your treatment.  Apply ice and continue with any pain medication you have been prescribed.  Notify the [facility] if you have any of the following: redness, drainage, swelling, headache, stiff neck or fever above 100°F.    SPECIAL INSTRUCTIONS  Our office will contact you in approximately 7 days for a progress report.    MEDICATIONS  Continue to take all routine medications.  Our office may have instructed you to hold some medications.    As no general anesthesia was used in today's procedure, you should not experience any side effects related to anesthesia.     If you are diabetic, the steroids used in today's injection may temporarily increase your blood sugar levels after the first few days after your injection. Please keep a close eye on your sugars and alert the doctor who manages your diabetes if your sugars are significantly high from your baseline or you are symptomatic.     If you have a  problem specifically related to your procedure, please call our office at [phone].  Problems not related to your procedure should be directed to your primary care physician.

## 2024-02-28 ENCOUNTER — PATIENT MESSAGE (OUTPATIENT)
Dept: FAMILY MEDICINE CLINIC | Facility: CLINIC | Age: 64
End: 2024-02-28

## 2024-02-28 ENCOUNTER — TELEPHONE (OUTPATIENT)
Dept: OBGYN CLINIC | Facility: HOSPITAL | Age: 64
End: 2024-02-28

## 2024-02-28 NOTE — LETTER
March 1, 2024     Patient: Sandip Dominguez III  YOB: 1960  Date of Visit: 2/28/2024      To Whom it May Concern:    Sandip Dominguez is under my professional care and is undergoing treatment for his lower back.  He is to remain out of work until 4/1/2024 while he recovers.      If you have any questions or concerns, please don't hesitate to call.         Sincerely,      Ty Hope        CC: No Recipients

## 2024-02-29 NOTE — TELEPHONE ENCOUNTER
Caller: Patient     Doctor: Jayy     Reason for call: Patient calling to advise he still had not had any relief and it is bothering the right side hip , butt and leg. He is very uncomfortable. His disability is up on Tuesday and asking if this can be extended or what his next options are.     Call back#: [phone]

## 2024-03-01 NOTE — TELEPHONE ENCOUNTER
I have updated a work note for him to return back to work on 4/1/2024.    Give steroid more time and we will f/u with him next week

## 2024-03-01 NOTE — TELEPHONE ENCOUNTER
S/w pt, explained below. Please call pt 3/8 to complete 2 week call back.    RN faxed new work note for STD as requested to [phone] and [phone], Ref # for claim [id number]

## 2024-03-06 ENCOUNTER — TELEPHONE (OUTPATIENT)
Age: 64
End: 2024-03-06

## 2024-03-06 NOTE — TELEPHONE ENCOUNTER
Caller: Patient     Doctor: Jayy     Reason for call: Patient had no relief from the injection and wanting to know what his next steps would be . It has been two weeks     Call back#: [phone]

## 2024-03-06 NOTE — TELEPHONE ENCOUNTER
Caller: Patient     Doctor:      Reason for call: [employer] states letter sent was not sufficient enough and they need more information including diagnosis and treatment dates etc.     Please advise     Call back#: [phone]

## 2024-03-06 NOTE — TELEPHONE ENCOUNTER
S/w pt he is s/p b/l L2 TFESI 2 weeks ago. Pt reports no relief.  RN asked for update on current symptoms: Pain is in lower back, hips and legs that he describes as pinching and soreness. Pain worsens with prolonged sitting and sitting to standing. Pain relieved if sitting on feet in a squatting position or lying on his side.  Pt requesting thoughts on next steps

## 2024-03-20 ENCOUNTER — TELEPHONE (OUTPATIENT)
Dept: RADIOLOGY | Facility: CLINIC | Age: 64
End: 2024-03-20

## 2024-03-20 NOTE — TELEPHONE ENCOUNTER
Ty Hope MD   to [facility]       3/18/24  1:03 PM  Can you get his appt moved up with me?

## 2024-03-20 NOTE — TELEPHONE ENCOUNTER
----- Message from Sandip Dominguez III sent at 3/20/2024  3:10 PM EDT -----  Regarding: Response from Dr. Hope  Contact: [phone]  Hello,  I just checked [employer] and they still have my Short Term Disability return date as April 1st.  I don't see Dr. Hope until April 19th and he doesn't want me to return until after my visit on April 19th.  Could you please notify [facility] of the extended date of my return??   Please let me know when you send the paper work in so that I can follow up with them.  Thank you,  Sandip  [phone]

## 2024-03-22 ENCOUNTER — OFFICE VISIT (OUTPATIENT)
Dept: PAIN MEDICINE | Facility: CLINIC | Age: 64
End: 2024-03-22
Payer: COMMERCIAL

## 2024-03-22 VITALS
DIASTOLIC BLOOD PRESSURE: 87 MMHG | HEART RATE: 75 BPM | BODY MASS INDEX: 21.43 KG/M2 | SYSTOLIC BLOOD PRESSURE: 129 MMHG | WEIGHT: 158 LBS

## 2024-03-22 DIAGNOSIS — M47.816 LUMBAR SPONDYLOSIS: ICD-10-CM

## 2024-03-22 DIAGNOSIS — M46.1 SACROILIITIS (HCC): Primary | ICD-10-CM

## 2024-03-22 DIAGNOSIS — M79.18 MYOFASCIAL PAIN SYNDROME: ICD-10-CM

## 2024-03-22 DIAGNOSIS — M51.36 LUMBAR DEGENERATIVE DISC DISEASE: ICD-10-CM

## 2024-03-22 PROCEDURE — 99214 OFFICE O/P EST MOD 30 MIN: CPT | Performed by: ANESTHESIOLOGY

## 2024-03-22 RX ORDER — METHOCARBAMOL 750 MG/1
750 TABLET, FILM COATED ORAL
Qty: 30 TABLET | Refills: 2 | Status: SHIPPED | OUTPATIENT
Start: 2024-03-22 | End: 2024-06-20

## 2024-03-22 NOTE — LETTER
March 22, 2024     Patient: Sandip Dominguez III  YOB: 1960  Date of Visit: 3/22/2024      To Whom it May Concern:    Sandip Dominguez is under my professional care. Sandip was seen in my office on 3/22/2024. Sandip He will remain out of work while he continues to undergo treatment for his lower back until 4/22/2024.    If you have any questions or concerns, please don't hesitate to call.         Sincerely,          Ty Hope MD        CC: No Recipients
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
## 2024-03-23 DIAGNOSIS — K21.9 GASTROESOPHAGEAL REFLUX DISEASE WITHOUT ESOPHAGITIS: ICD-10-CM

## 2024-03-23 RX ORDER — PANTOPRAZOLE SODIUM 40 MG/1
TABLET, DELAYED RELEASE ORAL
Qty: 60 TABLET | Refills: 3 | Status: SHIPPED | OUTPATIENT
Start: 2024-03-23

## 2024-03-26 DIAGNOSIS — R16.0 HEPATOMEGALY: Primary | ICD-10-CM

## 2024-04-01 DIAGNOSIS — F41.9 ANXIETY: ICD-10-CM

## 2024-04-01 NOTE — TELEPHONE ENCOUNTER
1 [id number] 02/29/2024 01/30/2024 clonazePAM (TABLET) 60.0 30 0.5 MG  [id number] Commercial Insurance 1 / 1 [place]    1 [id number] 01/30/2024 01/30/2024 clonazePAM (TABLET) 60.0 30 0.5 MG NA [id number] Commercial Insurance 0 / 1 [place]    1 [id number] 01/02/2024 12/05/2023 clonazePAM (Tablet) 60.0 30 0.5 MG [id number] Commercial Insurance 1 / 1 [place]    1 [id number] 12/05/2023 12/05/2023 clonazePAM (Tablet) 60.0 30 0.5 MG [id number] Commercial Insurance 0 / 1 [place]    1 [id number] 11/27/2023 11/27/2023 ACETAMINOPHEN 325 MG / HYDROcodone BITARTRATE 5 MG ORAL TABLET (Tablet) 50.0 12 5.0 MG/325.0 MG 20.83 [id number] Commercial Insurance 0

## 2024-04-02 RX ORDER — CLONAZEPAM 0.5 MG/1
0.5 TABLET ORAL 2 TIMES DAILY
Qty: 60 TABLET | Refills: 1 | Status: SHIPPED | OUTPATIENT
Start: 2024-04-02

## 2024-04-04 ENCOUNTER — HOSPITAL ENCOUNTER (OUTPATIENT)
Dept: RADIOLOGY | Facility: CLINIC | Age: 64
End: 2024-04-04
Payer: COMMERCIAL

## 2024-04-04 VITALS
RESPIRATION RATE: 18 BRPM | DIASTOLIC BLOOD PRESSURE: 76 MMHG | TEMPERATURE: 98.4 F | HEART RATE: 80 BPM | OXYGEN SATURATION: 96 % | SYSTOLIC BLOOD PRESSURE: 119 MMHG

## 2024-04-04 DIAGNOSIS — M46.1 SACROILIITIS (HCC): ICD-10-CM

## 2024-04-04 PROCEDURE — 27096 INJECT SACROILIAC JOINT: CPT | Performed by: ANESTHESIOLOGY

## 2024-04-04 RX ORDER — 0.9 % SODIUM CHLORIDE 0.9 %
2 VIAL (ML) INJECTION ONCE
Status: COMPLETED | OUTPATIENT
Start: 2024-04-04 | End: 2024-04-04

## 2024-04-04 RX ORDER — METHYLPREDNISOLONE ACETATE 80 MG/ML
80 INJECTION, SUSPENSION INTRA-ARTICULAR; INTRALESIONAL; INTRAMUSCULAR; PARENTERAL; SOFT TISSUE ONCE
Status: COMPLETED | OUTPATIENT
Start: 2024-04-04 | End: 2024-04-04

## 2024-04-04 RX ORDER — ROPIVACAINE HYDROCHLORIDE 2 MG/ML
3 INJECTION, SOLUTION EPIDURAL; INFILTRATION; PERINEURAL ONCE
Status: COMPLETED | OUTPATIENT
Start: 2024-04-04 | End: 2024-04-04

## 2024-04-04 RX ADMIN — ROPIVACAINE HYDROCHLORIDE 3 ML: 2 INJECTION, SOLUTION EPIDURAL; INFILTRATION; PERINEURAL at 11:54

## 2024-04-04 RX ADMIN — METHYLPREDNISOLONE ACETATE 80 MG: 80 INJECTION, SUSPENSION INTRA-ARTICULAR; INTRALESIONAL; INTRAMUSCULAR; PARENTERAL; SOFT TISSUE at 11:54

## 2024-04-04 RX ADMIN — Medication 2 ML: at 11:53

## 2024-04-04 RX ADMIN — SODIUM CHLORIDE 2 ML: 9 INJECTION INTRAMUSCULAR; INTRAVENOUS; SUBCUTANEOUS at 11:53

## 2024-04-04 RX ADMIN — IOHEXOL 1 ML: 300 INJECTION, SOLUTION INTRAVENOUS at 11:54
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
## 2024-04-04 NOTE — DISCHARGE INSTR - LAB

## 2024-04-11 ENCOUNTER — TELEPHONE (OUTPATIENT)
Dept: RADIOLOGY | Facility: MEDICAL CENTER | Age: 64
End: 2024-04-11
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
## 2024-04-18 ENCOUNTER — TELEPHONE (OUTPATIENT)
Dept: PAIN MEDICINE | Facility: CLINIC | Age: 64
End: 2024-04-18

## 2024-04-19 ENCOUNTER — OFFICE VISIT (OUTPATIENT)
Dept: PAIN MEDICINE | Facility: CLINIC | Age: 64
End: 2024-04-19
Payer: COMMERCIAL

## 2024-04-19 VITALS
BODY MASS INDEX: 21.43 KG/M2 | SYSTOLIC BLOOD PRESSURE: 136 MMHG | WEIGHT: 158 LBS | DIASTOLIC BLOOD PRESSURE: 92 MMHG | HEART RATE: 76 BPM

## 2024-04-19 DIAGNOSIS — M51.36 LUMBAR DEGENERATIVE DISC DISEASE: ICD-10-CM

## 2024-04-19 DIAGNOSIS — M46.1 SACROILIITIS (HCC): ICD-10-CM

## 2024-04-19 DIAGNOSIS — M47.816 LUMBAR SPONDYLOSIS: Primary | ICD-10-CM

## 2024-04-19 PROCEDURE — 99214 OFFICE O/P EST MOD 30 MIN: CPT | Performed by: ANESTHESIOLOGY

## 2024-04-19 NOTE — LETTER
April 19, 2024     Patient: Sandip Dominguez III  YOB: 1960  Date of Visit: 4/19/2024      To Whom it May Concern:    Sandip Dominguez is under my professional care. Sandip was seen in my office on 4/19/2024. Sandip will remain out of work while he undergoes treatment for his lower back.  He will return to work on 5/31/2024.      If you have any questions or concerns, please don't hesitate to call.         Sincerely,          Ty Hope MD        CC: No Recipients

## 2024-04-22 DIAGNOSIS — I10 ESSENTIAL HYPERTENSION: ICD-10-CM

## 2024-04-22 DIAGNOSIS — E03.9 HYPOTHYROIDISM, UNSPECIFIED TYPE: ICD-10-CM

## 2024-04-23 RX ORDER — LISINOPRIL 40 MG/1
TABLET ORAL
Qty: 90 TABLET | Refills: 1 | Status: SHIPPED | OUTPATIENT
Start: 2024-04-23

## 2024-04-23 RX ORDER — LEVOTHYROXINE SODIUM 0.07 MG/1
TABLET ORAL
Qty: 90 TABLET | Refills: 1 | Status: SHIPPED | OUTPATIENT
Start: 2024-04-23

## 2024-05-07 ENCOUNTER — EVALUATION (OUTPATIENT)
Dept: PHYSICAL THERAPY | Age: 64
End: 2024-05-07
Payer: COMMERCIAL

## 2024-05-07 DIAGNOSIS — M47.816 LUMBAR SPONDYLOSIS: ICD-10-CM

## 2024-05-07 DIAGNOSIS — M51.36 LUMBAR DEGENERATIVE DISC DISEASE: ICD-10-CM

## 2024-05-07 DIAGNOSIS — G89.29 CHRONIC BILATERAL LOW BACK PAIN WITHOUT SCIATICA: Primary | ICD-10-CM

## 2024-05-07 DIAGNOSIS — M54.50 CHRONIC BILATERAL LOW BACK PAIN WITHOUT SCIATICA: Primary | ICD-10-CM

## 2024-05-07 DIAGNOSIS — M46.1 SACROILIITIS (HCC): ICD-10-CM

## 2024-05-07 PROCEDURE — 97161 PT EVAL LOW COMPLEX 20 MIN: CPT | Performed by: PHYSICAL THERAPIST

## 2024-05-07 PROCEDURE — 97110 THERAPEUTIC EXERCISES: CPT | Performed by: PHYSICAL THERAPIST

## 2024-05-07 NOTE — PROGRESS NOTES
PT Evaluation     Today's date: 2024  Patient name: Sandip Dominguez III  : 1960  MRN: [id number]  Referring provider: Ty Hope MD  Dx:   Encounter Diagnosis     ICD-10-CM    1. Chronic bilateral low back pain without sciatica  M54.50     G89.29       2. Lumbar spondylosis  M47.816 Ambulatory referral to Physical Therapy      3. Lumbar degenerative disc disease  M51.36 Ambulatory referral to Physical Therapy      4. Sacroiliitis (HCC)  M46.1 Ambulatory referral to Physical Therapy                     Assessment  Assessment details: Patient with LBP secondary to degenerative changes - presents with decreased AROM, PROM, flexibility, weakness, and pain.  Patient is an excellent candidate for PT intervention.    Impairments: abnormal or restricted ROM, impaired physical strength, lacks appropriate home exercise program and pain with function  Understanding of Dx/Px/POC: good   Prognosis: good    Goals  Short Term goals - 4 weeks  1.  Patient will be independent HEP.   2.  Patient will report a 50% decrease in pain complaints.  3.  Increase strength 1/2 grade.  4.  Increase ROM 5-10 degrees.    Long Term goals - 8 weeks  1.  Patient will report elimination of pain complaints.  2.  Patient will return to all work related activities without restriction.  3.  Patient will return to all recreational activities without restriction.  4.  ROM WFL.  5.  Strength 5/5.    Plan  Planned therapy interventions: manual therapy, strengthening, stretching and aquatic therapy  Frequency: 2x week  Duration in weeks: 4    Subjective Evaluation    History of Present Illness  Mechanism of injury: Patient reports a long history of intermittent LBP.  Patient has had multiple courses of PT, injections, etc..  Patient here for a trial of aquatic therapy.  Sx's are aggravated by changing positions after a static posture.  Patient is employed but is currently off secondary to LB issues - .  Neg c/o radiating  "sx's.  Neg c/o LE weakness.    Quality of life: good    Patient Goals  Patient goals for therapy: decreased pain, increased strength, independence with ADLs/IADLs and increased motion    Pain  Current pain ratin  At best pain rating: 3  At worst pain ratin  Quality: throbbing, tight and dull ache      Objective     Active Range of Motion     Lumbar   Flexion:  Restriction level: moderate  Extension:  with pain Restriction level: maximal  Left lateral flexion:  with pain Restriction level: maximal  Right lateral flexion:  with pain Restriction level: maximal    Strength/Myotome Testing     Lumbar   Left   Normal strength    Right   Normal strength    Tests     Lumbar     Left   Negative femoral stretch, passive SLR and slump test.     Right   Negative femoral stretch, passive SLR and slump test.     Left Hip   Negative DIEGO and FADIR.     Right Hip   Negative DIEGO and FADIR.            Precautions: None      Manuals                                                                 Neuro Re-Ed                                                                                                        Ther Ex             Water walking                          Standing trunk extension             Standing SB\"ing             Seated hamstring stretch             Seated piriformis stretch                          Standing hip flexion, abd, ext                          Paddles rythmic stab             Paddles shld flexion/ext, horiz abd/add                                                                                           Ther Activity                                       Gait Training                                       Modalities                                            "

## 2024-05-14 ENCOUNTER — OFFICE VISIT (OUTPATIENT)
Dept: PHYSICAL THERAPY | Age: 64
End: 2024-05-14
Payer: COMMERCIAL

## 2024-05-14 DIAGNOSIS — G89.29 CHRONIC BILATERAL LOW BACK PAIN WITHOUT SCIATICA: Primary | ICD-10-CM

## 2024-05-14 DIAGNOSIS — M54.50 CHRONIC BILATERAL LOW BACK PAIN WITHOUT SCIATICA: Primary | ICD-10-CM

## 2024-05-14 PROCEDURE — 97113 AQUATIC THERAPY/EXERCISES: CPT | Performed by: PHYSICAL THERAPIST

## 2024-05-14 NOTE — PROGRESS NOTES
"Daily Note     Today's date: 2024  Patient name: Sandip Dominguez III  : 1960  MRN: [id number]  Referring provider: Ty Hope MD  Dx:   Encounter Diagnosis     ICD-10-CM    1. Chronic bilateral low back pain without sciatica  M54.50     G89.29                      Subjective: Patient reports no change in sx's.  Patient reported some soreness post-exercise program.      Objective: See treatment diary below      Assessment: Tolerated treatment well. Patient would benefit from continued PT      Plan: Continue per plan of care.      Precautions: None      Manuals 5/15                                                                Neuro Re-Ed                                                                                                          Ther Ex             Water walking X5 laps - pre and post                         Standing trunk extension 2x10            Standing SB\"ing With dumbbells - 2x10            Seated hamstring stretch X5 reps hold 20 sec            Seated piriformis stretch X5 reps hold 20 sec                         Standing hip flexion, abd, ext nt                         Paddles rythmic stab X5 reps - 30 sec            Paddles shld flexion/ext, horiz abd/add 3x10 each                         Pony Isometric abd - 2x10                                                                Ther Activity                                       Gait Training                                       Modalities                                            "

## 2024-05-15 ENCOUNTER — TELEPHONE (OUTPATIENT)
Dept: NEPHROLOGY | Facility: CLINIC | Age: 64
End: 2024-05-15

## 2024-05-16 ENCOUNTER — TELEPHONE (OUTPATIENT)
Age: 64
End: 2024-05-16

## 2024-05-16 NOTE — TELEPHONE ENCOUNTER
Pt called in to schedule an appt. Pt is scheduled to see Dr. Reyes 08/20/2024. Pt is aware and confirmed.

## 2024-05-17 ENCOUNTER — OFFICE VISIT (OUTPATIENT)
Dept: PHYSICAL THERAPY | Age: 64
End: 2024-05-17
Payer: COMMERCIAL

## 2024-05-17 DIAGNOSIS — G89.29 CHRONIC BILATERAL LOW BACK PAIN WITHOUT SCIATICA: Primary | ICD-10-CM

## 2024-05-17 DIAGNOSIS — M47.816 LUMBAR SPONDYLOSIS: ICD-10-CM

## 2024-05-17 DIAGNOSIS — M54.50 CHRONIC BILATERAL LOW BACK PAIN WITHOUT SCIATICA: Primary | ICD-10-CM

## 2024-05-17 PROCEDURE — 97113 AQUATIC THERAPY/EXERCISES: CPT | Performed by: PHYSICAL THERAPIST

## 2024-05-17 NOTE — PROGRESS NOTES
"Daily Note     Today's date: 2024  Patient name: Sandip Dominguez III  : 1960  MRN: [id number]  Referring provider: Ty Hope MD  Dx:   Encounter Diagnosis     ICD-10-CM    1. Chronic bilateral low back pain without sciatica  M54.50     G89.29       2. Lumbar spondylosis  M47.816                        Subjective: LB pain 2/10 today\"more like a soreness\"       Objective: See treatment diary below      Assessment: Good tolerance to exercises. Progress as able       Plan: Continue per plan of care.      Precautions: None      Manuals 5/15 5/17                        Neuro Re-Ed                                                    Ther Ex             Water walking pre/post  X5 laps - pre and post 5x                         Standing trunk extension 2x10 20x            Standing SB\"ing With dumbbells - 2x10 20x            Seated hamstring stretch X5 reps hold 20 sec 20sec 5x            Seated piriformis stretch X5 reps hold 20 sec 20sec 5x                         Standing hip flexion, abd, ext nt 20x                         Paddles rythmic stab X5 reps - 30 sec 30SEC 5X            Paddles shld flexion/ext, horiz abd/add 3x10 each 20x                         Pony Isometric abd - 2x10 2SEC 20X                                                                Ther Activity                                       Gait Training                                       Modalities                                            "

## 2024-05-21 ENCOUNTER — OFFICE VISIT (OUTPATIENT)
Dept: PHYSICAL THERAPY | Age: 64
End: 2024-05-21
Payer: COMMERCIAL

## 2024-05-21 DIAGNOSIS — M54.50 CHRONIC BILATERAL LOW BACK PAIN WITHOUT SCIATICA: Primary | ICD-10-CM

## 2024-05-21 DIAGNOSIS — G89.29 CHRONIC BILATERAL LOW BACK PAIN WITHOUT SCIATICA: Primary | ICD-10-CM

## 2024-05-21 PROCEDURE — 97113 AQUATIC THERAPY/EXERCISES: CPT | Performed by: PHYSICAL THERAPIST

## 2024-05-21 NOTE — PROGRESS NOTES
"Daily Note     Today's date: 2024  Patient name: Sandip Dominguez III  : 1960  MRN: [id number]  Referring provider: Ty Hope MD  Dx:   Encounter Diagnosis     ICD-10-CM    1. Chronic bilateral low back pain without sciatica  M54.50     G89.29                      Subjective: Patient stiff today.      Objective: See treatment diary below      Assessment: Tolerated treatment well. Patient would benefit from continued PT      Plan: Continue per plan of care.      Precautions: None      Manuals 5/15 5/17 5/21                       Neuro Re-Ed                                                    Ther Ex             Water walking pre/post  X5 laps - pre and post 5x  x5                       Standing trunk extension 2x10 20x  2x10          Standing SB\"ing With dumbbells - 2x10 20x  3x10          Seated hamstring stretch X5 reps hold 20 sec 20sec 5x  x5          Seated piriformis stretch X5 reps hold 20 sec 20sec 5x  x5                       Standing hip flexion, abd, ext nt 20x  2x10                       Paddles rythmic stab X5 reps - 30 sec 30SEC 5X  X10 - 20 sec          Paddles shld flexion/ext, horiz abd/add 3x10 each 20x  3x10                       Pony Isometric abd - 2x10 2SEC 20X  3x10          Ladder flutter kick   X5 reps - 1 min          Pony trunk rotation   2x10                                    Ther Activity                                       Gait Training                                       Modalities                                              "

## 2024-05-24 ENCOUNTER — OFFICE VISIT (OUTPATIENT)
Dept: PHYSICAL THERAPY | Age: 64
End: 2024-05-24
Payer: COMMERCIAL

## 2024-05-24 DIAGNOSIS — G89.29 CHRONIC BILATERAL LOW BACK PAIN WITHOUT SCIATICA: ICD-10-CM

## 2024-05-24 DIAGNOSIS — M54.50 CHRONIC BILATERAL LOW BACK PAIN WITHOUT SCIATICA: ICD-10-CM

## 2024-05-24 DIAGNOSIS — M47.816 LUMBAR SPONDYLOSIS: ICD-10-CM

## 2024-05-24 DIAGNOSIS — M51.36 LUMBAR DEGENERATIVE DISC DISEASE: Primary | ICD-10-CM

## 2024-05-24 PROCEDURE — 97113 AQUATIC THERAPY/EXERCISES: CPT

## 2024-05-24 NOTE — PROGRESS NOTES
"Daily Note     Today's date: 2024  Patient name: Sandip Dominguez III  : 1960  MRN: [id number]  Referring provider: Ty Hope MD  Dx:   Encounter Diagnosis     ICD-10-CM    1. Lumbar degenerative disc disease  M51.36       2. Lumbar spondylosis  M47.816       3. Chronic bilateral low back pain without sciatica  M54.50     G89.29                        Subjective: Pt reported LB soreness.       Objective: See treatment diary below      Assessment:  Good tolerance to exercises, improved LB soreness and increased ROM       Plan: Continue per plan of care.      Precautions: None      Manuals 5/15 5/17 5/21 5/24                      Neuro Re-Ed                                                    Ther Ex             Water walking pre/post  X5 laps - pre and post 5x  x5 10 x   One side of the pool                       Standing trunk extension 2x10 20x  2x10 30x          Standing SB\"ing with dumb-bells  With dumbbells - 2x10 20x  3x10 30x          Seated hamstring stretch X5 reps hold 20 sec 20sec 5x  x5 20sec 5x          Seated piriformis stretch X5 reps hold 20 sec 20sec 5x  x5 20sec 5x                       Standing hip flexion, abd, ext nt 20x  2x10 20x                       Paddles rythmic stab X5 reps - 30 sec 30SEC 5X  X10 - 20 sec 20sec 10x          Paddles shld flexion/ext, horiz abd/add 3x10 each 20x  3x10 30x                       Pony Isometric abd - 2x10 2SEC 20X  3x10 30x          Ladder flutter kick   X5 reps - 1 min NT         Dana trunk rotation   2x10 20X          Fore flex with ball     20x 3sec                       Ther Activity                                       Gait Training                                       Modalities                                              "

## 2024-05-28 ENCOUNTER — OFFICE VISIT (OUTPATIENT)
Dept: PHYSICAL THERAPY | Age: 64
End: 2024-05-28
Payer: COMMERCIAL

## 2024-05-28 DIAGNOSIS — M54.50 CHRONIC BILATERAL LOW BACK PAIN WITHOUT SCIATICA: Primary | ICD-10-CM

## 2024-05-28 DIAGNOSIS — G89.29 CHRONIC BILATERAL LOW BACK PAIN WITHOUT SCIATICA: Primary | ICD-10-CM

## 2024-05-28 PROCEDURE — 97113 AQUATIC THERAPY/EXERCISES: CPT | Performed by: PHYSICAL THERAPIST

## 2024-05-28 NOTE — PROGRESS NOTES
"Daily Note     Today's date: 2024  Patient name: Sandip Dominguez III  : 1960  MRN: [id number]  Referring provider: Ty Hope MD  Dx:   Encounter Diagnosis     ICD-10-CM    1. Chronic bilateral low back pain without sciatica  M54.50     G89.29                      Subjective: Patient did not do much over the weekend, wife was sick.      Objective: See treatment diary below      Assessment: Tolerated treatment well. Patient would benefit from continued PT      Plan: Continue per plan of care.      Precautions: None      Manuals 5/15 5/17 5/21 5/24 5/28                     Neuro Re-Ed                                                    Ther Ex             Water walking pre/post  X5 laps - pre and post 5x  x5 10 x   One side of the pool  completed                     Standing trunk extension 2x10 20x  2x10 30x  x30        Standing SB\"ing with dumb-bells  With dumbbells - 2x10 20x  3x10 30x  x30        Seated hamstring stretch X5 reps hold 20 sec 20sec 5x  x5 20sec 5x  x5        Seated piriformis stretch X5 reps hold 20 sec 20sec 5x  x5 20sec 5x  x5                     Standing hip flexion, abd, ext nt 20x  2x10 20x  x20                     Paddles rythmic stab X5 reps - 30 sec 30SEC 5X  X10 - 20 sec 20sec 10x  completed        Paddles shld flexion/ext, horiz abd/add 3x10 each 20x  3x10 30x  completed                     Pony Isometric abd - 2x10 2SEC 20X  3x10 30x  x30        Ladder flutter kick   X5 reps - 1 min NT X5 reps - 1 minute        Pony trunk rotation   2x10 20X  x30        Fore flex with ball     20x 3sec  nt                     Ther Activity                                       Gait Training                                       Modalities                                                "

## 2024-05-31 ENCOUNTER — OFFICE VISIT (OUTPATIENT)
Dept: PHYSICAL THERAPY | Age: 64
End: 2024-05-31
Payer: COMMERCIAL

## 2024-05-31 DIAGNOSIS — M54.50 CHRONIC BILATERAL LOW BACK PAIN WITHOUT SCIATICA: Primary | ICD-10-CM

## 2024-05-31 DIAGNOSIS — M51.36 LUMBAR DEGENERATIVE DISC DISEASE: ICD-10-CM

## 2024-05-31 DIAGNOSIS — G89.29 CHRONIC BILATERAL LOW BACK PAIN WITHOUT SCIATICA: Primary | ICD-10-CM

## 2024-05-31 DIAGNOSIS — M46.1 SACROILIITIS (HCC): ICD-10-CM

## 2024-05-31 DIAGNOSIS — M47.816 LUMBAR SPONDYLOSIS: ICD-10-CM

## 2024-05-31 PROCEDURE — 97113 AQUATIC THERAPY/EXERCISES: CPT

## 2024-05-31 NOTE — PROGRESS NOTES
"Daily Note     Today's date: 2024  Patient name: Sandip Dominguez III  : 1960  MRN: [id number]  Referring provider: Ty Hope MD  Dx:   Encounter Diagnosis     ICD-10-CM    1. Chronic bilateral low back pain without sciatica  M54.50     G89.29       2. Lumbar degenerative disc disease  M51.36       3. Lumbar spondylosis  M47.816       4. Sacroiliitis (HCC)  M46.1           Start Time: 848  Stop Time: 936  Total time in clinic (min): 48 minutes    Subjective: Pt reports he is doing okay today, has a little bit of soreness noted upon arrival.        Objective: See treatment diary below      Assessment: Tolerated treatment well. Increased reps for standing 3 way SLRs which pt tolerated well. Patient demonstrated fatigue post treatment, exhibited good technique with therapeutic exercises, and would benefit from continued PT      Plan: Continue per plan of care.      Precautions: None      Manuals 5/15 5/17 5/21 5/24 5/28 5/31                    Neuro Re-Ed                                                    Ther Ex             Water walking pre/post  X5 laps - pre and post 5x  x5 10 x   One side of the pool  completed completed                    Standing trunk extension 2x10 20x  2x10 30x  x30 X30        Standing SB\"ing with dumb-bells  With dumbbells - 2x10 20x  3x10 30x  x30 X30        Seated hamstring stretch X5 reps hold 20 sec 20sec 5x  x5 20sec 5x  x5 x5       Seated piriformis stretch X5 reps hold 20 sec 20sec 5x  x5 20sec 5x  x5 x5                    Standing hip flexion, abd, ext nt 20x  2x10 20x  x20 x25                    Paddles rythmic stab X5 reps - 30 sec 30SEC 5X  X10 - 20 sec 20sec 10x  completed completed       Paddles shld flexion/ext, horiz abd/add 3x10 each 20x  3x10 30x  completed Completed                     Pony Isometric abd - 2x10 2SEC 20X  3x10 30x  x30 nt       Ladder flutter kick   X5 reps - 1 min NT X5 reps - 1 minute X3 reps - 1 minute        Pony trunk rotation   2x10 " 20X  x30 nt       Fore flex with ball     20x 3sec  nt nt                    Ther Activity                                       Gait Training                                       Modalities

## 2024-06-07 ENCOUNTER — OFFICE VISIT (OUTPATIENT)
Dept: PHYSICAL THERAPY | Age: 64
End: 2024-06-07
Payer: COMMERCIAL

## 2024-06-07 DIAGNOSIS — F32.A ANXIETY AND DEPRESSION: ICD-10-CM

## 2024-06-07 DIAGNOSIS — F41.9 ANXIETY: ICD-10-CM

## 2024-06-07 DIAGNOSIS — F41.9 ANXIETY AND DEPRESSION: ICD-10-CM

## 2024-06-07 DIAGNOSIS — G89.29 CHRONIC BILATERAL LOW BACK PAIN WITHOUT SCIATICA: Primary | ICD-10-CM

## 2024-06-07 DIAGNOSIS — M54.50 CHRONIC BILATERAL LOW BACK PAIN WITHOUT SCIATICA: Primary | ICD-10-CM

## 2024-06-07 PROCEDURE — 97113 AQUATIC THERAPY/EXERCISES: CPT | Performed by: PHYSICAL THERAPIST

## 2024-06-07 RX ORDER — ESCITALOPRAM OXALATE 10 MG/1
10 TABLET ORAL DAILY
Qty: 30 TABLET | Refills: 5 | Status: SHIPPED | OUTPATIENT
Start: 2024-06-07

## 2024-06-07 RX ORDER — CLONAZEPAM 0.5 MG/1
0.5 TABLET ORAL 2 TIMES DAILY
Qty: 60 TABLET | Refills: 1 | Status: SHIPPED | OUTPATIENT
Start: 2024-06-07

## 2024-06-07 NOTE — PROGRESS NOTES
"Daily Note     Today's date: 2024  Patient name: Sandip Dominguez III  : 1960  MRN: [id number]  Referring provider: Ty Hope MD  Dx:   Encounter Diagnosis     ICD-10-CM    1. Chronic bilateral low back pain without sciatica  M54.50     G89.29                      Subjective: Overall, no significant subjective changes noted with aquatic PT intervention.      Objective: See treatment diary below      Assessment: Tolerated treatment well.       Plan: Recommend D/C aquatic PT at this point secondary to lack of progress.     Precautions: None      Manuals 5/15 5/17 5/21 5/24 5/28 5/31 6/7                   Neuro Re-Ed                                                    Ther Ex             Water walking pre/post  X5 laps - pre and post 5x  x5 10 x   One side of the pool  completed completed completed                   Standing trunk extension 2x10 20x  2x10 30x  x30 X30  3x10      Standing SB\"ing with dumb-bells  With dumbbells - 2x10 20x  3x10 30x  x30 X30  3x10      Seated hamstring stretch X5 reps hold 20 sec 20sec 5x  x5 20sec 5x  x5 x5 x5      Seated piriformis stretch X5 reps hold 20 sec 20sec 5x  x5 20sec 5x  x5 x5 x5                   Standing hip flexion, abd, ext nt 20x  2x10 20x  x20 x25 3x10                   Paddles rythmic stab X5 reps - 30 sec 30SEC 5X  X10 - 20 sec 20sec 10x  completed completed X5 reps      Paddles shld flexion/ext, horiz abd/add 3x10 each 20x  3x10 30x  completed Completed  3x10                   Pony Isometric abd - 2x10 2SEC 20X  3x10 30x  x30 nt 3x10      Ladder flutter kick   X5 reps - 1 min NT X5 reps - 1 minute X3 reps - 1 minute  X5 reps      Pony trunk rotation   2x10 20X  x30 nt completed      Fore flex with ball     20x 3sec  nt nt completed                   Ther Activity                                       Gait Training                                       Modalities                                                    "

## 2024-06-07 NOTE — TELEPHONE ENCOUNTER
1 [id number] 05/05/2024 04/02/2024 clonazePAM (Tablet) 60.0 30 0.5 MG NA [id number] Commercial Insurance 1 / 1 [place]    1 [id number] 04/02/2024 04/02/2024 clonazePAM (Tablet) 60.0 30 0.5 MG NA [id number] Commercial Insurance 0 / 1 [place]    1 [id number] 02/29/2024 01/30/2024 clonazePAM (Tablet) 60.0 30 0.5 MG NA [id number] Commercial Insurance 1 / 1 [place]    1 [id number] 01/30/2024 01/30/2024 clonazePAM (Tablet) 60.0 30 0.5 MG NA [id number] Commercial Insurance 0 / 1 [place]    1 [id number] 01/02/2024 12/05/2023 clonazePAM (Tablet) 60.0 30 0.5 MG NA [id number] Commercial Insurance 1 / 1 [place]    1 [id number] 12/05/2023 12/05/2023 clonazePAM (Tablet) 60.0 30 0.5 MG NA [id number] Commercial Insurance 0 / 1 [place]    1 [id number] 11/27/2023 11/27/2023 HYDROcodone BITARTRATE 5 MG ORAL TABLET/ACETAMINOPHEN 325 MG (Tablet) 50.0 12 5.0 MG/325.0 MG 20.83 [id number] Commercial Insurance 0 / 0 [place]

## 2024-06-10 ENCOUNTER — OFFICE VISIT (OUTPATIENT)
Dept: PAIN MEDICINE | Facility: CLINIC | Age: 64
End: 2024-06-10
Payer: COMMERCIAL

## 2024-06-10 VITALS
WEIGHT: 160 LBS | RESPIRATION RATE: 18 BRPM | HEIGHT: 72 IN | SYSTOLIC BLOOD PRESSURE: 139 MMHG | HEART RATE: 61 BPM | BODY MASS INDEX: 21.67 KG/M2 | DIASTOLIC BLOOD PRESSURE: 87 MMHG

## 2024-06-10 DIAGNOSIS — M47.812 CERVICAL SPONDYLOSIS: Primary | ICD-10-CM

## 2024-06-10 DIAGNOSIS — M51.16 INTERVERTEBRAL DISC DISORDER WITH RADICULOPATHY OF LUMBAR REGION: ICD-10-CM

## 2024-06-10 DIAGNOSIS — M47.816 LUMBAR FACET ARTHROPATHY: ICD-10-CM

## 2024-06-10 DIAGNOSIS — M79.18 MYOFASCIAL PAIN SYNDROME: ICD-10-CM

## 2024-06-10 PROCEDURE — 99214 OFFICE O/P EST MOD 30 MIN: CPT | Performed by: ANESTHESIOLOGY
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
## 2024-06-20 ENCOUNTER — EVALUATION (OUTPATIENT)
Dept: PHYSICAL THERAPY | Facility: CLINIC | Age: 64
End: 2024-06-20
Payer: COMMERCIAL

## 2024-06-20 DIAGNOSIS — M79.18 MYOFASCIAL PAIN SYNDROME: ICD-10-CM

## 2024-06-20 DIAGNOSIS — M47.812 CERVICAL SPONDYLOSIS: ICD-10-CM

## 2024-06-20 PROCEDURE — 97530 THERAPEUTIC ACTIVITIES: CPT

## 2024-06-20 PROCEDURE — 97161 PT EVAL LOW COMPLEX 20 MIN: CPT

## 2024-06-20 PROCEDURE — 97140 MANUAL THERAPY 1/> REGIONS: CPT

## 2024-06-20 NOTE — PROGRESS NOTES
PT Evaluation     Today's date: 2024  Patient name: Sandip Dominguez III  : 1960  MRN: [id number]  Referring provider: Ty Hope MD  Dx:   Encounter Diagnosis     ICD-10-CM    1. Cervical spondylosis  M47.812 Ambulatory referral to Physical Therapy      2. Myofascial pain syndrome  M79.18 Ambulatory referral to Physical Therapy          Start Time: 161  Stop Time: 1658  Total time in clinic (min): 41 minutes    Assessment  Impairments: abnormal gait, abnormal or restricted ROM, abnormal movement, activity intolerance, impaired balance, impaired physical strength, lacks appropriate home exercise program, pain with function, poor posture , poor body mechanics, participation limitations, activity limitations and endurance    Assessment details: Sandip Dominguez III is a 63 y.o. male who presents with signs and symptoms consistent of cervicalgia. Patient presents with pain, decreased strength, decreased ROM, decreased joint mobility, and postural dysfunction. Due to these impairments, Patient has difficulty performing prolonged sitting, driving, turning head , and looking up . Patient would benefit from skilled physical therapy to address the impairments, improve their level of function, and to improve their overall quality of life. Reviewed HEP, involved anatomy, physio as well as POC with verbalized understanding and pt is in agreement with above.     Goals  Short Term Goals: to be achieved by 4 weeks  1) Patient to be independent with basic HEP.  2) Decrease pain to 3/10 at its worst.  3) Increase cervical spine ROM to WFL  4) Increase UE strength by 1/2 MMT grade in all deficient planes.  5) Improve joint mobility in cervical spine to normal     Long Term Goals: to be achieved by discharge  1) FOTO equal to or greater than projected goal.  2) Patient to be independent with comprehensive HEP.  3) Cervical spine ROM WNL all planes to improve a/iadls.  4) Increase UE strength to 1 MMT grade in all planes  to improve a/iadls.  5) Lifting is improved to maximal level of function, pain free    Plan  Patient would benefit from: skilled physical therapy    Planned therapy interventions: abdominal trunk stabilization, ADL training, body mechanics training, home exercise program, functional ROM exercises, flexibility, therapeutic exercise, therapeutic activities, stretching, strengthening, joint mobilization, manual therapy, neuromuscular re-education, patient education, postural training, IASTM, kinesiology taping and massage    Frequency: 2x week  Duration in weeks: 8  Treatment plan discussed with: patient      Subjective Evaluation    History of Present Illness  Mechanism of injury: History: Pt just returned to work after being out for 6 months secondary to L/S. Pt presents today though for cervicalgia. Pt reports he feels most of his pain at the back of the head and neck. He says he has soreness on the side of the spine and muscle soreness that bothers him when he turns his head. Driving a long distance would burn and bother him. Has been an ongoing issue for years and last year it really got worse. Denies any BUE pain or symptoms. Pt notes that the pain will sometimes give him a HA at the back of the head. Sleeping is not effected.    Aggravating factors: cervical AROM,   Relieving factors: ice  Imaging: none  Occupation: mechanics  Status: retires (tomorrow, )  Hobbies/recreation: workout, yard work  Patient Goals  Patient goals for therapy: decreased pain and increased motion    Pain  Current pain ratin  At best pain ratin  At worst pain ratin  Quality: burning and dull ache  Relieving factors: change in position, rest and ice  Aggravating factors: keyboarding      Diagnostic Tests  No diagnostic tests performed  Treatments  Current treatment: physical therapy      Objective     Postural Observations  Seated posture: poor  Standing posture: fair  Correction of posture: makes symptoms  "better      Palpation   Left   Tenderness of the rhomboids, sternocleidomastoid and suboccipitals.     Right   Tenderness of the rhomboids, sternocleidomastoid and suboccipitals.     Active Range of Motion   Cervical/Thoracic Spine       Cervical    Flexion:  WFL  Extension:  with pain Restriction level: moderate  Left lateral flexion:  with pain Restriction level: moderate  Right lateral flexion:  with pain Restriction level moderate  Left rotation:  with pain Restriction level: minimal  Right rotation:  with pain Restriction level: minimal    Thoracic    Left rotation:  Restriction level: moderate  Right rotation:  Restriction level: moderate    Joint Play     Hypomobile: C2, C3, C4, C5, C6, C7, T1, T2, T3, T4, T5, T6, T7, T8, T9 and T10     Pain: C2, C3, C4, T1 and T2     Strength/Myotome Testing     Left Shoulder     Planes of Motion   Abduction: 4     Right Shoulder     Planes of Motion   Abduction: 4     Tests   Cervical   Negative vertical compression and cervical distraction.     Lumbar   Negative vertical compression.              Precautions: Hashimoto, depression, anxiety, HTN      Manuals 6/20            Cervical traction RE            SOR RE                                      Neuro Re-Ed             Scap squeeze             Chin tucks 3\"x15            rows             pulldowns                                                    Ther Ex             UT S             Levator S             Open books                                                                              Ther Activity             Pt edu: involved anatomy, physio, HEP, POC, movement patterns  RE                                                                Modalities                                            "

## 2024-06-24 ENCOUNTER — OFFICE VISIT (OUTPATIENT)
Dept: PHYSICAL THERAPY | Facility: CLINIC | Age: 64
End: 2024-06-24
Payer: COMMERCIAL

## 2024-06-24 DIAGNOSIS — M79.18 MYOFASCIAL PAIN SYNDROME: ICD-10-CM

## 2024-06-24 DIAGNOSIS — M47.812 CERVICAL SPONDYLOSIS: Primary | ICD-10-CM

## 2024-06-24 PROCEDURE — 97110 THERAPEUTIC EXERCISES: CPT

## 2024-06-24 PROCEDURE — 97112 NEUROMUSCULAR REEDUCATION: CPT

## 2024-06-24 PROCEDURE — 97140 MANUAL THERAPY 1/> REGIONS: CPT

## 2024-06-24 NOTE — PROGRESS NOTES
"Daily Note     Today's date: 2024  Patient name: Sandip Dominguez III  : 1960  MRN: [id number]  Referring provider: Ty Hope MD  Dx:   Encounter Diagnosis     ICD-10-CM    1. Cervical spondylosis  M47.812       2. Myofascial pain syndrome  M79.18           Start Time: 916  Stop Time: 957  Total time in clinic (min): 41 minutes    Subjective: Pt reports he has been working on his HEP and doing well. He does note being a little sore after IE.      Objective: See treatment diary below      Assessment: Tolerated treatment well. Patient demonstrated fatigue post treatment, exhibited good technique with therapeutic exercises, and would benefit from continued PT. Reviewed HEP given at IE and progressed tx focusing on cervical and t/s ROM/flexibility as well as scapular strengthening. Assess pts tolerance to today's session at next visit and look to progress HEP from there.      Plan: Continue per plan of care.  Progress treatment as tolerated.       Precautions: Hashimoto, depression, anxiety, HTN      Manuals            Cervical traction RE RE           SOR RE RE                                     Neuro Re-Ed             Scap squeeze  3\"X20           Chin tucks 3\"x15 3\"X20           rows             pulldowns                                                    Ther Ex             UT S  10\"x4 B           Levator S  10\"x4 B           Open books  5\"x10 B           UBE fw/bw  L1 3'/3'           Doorway S  15\"x4                                                  Ther Activity             Pt edu: involved anatomy, physio, HEP, POC, movement patterns  RE                                                                Modalities                                            "

## 2024-06-28 ENCOUNTER — OFFICE VISIT (OUTPATIENT)
Dept: PHYSICAL THERAPY | Facility: CLINIC | Age: 64
End: 2024-06-28
Payer: COMMERCIAL

## 2024-06-28 DIAGNOSIS — M79.18 MYOFASCIAL PAIN SYNDROME: ICD-10-CM

## 2024-06-28 DIAGNOSIS — M47.812 CERVICAL SPONDYLOSIS: Primary | ICD-10-CM

## 2024-06-28 PROCEDURE — 97140 MANUAL THERAPY 1/> REGIONS: CPT

## 2024-06-28 PROCEDURE — 97110 THERAPEUTIC EXERCISES: CPT

## 2024-06-28 PROCEDURE — 97112 NEUROMUSCULAR REEDUCATION: CPT

## 2024-06-28 NOTE — PROGRESS NOTES
"Daily Note     Today's date: 2024  Patient name: Sandip Dominguez III  : 1960  MRN: [id number]  Referring provider: Ty Hope MD  Dx:   Encounter Diagnosis     ICD-10-CM    1. Cervical spondylosis  M47.812       2. Myofascial pain syndrome  M79.18                      Subjective: Patient reports no sig soreness after last session.       Objective: See treatment diary below      Assessment: Tolerated treatment well. Added rows and no moneys to progress postural and scapular strengtheing with no increased discomfort. He cont to feel relief with tx and SOR. Patient demonstrated fatigue post treatment, exhibited good technique with therapeutic exercises, and would benefit from continued PT      Plan: Progress treatment as tolerated.       Precautions: Hashimoto, depression, anxiety, HTN      Manuals           Cervical traction RE RE ROGERS          SOR RE RE ROGERS                                    Neuro Re-Ed             Scap squeeze  3\"X20 3\"x20          Chin tucks 3\"x15 3\"X20 3\"x20          rows   X15 RTB          pulldowns             No Money's   Supine YTB x15                                    Ther Ex             UT S  10\"x4 B 30\"x3 B          Levator S  10\"x4 B 30\"x3 B          Open books  5\"x10 B 5\"x10 B           UBE fw/bw  L1 3'/3' L1 3'/3'          Doorway S  15\"x4 15\"x4                                                 Ther Activity             Pt edu: involved anatomy, physio, HEP, POC, movement patterns  RE                                                                Modalities                                              "

## 2024-07-02 ENCOUNTER — OFFICE VISIT (OUTPATIENT)
Dept: PHYSICAL THERAPY | Facility: CLINIC | Age: 64
End: 2024-07-02
Payer: COMMERCIAL

## 2024-07-02 DIAGNOSIS — M54.50 CHRONIC BILATERAL LOW BACK PAIN WITHOUT SCIATICA: ICD-10-CM

## 2024-07-02 DIAGNOSIS — M47.812 CERVICAL SPONDYLOSIS: Primary | ICD-10-CM

## 2024-07-02 DIAGNOSIS — G89.29 CHRONIC BILATERAL LOW BACK PAIN WITHOUT SCIATICA: ICD-10-CM

## 2024-07-02 DIAGNOSIS — M79.18 MYOFASCIAL PAIN SYNDROME: ICD-10-CM

## 2024-07-02 PROCEDURE — 97112 NEUROMUSCULAR REEDUCATION: CPT

## 2024-07-02 PROCEDURE — 97140 MANUAL THERAPY 1/> REGIONS: CPT

## 2024-07-02 PROCEDURE — 97110 THERAPEUTIC EXERCISES: CPT

## 2024-07-02 NOTE — PROGRESS NOTES
"Daily Note     Today's date: 2024  Patient name: Sandip Dominguez III  : 1960  MRN: [id number]  Referring provider: Ty Hope MD  Dx:   Encounter Diagnosis     ICD-10-CM    1. Cervical spondylosis  M47.812       2. Myofascial pain syndrome  M79.18       3. Chronic bilateral low back pain without sciatica  M54.50     G89.29           Start Time: 08  Stop Time: 916  Total time in clinic (min): 46 minutes    Subjective: Pt reports pinpoint pain in upper cervical spine and denies pain radiating to upper extremities. Pt notes no increased soreness after additions made last session.      Objective: See treatment diary below      Assessment: Tolerated treatment well. Patient exhibited good technique with therapeutic exercises, would benefit from continued PT, and patient felt tightness in cervical spine, shoulders, and back while performing manual therapy.  Following manual therapy, pt reported feeling looser with an increase in cervical ROM with minimal to no cervical/upper trap tightness. Pt responses well to stretches but still reports feeling sores directly after treatment. Next session, focus on increasing scapula and trap strengthening.      Plan: Continue per plan of care.  Progress treatment as tolerated.       Precautions: Hashimoto, depression, anxiety, HTN    1:1 [phone]  Manuals          Cervical traction RE RE ROGERS RE         SOR RE RE ROGERS RE                                   Neuro Re-Ed             Scap squeeze  3\"X20 3\"x20 3\"x20         Chin tucks 3\"x15 3\"X20 3\"x20 3\"x20         rows   X15 RTB 15x RTB         pulldowns             No Money's   Supine YTB x15 Supine YTB 15x                                   Ther Ex             UT S  10\"x4 B 30\"x3 B 30\"x4         Levator S  10\"x4 B 30\"x3 B 30'x3         Open books  5\"x10 B 5\"x10 B  5'x10         UBE fw/bw  L1 3'/3' L1 3'/3' L1 3'/3'         Doorway S  15\"x4 15\"x4 15'x4                                                Ther Activity "             Pt edu: involved anatomy, physio, HEP, POC, movement patterns  RE                                                                Modalities

## 2024-07-05 ENCOUNTER — OFFICE VISIT (OUTPATIENT)
Dept: PHYSICAL THERAPY | Facility: CLINIC | Age: 64
End: 2024-07-05
Payer: COMMERCIAL

## 2024-07-05 DIAGNOSIS — M54.50 CHRONIC BILATERAL LOW BACK PAIN WITHOUT SCIATICA: ICD-10-CM

## 2024-07-05 DIAGNOSIS — G89.29 CHRONIC BILATERAL LOW BACK PAIN WITHOUT SCIATICA: ICD-10-CM

## 2024-07-05 DIAGNOSIS — M79.18 MYOFASCIAL PAIN SYNDROME: ICD-10-CM

## 2024-07-05 DIAGNOSIS — M47.812 CERVICAL SPONDYLOSIS: Primary | ICD-10-CM

## 2024-07-05 PROCEDURE — 97110 THERAPEUTIC EXERCISES: CPT

## 2024-07-05 PROCEDURE — 97140 MANUAL THERAPY 1/> REGIONS: CPT

## 2024-07-05 PROCEDURE — 97112 NEUROMUSCULAR REEDUCATION: CPT

## 2024-07-05 NOTE — PROGRESS NOTES
"Daily Note     Today's date: 2024  Patient name: Sandip Dominguez III  : 1960  MRN: [id number]  Referring provider: Ty Hope MD  Dx:   Encounter Diagnosis     ICD-10-CM    1. Cervical spondylosis  M47.812       2. Myofascial pain syndrome  M79.18       3. Chronic bilateral low back pain without sciatica  M54.50     G89.29           Start Time: 0755  Stop Time: 08  Total time in clinic (min): 43 minutes    Subjective: Pt reports he had some minor soreness after previous session, mostly in the distal cervical spine and no discomfort into BUE shoulder or arms.      Objective: See treatment diary below      Assessment: Tolerated treatment well. Patient exhibited good technique with therapeutic exercises and would benefit from continued PT. Pt responded well to cervical manual techniques, some tightness was felt in the SCM. Added scapular strengthening exercises w/ therabands and made stretches HEP secondary to independence with exercises. Pt was advised that he may feel sore following the session and was given a yellow TB to complete no moneys at home if tolerated.      Plan: Progress treatment as tolerated.       Precautions: Hashimoto, depression, anxiety, HTN    1:1 [id number]  Manuals         Cervical traction RE RE ROGERS RE RE        SOR RE RE ROGERS RE RE                                  Neuro Re-Ed             Scap squeeze  3\"X20 3\"x20 3\"x20 HEP        Chin tucks 3\"x15 3\"X20 3\"x20 3\"x20 3\"x20        rows   X15 RTB 15x RTB 2x10  RTB        pulldowns     15x  GTB        No Money's   Supine YTB x15 Supine YTB 15x Supine YTB  2x10        Prone T's     15x2\"        DNF endurance w/ BP cuff             Ther Ex             UT S  10\"x4 B 30\"x3 B 30\"x4 30\"x4        Levator S  10\"x4 B 30\"x3 B 30'x3 HEP        Open books  5\"x10 B 5\"x10 B  5'x10 HEP        UBE fw/bw  L1 3'/3' L1 3'/3' L1 3'/3' L23'/3'        Doorway S  15\"x4 15\"x4 15'x4 HEP                                               Ther " Activity             Pt edu: involved anatomy, physio, HEP, POC, movement patterns  RE                                                                Modalities

## 2024-07-09 ENCOUNTER — OFFICE VISIT (OUTPATIENT)
Dept: PHYSICAL THERAPY | Facility: CLINIC | Age: 64
End: 2024-07-09
Payer: COMMERCIAL

## 2024-07-09 DIAGNOSIS — M47.812 CERVICAL SPONDYLOSIS: Primary | ICD-10-CM

## 2024-07-09 PROCEDURE — 97112 NEUROMUSCULAR REEDUCATION: CPT

## 2024-07-09 PROCEDURE — 97140 MANUAL THERAPY 1/> REGIONS: CPT

## 2024-07-09 PROCEDURE — 97110 THERAPEUTIC EXERCISES: CPT

## 2024-07-09 NOTE — PROGRESS NOTES
"Daily Note     Today's date: 2024  Patient name: Sandip Dominguez III  : 1960  MRN: [id number]  Referring provider: Ty Hope MD  Dx:   Encounter Diagnosis     ICD-10-CM    1. Cervical spondylosis  M47.812           Start Time: 1446  Stop Time: 1530  Total time in clinic (min): 44 minutes    Subjective: Pt reports no pain in the neck and minimal soreness following less session. Pt reports he's experiencing normal daily stiffness in the neck.      Objective: See treatment diary below      Assessment: Tolerated treatment fair. Patient exhibited good technique with therapeutic exercises and would benefit from continued PT Pt responded well to manual therapy and SNAGs to alleviate stiffness in the neck and gain more cervical ROM. Pt has tightness in SCM which was worked on with manual therapy and DNF endurance exercises. Pt experienced L shoulder pain during prone T's however had no pain with any other exercise. Pt had no questions about HEP and is performing stretches independently at home.    Plan: Continue per plan of care.      Precautions: Hashimoto, depression, anxiety, HTN    Treatment was performed by Gosia Balderas SPT under the direct supervision of Vanessa Arias, PT, DPT      Manuals        Cervical traction RE RE ROGERS RE RE AB       SOR RE RE ROGERS RE RE AB                                 Neuro Re-Ed             Scap squeeze  3\"X20 3\"x20 3\"x20 HEP        Chin tucks 3\"x15 3\"X20 3\"x20 3\"x20 3\"x20 HEP       rows   X15 RTB 15x RTB 2x10  RTB 2x10  GTB       pulldowns     15x  GTB 15x GTB       No Money's   Supine YTB x15 Supine YTB 15x Supine YTB  2x10 Supine RTB  15x       Prone T's     15x2\" 15x2\" *pain       DNF endurance      10\"x3       Ther Ex             UT S  10\"x4 B 30\"x3 B 30\"x4 30\"x4 HEP       Levator S  10\"x4 B 30\"x3 B 30'x3 HEP        Open books  5\"x10 B 5\"x10 B  5'x10 HEP        UBE fw/bw  L1 3'/3' L1 3'/3' L1 3'/3' L23'/3' L2 3'/3'       [facility] S  15\"x4 " "15\"x4 15'x4 HEP 15\"x4       SNAG w/ towel      10\"x3 B                                 Ther Activity             Pt edu: involved anatomy, physio, HEP, POC, movement patterns  RE                                                                Modalities                                                    "

## 2024-07-11 ENCOUNTER — OFFICE VISIT (OUTPATIENT)
Dept: PHYSICAL THERAPY | Facility: CLINIC | Age: 64
End: 2024-07-11
Payer: COMMERCIAL

## 2024-07-11 DIAGNOSIS — M54.50 CHRONIC BILATERAL LOW BACK PAIN WITHOUT SCIATICA: ICD-10-CM

## 2024-07-11 DIAGNOSIS — G89.29 CHRONIC BILATERAL LOW BACK PAIN WITHOUT SCIATICA: ICD-10-CM

## 2024-07-11 DIAGNOSIS — M47.812 CERVICAL SPONDYLOSIS: Primary | ICD-10-CM

## 2024-07-11 DIAGNOSIS — M79.18 MYOFASCIAL PAIN SYNDROME: ICD-10-CM

## 2024-07-11 PROCEDURE — 97110 THERAPEUTIC EXERCISES: CPT

## 2024-07-11 PROCEDURE — 97140 MANUAL THERAPY 1/> REGIONS: CPT

## 2024-07-11 PROCEDURE — 97112 NEUROMUSCULAR REEDUCATION: CPT

## 2024-07-11 NOTE — PROGRESS NOTES
"Daily Note     Today's date: 2024  Patient name: Sandip Dominguez III  : 1960  MRN: [id number]  Referring provider: Ty Hope MD  Dx:   Encounter Diagnosis     ICD-10-CM    1. Cervical spondylosis  M47.812       2. Myofascial pain syndrome  M79.18       3. Chronic bilateral low back pain without sciatica  M54.50     G89.29           Start Time: 1358  Stop Time: 1444  Total time in clinic (min): 46 minutes    Subjective: Pt reports no pain in neck and completes HEP at least once a day w/ no issues. Pt reports no soreness from previous session.      Objective: See treatment diary below      Assessment: Tolerated treatment well. Patient exhibited good technique with therapeutic exercises and would benefit from continued PT. Pt completed some exercises at home which were printed out for his HEP, pt was educated on exercises and supplied a red theraband for no money's. Pt reported no shoulder pain with prone T's that he experienced last session. Added shoulder IR/ER w/ a theraband to strengthen postural muscles in the shoulders. Started shoulder ER w/ yellow band and pt did not feel challenged so progressed to red band. Pt completed shoulder IR w/ green theraband at 0 and did not feel challenged so progressed to 90 deg abd. Pt was able to complete shoulder IR at 90 on R arm, but felt pain in the L anterior shoulder so he completed the exercise at 0 deg abd with no pain. Pt was advised that he may experience soreness following the session.      Plan: Progress treatment as tolerated.       Precautions: Hashimoto, depression, anxiety, HTN    Treatment was performed by GERMANIA Viveros under the direct supervision of Vanessa Arias PT, DPT      Manuals       Cervical traction RE RE ROGERS RE RE AB AB      SOR RE RE ROGERS RE RE AB AB                                Neuro Re-Ed             Scap squeeze  3\"X20 3\"x20 3\"x20 HEP        Chin tucks 3\"x15 3\"X20 3\"x20 3\"x20 3\"x20 HEP     " "  rows   X15 RTB 15x RTB 2x10  RTB 2x10  GTB 2x10  GTB      pulldowns     15x  GTB 15x GTB 15x GTB      No Money's   Supine YTB x15 Supine YTB 15x Supine YTB  2x10 Supine RTB  15x HEP      Prone T's     15x2\" 15x2\" *pain 15x2\"      DNF endurance      10\"x3 HEP      Shoulder ER       RTB  10x ea      Shoulder IR       GTB   10x ea  L sh at 0;  R sh at 90      Ther Ex             UT S  10\"x4 B 30\"x3 B 30\"x4 30\"x4 HEP       Levator S  10\"x4 B 30\"x3 B 30'x3 HEP        Open books  5\"x10 B 5\"x10 B  5'x10 HEP        UBE fw/bw  L1 3'/3' L1 3'/3' L1 3'/3' L23'/3' L2 3'/3' L3 3'/3'      Doorway S  15\"x4 15\"x4 15'x4 HEP 15\"x4 HEP      SNAG w/ towel      10\"x3 B 10\" x 3 B                                Ther Activity             Pt edu: involved anatomy, physio, HEP, POC, movement patterns  RE                                                                Modalities                                                    "

## 2024-07-16 ENCOUNTER — HOSPITAL ENCOUNTER (OUTPATIENT)
Dept: ULTRASOUND IMAGING | Facility: HOSPITAL | Age: 64
Discharge: HOME | End: 2024-07-16
Payer: COMMERCIAL

## 2024-07-16 ENCOUNTER — OFFICE VISIT (OUTPATIENT)
Dept: PHYSICAL THERAPY | Facility: CLINIC | Age: 64
End: 2024-07-16
Payer: COMMERCIAL

## 2024-07-16 DIAGNOSIS — M79.18 MYOFASCIAL PAIN SYNDROME: ICD-10-CM

## 2024-07-16 DIAGNOSIS — N20.0 RENAL CALCULI: ICD-10-CM

## 2024-07-16 DIAGNOSIS — M54.50 CHRONIC BILATERAL LOW BACK PAIN WITHOUT SCIATICA: ICD-10-CM

## 2024-07-16 DIAGNOSIS — M47.812 CERVICAL SPONDYLOSIS: Primary | ICD-10-CM

## 2024-07-16 DIAGNOSIS — G89.29 CHRONIC BILATERAL LOW BACK PAIN WITHOUT SCIATICA: ICD-10-CM

## 2024-07-16 PROCEDURE — 97110 THERAPEUTIC EXERCISES: CPT

## 2024-07-16 PROCEDURE — 97112 NEUROMUSCULAR REEDUCATION: CPT

## 2024-07-16 PROCEDURE — 76775 US EXAM ABDO BACK WALL LIM: CPT

## 2024-07-16 PROCEDURE — 97140 MANUAL THERAPY 1/> REGIONS: CPT

## 2024-07-16 NOTE — PROGRESS NOTES
"Daily Note     Today's date: 2024  Patient name: Sandip Dominguez III  : 1960  MRN: [id number]  Referring provider: Ty Hope MD  Dx:   Encounter Diagnosis     ICD-10-CM    1. Cervical spondylosis  M47.812       2. Chronic bilateral low back pain without sciatica  M54.50     G89.29       3. Myofascial pain syndrome  M79.18           Start Time: 1555  Stop Time: 1642  Total time in clinic (min): 47 minutes    Subjective: Pt reports soreness in the neck upon arrival and experienced some neck soreness the morning following last session. Pt reports completing HEP multiple times a day.      Objective: See treatment diary below      Assessment: Tolerated treatment well. Patient exhibited good technique with therapeutic exercises and would benefit from continued PT. Added bilateral SCM stretch as pt had notable tightness in SCM on R side when preforming cervical manuals. Pt is able to complete HEP w/ no questions and exercises in session w/ no issues. Added bicep curls to focus on postural muscle strengthening in the anterior shoulder which pt completed w/ no issues.      Plan: Progress treatment as tolerated.       Precautions: Hashimoto, depression, anxiety, HTN    Treatment was performed by Gosia Balderas SPT under the direct supervision of Vanessa Arias PT, DPT    1:1 [id number]  Manuals  7 7 7     Cervical traction RE RE ROGERS RE RE AB AB AB     SOR RE RE ROGERS RE RE AB AB AB                               Neuro Re-Ed             Scap squeeze  3\"X20 3\"x20 3\"x20 HEP        Chin tucks 3\"x15 3\"X20 3\"x20 3\"x20 3\"x20 HEP       rows   X15 RTB 15x RTB 2x10  RTB 2x10  GTB 2x10  GTB 2x10  GTB     pulldowns     15x  GTB 15x GTB 15x GTB 2x10  GTB     No Money's   Supine YTB x15 Supine YTB 15x Supine YTB  2x10 Supine RTB  15x HEP      Prone T's     15\"x2 15\"x2  *pain 15\"x2 15\"x2     DNF endurance      10\"x3 HEP      Shoulder ER       RTB  10x ea RTB  10x ea     Shoulder IR       GTB " "  10x ea  L sh at 0;  R sh at 90 GTB 10x ea at 90 deg     Ther Ex             UT S  10\"x4 B 30\"x3 B 30\"x4 30\"x4 HEP       Levator S  10\"x4 B 30\"x3 B 30'x3 HEP        Open books  5\"x10 B 5\"x10 B  5'x10 HEP        UBE fw/bw  L1 3'/3' L1 3'/3' L1 3'/3' L23'/3' L2 3'/3' L3 3'/3' L3 3'/3'     Doorway S  15\"x4 15\"x4 15'x4 HEP 15\"x4 HEP      SNAG w/ towel      10\"x3 B 10\" x 3 B 10\"x3 B     SCM stretch w/ towel        10\"x3 B     Bicep curls        15#, 15 ea     Ther Activity             Pt edu: involved anatomy, physio, HEP, POC, movement patterns  RE                                                                Modalities                                            "

## 2024-07-19 DIAGNOSIS — I10 PRIMARY HYPERTENSION: ICD-10-CM

## 2024-07-19 DIAGNOSIS — N20.0 NEPHROLITHIASIS: Primary | ICD-10-CM

## 2024-07-19 DIAGNOSIS — N17.9 ACUTE KIDNEY INJURY (HCC): ICD-10-CM

## 2024-07-23 ENCOUNTER — OFFICE VISIT (OUTPATIENT)
Dept: PHYSICAL THERAPY | Facility: CLINIC | Age: 64
End: 2024-07-23
Payer: COMMERCIAL

## 2024-07-23 DIAGNOSIS — M54.50 CHRONIC BILATERAL LOW BACK PAIN WITHOUT SCIATICA: ICD-10-CM

## 2024-07-23 DIAGNOSIS — G89.29 CHRONIC BILATERAL LOW BACK PAIN WITHOUT SCIATICA: ICD-10-CM

## 2024-07-23 DIAGNOSIS — M79.18 MYOFASCIAL PAIN SYNDROME: ICD-10-CM

## 2024-07-23 DIAGNOSIS — M47.812 CERVICAL SPONDYLOSIS: Primary | ICD-10-CM

## 2024-07-23 PROCEDURE — 97140 MANUAL THERAPY 1/> REGIONS: CPT

## 2024-07-23 PROCEDURE — 97112 NEUROMUSCULAR REEDUCATION: CPT

## 2024-07-23 NOTE — PROGRESS NOTES
"Daily Note     Today's date: 2024  Patient name: Sandip Dominguez III  : 1960  MRN: [id number]  Referring provider: Ty Hope MD  Dx:   Encounter Diagnosis     ICD-10-CM    1. Cervical spondylosis  M47.812       2. Chronic bilateral low back pain without sciatica  M54.50     G89.29       3. Myofascial pain syndrome  M79.18           Start Time: 1409  Stop Time: 1458  Total time in clinic (min): 49 minutes    Subjective: Pt reports he has not experienced burning pain in neck for the past month and is able to drive w/o any pain. Pt reports constant stiffness in the neck that is alleviated for ~1 hours following stretches, but stiffness will return after. Pt reports returning to the gym on Thursday and did not experience any issues.    Objective: See treatment diary below      Assessment: Tolerated treatment well. Patient exhibited good technique with therapeutic exercises and would benefit from continued PT. Pt has bilateral SCM tightness that decreases w/ manual therapy. Pt responded well to manual cervical traction and cervical retraction w/ pt overpressure as he reported more of a stretch in the neck and no pain. Pt attempted bent over reverse flys and experienced low back pain, so changed to lateral raises to strengthen deltoids and traps for postural stability. Discussed potential discharge w/ pt as pt has 0/10 pain, independent w/ HEP, and has transitioned to a gym. Plan 1x/week for 2 weeks until d/c.      Plan: Continue per plan of care.      Precautions: Hashimoto, depression, anxiety, HTN    Treatment was performed by Gosia Balderas SPT under the direct supervision of Vanessa Arias PT, DPT    1:1 [id number]  Manuals  7 7    Cervical traction RE RE ROGERS RE RE AB AB AB AB    SOR RE RE ROGERS RE RE AB AB AB AB                              Neuro Re-Ed             Scap squeeze  3\"X20 3\"x20 3\"x20 HEP        Chin tucks 3\"x15 3\"X20 3\"x20 3\"x20 3\"x20 HEP       rows   " "X15 RTB 15x RTB 2x10  RTB 2x10  GTB 2x10  GTB 2x10  GTB 15x  14#    pulldowns     15x  GTB 15x GTB 15x GTB 2x10  GTB 15x  12#    No Money's   Supine YTB x15 Supine YTB 15x Supine YTB  2x10 Supine RTB  15x HEP      Prone T's     15x2\" 15x2\"  *pain 15x2\" 15x2\"     Lateral raises standing         RTB 15x    DNF endurance      10\"x3 HEP      Shoulder ER       RTB  10x ea RTB  10x ea RTB  10x ea    Shoulder IR       GTB   10x ea  L sh at 0;  R sh at 90 GTB 10x ea at 90 deg GTB 10x ea at 90 deg    Prone W's         15x2\"    Seated cervical retraction         Pt overpressure  3\"x10    Ther Ex             UT S  10\"x4 B 30\"x3 B 30\"x4 30\"x4 HEP       Levator S  10\"x4 B 30\"x3 B 30'x3 HEP        Open books  5\"x10 B 5\"x10 B  5'x10 HEP        UBE fw/bw  L1 3'/3' L1 3'/3' L1 3'/3' L23'/3' L2 3'/3' L3 3'/3' L3 3'/3' L3 3'/3'    Doorway S  15\"x4 15\"x4 15'x4 HEP 15\"x4 HEP      SNAG w/ towel      10\"x3 B 10\" x 3 B 10\"x3 B HEP    SCM stretch w/ towel        10\"x3 B HEP    Bicep curls        15#, 15 ea     Ther Activity             Pt edu: involved anatomy, physio, HEP, POC, movement patterns  RE                                                                Modalities                                              "

## 2024-07-25 RX ORDER — BUDESONIDE 3 MG/1
CAPSULE, COATED PELLETS ORAL
COMMUNITY
Start: 2024-06-12 | End: 2024-07-29

## 2024-07-29 ENCOUNTER — TELEPHONE (OUTPATIENT)
Dept: UROLOGY | Facility: AMBULATORY SURGERY CENTER | Age: 64
End: 2024-07-29

## 2024-07-29 ENCOUNTER — OFFICE VISIT (OUTPATIENT)
Dept: UROLOGY | Facility: AMBULATORY SURGERY CENTER | Age: 64
End: 2024-07-29
Payer: COMMERCIAL

## 2024-07-29 VITALS
HEIGHT: 72 IN | BODY MASS INDEX: 21.67 KG/M2 | HEART RATE: 75 BPM | OXYGEN SATURATION: 98 % | SYSTOLIC BLOOD PRESSURE: 128 MMHG | WEIGHT: 160 LBS | DIASTOLIC BLOOD PRESSURE: 72 MMHG

## 2024-07-29 DIAGNOSIS — N20.0 NEPHROLITHIASIS: ICD-10-CM

## 2024-07-29 DIAGNOSIS — Z12.5 SCREENING FOR PROSTATE CANCER: Primary | ICD-10-CM

## 2024-07-29 PROCEDURE — 99213 OFFICE O/P EST LOW 20 MIN: CPT | Performed by: UROLOGY

## 2024-07-29 NOTE — TELEPHONE ENCOUNTER
Spoke with FT OK to schedule with FT, as per PATIENT REQUEST.      Return in about 1 year (around 7/29/2025) for 1 yr with AP with KUB, PSA, REnal and bladder U/S, PSA October .
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
## 2024-07-29 NOTE — ASSESSMENT & PLAN NOTE
Impression: Stable right nephrolithiasis, routine prostate cancer screening    Plan: I provided the patient and his wife with reassurance that his renal ultrasound shows a stable 5 mm right lower pole stone.  I recommend observation.  Next we discussed his most recent PSA from the fall 2023 of 1.57.  I recommend repeating this in the fall again.  Follow-up will be in 1 year with KUB, renal bladder ultrasound, repeat PSA, and repeat digital rectal examination.  The patient is amenable with this plan.

## 2024-07-30 ENCOUNTER — OFFICE VISIT (OUTPATIENT)
Dept: PHYSICAL THERAPY | Facility: CLINIC | Age: 64
End: 2024-07-30
Payer: COMMERCIAL

## 2024-07-30 DIAGNOSIS — M79.18 MYOFASCIAL PAIN SYNDROME: Primary | ICD-10-CM

## 2024-07-30 DIAGNOSIS — M47.812 CERVICAL SPONDYLOSIS: ICD-10-CM

## 2024-07-30 PROCEDURE — 97110 THERAPEUTIC EXERCISES: CPT

## 2024-07-30 PROCEDURE — 97112 NEUROMUSCULAR REEDUCATION: CPT

## 2024-07-30 PROCEDURE — 97140 MANUAL THERAPY 1/> REGIONS: CPT

## 2024-07-30 NOTE — PROGRESS NOTES
"Daily Note     Today's date: 2024  Patient name: Sandip Dominguez III  : 1960  MRN: [id number]  Referring provider: Ty Hope MD  Dx:   Encounter Diagnosis     ICD-10-CM    1. Myofascial pain syndrome  M79.18       2. Cervical spondylosis  M47.812           Start Time: 1441  Stop Time: 1528  Total time in clinic (min): 47 minutes    Subjective: Pt presents to PT noting he has been a little sore and felt a reasl benefit after the cervical traction after last session and would like to continue for another few visits to gain that benefit.      Objective: See treatment diary below      Assessment: Tolerated treatment well. Patient demonstrated fatigue post treatment, exhibited good technique with therapeutic exercises, and would benefit from continued PT. Progressed pts tx in terms of repetitions as well as adding around the worlds. Advised pt he may be a little sore after today's additions and pt did verbalize understanding.      Plan: Continue per plan of care.  Progress treatment as tolerated.       Precautions: Hashimoto, depression, anxiety, HTN      Manuals      Cervical traction   ROGERS RE RE AB AB AB AB RE   SOR   ROGERS RE RE AB AB AB AB RE                             Neuro Re-Ed             Scap squeeze   3\"x20 3\"x20 HEP        Chin tucks   3\"x20 3\"x20 3\"x20 HEP       rows   X15 RTB 15x RTB 2x10  RTB 2x10  GTB 2x10  GTB 2x10  GTB 15x  14# 15# 20x   pulldowns     15x  GTB 15x GTB 15x GTB 2x10  GTB 15x  12# 15# 20x   No Money's   Supine YTB x15 Supine YTB 15x Supine YTB  2x10 Supine RTB  15x HEP      Prone T's     15x2\" 15x2\"  *pain 15x2\" 15x2\"     Lateral raises standing         RTB 15x RTB 15x & flex to 90   DNF endurance      10\"x3 HEP      Shoulder ER       RTB  10x ea RTB  10x ea RTB  10x ea RTB 2x10 B   Shoulder IR       GTB   10x ea  L sh at 0;  R sh at 90 GTB 10x ea at 90 deg GTB 10x ea at 90 deg GTB @ 90 abd 2x10 B   Prone W's         15x2\" 2\"2x10   Seated " "cervical retraction         Pt overpressure  3\"x10 W/ OP 3\"x15   Ther Ex             UT S   30\"x3 B 30\"x4 30\"x4 HEP       Levator S   30\"x3 B 30'x3 HEP        Open books   5\"x10 B  5'x10 HEP        UBE fw/bw   L1 3'/3' L1 3'/3' L23'/3' L2 3'/3' L3 3'/3' L3 3'/3' L3 3'/3' L3 3'/3'   Doorway S   15\"x4 15'x4 HEP 15\"x4 HEP      SNAG w/ towel      10\"x3 B 10\" x 3 B 10\"x3 B HEP    SCM stretch w/ towel        10\"x3 B HEP    Round the worlds          5# 10x BUE   Bicep curls        15#, 15 ea     Ther Activity             Pt edu: involved anatomy, physio, HEP, POC, movement patterns                                                                  Modalities                                                "

## 2024-08-06 ENCOUNTER — OFFICE VISIT (OUTPATIENT)
Dept: PHYSICAL THERAPY | Facility: CLINIC | Age: 64
End: 2024-08-06
Payer: COMMERCIAL

## 2024-08-06 DIAGNOSIS — M79.18 MYOFASCIAL PAIN SYNDROME: ICD-10-CM

## 2024-08-06 DIAGNOSIS — M47.812 CERVICAL SPONDYLOSIS: Primary | ICD-10-CM

## 2024-08-06 PROCEDURE — 97112 NEUROMUSCULAR REEDUCATION: CPT

## 2024-08-06 PROCEDURE — 97110 THERAPEUTIC EXERCISES: CPT

## 2024-08-06 PROCEDURE — 97140 MANUAL THERAPY 1/> REGIONS: CPT

## 2024-08-06 NOTE — PROGRESS NOTES
"Daily Note     Today's date: 2024  Patient name: Sandip Dominguez III  : 1960  MRN: [id number]  Referring provider: Ty Hope MD  Dx:   Encounter Diagnosis     ICD-10-CM    1. Cervical spondylosis  M47.812       2. Myofascial pain syndrome  M79.18           Start Time: 1441  Stop Time: 1527  Total time in clinic (min): 46 minutes    Subjective: Pt reports mild neck stiffness and 0/10 pain. Regularly attends the gym w/ no reported neck issues. Experiences L anterior shoulder pain w/ 90 degrees or more of abduction.       Objective: See treatment diary below      Assessment: Tolerated treatment well. Reports L shoulder pain w/ ER/IR and round the worlds. Responses well to manuals and reported feeling looser following the session. Discussed potential d/c for the neck next session due to decreased pain, independence w/ HEP, transition to the gym, and performing ADLs w/ no limitations due to the neck. Pt would like to continue skilled PT for L shoulder as they are experiencing increased pain w/ ROM and lifting. Patient demonstrated fatigue post treatment and exhibited good technique with therapeutic exercises.      Plan:  Re-evaluation next session to d/c the neck and add L shoulder.      Precautions: Hashimoto, depression, anxiety, HTN    Treatment was performed by GERMANIA Viveros under the direct supervision of Vanessa Arias PT, DPT      [id number]  Manuals    Cervical traction AB   RE RE AB AB AB AB RE   SOR AB   RE RE AB AB AB AB RE                             Neuro Re-Ed             Scap squeeze    3\"x20 HEP        Chin tucks    3\"x20 3\"x20 HEP       rows 15# 20x   15x RTB 2x10  RTB 2x10  GTB 2x10  GTB 2x10  GTB 15x  14# 15# 20x   pulldowns 15# 20x    15x  GTB 15x GTB 15x GTB 2x10  GTB 15x  12# 15# 20x   No Money's    Supine YTB 15x Supine YTB  2x10 Supine RTB  15x HEP      Prone T's     15x2\" 15x2\"  *pain 15x2\" 15x2\"     Lateral raises standing RTB 15x & " "flex to 90        RTB 15x RTB 15x & flex to 90   DNF endurance      10\"x3 HEP      Shoulder ER RTB 2x10 B      RTB  10x ea RTB  10x ea RTB  10x ea RTB 2x10 B   Shoulder IR GTB R @ 90 abd ;  L @ 0 abd  2x10 B      GTB   10x ea  L sh at 0;  R sh at 90 GTB 10x ea at 90 deg GTB 10x ea at 90 deg GTB @ 90 abd 2x10 B   Prone W's 2\"2x10        15x2\" 2\"2x10   Seated cervical retraction HEP        Pt overpressure  3\"x10 W/ OP 3\"x15   Ther Ex             UT S    30\"x4 30\"x4 HEP       Levator S    30'x3 HEP        Open books    5'x10 HEP        UBE fw/bw L3 3'/3'   L1 3'/3' L23'/3' L2 3'/3' L3 3'/3' L3 3'/3' L3 3'/3' L3 3'/3'   Doorway S    15'x4 HEP 15\"x4 HEP      SNAG w/ towel      10\"x3 B 10\" x 3 B 10\"x3 B HEP    SCM stretch w/ towel        10\"x3 B HEP    Round the worlds 5# 10x BUE         5# 10x BUE   Bicep curls        15#, 15 ea     Ther Activity             Pt edu: involved anatomy, physio, HEP, POC, movement patterns                                                                  Modalities                                            "

## 2024-08-13 ENCOUNTER — EVALUATION (OUTPATIENT)
Dept: PHYSICAL THERAPY | Facility: CLINIC | Age: 64
End: 2024-08-13
Payer: COMMERCIAL

## 2024-08-13 ENCOUNTER — TELEPHONE (OUTPATIENT)
Dept: NEPHROLOGY | Facility: CLINIC | Age: 64
End: 2024-08-13

## 2024-08-13 DIAGNOSIS — M25.512 LEFT SHOULDER PAIN, UNSPECIFIED CHRONICITY: Primary | ICD-10-CM

## 2024-08-13 DIAGNOSIS — M79.18 MYOFASCIAL PAIN SYNDROME: ICD-10-CM

## 2024-08-13 DIAGNOSIS — M47.812 CERVICAL SPONDYLOSIS: ICD-10-CM

## 2024-08-13 PROCEDURE — 97530 THERAPEUTIC ACTIVITIES: CPT

## 2024-08-13 PROCEDURE — 97164 PT RE-EVAL EST PLAN CARE: CPT

## 2024-08-13 PROCEDURE — 97110 THERAPEUTIC EXERCISES: CPT

## 2024-08-13 NOTE — TELEPHONE ENCOUNTER
CARMELOM for pt to please complete his lab work prior to his appointment with Dr. Reyes on 08/20.     Asked pt to give us a call back with any questions or concerns.

## 2024-08-13 NOTE — PROGRESS NOTES
Progress Note     Today's date: 2024  Patient name: Sandip Dominguez III  : 1960  MRN: [id number]  Referring provider: Ty Hope MD  Dx:   Encounter Diagnosis     ICD-10-CM    1. Cervical spondylosis  M47.812       2. Myofascial pain syndrome  M79.18       3. Left shoulder pain, unspecified chronicity  M25.512           Start Time: 1448  Stop Time: 1535  Total time in clinic (min): 47 minutes    Subjective: Pt presents to PT for a re-evaluation today. Reports stiffness and pinching sensation in the neck when looking up to watch stars over the weekend. Reports feeling limited w/ cervical rotation. Pt reports improvements since beginning skilled PT. Pt reports 4/10 pain at worst over the past 24 hours and reports 33% improvement in overall condition since beginning formal PT care. The pt's chief remaining concern is rotating when driving and burning sensation at C7 when driving. Pt reported L shoulder pain during previous sessions and would like to continue skilled PT for L shoulder.      Re-evaluation on  (L shoulder)    Subjective:    History:  Sandip Dominguez III is a [age] y.o. male who presents to PT to add L shoulder due to increased irritation over the past few months. Pt reports pinpoint anterior and posterior L shoulder pain that began 3-4 months ago w/ no KING noted. Pt denies radiating symptoms. Reports sharp pain when actively moving L shoulder beyond 90 degrees of abduction and flexion, which will subside w/ rest.       Aggravating activities: AROM >90 degrees of abduction and flexion, exercises at the gym - OH shoulder press, chest fly, lateral raise  Relieving activities: temporary relief w/ Theragun and rest  Treatment: iceyhot, theragun  Impact on ADLs: petting the dog, showering, reaching OH  Red flags: no sleep disturbance, no change in bowel or bladder, no constant pain  PMH: cervical spondylosis  Pt goals: to be pain free when moving arm beyond 90 degrees    Objective: See treatment  diary below    Postural Observations  Seated posture: fair  Standing posture: fair  Correction of posture: makes symptoms better      Palpation   Left   Tenderness of the rhomboids, sternocleidomastoid and suboccipitals.     Right   Tenderness of the rhomboids, sternocleidomastoid and suboccipitals.     Active Range of Motion   Cervical/Thoracic Spine       Cervical    Flexion:  WFL  Extension:  Restriction level: WFL  Left lateral flexion: Restriction level: moderate  Right lateral flexion: Restriction level moderate  Left rotation:  with pain Restriction level: minimal  Right rotation:  Restriction level: minimal    Thoracic    Left rotation:  Restriction level: moderate  Right rotation:  Restriction level: moderate    Joint Play     Hypomobile: C2, C3, C4, C5, C6, C7, T1, T2, T3, T4, T5, T6, T7, T8, T9 and T10     Pain: C2, C3, C4, T1 and T2     Strength/Myotome Testing     Left Shoulder     Planes of Motion   Abduction: 4- pain  Flexion: 4+ pain  ER: 4+  IR: 4+    Right Shoulder     Planes of Motion   Abduction: 4   Flexion: 4+  ER: 4+  IR: 4+    Tests   Shoulder  Negative speeds, belly press, HK, Jefferson's/lift off  Positive empty can, painful arc    Cervical   Negative vertical compression and cervical distraction.   Negative sharp tanisha, alar ligament    Lumbar   Negative vertical compression.     Assessment:     Sandip Dominguez III is a 64 y.o. male who has been attending physical therapy for cervical spondylosis and myofascial pain syndrome. Pt has made significant improvements in cervical ROM, UE strength, and pain. Pt has transitioned to a gym and feels comfortable managing pain at home. However, pt reports increased pain over the weekend due to increased activity. HEP was reviewed to ensure proper form during exercises and appropriate modifications were made. Pt reported greater activity limitations w/ L shoulder and presents with signs and symptoms consistent with L shoulder impingement. Pt presents with  "decreased AROM, decreased LUE strength, and activity intolerance. Skilled PT is necessary to monitor symptoms in the cervical spine and address measured impairments to improve activity tolerance. Reviewed POC, HEP, and involved anatomy and physiology in which pt was in verbal agreement.        Goals  Short Term Goals: to be achieved by 4 weeks  1) Patient to be independent with basic HEP. - MET  2) Decrease pain to 3/10 at its worst. - PROGRESSING  3) Increase cervical spine ROM to WFL - PROGRESSING  4) Increase UE strength by 1/2 MMT grade in all deficient planes. - MET  5) Improve joint mobility in cervical spine to normal    Long Term Goals: to be achieved by discharge  1) FOTO equal to or greater than projected goal.  2) Patient to be independent with comprehensive HEP.  3) Cervical spine ROM WNL all planes to improve a/iadls.  4) Increase UE strength to 1 MMT grade in all planes to improve a/iadls.  5) Lifting is improved to maximal level of function, pain free      Plan: Progress treatment as tolerated.   1x/week for 4-6 weeks to address L shoulder impairments and monitor cervical symptoms     Precautions: Hashimoto, depression, anxiety, HTN    Treatment was performed by Gosia Balderas, SPT under the direct supervision of Vanessa Arias, PT, DPT      Manuals 8/6 8/13 7/5 7/9 7/11 7/16 7/23 7/30   Cervical traction AB    RE AB AB AB AB RE   SOR AB    RE AB AB AB AB RE                             Neuro Re-Ed             Scap squeeze     HEP        Chin tucks  reviewed   3\"x20 HEP       rows 15# 20x    2x10  RTB 2x10  GTB 2x10  GTB 2x10  GTB 15x  14# 15# 20x   pulldowns 15# 20x    15x  GTB 15x GTB 15x GTB 2x10  GTB 15x  12# 15# 20x   No Money's     Supine YTB  2x10 Supine RTB  15x HEP      Prone T's     15x2\" 15x2\"  *pain 15x2\" 15x2\"     Lateral raises standing RTB 15x & flex to 90        RTB 15x RTB 15x & flex to 90   DNF endurance  reviewed    10\"x3 HEP      Shoulder ER RTB 2x10 B      RTB  10x ea RTB  10x " "ea RTB  10x ea RTB 2x10 B   Shoulder IR GTB R @ 90 abd ;  L @ 0 abd  2x10 B      GTB   10x ea  L sh at 0;  R sh at 90 GTB 10x ea at 90 deg GTB 10x ea at 90 deg GTB @ 90 abd 2x10 B   Prone W's 2\"2x10        15x2\" 2\"2x10   Seated cervical retraction HEP        Pt overpressure  3\"x10 W/ OP 3\"x15   Ther Ex             UT S  reviewed   30\"x4 HEP       Levator S     HEP        Open books     HEP        UBE fw/bw L3 3'/3' L3 3'/3'   L23'/3' L2 3'/3' L3 3'/3' L3 3'/3' L3 3'/3' L3 3'/3'   Doorway S  15\"x4   HEP 15\"x4 HEP      SNAG w/ towel  reviewed    10\"x3 B 10\" x 3 B 10\"x3 B HEP    SCM stretch w/ towel        10\"x3 B HEP    Round the worlds 5# 10x BUE         5# 10x BUE   Bicep curls        15#, 15 ea     Shoulder AAROM w/ cane  Abduction  3\"x5           Ther Activity             Pt edu: involved anatomy, physio, HEP, POC, movement patterns   HEP, POC, cervical ROM, UE ROM/  MMT, RE                                                               Modalities                                              "

## 2024-08-15 ENCOUNTER — APPOINTMENT (OUTPATIENT)
Dept: LAB | Facility: CLINIC | Age: 64
End: 2024-08-15
Payer: COMMERCIAL

## 2024-08-15 DIAGNOSIS — Z12.5 SCREENING FOR PROSTATE CANCER: ICD-10-CM

## 2024-08-15 DIAGNOSIS — I10 PRIMARY HYPERTENSION: ICD-10-CM

## 2024-08-15 DIAGNOSIS — N17.9 ACUTE KIDNEY INJURY (HCC): ICD-10-CM

## 2024-08-15 DIAGNOSIS — N20.0 NEPHROLITHIASIS: ICD-10-CM

## 2024-08-15 LAB
ANION GAP SERPL CALCULATED.3IONS-SCNC: 5 MMOL/L (ref 4–13)
BACTERIA UR QL AUTO: ABNORMAL /HPF
BILIRUB UR QL STRIP: NEGATIVE
BUN SERPL-MCNC: 16 MG/DL (ref 5–25)
CALCIUM SERPL-MCNC: 9.1 MG/DL (ref 8.4–10.2)
CHLORIDE SERPL-SCNC: 103 MMOL/L (ref 96–108)
CLARITY UR: CLEAR
CO2 SERPL-SCNC: 32 MMOL/L (ref 21–32)
COLOR UR: YELLOW
CREAT SERPL-MCNC: 1.24 MG/DL (ref 0.6–1.3)
CREAT UR-MCNC: 349.8 MG/DL
GFR SERPL CREATININE-BSD FRML MDRD: 61 ML/MIN/1.73SQ M
GLUCOSE P FAST SERPL-MCNC: 96 MG/DL (ref 65–99)
GLUCOSE UR STRIP-MCNC: NEGATIVE MG/DL
HGB UR QL STRIP.AUTO: NEGATIVE
KETONES UR STRIP-MCNC: NEGATIVE MG/DL
LEUKOCYTE ESTERASE UR QL STRIP: NEGATIVE
MICROALBUMIN UR-MCNC: 7.8 MG/L
MICROALBUMIN/CREAT 24H UR: 2 MG/G CREATININE (ref 0–30)
MUCOUS THREADS UR QL AUTO: ABNORMAL
NITRITE UR QL STRIP: NEGATIVE
NON-SQ EPI CELLS URNS QL MICRO: ABNORMAL /HPF
PH UR STRIP.AUTO: 5.5 [PH]
POTASSIUM SERPL-SCNC: 3.8 MMOL/L (ref 3.5–5.3)
PROT UR STRIP-MCNC: ABNORMAL MG/DL
RBC #/AREA URNS AUTO: ABNORMAL /HPF
SODIUM SERPL-SCNC: 140 MMOL/L (ref 135–147)
SP GR UR STRIP.AUTO: 1.02 (ref 1–1.03)
UROBILINOGEN UR STRIP-ACNC: <2 MG/DL
WBC #/AREA URNS AUTO: ABNORMAL /HPF

## 2024-08-15 PROCEDURE — 80048 BASIC METABOLIC PNL TOTAL CA: CPT

## 2024-08-15 PROCEDURE — 36415 COLL VENOUS BLD VENIPUNCTURE: CPT

## 2024-08-19 ENCOUNTER — OFFICE VISIT (OUTPATIENT)
Dept: PAIN MEDICINE | Facility: CLINIC | Age: 64
End: 2024-08-19
Payer: COMMERCIAL

## 2024-08-19 VITALS
WEIGHT: 163 LBS | BODY MASS INDEX: 22.08 KG/M2 | RESPIRATION RATE: 18 BRPM | HEART RATE: 70 BPM | DIASTOLIC BLOOD PRESSURE: 78 MMHG | HEIGHT: 72 IN | SYSTOLIC BLOOD PRESSURE: 140 MMHG

## 2024-08-19 DIAGNOSIS — M25.512 CHRONIC LEFT SHOULDER PAIN: Primary | ICD-10-CM

## 2024-08-19 DIAGNOSIS — M54.12 CERVICAL RADICULOPATHY: ICD-10-CM

## 2024-08-19 DIAGNOSIS — F41.9 ANXIETY: ICD-10-CM

## 2024-08-19 DIAGNOSIS — G89.29 CHRONIC LEFT SHOULDER PAIN: Primary | ICD-10-CM

## 2024-08-19 PROCEDURE — 99214 OFFICE O/P EST MOD 30 MIN: CPT | Performed by: ANESTHESIOLOGY

## 2024-08-19 RX ORDER — CLONAZEPAM 0.5 MG/1
0.5 TABLET ORAL 2 TIMES DAILY
Qty: 60 TABLET | Refills: 1 | Status: SHIPPED | OUTPATIENT
Start: 2024-08-19
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
## 2024-08-19 NOTE — TELEPHONE ENCOUNTER
1 [id number] 07/16/2024 06/07/2024 clonazePAM (Tablet) 60.0 30 0.5 MG NA DOUGLAS NELSON [id number] Commercial Insurance 1 / 1 [place]    1 [id number] 06/07/2024 06/07/2024 clonazePAM (Tablet) 60.0 30 0.5 MG NA DOUGLAS [facility][id number] Commercial Insurance 0 / 1 PA    1 [id number] 05/05/2024 04/02/2024 clonazePAM (Tablet) 60.0 30 0.5 MG [id number] Commercial Insurance 1 / 1 PA    1 [id number] 04/02/2024 04/02/2024 clonazePAM (Tablet) 60.0 30 0.5 MG [id number] Commercial Insurance 0 / 1 PA    1 [id number] 02/29/2024 01/30/2024 clonazePAM (Tablet) 60.0 30 0.5 MG [facility] Commercial Insurance 1 / 1 PA    1 [id number] 01/30/2024 01/30/2024 clonazePAM (Tablet) 60.0 30 0.5 MG [facility] Commercial Insurance 0 / 1 PA    1 [id number] 01/02/2024 12/05/2023 clonazePAM (Tablet) 60.0 30 0.5 MG [id number] Commercial Insurance 1 / 1 PA    1 [id number] 12/05/2023 12/05/2023 clonazePAM (Tablet) 60.0 30 0.5 MG [id number] Commercial Insurance 0 / 1 PA    1 [id number] 11/27/2023 11/27/2023 HYDROcodone BITARTRATE 5 MG ORAL TABLET/ACETAMINOPHEN 325 MG (Tablet) 50.0 12 325 MG-5 MG 20.83 [id number] Commercial Insurance 0 / 0 PA

## 2024-08-20 ENCOUNTER — APPOINTMENT (OUTPATIENT)
Dept: PHYSICAL THERAPY | Facility: CLINIC | Age: 64
End: 2024-08-20
Payer: COMMERCIAL

## 2024-08-30 ENCOUNTER — OFFICE VISIT (OUTPATIENT)
Dept: NEPHROLOGY | Facility: CLINIC | Age: 64
End: 2024-08-30
Payer: COMMERCIAL

## 2024-08-30 ENCOUNTER — HOSPITAL ENCOUNTER (OUTPATIENT)
Dept: RADIOLOGY | Age: 64
Discharge: HOME | End: 2024-08-30
Payer: COMMERCIAL

## 2024-08-30 ENCOUNTER — APPOINTMENT (OUTPATIENT)
Dept: RADIOLOGY | Age: 64
End: 2024-08-30
Payer: COMMERCIAL

## 2024-08-30 VITALS
WEIGHT: 164 LBS | HEART RATE: 76 BPM | DIASTOLIC BLOOD PRESSURE: 76 MMHG | BODY MASS INDEX: 22.21 KG/M2 | HEIGHT: 72 IN | SYSTOLIC BLOOD PRESSURE: 102 MMHG | OXYGEN SATURATION: 98 %

## 2024-08-30 DIAGNOSIS — I10 PRIMARY HYPERTENSION: ICD-10-CM

## 2024-08-30 DIAGNOSIS — N25.89 RTA (RENAL TUBULAR ACIDOSIS): ICD-10-CM

## 2024-08-30 DIAGNOSIS — K31.84 GASTROPARESIS: ICD-10-CM

## 2024-08-30 DIAGNOSIS — N20.0 NEPHROLITHIASIS: Primary | ICD-10-CM

## 2024-08-30 DIAGNOSIS — M54.12 CERVICAL RADICULOPATHY: ICD-10-CM

## 2024-08-30 DIAGNOSIS — G89.29 CHRONIC LEFT SHOULDER PAIN: ICD-10-CM

## 2024-08-30 DIAGNOSIS — M25.512 CHRONIC LEFT SHOULDER PAIN: ICD-10-CM

## 2024-08-30 PROCEDURE — 99213 OFFICE O/P EST LOW 20 MIN: CPT | Performed by: STUDENT IN AN ORGANIZED HEALTH CARE EDUCATION/TRAINING PROGRAM

## 2024-08-30 PROCEDURE — 73030 X-RAY EXAM OF SHOULDER: CPT

## 2024-08-30 PROCEDURE — 72141 MRI NECK SPINE W/O DYE: CPT

## 2024-08-30 NOTE — PROGRESS NOTES
NEPHROLOGY OUTPATIENT PROGRESS NOTE   Sandip Dominguez III 64 y.o. male MRN: [id number]  DATE: 8/30/2024    Reason for visit: No chief complaint on file.       There are no Patient Instructions on file for this visit.      There are no diagnoses linked to this encounter.    Assessment/Plan:  62 y.o. man PMH recurrent nephrolithiasis (8-9mm stone, multiple small stones vs calcifications on right kidney), HTN, COVID infection on 8/2022. Patient is here for follow up of nephrolithiasis      PLAN:     #Recurrent Nephrolithiasis RTA ? 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 male presents in follow up of nephrolithiasis. Feels well, no SOB, no CP, no recent hospitalizations. No issues with medication        Review of Systems   Constitutional:  Negative for activity change and appetite change.   HENT:  Negative for congestion and dental problem.    Eyes:  Negative for discharge.   Respiratory:  Negative for  cough and shortness of breath.    Cardiovascular:  Negative for chest pain and leg swelling.   Gastrointestinal:  Negative for abdominal distention and abdominal pain.   Endocrine: Negative for cold intolerance.   Genitourinary:  Negative for dysuria.   Musculoskeletal:  Negative for arthralgias.   Skin:  Negative for color change and pallor.   Neurological:  Negative for dizziness.   Psychiatric/Behavioral:  Negative for agitation.        OBJECTIVE:  There were no vitals taken for this visit. There is no height or weight on file to calculate BMI.    Physical exam:  Physical Exam  General:  no acute distress at this time  Skin:  No acute rash  Eyes:  No scleral icterus and noninjected  ENT:  mucous membranes moist  Neck:  no carotid bruits  Chest:  Clear to auscultation percussion, good respiratory effort, no use of accessory respiratory muscles  CVS:  Regular rate and rhythm without rub   Abdomen:  soft and nontender   Extremities: no significant lower extremity edema  Neuro:  No gross focality  Psych:  Alert , cooperative       Medications:    Current Outpatient Medications:     citric acid-potassium citrate (POLYCITRA K) 1,100-334 mg/5 mL solution, Take 15 mL by mouth 2 (two) times a day Daily on weekdays and BID on weekends, Disp: 473 mL, Rfl: 3    clonazePAM (KlonoPIN) 0.5 mg tablet, TAKE ONE TABLET BY MOUTH TWICE A DAY, Disp: 60 tablet, Rfl: 1    EPINEPHrine (EPIPEN) 0.3 mg/0.3 mL SOAJ, Inject 0.3 mL (0.3 mg total) into a muscle once for 1 dose, Disp: 0.6 mL, Rfl: 0    escitalopram (LEXAPRO) 10 mg tablet, TAKE ONE TABLET BY MOUTH EVERY DAY, Disp: 30 tablet, Rfl: 5    levothyroxine 75 mcg tablet, TAKE ONE TABLET BY MOUTH EVERY DAY, Disp: 90 tablet, Rfl: 1    lisinopril (ZESTRIL) 40 mg tablet, TAKE ONE TABLET BY MOUTH EVERY DAY, Disp: 90 tablet, Rfl: 1    pantoprazole (PROTONIX) 40 mg tablet, TAKE ONE TABLET BY MOUTH TWICE A DAY, Disp: 60 tablet, Rfl: 3    Current Facility-Administered Medications:      "cefTRIAXone (ROCEPHIN) injection 1,000 mg, 1,000 mg, Intramuscular, Q24H, Nathaly Chuy Alvarado DO, 1,000 mg at 07/14/23 1209    Allergies:  Allergies as of 08/30/2024 - Reviewed 08/19/2024   Allergen Reaction Noted    Compazine [prochlorperazine] GI Intolerance 06/03/2017    Percocet [oxycodone-acetaminophen] Itching 04/02/2022    Sulfa antibiotics Rash 06/03/2017       The following portions of the patient's history were reviewed and updated as appropriate: past family history, past surgical history and problem list.    Laboratory Results:  Lab Results   Component Value Date    SODIUM 140 08/15/2024    K 3.8 08/15/2024     08/15/2024    CO2 32 08/15/2024    BUN 16 08/15/2024    CREATININE 1.24 08/15/2024    GLUC 132 07/18/2023    CALCIUM 9.1 08/15/2024        Lab Results   Component Value Date    PTH 41.1 12/24/2022    CALCIUM 9.1 08/15/2024       Portions of the record may have been created with voice recognition software.  Occasional wrong word or \"sound a like\" substitutions may have occurred due to the inherent limitations of voice recognition software.  Read the chart carefully and recognize, using context, where substitutions have occurred.    "

## 2024-08-30 NOTE — PATIENT INSTRUCTIONS
Thank you for coming to your visit today. As we discussed you kidney function is stable at your baseline, your creatinine is 1.2 mg/dL.  Your electrolytes are normal. Please follow the recommendations below       Recommend low sodium (salt) food    Avoid nonsteroidal anti-inflammatory drugs such as Naprosyn, ibuprofen, Aleve, Advil, Celebrex, Meloxicam (Mobic) etc.  You can use Tylenol as needed if you do not have any liver condition to be concerned about    Try to avoid medications such as pantoprazole or  Protonix/Nexium or Esomeprazole)/Prilosec or omeprazole/Prevacid or lansoprazole/AcipHex or Rabeprazole.  If you are able to, use Pepcid as this is safer for your kidneys.    Try to exercise at least 30 minutes 3 days a week to begin with with an ultimate goal of 5 days a week for at least 30 minutes    Next Visit in 12 months with results   If you need to see us earlier we can change the appointment for you        Joselyn Reyes Bahamonde, MD  Nephrology Attending

## 2024-09-04 ENCOUNTER — TELEPHONE (OUTPATIENT)
Dept: RADIOLOGY | Facility: CLINIC | Age: 64
End: 2024-09-04

## 2024-09-04 NOTE — TELEPHONE ENCOUNTER
Patient has been scheduled for their procedure, please see [employer]t message for additional details.

## 2024-09-04 NOTE — TELEPHONE ENCOUNTER
Please let patient know I reviewed the MRI cervical spine and he has small disc bulges at C4-5 which is more to the left and at C5-6 which is more to the right which is pinching more where the nerves exit on the sides along with arthritis.  There is also a small hemangioma in his C2 vertebral body which is incidental and nothing to worry about.  He also has a chronic T1 spinous process fracture.    The x-rays of the left shoulder were fine.  I suspect the C4-5 and the C5-6 are causing most of his pain.  If he would like we can proceed with the IRAIDA to help bring down swelling/inflammation from the disc bulges.  If amenable, please send to [facility] to schedule

## 2024-09-04 NOTE — TELEPHONE ENCOUNTER
S/W pt and advised the same.  Pt would like to schedule the IRAIDA.    Pt appreciative and understanding.

## 2024-09-13 ENCOUNTER — TELEPHONE (OUTPATIENT)
Age: 64
End: 2024-09-13

## 2024-09-13 NOTE — TELEPHONE ENCOUNTER
Caller: Patient     Doctor: Jayy     Reason for call: Procedure on 9/18 but due to bee sting he has developed cellulitis and is on an antimitotic so looking to see if he needs to reschedule .    Also has question about the second procedure as he is scheduled for a MOHs procedure on 10/3 and not sure if this will delay that procedure.    Please advise     Call back#: [phone]

## 2024-09-25 DIAGNOSIS — K21.9 GASTROESOPHAGEAL REFLUX DISEASE WITHOUT ESOPHAGITIS: ICD-10-CM

## 2024-09-25 RX ORDER — PANTOPRAZOLE SODIUM 40 MG/1
TABLET, DELAYED RELEASE ORAL
Qty: 60 TABLET | Refills: 5 | Status: SHIPPED | OUTPATIENT
Start: 2024-09-25

## 2024-10-10 ENCOUNTER — APPOINTMENT (OUTPATIENT)
Dept: LAB | Facility: CLINIC | Age: 64
End: 2024-10-10
Payer: COMMERCIAL

## 2024-10-10 ENCOUNTER — OFFICE VISIT (OUTPATIENT)
Dept: FAMILY MEDICINE CLINIC | Facility: CLINIC | Age: 64
End: 2024-10-10
Payer: COMMERCIAL

## 2024-10-10 VITALS
HEIGHT: 72 IN | OXYGEN SATURATION: 96 % | SYSTOLIC BLOOD PRESSURE: 114 MMHG | WEIGHT: 168 LBS | TEMPERATURE: 97.1 F | BODY MASS INDEX: 22.75 KG/M2 | DIASTOLIC BLOOD PRESSURE: 76 MMHG | HEART RATE: 82 BPM

## 2024-10-10 DIAGNOSIS — R07.9 CHEST PAIN, UNSPECIFIED TYPE: ICD-10-CM

## 2024-10-10 DIAGNOSIS — A08.4 VIRAL GASTROENTERITIS: Primary | ICD-10-CM

## 2024-10-10 DIAGNOSIS — A08.4 VIRAL GASTROENTERITIS: ICD-10-CM

## 2024-10-10 DIAGNOSIS — N20.0 NEPHROLITHIASIS: ICD-10-CM

## 2024-10-10 DIAGNOSIS — R68.89 FLU-LIKE SYMPTOMS: ICD-10-CM

## 2024-10-10 LAB
ALBUMIN SERPL BCG-MCNC: 4.2 G/DL (ref 3.5–5)
ALP SERPL-CCNC: 67 U/L (ref 34–104)
ALT SERPL W P-5'-P-CCNC: 21 U/L (ref 7–52)
ANION GAP SERPL CALCULATED.3IONS-SCNC: 9 MMOL/L (ref 4–13)
AST SERPL W P-5'-P-CCNC: 25 U/L (ref 13–39)
BASOPHILS # BLD AUTO: 0.03 THOUSANDS/ΜL (ref 0–0.1)
BASOPHILS NFR BLD AUTO: 1 % (ref 0–1)
BILIRUB SERPL-MCNC: 1.15 MG/DL (ref 0.2–1)
BUN SERPL-MCNC: 13 MG/DL (ref 5–25)
CALCIUM SERPL-MCNC: 9 MG/DL (ref 8.4–10.2)
CHLORIDE SERPL-SCNC: 98 MMOL/L (ref 96–108)
CK SERPL-CCNC: 67 U/L (ref 39–308)
CO2 SERPL-SCNC: 29 MMOL/L (ref 21–32)
CREAT SERPL-MCNC: 1.17 MG/DL (ref 0.6–1.3)
EOSINOPHIL # BLD AUTO: 0.01 THOUSAND/ΜL (ref 0–0.61)
EOSINOPHIL NFR BLD AUTO: 0 % (ref 0–6)
ERYTHROCYTE [DISTWIDTH] IN BLOOD BY AUTOMATED COUNT: 12 % (ref 11.6–15.1)
FLUAV RNA RESP QL NAA+PROBE: NEGATIVE
FLUBV RNA RESP QL NAA+PROBE: NEGATIVE
GFR SERPL CREATININE-BSD FRML MDRD: 65 ML/MIN/1.73SQ M
GLUCOSE SERPL-MCNC: 93 MG/DL (ref 65–140)
HCT VFR BLD AUTO: 46 % (ref 36.5–49.3)
HGB BLD-MCNC: 15.5 G/DL (ref 12–17)
IMM GRANULOCYTES # BLD AUTO: 0.03 THOUSAND/UL (ref 0–0.2)
IMM GRANULOCYTES NFR BLD AUTO: 1 % (ref 0–2)
LYMPHOCYTES # BLD AUTO: 1.11 THOUSANDS/ΜL (ref 0.6–4.47)
LYMPHOCYTES NFR BLD AUTO: 22 % (ref 14–44)
MCH RBC QN AUTO: 30.8 PG (ref 26.8–34.3)
MCHC RBC AUTO-ENTMCNC: 33.7 G/DL (ref 31.4–37.4)
MCV RBC AUTO: 92 FL (ref 82–98)
MONOCYTES # BLD AUTO: 0.46 THOUSAND/ΜL (ref 0.17–1.22)
MONOCYTES NFR BLD AUTO: 9 % (ref 4–12)
NEUTROPHILS # BLD AUTO: 3.5 THOUSANDS/ΜL (ref 1.85–7.62)
NEUTS SEG NFR BLD AUTO: 67 % (ref 43–75)
NRBC BLD AUTO-RTO: 0 /100 WBCS
PLATELET # BLD AUTO: 144 THOUSANDS/UL (ref 149–390)
PMV BLD AUTO: 9.4 FL (ref 8.9–12.7)
POTASSIUM SERPL-SCNC: 3.6 MMOL/L (ref 3.5–5.3)
PROT SERPL-MCNC: 6.5 G/DL (ref 6.4–8.4)
PSA SERPL-MCNC: 2.15 NG/ML (ref 0–4)
RBC # BLD AUTO: 5.03 MILLION/UL (ref 3.88–5.62)
SARS-COV-2 AG UPPER RESP QL IA: NEGATIVE
SARS-COV-2 RNA RESP QL NAA+PROBE: NEGATIVE
SODIUM SERPL-SCNC: 136 MMOL/L (ref 135–147)
VALID CONTROL: NORMAL
WBC # BLD AUTO: 5.14 THOUSAND/UL (ref 4.31–10.16)

## 2024-10-10 PROCEDURE — 87811 SARS-COV-2 COVID19 W/OPTIC: CPT | Performed by: FAMILY MEDICINE

## 2024-10-10 PROCEDURE — 82550 ASSAY OF CK (CPK): CPT

## 2024-10-10 PROCEDURE — 87636 SARSCOV2 & INF A&B AMP PRB: CPT | Performed by: FAMILY MEDICINE

## 2024-10-10 PROCEDURE — 93000 ELECTROCARDIOGRAM COMPLETE: CPT | Performed by: FAMILY MEDICINE

## 2024-10-10 PROCEDURE — 80053 COMPREHEN METABOLIC PANEL: CPT

## 2024-10-10 PROCEDURE — 85025 COMPLETE CBC W/AUTO DIFF WBC: CPT

## 2024-10-10 PROCEDURE — 99213 OFFICE O/P EST LOW 20 MIN: CPT | Performed by: FAMILY MEDICINE

## 2024-10-10 NOTE — PROGRESS NOTES
Ambulatory Visit  Name: Sandip Dominguez III      : 1960      MRN: [id number]  Encounter Provider: Antonio Israel MD  Encounter Date: 10/10/2024   Encounter department: [facility]    Assessment & Plan  Viral gastroenteritis  Recommend supportive care with Tylenol, Motrin, epsom baths, Bengay   BRAT diet  EKG completed due to chest pain in 63yo M - negative for acute ischemia   Obtain labs out of abundance of caution   Orders:    CBC and differential; Future    Comprehensive metabolic panel; Future    CK; Future    POCT ECG    Chest pain, unspecified type    Orders:    CK; Future    POCT ECG       History of Present Illness     HPI Patient presents with joint pains and thigh pain, headache, starting yesterday. He felt nauseated and vomited yesterday. Has diarrhea. No rash, fever, cough, congestion.     History obtained from : patient  Review of Systems   Constitutional:  Negative for chills and fever.   HENT:  Negative for congestion and sore throat.    Eyes:  Negative for pain and visual disturbance.   Respiratory:  Negative for cough and shortness of breath.    Cardiovascular:  Positive for chest pain. Negative for palpitations.   Gastrointestinal:  Negative for abdominal pain and nausea.   Genitourinary:  Negative for dysuria.   Musculoskeletal:  Positive for arthralgias and myalgias.   Skin:  Negative for rash and wound.   Neurological:  Positive for headaches. Negative for dizziness.   All other systems reviewed and are negative.    Medical History Reviewed by provider this encounter:           Objective     /76   Pulse 82   Temp (!) 97.1 °F (36.2 °C)   Ht 6' (1.829 m)   Wt 76.2 kg (168 lb)   SpO2 96%   BMI 22.78 kg/m²     Physical Exam  Constitutional:       General: He is in acute distress.      Appearance: Normal appearance. He is normal weight. He is not toxic-appearing or diaphoretic.   HENT:      Head: Normocephalic and atraumatic.      Right Ear: Tympanic  membrane, ear canal and external ear normal.      Left Ear: Tympanic membrane, ear canal and external ear normal.      Nose: Nose normal.   Cardiovascular:      Rate and Rhythm: Normal rate and regular rhythm.      Pulses: Normal pulses.      Heart sounds: Normal heart sounds.   Pulmonary:      Effort: Pulmonary effort is normal. No respiratory distress.      Breath sounds: Normal breath sounds. No wheezing, rhonchi or rales.   Abdominal:      General: Abdomen is flat. Bowel sounds are normal.      Tenderness: There is abdominal tenderness.   Skin:     Findings: No rash.   Neurological:      General: No focal deficit present.      Mental Status: He is alert. Mental status is at baseline.

## 2024-10-22 ENCOUNTER — HOSPITAL ENCOUNTER (OUTPATIENT)
Dept: RADIOLOGY | Facility: CLINIC | Age: 64
Discharge: HOME | End: 2024-10-22
Payer: COMMERCIAL

## 2024-10-22 VITALS
OXYGEN SATURATION: 97 % | HEART RATE: 64 BPM | RESPIRATION RATE: 20 BRPM | SYSTOLIC BLOOD PRESSURE: 116 MMHG | TEMPERATURE: 97.7 F | DIASTOLIC BLOOD PRESSURE: 75 MMHG

## 2024-10-22 DIAGNOSIS — M54.12 CERVICAL RADICULOPATHY: ICD-10-CM

## 2024-10-22 PROCEDURE — 62321 NJX INTERLAMINAR CRV/THRC: CPT | Performed by: ANESTHESIOLOGY

## 2024-10-22 RX ORDER — METHYLPREDNISOLONE ACETATE 80 MG/ML
80 INJECTION, SUSPENSION INTRA-ARTICULAR; INTRALESIONAL; INTRAMUSCULAR; PARENTERAL; SOFT TISSUE ONCE
Status: COMPLETED | OUTPATIENT
Start: 2024-10-22 | End: 2024-10-22

## 2024-10-22 RX ADMIN — IOHEXOL 1 ML: 300 INJECTION, SOLUTION INTRAVENOUS at 10:40

## 2024-10-22 RX ADMIN — METHYLPREDNISOLONE ACETATE 80 MG: 80 INJECTION, SUSPENSION INTRA-ARTICULAR; INTRALESIONAL; INTRAMUSCULAR; SOFT TISSUE at 10:40

## 2024-10-22 NOTE — DISCHARGE INSTR - LAB
Epidural Steroid Injection   WHAT YOU NEED TO KNOW:   An epidural steroid injection (MICHELLE) is a procedure to inject steroid medicine into the epidural space. The epidural space is between your spinal cord and vertebrae. Steroids reduce inflammation and fluid buildup in your spine that may be causing pain. You may be given pain medicine along with the steroids.          ACTIVITY  Do not drive or operate machinery today.  No strenuous activity today - bending, lifting, etc.  You may resume normal activites starting tomorrow - start slowly and as tolerated.  You may shower today, but no tub baths or hot tubs.  You may have numbness for several hours from the local anesthetic. Please use caution and common sense, especially with weight-bearing activities.    CARE OF THE INJECTION SITE  If you have soreness or pain, apply ice to the area today (20 minutes on/20 minutes off).  Starting tomorrow, you may use warm, moist heat or ice if needed.  You may have an increase or change in your discomfort for 36-48 hours after your treatment.  Apply ice and continue with any pain medication you have been prescribed.  Notify the [facility] if you have any of the following: redness, drainage, swelling, headache, stiff neck or fever above 100°F.    SPECIAL INSTRUCTIONS  Our office will contact you in approximately 14 days for a progress report.    MEDICATIONS  Continue to take all routine medications.  Our office may have instructed you to hold some medications.    As no general anesthesia was used in today's procedure, you should not experience any side effects related to anesthesia.     If you are diabetic, the steroids used in today's injection may temporarily increase your blood sugar levels after the first few days after your injection. Please keep a close eye on your sugars and alert the doctor who manages your diabetes if your sugars are significantly high from your baseline or you are symptomatic.     If you have a  problem specifically related to your procedure, please call our office at [phone].  Problems not related to your procedure should be directed to your primary care physician.
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
## 2024-10-27 DIAGNOSIS — I10 ESSENTIAL HYPERTENSION: ICD-10-CM

## 2024-10-27 DIAGNOSIS — E03.9 HYPOTHYROIDISM, UNSPECIFIED TYPE: ICD-10-CM

## 2024-10-28 DIAGNOSIS — F41.9 ANXIETY: ICD-10-CM

## 2024-10-28 RX ORDER — CLONAZEPAM 0.5 MG/1
0.5 TABLET ORAL 2 TIMES DAILY
Qty: 60 TABLET | Refills: 1 | Status: SHIPPED | OUTPATIENT
Start: 2024-10-28

## 2024-10-28 NOTE — TELEPHONE ENCOUNTER
1 [id number] 09/23/2024 08/19/2024 clonazePAM (Tablet) 60.0 30 0.5 MG NA DOUGLAS NELSON [id number] Commercial Insurance 1 / 1 PA    1 [id number] 08/19/2024 08/19/2024 clonazePAM (Tablet) 60.0 30 0.5 MG NA DOUGLAS NELSON [facility] Commercial Insurance 0 / 1 PA    1 [id number] 07/16/2024 06/07/2024 clonazePAM (Tablet) 60.0 30 0.5 MG  [facility] Commercial Insurance 1 / 1 [place]    1 [id number] 06/07/2024 06/07/2024 clonazePAM (Tablet) 60.0 30 0.5 MG  DOUGLAS LESLIE [facility] Commercial Insurance 0 / 1 PA    1 [id number] 05/05/2024 04/02/2024 clonazePAM (Tablet) 60.0 30 0.5 MG  [facility] Commercial Insurance 1 / 1 PA    1 [id number] 04/02/2024 04/02/2024 clonazePAM (Tablet) 60.0 30 0.5 MG  [facility] Commercial Insurance 0 / 1 PA    1 [id number] 02/29/2024 01/30/2024 clonazePAM (Tablet) 60.0 30 0.5 MG [facility] Commercial Insurance 1 / 1 PA    1 [id number] 01/30/2024 01/30/2024 clonazePAM (Tablet) 60.0 30 0.5 MG  [facility] Commercial Insurance 0 / 1 PA    1 [id number] 01/02/2024 12/05/2023 clonazePAM (Tablet) 60.0 30 0.5 MG  [facility] Commercial Insurance 1 / 1 PA    1 [id number] 12/05/2023 12/05/2023 clonazePAM (Tablet) 60.0 30 0.5 MG  [facility] Commercial Insurance 0 /

## 2024-10-29 RX ORDER — LEVOTHYROXINE SODIUM 75 UG/1
TABLET ORAL
Qty: 30 TABLET | Refills: 0 | Status: SHIPPED | OUTPATIENT
Start: 2024-10-29

## 2024-10-29 RX ORDER — LISINOPRIL 40 MG/1
TABLET ORAL
Qty: 90 TABLET | Refills: 1 | Status: SHIPPED | OUTPATIENT
Start: 2024-10-29

## 2024-11-05 ENCOUNTER — TELEPHONE (OUTPATIENT)
Dept: RADIOLOGY | Facility: MEDICAL CENTER | Age: 64
End: 2024-11-05

## 2024-11-08 NOTE — TELEPHONE ENCOUNTER
Caller: Sandip  Doctor/office: Jayy  #: [phone]    % of improvement: 60%  Pain Scale (1-10): 2/10

## 2024-11-13 ENCOUNTER — TELEPHONE (OUTPATIENT)
Dept: NEPHROLOGY | Facility: CLINIC | Age: 64
End: 2024-11-13

## 2024-11-13 NOTE — TELEPHONE ENCOUNTER
Left message w/ pt. for August f/u. Dr. Reyes out of office in August. Schedule first available and place on wait list

## 2024-11-24 DIAGNOSIS — E03.9 HYPOTHYROIDISM, UNSPECIFIED TYPE: ICD-10-CM

## 2024-11-26 RX ORDER — LEVOTHYROXINE SODIUM 75 UG/1
75 TABLET ORAL DAILY
Qty: 30 TABLET | Refills: 0 | Status: SHIPPED | OUTPATIENT
Start: 2024-11-26

## 2024-12-10 DIAGNOSIS — T63.441D BEE STING REACTION, ACCIDENTAL OR UNINTENTIONAL, SUBSEQUENT ENCOUNTER: ICD-10-CM

## 2024-12-11 RX ORDER — EPINEPHRINE 0.3 MG/.3ML
0.3 INJECTION SUBCUTANEOUS ONCE
Qty: 0.6 ML | Refills: 0 | Status: SHIPPED | OUTPATIENT
Start: 2024-12-11 | End: 2024-12-11

## 2024-12-30 DIAGNOSIS — E03.9 HYPOTHYROIDISM, UNSPECIFIED TYPE: ICD-10-CM

## 2024-12-31 RX ORDER — LEVOTHYROXINE SODIUM 75 UG/1
75 TABLET ORAL DAILY
Qty: 30 TABLET | Refills: 5 | Status: SHIPPED | OUTPATIENT
Start: 2024-12-31

## 2025-01-09 DIAGNOSIS — F41.9 ANXIETY AND DEPRESSION: ICD-10-CM

## 2025-01-09 DIAGNOSIS — F32.A ANXIETY AND DEPRESSION: ICD-10-CM

## 2025-01-09 DIAGNOSIS — F41.9 ANXIETY: ICD-10-CM

## 2025-01-10 RX ORDER — CLONAZEPAM 0.5 MG/1
0.5 TABLET ORAL 2 TIMES DAILY
Qty: 60 TABLET | Refills: 1 | Status: SHIPPED | OUTPATIENT
Start: 2025-01-10

## 2025-01-10 RX ORDER — ESCITALOPRAM OXALATE 10 MG/1
10 TABLET ORAL DAILY
Qty: 30 TABLET | Refills: 5 | Status: SHIPPED | OUTPATIENT
Start: 2025-01-10

## 2025-01-10 NOTE — TELEPHONE ENCOUNTER
1 [id number] 12/02/2024 10/28/2024 clonazePAM (Tablet) 60.0 30 0.5 MG  DOUGLAS LESLIE [facility] Commercial Insurance 1 / 1 PA    1 [id number] 10/28/2024 10/28/2024 clonazePAM (Tablet) 60.0 30 0.5 MG  DOUGLAS LESLIE [facility] Commercial Insurance 0 / 1 PA    1 [id number] 09/23/2024 08/19/2024 clonazePAM (Tablet) 60.0 30 0.5 MG  DOUGLAS LESLIE [facility] Commercial Insurance 1 / 1 PA    1 [id number] 08/19/2024 08/19/2024 clonazePAM (Tablet) 60.0 30 0.5 MG  DOUGLAS LESLIE [facility] Commercial Insurance 0 / 1 PA    1 [id number] 07/16/2024 06/07/2024 clonazePAM (Tablet) 60.0 30 0.5 MG  DOUGLAS LESLIE [facility] Commercial Insurance 1 / 1 PA    1 [id number] 06/07/2024 06/07/2024 clonazePAM (Tablet) 60.0 30 0.5 MG  DOUGLAS LESLIE [facility] Commercial Insurance 0 / 1 PA    1 [id number] 05/05/2024 04/02/2024 clonazePAM (Tablet) 60.0 30 0.5 MG  [facility] Commercial Insurance 1 / 1 PA    1 [id number] 04/02/2024 04/02/2024 clonazePAM (Tablet) 60.0 30 0.5 MG  [facility] Commercial Insurance 0 / 1 PA    1 [id number] 02/29/2024 01/30/2024 clonazePAM (Tablet) 60.0 30 0.5 MG  [facility] Commercial Insurance 1 / 1 PA    1 [id number] 01/30/2024 01/30/2024 clonazePAM (Tablet) 60.0 30 0.5 MG  [facility] Commercial Insurance 0 / 1 PA       Consent Type: Consent 1 (Standard)

## 2025-02-28 ENCOUNTER — HOSPITAL ENCOUNTER (OUTPATIENT)
Dept: ULTRASOUND IMAGING | Facility: HOSPITAL | Age: 65
Discharge: HOME | End: 2025-02-28
Attending: STUDENT IN AN ORGANIZED HEALTH CARE EDUCATION/TRAINING PROGRAM
Payer: COMMERCIAL

## 2025-02-28 DIAGNOSIS — N20.0 NEPHROLITHIASIS: ICD-10-CM

## 2025-02-28 PROCEDURE — 76775 US EXAM ABDO BACK WALL LIM: CPT

## 2025-03-07 ENCOUNTER — RESULTS FOLLOW-UP (OUTPATIENT)
Dept: OTHER | Facility: HOSPITAL | Age: 65
End: 2025-03-07

## 2025-03-26 DIAGNOSIS — F41.9 ANXIETY: ICD-10-CM

## 2025-03-27 RX ORDER — CLONAZEPAM 0.5 MG/1
0.5 TABLET ORAL 2 TIMES DAILY
Qty: 60 TABLET | Refills: 1 | Status: SHIPPED | OUTPATIENT
Start: 2025-03-27

## 2025-03-27 NOTE — TELEPHONE ENCOUNTER
1 [id number] 02/23/2025 01/10/2025 clonazePAM (Tablet) 60.0 30 0.5 MG  [facility] Commercial Insurance 1 / 1 PA    1 [id number] 01/10/2025 01/10/2025 clonazePAM (Tablet) 60.0 30 0.5 MG NA [facility] Commercial Insurance 0 / 1 PA    1 [id number] 12/02/2024 10/28/2024 clonazePAM (Tablet) 60.0 30 0.5 MG  DOUGLAS LESLIE [facility] Commercial Insurance 1 / 1 PA    1 [id number] 10/28/2024 10/28/2024 clonazePAM (Tablet) 60.0 30 0.5 MG  [employer] [facility] #6[facility] Commercial Insurance 0 / 1 PA    1 [id number] 09/23/2024 08/19/2024 clonazePAM (Tablet) 60.0 30 0.5 MG  [employer] [facility] Commercial Insurance 1 / 1 PA    1 [id number] 08/19/2024 08/19/2024 clonazePAM (Tablet) 60.0 30 0.5 MG  [employer] [facility] Commercial Insurance 0 / 1 PA    1 [id number] 07/16/2024 06/07/2024 clonazePAM (Tablet) 60.0 30 0.5 MG  DOUGLAS LESLIE [facility] Commercial Insurance 1 / 1 PA    1 [id number] 06/07/2024 06/07/2024 clonazePAM (Tablet) 60.0 30 0.5 MG  [employer] [facility] Commercial Insurance 0 / 1 PA    1 [id number] 05/05/2024 04/02/2024 clonazePAM (Tablet) 60.0 30 0.5 MG  [facility] Commercial Insurance 1 / 1 PA    1 [id number] 04/02/2024 04/02/2024 clonazePAM (Tablet) 60.0 30 0.5 MG  [facility] Commercial Insurance 0 / 1

## 2025-03-30 DIAGNOSIS — I10 ESSENTIAL HYPERTENSION: ICD-10-CM

## 2025-03-30 DIAGNOSIS — K21.9 GASTROESOPHAGEAL REFLUX DISEASE WITHOUT ESOPHAGITIS: ICD-10-CM

## 2025-03-30 RX ORDER — LISINOPRIL 40 MG/1
40 TABLET ORAL DAILY
Qty: 90 TABLET | Refills: 1 | Status: SHIPPED | OUTPATIENT
Start: 2025-03-30

## 2025-03-30 RX ORDER — PANTOPRAZOLE SODIUM 40 MG/1
40 TABLET, DELAYED RELEASE ORAL 2 TIMES DAILY
Qty: 180 TABLET | Refills: 1 | Status: SHIPPED | OUTPATIENT
Start: 2025-03-30

## 2025-03-31 DIAGNOSIS — J06.9 UPPER RESPIRATORY TRACT INFECTION, UNSPECIFIED TYPE: Primary | ICD-10-CM

## 2025-03-31 RX ORDER — CEFUROXIME AXETIL 500 MG/1
500 TABLET ORAL EVERY 12 HOURS SCHEDULED
Qty: 20 TABLET | Refills: 0 | Status: SHIPPED | OUTPATIENT
Start: 2025-03-31 | End: 2025-04-10

## 2025-03-31 RX ORDER — PREDNISONE 10 MG/1
TABLET ORAL
Qty: 26 TABLET | Refills: 0 | Status: SHIPPED | OUTPATIENT
Start: 2025-03-31

## 2025-05-22 DIAGNOSIS — F41.9 ANXIETY: ICD-10-CM

## 2025-05-22 NOTE — TELEPHONE ENCOUNTER
1 [id number] 04/29/2025 04/25/2025 03/27/2025 clonazePAM (Tablet) 60.0 30 0.5 MG NA FRANCIS MEZA [facility] #6[facility] Commercial Insurance 1 / 1 PA    1 [id number] 03/29/2025 03/27/2025 03/27/2025 clonazePAM (Tablet) 60.0 30 0.5 MG NA FRANCIS MEZA [facility] #6[facility] Commercial Insurance 0 / 1 PA    1 [id number] 02/24/2025 02/23/2025 01/10/2025 clonazePAM (Tablet) 60.0 30 0.5 MG NA [facility] Commercial Insurance 1 / 1 PA

## 2025-05-23 RX ORDER — CLONAZEPAM 0.5 MG/1
0.5 TABLET ORAL 2 TIMES DAILY
Qty: 60 TABLET | Refills: 1 | Status: SHIPPED | OUTPATIENT
Start: 2025-05-23

## 2025-06-21 DIAGNOSIS — E03.9 HYPOTHYROIDISM, UNSPECIFIED TYPE: ICD-10-CM

## 2025-06-22 RX ORDER — LEVOTHYROXINE SODIUM 75 UG/1
75 TABLET ORAL DAILY
Qty: 30 TABLET | Refills: 5 | Status: SHIPPED | OUTPATIENT
Start: 2025-06-22

## 2025-06-24 DIAGNOSIS — R53.83 OTHER FATIGUE: ICD-10-CM

## 2025-06-24 DIAGNOSIS — E61.1 IRON DEFICIENCY: Primary | ICD-10-CM

## 2025-06-24 DIAGNOSIS — E55.9 VITAMIN D DEFICIENCY: ICD-10-CM

## 2025-06-25 ENCOUNTER — APPOINTMENT (OUTPATIENT)
Dept: LAB | Facility: CLINIC | Age: 65
End: 2025-06-25
Payer: COMMERCIAL

## 2025-06-25 DIAGNOSIS — E61.1 IRON DEFICIENCY: ICD-10-CM

## 2025-06-25 DIAGNOSIS — E55.9 VITAMIN D DEFICIENCY: ICD-10-CM

## 2025-06-25 DIAGNOSIS — R53.83 OTHER FATIGUE: ICD-10-CM

## 2025-06-25 LAB
25(OH)D3 SERPL-MCNC: 60.2 NG/ML (ref 30–100)
ALBUMIN SERPL BCG-MCNC: 4.4 G/DL (ref 3.5–5)
ALP SERPL-CCNC: 70 U/L (ref 34–104)
ALT SERPL W P-5'-P-CCNC: 16 U/L (ref 7–52)
ANION GAP SERPL CALCULATED.3IONS-SCNC: 6 MMOL/L (ref 4–13)
AST SERPL W P-5'-P-CCNC: 19 U/L (ref 13–39)
BILIRUB SERPL-MCNC: 1.22 MG/DL (ref 0.2–1)
BUN SERPL-MCNC: 14 MG/DL (ref 5–25)
CALCIUM SERPL-MCNC: 9 MG/DL (ref 8.4–10.2)
CHLORIDE SERPL-SCNC: 102 MMOL/L (ref 96–108)
CO2 SERPL-SCNC: 32 MMOL/L (ref 21–32)
CREAT SERPL-MCNC: 1.13 MG/DL (ref 0.6–1.3)
FERRITIN SERPL-MCNC: 42 NG/ML (ref 30–336)
GFR SERPL CREATININE-BSD FRML MDRD: 68 ML/MIN/1.73SQ M
GLUCOSE P FAST SERPL-MCNC: 89 MG/DL (ref 65–99)
IRON SATN MFR SERPL: 47 % (ref 15–50)
IRON SERPL-MCNC: 158 UG/DL (ref 50–212)
POTASSIUM SERPL-SCNC: 4.1 MMOL/L (ref 3.5–5.3)
PROT SERPL-MCNC: 6.4 G/DL (ref 6.4–8.4)
PSA SERPL-MCNC: 2.19 NG/ML (ref 0–4)
SODIUM SERPL-SCNC: 140 MMOL/L (ref 135–147)
T4 FREE SERPL-MCNC: 0.91 NG/DL (ref 0.61–1.12)
TIBC SERPL-MCNC: 334.6 UG/DL (ref 250–450)
TRANSFERRIN SERPL-MCNC: 239 MG/DL (ref 203–362)
TSH SERPL DL<=0.05 MIU/L-ACNC: 4.52 UIU/ML (ref 0.45–4.5)
UIBC SERPL-MCNC: 177 UG/DL (ref 155–355)

## 2025-06-25 PROCEDURE — 83550 IRON BINDING TEST: CPT

## 2025-06-25 PROCEDURE — 84439 ASSAY OF FREE THYROXINE: CPT

## 2025-06-25 PROCEDURE — 82728 ASSAY OF FERRITIN: CPT

## 2025-06-25 PROCEDURE — 84443 ASSAY THYROID STIM HORMONE: CPT

## 2025-06-25 PROCEDURE — 82306 VITAMIN D 25 HYDROXY: CPT

## 2025-06-25 PROCEDURE — 80053 COMPREHEN METABOLIC PANEL: CPT

## 2025-06-25 PROCEDURE — 83540 ASSAY OF IRON: CPT

## 2025-06-26 ENCOUNTER — APPOINTMENT (OUTPATIENT)
Dept: LAB | Facility: CLINIC | Age: 65
End: 2025-06-26
Attending: FAMILY MEDICINE
Payer: COMMERCIAL

## 2025-06-26 ENCOUNTER — TRANSCRIBE ORDERS (OUTPATIENT)
Dept: LAB | Facility: HOSPITAL | Age: 65
End: 2025-06-26

## 2025-06-26 DIAGNOSIS — R53.83 OTHER FATIGUE: Primary | ICD-10-CM

## 2025-06-26 DIAGNOSIS — R53.83 OTHER FATIGUE: ICD-10-CM

## 2025-06-26 LAB
BASOPHILS # BLD AUTO: 0.04 THOUSANDS/ÂΜL (ref 0–0.1)
BASOPHILS NFR BLD AUTO: 1 % (ref 0–1)
EOSINOPHIL # BLD AUTO: 0.45 THOUSAND/ÂΜL (ref 0–0.61)
EOSINOPHIL NFR BLD AUTO: 8 % (ref 0–6)
ERYTHROCYTE [DISTWIDTH] IN BLOOD BY AUTOMATED COUNT: 12 % (ref 11.6–15.1)
HCT VFR BLD AUTO: 46.1 % (ref 36.5–49.3)
HGB BLD-MCNC: 16 G/DL (ref 12–17)
IMM GRANULOCYTES # BLD AUTO: 0.02 THOUSAND/UL (ref 0–0.2)
IMM GRANULOCYTES NFR BLD AUTO: 0 % (ref 0–2)
LYMPHOCYTES # BLD AUTO: 2.07 THOUSANDS/ÂΜL (ref 0.6–4.47)
LYMPHOCYTES NFR BLD AUTO: 36 % (ref 14–44)
MCH RBC QN AUTO: 31.4 PG (ref 26.8–34.3)
MCHC RBC AUTO-ENTMCNC: 34.7 G/DL (ref 31.4–37.4)
MCV RBC AUTO: 90 FL (ref 82–98)
MONOCYTES # BLD AUTO: 0.51 THOUSAND/ÂΜL (ref 0.17–1.22)
MONOCYTES NFR BLD AUTO: 9 % (ref 4–12)
NEUTROPHILS # BLD AUTO: 2.65 THOUSANDS/ÂΜL (ref 1.85–7.62)
NEUTS SEG NFR BLD AUTO: 46 % (ref 43–75)
NRBC BLD AUTO-RTO: 0 /100 WBCS
PLATELET # BLD AUTO: 168 THOUSANDS/UL (ref 149–390)
PMV BLD AUTO: 8.9 FL (ref 8.9–12.7)
RBC # BLD AUTO: 5.1 MILLION/UL (ref 3.88–5.62)
WBC # BLD AUTO: 5.74 THOUSAND/UL (ref 4.31–10.16)

## 2025-06-26 PROCEDURE — 85025 COMPLETE CBC W/AUTO DIFF WBC: CPT

## 2025-06-26 PROCEDURE — 36415 COLL VENOUS BLD VENIPUNCTURE: CPT

## 2025-07-15 ENCOUNTER — TELEPHONE (OUTPATIENT)
Age: 65
End: 2025-07-15

## 2025-07-16 ENCOUNTER — OFFICE VISIT (OUTPATIENT)
Dept: FAMILY MEDICINE CLINIC | Facility: CLINIC | Age: 65
End: 2025-07-16
Payer: COMMERCIAL

## 2025-07-16 ENCOUNTER — APPOINTMENT (OUTPATIENT)
Dept: LAB | Facility: CLINIC | Age: 65
End: 2025-07-16
Payer: COMMERCIAL

## 2025-07-16 VITALS
HEIGHT: 72 IN | DIASTOLIC BLOOD PRESSURE: 82 MMHG | TEMPERATURE: 97.8 F | OXYGEN SATURATION: 94 % | WEIGHT: 164 LBS | BODY MASS INDEX: 22.21 KG/M2 | SYSTOLIC BLOOD PRESSURE: 124 MMHG | HEART RATE: 74 BPM | RESPIRATION RATE: 22 BRPM

## 2025-07-16 DIAGNOSIS — R40.0 DAYTIME SOMNOLENCE: Primary | ICD-10-CM

## 2025-07-16 DIAGNOSIS — J02.9 ACUTE PHARYNGITIS, UNSPECIFIED ETIOLOGY: ICD-10-CM

## 2025-07-16 DIAGNOSIS — R23.2 FLUSHING: ICD-10-CM

## 2025-07-16 DIAGNOSIS — J02.9 SORE THROAT: ICD-10-CM

## 2025-07-16 PROCEDURE — 99214 OFFICE O/P EST MOD 30 MIN: CPT | Performed by: FAMILY MEDICINE

## 2025-07-16 PROCEDURE — 87070 CULTURE OTHR SPECIMN AEROBIC: CPT | Performed by: FAMILY MEDICINE

## 2025-07-16 RX ORDER — CEFUROXIME AXETIL 500 MG/1
500 TABLET ORAL EVERY 12 HOURS SCHEDULED
Qty: 20 TABLET | Refills: 0 | Status: SHIPPED | OUTPATIENT
Start: 2025-07-16 | End: 2025-07-26

## 2025-07-17 ENCOUNTER — APPOINTMENT (OUTPATIENT)
Dept: LAB | Facility: CLINIC | Age: 65
End: 2025-07-17

## 2025-07-17 DIAGNOSIS — R23.2 FLUSHING: ICD-10-CM

## 2025-07-19 LAB — BACTERIA THROAT CULT: NORMAL

## 2025-07-21 ENCOUNTER — APPOINTMENT (OUTPATIENT)
Dept: LAB | Facility: CLINIC | Age: 65
End: 2025-07-21
Payer: COMMERCIAL

## 2025-07-21 PROCEDURE — 82384 ASSAY THREE CATECHOLAMINES: CPT

## 2025-07-21 PROCEDURE — 83835 ASSAY OF METANEPHRINES: CPT
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
## 2025-07-24 ENCOUNTER — TRANSCRIBE ORDERS (OUTPATIENT)
Dept: SLEEP CENTER | Facility: CLINIC | Age: 65
End: 2025-07-24

## 2025-07-24 DIAGNOSIS — R40.0 DAYTIME SOMNOLENCE: Primary | ICD-10-CM

## 2025-07-26 LAB
METANEPH 24H UR-MRATE: 111 UG/24 HR (ref 58–276)
METANEPHS 24H UR-MCNC: 54 UG/L
NORMETANEPHRINE 24H UR-MCNC: 124 UG/L
NORMETANEPHRINE 24H UR-MRATE: 254 UG/24 HR (ref 156–729)

## 2025-08-07 LAB
DOPAMINE 24H UR-MRATE: 158 UG/24 HR (ref 0–510)
DOPAMINE UR-MCNC: 77 UG/L
EPINEPH 24H UR-MRATE: <6 UG/24 HR (ref 0–20)
EPINEPH UR-MCNC: <3 UG/L
NOREPINEPH 24H UR-MRATE: 53 UG/24 HR (ref 0–135)
NOREPINEPH UR-MCNC: 26 UG/L

## 2025-08-15 ENCOUNTER — APPOINTMENT (OUTPATIENT)
Dept: LAB | Facility: CLINIC | Age: 65
End: 2025-08-15
Payer: MEDICARE

## (undated) DEVICE — BETHLEHEM UNIVERSAL MINOR GEN: Brand: CARDINAL HEALTH

## (undated) DEVICE — VIAL DECANTER

## (undated) DEVICE — UROCATCH BAG

## (undated) DEVICE — CHLORHEXIDINE 4PCT 4 OZ

## (undated) DEVICE — GLOVE SRG BIOGEL ORTHOPEDIC 7.5

## (undated) DEVICE — EXIDINE 4 PCT

## (undated) DEVICE — TUBING SUCTION 5MM X 12 FT

## (undated) DEVICE — INTENDED FOR TISSUE SEPARATION, AND OTHER PROCEDURES THAT REQUIRE A SHARP SURGICAL BLADE TO PUNCTURE OR CUT.: Brand: BARD-PARKER SAFETY BLADES SIZE 15, STERILE

## (undated) DEVICE — SPECIMEN CONTAINER STERILE PEEL PACK

## (undated) DEVICE — INVIEW CLEAR LEGGINGS: Brand: CONVERTORS

## (undated) DEVICE — NEEDLE 22 G X 1 1/2 SAFETY

## (undated) DEVICE — PLUMEPEN PRO 10FT

## (undated) DEVICE — CHLORAPREP HI-LITE 26ML ORANGE

## (undated) DEVICE — GLOVE SRG BIOGEL ORTHOPEDIC 8

## (undated) DEVICE — ADHESIVE SKIN HIGH VISCOSITY EXOFIN 1ML

## (undated) DEVICE — CATH URETERAL 5FR X 70 CM FLEX TIP POLYUR BARD

## (undated) DEVICE — STERILE SURGICAL LUBRICANT,  TUBE: Brand: SURGILUBE

## (undated) DEVICE — ENDOSCOPIC VALVE WITH ADAPTER.: Brand: SURSEAL® II

## (undated) DEVICE — PAD GROUNDING ADULT

## (undated) DEVICE — DRAPE EQUIPMENT RF WAND

## (undated) DEVICE — BASKET SPECIMEN RETRIVAL 1.9FR 120CM

## (undated) DEVICE — GUIDEWIRE STRGHT TIP 0.035 IN  SOLO PLUS

## (undated) DEVICE — SHEATH URETERAL ACCESS 10/12FR 45CM PROXIS

## (undated) DEVICE — SCD SEQUENTIAL COMPRESSION COMFORT SLEEVE MEDIUM KNEE LENGTH: Brand: KENDALL SCD

## (undated) DEVICE — LIGHT HANDLE COVER SLEEVE DISP BLUE STELLAR

## (undated) DEVICE — SINGLE PORT MANIFOLD: Brand: NEPTUNE 2

## (undated) DEVICE — PACK TUR

## (undated) DEVICE — 3M™ TEGADERM™ TRANSPARENT FILM DRESSING FRAME STYLE, 1624W, 2-3/8 IN X 2-3/4 IN (6 CM X 7 CM), 100/CT 4CT/CASE: Brand: 3M™ TEGADERM™

## (undated) DEVICE — GLOVE SRG BIOGEL 8

## (undated) DEVICE — PREMIUM DRY TRAY LF: Brand: MEDLINE INDUSTRIES, INC.

## (undated) DEVICE — FIBER STD QUANTA 272 MICRON

## (undated) DEVICE — SHEATH URETERAL ACCESS 10/12FR 35CM PROXIS

## (undated) DEVICE — TOWEL SURG XR DETECT GREEN STRL RFD

## (undated) DEVICE — CATH URET .038 10FR 50CM DUAL LUMEN

## (undated) DEVICE — 3M™ STERI-STRIP™ COMPOUND BENZOIN TINCTURE 40 BAGS/CARTON 4 CARTONS/CASE C1544: Brand: 3M™ STERI-STRIP™

## (undated) DEVICE — SYRINGE 10ML LL

## (undated) DEVICE — SUT VICRYL 2-0 SH 27 IN UNDYED J417H

## (undated) DEVICE — SUT MONOCRYL 4-0 PS-2 27 IN Y426H

## (undated) DEVICE — GLOVE SRG BIOGEL 7.5